# Patient Record
Sex: FEMALE | Race: BLACK OR AFRICAN AMERICAN | Employment: OTHER | URBAN - METROPOLITAN AREA
[De-identification: names, ages, dates, MRNs, and addresses within clinical notes are randomized per-mention and may not be internally consistent; named-entity substitution may affect disease eponyms.]

---

## 2017-03-27 ENCOUNTER — OFFICE VISIT (OUTPATIENT)
Dept: PRIMARY CARE CLINIC | Age: 67
End: 2017-03-27

## 2017-03-27 VITALS
HEART RATE: 55 BPM | DIASTOLIC BLOOD PRESSURE: 86 MMHG | SYSTOLIC BLOOD PRESSURE: 150 MMHG | BODY MASS INDEX: 27.38 KG/M2 | OXYGEN SATURATION: 99 % | WEIGHT: 140.2 LBS

## 2017-03-27 DIAGNOSIS — E04.1 THYROID NODULE: ICD-10-CM

## 2017-03-27 DIAGNOSIS — I10 ESSENTIAL HYPERTENSION: ICD-10-CM

## 2017-03-27 DIAGNOSIS — M81.0 OSTEOPOROSIS: ICD-10-CM

## 2017-03-27 DIAGNOSIS — I15.9 SECONDARY HYPERTENSION, UNSPECIFIED: ICD-10-CM

## 2017-03-27 DIAGNOSIS — Z11.59 NEED FOR HEPATITIS C SCREENING TEST: ICD-10-CM

## 2017-03-27 DIAGNOSIS — E55.9 VITAMIN D DEFICIENCY: ICD-10-CM

## 2017-03-27 DIAGNOSIS — J45.991 ASTHMA, COUGH VARIANT: Primary | ICD-10-CM

## 2017-03-27 DIAGNOSIS — Z23 NEED FOR TDAP VACCINATION: ICD-10-CM

## 2017-03-27 PROCEDURE — 90715 TDAP VACCINE 7 YRS/> IM: CPT | Performed by: INTERNAL MEDICINE

## 2017-03-27 PROCEDURE — 99214 OFFICE O/P EST MOD 30 MIN: CPT | Performed by: INTERNAL MEDICINE

## 2017-03-27 PROCEDURE — 90471 IMMUNIZATION ADMIN: CPT | Performed by: INTERNAL MEDICINE

## 2017-03-27 RX ORDER — ALENDRONATE SODIUM 70 MG/1
70 TABLET ORAL
Qty: 4 TABLET | Refills: 11 | Status: SHIPPED | OUTPATIENT
Start: 2017-03-27 | End: 2019-04-08 | Stop reason: SDUPTHER

## 2017-03-27 RX ORDER — MONTELUKAST SODIUM 10 MG/1
10 TABLET ORAL DAILY
Qty: 30 TABLET | Refills: 3 | Status: SHIPPED | OUTPATIENT
Start: 2017-03-27 | End: 2020-06-22

## 2017-03-27 RX ORDER — MULTIVIT-MIN/IRON/FOLIC ACID/K 18-600-40
1 CAPSULE ORAL DAILY
Qty: 30 CAPSULE | Refills: 11 | Status: SHIPPED | OUTPATIENT
Start: 2017-03-27 | End: 2017-03-28 | Stop reason: SDUPTHER

## 2017-03-27 RX ORDER — FLUTICASONE PROPIONATE 110 UG/1
2 AEROSOL, METERED RESPIRATORY (INHALATION) 2 TIMES DAILY
Qty: 1 INHALER | Refills: 3 | Status: SHIPPED | OUTPATIENT
Start: 2017-03-27 | End: 2017-10-03 | Stop reason: CLARIF

## 2017-03-27 RX ORDER — ATENOLOL 100 MG/1
100 TABLET ORAL DAILY
Qty: 30 TABLET | Refills: 11 | Status: SHIPPED | OUTPATIENT
Start: 2017-03-27 | End: 2018-04-02 | Stop reason: SDUPTHER

## 2017-03-27 RX ORDER — INHALER, ASSIST DEVICES
1 SPACER (EA) MISCELLANEOUS 2 TIMES DAILY
Qty: 1 EACH | Refills: 2 | Status: SHIPPED | OUTPATIENT
Start: 2017-03-27 | End: 2021-09-08

## 2017-03-27 RX ORDER — CHLORTHALIDONE 25 MG/1
TABLET ORAL
Qty: 30 TABLET | Refills: 11 | Status: SHIPPED | OUTPATIENT
Start: 2017-03-27 | End: 2018-04-02 | Stop reason: SDUPTHER

## 2017-03-27 RX ORDER — LEVOTHYROXINE SODIUM 0.1 MG/1
TABLET ORAL
Qty: 30 TABLET | Refills: 11 | Status: SHIPPED | OUTPATIENT
Start: 2017-03-27 | End: 2018-04-02 | Stop reason: SDUPTHER

## 2017-03-27 RX ORDER — NIFEDIPINE 30 MG/1
30 TABLET, EXTENDED RELEASE ORAL DAILY
Qty: 30 TABLET | Refills: 11 | Status: SHIPPED | OUTPATIENT
Start: 2017-03-27 | End: 2017-07-25 | Stop reason: SDUPTHER

## 2017-03-27 ASSESSMENT — ENCOUNTER SYMPTOMS
WHEEZING: 1
ABDOMINAL PAIN: 0
VOMITING: 0
COUGH: 1
BLOOD IN STOOL: 0
ANAL BLEEDING: 0
EYE PAIN: 0
ALLERGIC/IMMUNOLOGIC NEGATIVE: 1
SHORTNESS OF BREATH: 0
NAUSEA: 0
BACK PAIN: 0
CONSTIPATION: 0
DIARRHEA: 0
RECTAL PAIN: 0

## 2017-03-27 ASSESSMENT — PATIENT HEALTH QUESTIONNAIRE - PHQ9
2. FEELING DOWN, DEPRESSED OR HOPELESS: 0
1. LITTLE INTEREST OR PLEASURE IN DOING THINGS: 0
SUM OF ALL RESPONSES TO PHQ QUESTIONS 1-9: 0
SUM OF ALL RESPONSES TO PHQ9 QUESTIONS 1 & 2: 0

## 2017-03-28 DIAGNOSIS — E55.9 VITAMIN D DEFICIENCY: ICD-10-CM

## 2017-03-28 LAB
A/G RATIO: 1.7 (ref 1.1–2.2)
ALBUMIN SERPL-MCNC: 4.5 G/DL (ref 3.4–5)
ALP BLD-CCNC: 51 U/L (ref 40–129)
ALT SERPL-CCNC: 9 U/L (ref 10–40)
ANION GAP SERPL CALCULATED.3IONS-SCNC: 15 MMOL/L (ref 3–16)
AST SERPL-CCNC: 12 U/L (ref 15–37)
BASOPHILS ABSOLUTE: 0.1 K/UL (ref 0–0.2)
BASOPHILS RELATIVE PERCENT: 0.6 %
BILIRUB SERPL-MCNC: 0.4 MG/DL (ref 0–1)
BILIRUBIN URINE: NEGATIVE
BLOOD, URINE: NEGATIVE
BUN BLDV-MCNC: 15 MG/DL (ref 7–20)
CALCIUM SERPL-MCNC: 10.2 MG/DL (ref 8.3–10.6)
CHLORIDE BLD-SCNC: 101 MMOL/L (ref 99–110)
CHOLESTEROL, TOTAL: 156 MG/DL (ref 0–199)
CLARITY: CLEAR
CO2: 26 MMOL/L (ref 21–32)
COLOR: YELLOW
CREAT SERPL-MCNC: 0.8 MG/DL (ref 0.6–1.2)
EOSINOPHILS ABSOLUTE: 0.2 K/UL (ref 0–0.6)
EOSINOPHILS RELATIVE PERCENT: 2 %
GFR AFRICAN AMERICAN: >60
GFR NON-AFRICAN AMERICAN: >60
GLOBULIN: 2.7 G/DL
GLUCOSE BLD-MCNC: 90 MG/DL (ref 70–99)
GLUCOSE URINE: NEGATIVE MG/DL
HCT VFR BLD CALC: 39.7 % (ref 36–48)
HDLC SERPL-MCNC: 63 MG/DL (ref 40–60)
HEMOGLOBIN: 12.7 G/DL (ref 12–16)
HEPATITIS C ANTIBODY INTERPRETATION: NORMAL
KETONES, URINE: NEGATIVE MG/DL
LDL CHOLESTEROL CALCULATED: 65 MG/DL
LEUKOCYTE ESTERASE, URINE: NEGATIVE
LYMPHOCYTES ABSOLUTE: 1.2 K/UL (ref 1–5.1)
LYMPHOCYTES RELATIVE PERCENT: 12 %
MCH RBC QN AUTO: 28.4 PG (ref 26–34)
MCHC RBC AUTO-ENTMCNC: 31.9 G/DL (ref 31–36)
MCV RBC AUTO: 88.8 FL (ref 80–100)
MICROSCOPIC EXAMINATION: NORMAL
MONOCYTES ABSOLUTE: 0.7 K/UL (ref 0–1.3)
MONOCYTES RELATIVE PERCENT: 6.7 %
NEUTROPHILS ABSOLUTE: 7.7 K/UL (ref 1.7–7.7)
NEUTROPHILS RELATIVE PERCENT: 78.7 %
NITRITE, URINE: NEGATIVE
PDW BLD-RTO: 15.4 % (ref 12.4–15.4)
PH UA: 6.5
PLATELET # BLD: 475 K/UL (ref 135–450)
PMV BLD AUTO: 9.9 FL (ref 5–10.5)
POTASSIUM SERPL-SCNC: 4.5 MMOL/L (ref 3.5–5.1)
PROTEIN UA: NEGATIVE MG/DL
RBC # BLD: 4.46 M/UL (ref 4–5.2)
SODIUM BLD-SCNC: 142 MMOL/L (ref 136–145)
SPECIFIC GRAVITY UA: 1.02
TOTAL PROTEIN: 7.2 G/DL (ref 6.4–8.2)
TRIGL SERPL-MCNC: 139 MG/DL (ref 0–150)
TSH REFLEX FT4: 1.43 UIU/ML (ref 0.27–4.2)
URINE TYPE: NORMAL
UROBILINOGEN, URINE: 0.2 E.U./DL
VITAMIN D 25-HYDROXY: 29 NG/ML
VLDLC SERPL CALC-MCNC: 28 MG/DL
WBC # BLD: 9.8 K/UL (ref 4–11)

## 2017-03-28 RX ORDER — MULTIVIT-MIN/IRON/FOLIC ACID/K 18-600-40
2 CAPSULE ORAL DAILY
Qty: 60 CAPSULE | Refills: 11 | Status: SHIPPED | OUTPATIENT
Start: 2017-03-28

## 2017-03-31 ENCOUNTER — TELEPHONE (OUTPATIENT)
Dept: PRIMARY CARE CLINIC | Age: 67
End: 2017-03-31

## 2017-07-24 DIAGNOSIS — I10 ESSENTIAL HYPERTENSION: ICD-10-CM

## 2017-08-10 ENCOUNTER — HOSPITAL ENCOUNTER (OUTPATIENT)
Dept: MAMMOGRAPHY | Age: 67
Discharge: HOME | End: 2017-08-10
Admitting: INTERNAL MEDICINE

## 2017-08-10 DIAGNOSIS — E04.1 THYROID NODULE: ICD-10-CM

## 2017-08-10 DIAGNOSIS — Z12.31 ENCOUNTER FOR SCREENING MAMMOGRAM FOR BREAST CANCER: ICD-10-CM

## 2017-08-14 ENCOUNTER — TELEPHONE (OUTPATIENT)
Dept: PRIMARY CARE CLINIC | Age: 67
End: 2017-08-14

## 2017-08-15 ENCOUNTER — TELEPHONE (OUTPATIENT)
Dept: PRIMARY CARE CLINIC | Age: 67
End: 2017-08-15

## 2017-08-15 DIAGNOSIS — R92.8 ABNORMAL MAMMOGRAM: Primary | ICD-10-CM

## 2017-08-16 ENCOUNTER — TELEPHONE (OUTPATIENT)
Dept: PRIMARY CARE CLINIC | Age: 67
End: 2017-08-16

## 2017-08-17 ENCOUNTER — HOSPITAL ENCOUNTER (OUTPATIENT)
Dept: MAMMOGRAPHY | Age: 67
Discharge: HOME | End: 2017-08-17
Attending: INTERNAL MEDICINE | Admitting: INTERNAL MEDICINE

## 2017-08-17 DIAGNOSIS — R92.8 ABNORMAL MAMMOGRAM: ICD-10-CM

## 2017-08-17 DIAGNOSIS — R92.8 OTHER ABNORMAL AND INCONCLUSIVE FINDINGS ON DIAGNOSTIC IMAGING OF BREAST: ICD-10-CM

## 2017-10-03 ENCOUNTER — OFFICE VISIT (OUTPATIENT)
Dept: PRIMARY CARE CLINIC | Age: 67
End: 2017-10-03

## 2017-10-03 VITALS
HEART RATE: 69 BPM | DIASTOLIC BLOOD PRESSURE: 78 MMHG | SYSTOLIC BLOOD PRESSURE: 122 MMHG | HEIGHT: 60 IN | BODY MASS INDEX: 31.02 KG/M2 | TEMPERATURE: 98.4 F | OXYGEN SATURATION: 97 % | WEIGHT: 158 LBS | RESPIRATION RATE: 16 BRPM

## 2017-10-03 DIAGNOSIS — J06.9 VIRAL URI WITH COUGH: Primary | ICD-10-CM

## 2017-10-03 PROCEDURE — 99213 OFFICE O/P EST LOW 20 MIN: CPT | Performed by: FAMILY MEDICINE

## 2017-10-03 RX ORDER — GUAIFENESIN AND CODEINE PHOSPHATE 100; 10 MG/5ML; MG/5ML
10 SOLUTION ORAL NIGHTLY
Qty: 120 ML | Refills: 0 | Status: SHIPPED | OUTPATIENT
Start: 2017-10-03 | End: 2018-12-12

## 2017-10-03 NOTE — MR AVS SNAPSHOT
After Visit Summary             Marco Mathews   10/3/2017 3:40 PM   Office Visit    Description:  Female : 1950   Provider:  Mary Winston MD   Department:  [address] [facility]              Your Follow-Up and Future Appointments         Below is a list of your follow-up and future appointments. This may not be a complete list as you may have made appointments directly with providers that we are not aware of or your providers may have made some for you. Please call your providers to confirm appointments. It is important to keep your appointments. Please bring your current insurance card, photo ID, co-pay, and all medication bottles to your appointment. If self-pay, payment is expected at the time of service. Your To-Do List     Follow-Up    Return if symptoms worsen or fail to improve. Information from Your Visit        Department     Name Address Phone Fax    [address] [phone]      You Were Seen for:         Comments    Viral URI with cough   [[id number]]         Vital Signs     Blood Pressure Pulse Temperature Respirations Height Weight    122/78 69 98.4 °F (36.9 °C) (Oral) 16 4' 11.5\" (1.511 m) 158 lb (71.7 kg)    Oxygen Saturation Body Mass Index Smoking Status             97% 31.38 kg/m2 Never Smoker         Additional Information about your Body Mass Index (BMI)           Your BMI as listed above is considered obese (30 or more). BMI is an estimate of body fat, calculated from your height and weight. The higher your BMI, the greater your risk of heart disease, high blood pressure, type 2 diabetes, stroke, gallstones, arthritis, sleep apnea, and certain cancers. BMI is not perfect. It may overestimate body fat in athletes and people who are more muscular.   Even a small weight loss (between 5 and 10 percent of your current weight) by decreasing your calorie intake and license by [facility] ([facility]). If you have questions about a medical condition or this instruction, always ask your healthcare professional. Anh Guzman any warranty or liability for your use of this information. Today's Medication Changes          These changes are accurate as of: 10/3/17  4:15 PM.  If you have any questions, ask your nurse or doctor. START taking these medications           guaiFENesin-codeine 100-10 MG/5ML syrup   Commonly known as:  CHERATUSSIN AC   Instructions: Take 10 mLs by mouth nightly   Quantity:  120 mL   Refills:  0   Started by:  Jonathan Pettit MD         STOP taking these medications           fluticasone 110 MCG/ACT inhaler   Commonly known as:  FLOVENT HFA   Stopped by:  Jonathan Pettit MD            Where to Get Your Medications      You can get these medications from any pharmacy     Bring a paper prescription for each of these medications     guaiFENesin-codeine 100-10 MG/5ML syrup               Your Current Medications Are              guaiFENesin-codeine (CHERATUSSIN AC) 100-10 MG/5ML syrup Take 10 mLs by mouth nightly    NIFEDICAL XL 30 MG extended release tablet TAKE ONE TABLET BY MOUTH ONCE DAILY    beclomethasone (QVAR) 80 MCG/ACT inhaler Inhale 1 puff into the lungs 2 times daily Discontinue flovent    Cholecalciferol (VITAMIN D) 2000 UNITS CAPS capsule Take 2 capsules by mouth daily Remind patient, can purchase over the counter and also purchase Caltrate D 600mg/ 400 iu and take bid.     atenolol (TENORMIN) 100 MG tablet Take 1 tablet by mouth daily    levothyroxine (SYNTHROID) 100 MCG tablet TAKE ONE TABLET BY MOUTH DAILY    alendronate (FOSAMAX) 70 MG tablet Take 1 tablet by mouth every 7 days    chlorthalidone (HYGROTON) 25 MG tablet TAKE ONE TABLET BY MOUTH EVERY DAY    Spacer/Aero-Holding Chambers (AEROCHAMBER MV) MISC 1 each by Does not apply route 2 times daily montelukast (SINGULAIR) 10 MG tablet Take 1 tablet by mouth daily      Allergies              Ace Inhibitors Swelling    Lip swelling    Amlodipine Besy-benazepril Hcl Other (See Comments)    Lip swelling    Benicar [Olmesartan]     cough    Clonidine Derivatives Other (See Comments)    Lethargy. Additional Information        Basic Information     Date Of Birth Sex Race Ethnicity Preferred Language    1950 Female Black Non-/Non  English      Problem List as of 10/3/2017  Date Reviewed: 10/3/2017                Multiple thyroid nodules    Essential hypertension    Right thyroid nodule    Prediabetes    Diverticulitis    Gas pain    Osteopenia    Vitamin D deficiency    Hypokalemia    Abnormal mammogram    Foot pain, left    Muscle cramps    Thyroid nodule    Osteoporosis    Thrombocytosis (HCC)    Pap smear    Visit for screening mammogram    S/P colonoscopy      Your Goals as of 10/3/2017 at 4:15 PM              3/27/17    12/20/12       Exercise    Exercise 7x per week (30 min per time)   On track  On track      Immunizations as of 10/3/2017     Name Date    Influenza Vaccine, unspecified formulation 10/27/2016    Influenza, High Dose 11/11/2015    Pneumococcal 13-valent Conjugate (Xsoqcpp18) 12/21/2015    Pneumococcal Polysaccharide (Uyjfuzcrp49) 8/31/2014    Tdap (Boostrix, Adacel) 3/27/2017      Preventive Care        Date Due    Zoster Vaccine 10/22/2008    Yearly Flu Vaccine (1) 9/1/2017    Mammograms are recommended every 2 years for low/average risk patients aged 48 - 69, and every year for high risk patients per updated national guidelines. However these guidelines can be individualized by your provider.  8/17/2019    Colonoscopy 3/26/2020    Cholesterol Screening 3/27/2022    Tetanus Combination Vaccine (2 - Td) 3/27/2027            MyChart Signup           Node Management allows you to send messages to your doctor, view your test results, renew your prescriptions, schedule appointments, view visit notes, and more. How Do I Sign Up? 1. In your Internet browser, go to [email or web address]  2. Click on the Sign Up Now link in the Sign In box. You will see the New Member Sign Up page. 3. Enter your Ecosiat Access Code exactly as it appears below. You will not need to use this code after youve completed the sign-up process. If you do not sign up before the expiration date, you must request a new code. [facility] Access Code: [email or web address]  Expires: 12/2/2017  4:15 PM    4. Enter your Social Security Number ([id number]) and Date of Birth (mm/dd/yyyy) as indicated and click Submit. You will be taken to the next sign-up page. 5. Create a Ecosiat ID. This will be your [facility] login ID and cannot be changed, so think of one that is secure and easy to remember. 6. Create a [facility] password. You can change your password at any time. 7. Enter your Password Reset Question and Answer. This can be used at a later time if you forget your password. 8. Enter your e-mail address. You will receive e-mail notification when new information is available in [address]. 9. Click Sign Up. You can now view your medical record. Additional Information  If you have questions, please contact the physician practice where you receive care. Remember, [facility] is NOT to be used for urgent needs. For medical emergencies, dial 911. For questions regarding your [facility] account call [phone]. If you have a clinical question, please call your doctor's office.

## 2017-10-03 NOTE — PROGRESS NOTES
Subjective:      Patient ID: Jose M Tilley is a 74 y.o. female. HPI Pt of Dr Iris Craig here for acute problem. Has had a 'cold' for 2 weeks. Congestion, cough, down in chest.  Afebrile. Sore throat has resolved. Review of Systems   All other systems reviewed and are negative. Objective:   Physical Exam   Constitutional: She is oriented to person, place, and time. She appears well-developed and well-nourished. HENT:   Head: Normocephalic and atraumatic. Neck: Normal range of motion. Neck supple. No thyromegaly present. Cardiovascular: Normal rate, regular rhythm, normal heart sounds and intact distal pulses. No murmur heard. Pulmonary/Chest: Effort normal and breath sounds normal. No respiratory distress. Abdominal: Soft. Musculoskeletal: Normal range of motion. She exhibits no edema or tenderness. Lymphadenopathy:     She has no cervical adenopathy. Neurological: She is alert and oriented to person, place, and time. Skin: Skin is warm and dry. Psychiatric: She has a normal mood and affect. Her behavior is normal.   Vitals reviewed. Assessment / Plan:      Catalina Banegas was seen today for cough. Diagnoses and all orders for this visit:    Viral URI with cough  -     guaiFENesin-codeine (CHERATUSSIN AC) 100-10 MG/5ML syrup;  Take 10 mLs by mouth nightly  -  Rest, fluids, mucinex

## 2017-10-20 ENCOUNTER — TELEPHONE (OUTPATIENT)
Dept: PRIMARY CARE CLINIC | Age: 67
End: 2017-10-20

## 2017-10-20 NOTE — TELEPHONE ENCOUNTER
Patient is requesting something for a yeast infection. She states it started yesterday. She has itching, no discharge.   Please advise

## 2017-10-24 DIAGNOSIS — N76.0 ACUTE VAGINITIS: Primary | ICD-10-CM

## 2017-10-24 RX ORDER — FLUCONAZOLE 150 MG/1
150 TABLET ORAL ONCE
Qty: 1 TABLET | Refills: 0 | Status: SHIPPED | OUTPATIENT
Start: 2017-10-24 | End: 2017-10-24

## 2017-11-09 ENCOUNTER — OFFICE VISIT (OUTPATIENT)
Dept: PRIMARY CARE CLINIC | Age: 67
End: 2017-11-09

## 2017-11-09 VITALS
OXYGEN SATURATION: 93 % | TEMPERATURE: 98.7 F | SYSTOLIC BLOOD PRESSURE: 148 MMHG | DIASTOLIC BLOOD PRESSURE: 88 MMHG | WEIGHT: 158 LBS | HEART RATE: 61 BPM | RESPIRATION RATE: 18 BRPM | BODY MASS INDEX: 31.38 KG/M2

## 2017-11-09 DIAGNOSIS — S80.01XA CONTUSION OF RIGHT KNEE, INITIAL ENCOUNTER: Primary | ICD-10-CM

## 2017-11-09 DIAGNOSIS — E55.9 VITAMIN D DEFICIENCY: ICD-10-CM

## 2017-11-09 DIAGNOSIS — Z23 NEEDS FLU SHOT: ICD-10-CM

## 2017-11-09 DIAGNOSIS — I10 ESSENTIAL HYPERTENSION: ICD-10-CM

## 2017-11-09 DIAGNOSIS — Z78.0 MENOPAUSE: ICD-10-CM

## 2017-11-09 DIAGNOSIS — E04.1 THYROID NODULE: ICD-10-CM

## 2017-11-09 DIAGNOSIS — R73.03 PREDIABETES: ICD-10-CM

## 2017-11-09 PROCEDURE — 99213 OFFICE O/P EST LOW 20 MIN: CPT | Performed by: INTERNAL MEDICINE

## 2017-11-09 RX ORDER — CELECOXIB 200 MG/1
200 CAPSULE ORAL DAILY
Qty: 7 CAPSULE | Refills: 0 | Status: SHIPPED | OUTPATIENT
Start: 2017-11-09 | End: 2018-12-12

## 2017-11-09 ASSESSMENT — ENCOUNTER SYMPTOMS
ABDOMINAL PAIN: 0
RECTAL PAIN: 0
COUGH: 0
VISUAL CHANGE: 0
NAUSEA: 0
WHEEZING: 0
BACK PAIN: 0
CONSTIPATION: 0
ANAL BLEEDING: 0
BOWEL INCONTINENCE: 0
EYE PAIN: 0
DIARRHEA: 0
SHORTNESS OF BREATH: 0
RESPIRATORY NEGATIVE: 1
BLOOD IN STOOL: 0
VOMITING: 0
ALLERGIC/IMMUNOLOGIC NEGATIVE: 1

## 2017-11-09 NOTE — PROGRESS NOTES
plan.      I have instructed Josseline to complete a self tracking handout on Blood Pressures  and Weights and instructed them to bring it with them to her next appointment. Discussed use, benefit, and side effects of prescribed medications. Barriers to medication compliance addressed. All patient questions answered. Pt voiced understanding. Patient is taking over the counter meds and discussed as to how they interact with prescription medications. Return in one month.

## 2017-11-10 ENCOUNTER — HOSPITAL ENCOUNTER (OUTPATIENT)
Dept: OTHER | Age: 67
Discharge: HOME | End: 2017-11-10
Attending: INTERNAL MEDICINE | Admitting: INTERNAL MEDICINE

## 2017-11-10 DIAGNOSIS — S80.01XA CONTUSION OF RIGHT KNEE, INITIAL ENCOUNTER: ICD-10-CM

## 2017-11-14 ENCOUNTER — OFFICE VISIT (OUTPATIENT)
Dept: ORTHOPEDIC SURGERY | Age: 67
End: 2017-11-14

## 2017-11-14 VITALS
SYSTOLIC BLOOD PRESSURE: 127 MMHG | HEART RATE: 60 BPM | DIASTOLIC BLOOD PRESSURE: 77 MMHG | HEIGHT: 59 IN | BODY MASS INDEX: 31.85 KG/M2 | WEIGHT: 158 LBS

## 2017-11-14 DIAGNOSIS — M25.561 RIGHT KNEE PAIN, UNSPECIFIED CHRONICITY: Primary | ICD-10-CM

## 2017-11-14 PROCEDURE — 73560 X-RAY EXAM OF KNEE 1 OR 2: CPT | Performed by: ORTHOPAEDIC SURGERY

## 2017-11-14 PROCEDURE — 99203 OFFICE O/P NEW LOW 30 MIN: CPT | Performed by: ORTHOPAEDIC SURGERY

## 2017-11-14 NOTE — PROGRESS NOTES
Narrative    None       Current Outpatient Prescriptions   Medication Sig Dispense Refill    celecoxib (CELEBREX) 200 MG capsule Take 1 capsule by mouth daily 7 capsule 0    guaiFENesin-codeine (CHERATUSSIN AC) 100-10 MG/5ML syrup Take 10 mLs by mouth nightly 120 mL 0    NIFEDICAL XL 30 MG extended release tablet TAKE ONE TABLET BY MOUTH ONCE DAILY 30 tablet 0    beclomethasone (QVAR) 80 MCG/ACT inhaler Inhale 1 puff into the lungs 2 times daily Discontinue flovent 1 Inhaler 3    Cholecalciferol (VITAMIN D) 2000 UNITS CAPS capsule Take 2 capsules by mouth daily Remind patient, can purchase over the counter and also purchase Caltrate D 600mg/ 400 iu and take bid. 60 capsule 11    atenolol (TENORMIN) 100 MG tablet Take 1 tablet by mouth daily 30 tablet 11    levothyroxine (SYNTHROID) 100 MCG tablet TAKE ONE TABLET BY MOUTH DAILY 30 tablet 11    alendronate (FOSAMAX) 70 MG tablet Take 1 tablet by mouth every 7 days 4 tablet 11    chlorthalidone (HYGROTON) 25 MG tablet TAKE ONE TABLET BY MOUTH EVERY DAY 30 tablet 11    Spacer/Aero-Holding Chambers (AEROCHAMBER MV) MISC 1 each by Does not apply route 2 times daily 1 each 2    montelukast (SINGULAIR) 10 MG tablet Take 1 tablet by mouth daily 30 tablet 3     No current facility-administered medications for this visit. Allergies   Allergen Reactions    Ace Inhibitors Swelling     Lip swelling    Amlodipine Besy-Benazepril Hcl Other (See Comments)     Lip swelling    Benicar [Olmesartan]      cough    Clonidine Derivatives Other (See Comments)     Lethargy. Review of Systems:  Pertinent items are noted in HPI. Review of systems from Patient History Form dated 11/14/17 and available in the patient's chart under the Media tab.       Physical Examination:     Vital signs:   /77   Pulse 60   Ht 4' 11\" (1.499 m)   Wt 158 lb (71.7 kg)   BMI 31.91 kg/m²     General:  alert, appears stated age, cooperative and no distress   Gait:  Normal. The

## 2018-04-02 DIAGNOSIS — I10 ESSENTIAL HYPERTENSION: ICD-10-CM

## 2018-04-02 RX ORDER — NIFEDIPINE 30 MG/1
30 TABLET, EXTENDED RELEASE ORAL DAILY
Qty: 90 TABLET | Refills: 3 | Status: SHIPPED | OUTPATIENT
Start: 2018-04-02 | End: 2018-05-09

## 2018-05-09 ENCOUNTER — OFFICE VISIT (OUTPATIENT)
Dept: PRIMARY CARE CLINIC | Age: 68
End: 2018-05-09

## 2018-05-09 VITALS
HEIGHT: 60 IN | BODY MASS INDEX: 27.29 KG/M2 | TEMPERATURE: 97 F | SYSTOLIC BLOOD PRESSURE: 140 MMHG | DIASTOLIC BLOOD PRESSURE: 78 MMHG | HEART RATE: 53 BPM | OXYGEN SATURATION: 98 % | WEIGHT: 139 LBS | RESPIRATION RATE: 16 BRPM

## 2018-05-09 DIAGNOSIS — R20.0 NUMBNESS AND TINGLING IN LEFT HAND: Primary | ICD-10-CM

## 2018-05-09 DIAGNOSIS — R20.2 NUMBNESS AND TINGLING IN LEFT HAND: Primary | ICD-10-CM

## 2018-05-09 DIAGNOSIS — E04.1 THYROID NODULE: ICD-10-CM

## 2018-05-09 DIAGNOSIS — E55.9 VITAMIN D DEFICIENCY: ICD-10-CM

## 2018-05-09 DIAGNOSIS — D75.839 THROMBOCYTOSIS: ICD-10-CM

## 2018-05-09 DIAGNOSIS — Z12.31 ENCOUNTER FOR SCREENING MAMMOGRAM FOR BREAST CANCER: ICD-10-CM

## 2018-05-09 DIAGNOSIS — R73.03 PREDIABETES: ICD-10-CM

## 2018-05-09 DIAGNOSIS — I10 ESSENTIAL HYPERTENSION: ICD-10-CM

## 2018-05-09 PROCEDURE — 99213 OFFICE O/P EST LOW 20 MIN: CPT | Performed by: INTERNAL MEDICINE

## 2018-05-09 RX ORDER — NIFEDIPINE 60 MG/1
60 TABLET, EXTENDED RELEASE ORAL DAILY
Qty: 90 TABLET | Refills: 3 | Status: SHIPPED | OUTPATIENT
Start: 2018-05-09 | End: 2019-04-08 | Stop reason: SDUPTHER

## 2018-05-09 ASSESSMENT — ENCOUNTER SYMPTOMS
ANAL BLEEDING: 0
ALLERGIC/IMMUNOLOGIC NEGATIVE: 1
WHEEZING: 0
CONSTIPATION: 0
ABDOMINAL PAIN: 0
SHORTNESS OF BREATH: 0
RECTAL PAIN: 0
COUGH: 0
BACK PAIN: 0
NAUSEA: 0
DIARRHEA: 0
EYE PAIN: 0
BLOOD IN STOOL: 0
VOMITING: 0

## 2018-05-09 ASSESSMENT — PATIENT HEALTH QUESTIONNAIRE - PHQ9
2. FEELING DOWN, DEPRESSED OR HOPELESS: 0
SUM OF ALL RESPONSES TO PHQ9 QUESTIONS 1 & 2: 0
1. LITTLE INTEREST OR PLEASURE IN DOING THINGS: 0
SUM OF ALL RESPONSES TO PHQ QUESTIONS 1-9: 0

## 2018-05-11 ENCOUNTER — HOSPITAL ENCOUNTER (OUTPATIENT)
Dept: OTHER | Age: 68
Discharge: HOME | End: 2018-05-11
Attending: INTERNAL MEDICINE | Admitting: INTERNAL MEDICINE

## 2018-05-11 DIAGNOSIS — R73.03 PREDIABETES: ICD-10-CM

## 2018-05-11 DIAGNOSIS — I10 ESSENTIAL HYPERTENSION: ICD-10-CM

## 2018-05-11 DIAGNOSIS — R20.0 NUMBNESS AND TINGLING IN LEFT HAND: ICD-10-CM

## 2018-05-11 DIAGNOSIS — E04.1 THYROID NODULE: ICD-10-CM

## 2018-05-11 DIAGNOSIS — R20.2 NUMBNESS AND TINGLING IN LEFT HAND: ICD-10-CM

## 2018-05-11 LAB
A/G RATIO: 1.5 (ref 1.1–2.2)
ALBUMIN SERPL-MCNC: 4.4 G/DL (ref 3.4–5)
ALP BLD-CCNC: 46 U/L (ref 40–129)
ALT SERPL-CCNC: 8 U/L (ref 10–40)
ANION GAP SERPL CALCULATED.3IONS-SCNC: 14 MMOL/L (ref 3–16)
AST SERPL-CCNC: 13 U/L (ref 15–37)
BASOPHILS ABSOLUTE: 0.1 K/UL (ref 0–0.2)
BASOPHILS RELATIVE PERCENT: 0.6 %
BILIRUB SERPL-MCNC: 0.3 MG/DL (ref 0–1)
BUN BLDV-MCNC: 24 MG/DL (ref 7–20)
CALCIUM SERPL-MCNC: 10.1 MG/DL (ref 8.3–10.6)
CHLORIDE BLD-SCNC: 100 MMOL/L (ref 99–110)
CO2: 27 MMOL/L (ref 21–32)
CREAT SERPL-MCNC: 0.8 MG/DL (ref 0.6–1.2)
EOSINOPHILS ABSOLUTE: 0.3 K/UL (ref 0–0.6)
EOSINOPHILS RELATIVE PERCENT: 4 %
ESTIMATED AVERAGE GLUCOSE: 111.2 MG/DL
GFR AFRICAN AMERICAN: >60
GFR NON-AFRICAN AMERICAN: >60
GLOBULIN: 3 G/DL
GLUCOSE BLD-MCNC: 95 MG/DL (ref 70–99)
HBA1C MFR BLD: 5.5 %
HCT VFR BLD CALC: 37.3 % (ref 36–48)
HEMOGLOBIN: 12.7 G/DL (ref 12–16)
LYMPHOCYTES ABSOLUTE: 1.7 K/UL (ref 1–5.1)
LYMPHOCYTES RELATIVE PERCENT: 22.2 %
MCH RBC QN AUTO: 29.7 PG (ref 26–34)
MCHC RBC AUTO-ENTMCNC: 34.1 G/DL (ref 31–36)
MCV RBC AUTO: 87.1 FL (ref 80–100)
MONOCYTES ABSOLUTE: 0.7 K/UL (ref 0–1.3)
MONOCYTES RELATIVE PERCENT: 9.2 %
NEUTROPHILS ABSOLUTE: 5 K/UL (ref 1.7–7.7)
NEUTROPHILS RELATIVE PERCENT: 64 %
PDW BLD-RTO: 15.5 % (ref 12.4–15.4)
PLATELET # BLD: 444 K/UL (ref 135–450)
PMV BLD AUTO: 10 FL (ref 5–10.5)
POTASSIUM SERPL-SCNC: 3.8 MMOL/L (ref 3.5–5.1)
RBC # BLD: 4.29 M/UL (ref 4–5.2)
SODIUM BLD-SCNC: 141 MMOL/L (ref 136–145)
TOTAL PROTEIN: 7.4 G/DL (ref 6.4–8.2)
TSH REFLEX FT4: 1.75 UIU/ML (ref 0.27–4.2)
VITAMIN B-12: 632 PG/ML (ref 211–911)
VITAMIN D 25-HYDROXY: 30 NG/ML
WBC # BLD: 7.8 K/UL (ref 4–11)

## 2018-05-15 ENCOUNTER — TELEPHONE (OUTPATIENT)
Dept: PRIMARY CARE CLINIC | Age: 68
End: 2018-05-15

## 2018-06-08 RX ORDER — LEVOTHYROXINE SODIUM 0.1 MG/1
TABLET ORAL
Qty: 30 TABLET | Refills: 0 | Status: SHIPPED | OUTPATIENT
Start: 2018-06-08 | End: 2018-06-11 | Stop reason: SDUPTHER

## 2018-06-11 ENCOUNTER — OFFICE VISIT (OUTPATIENT)
Dept: PRIMARY CARE CLINIC | Age: 68
End: 2018-06-11

## 2018-06-11 VITALS
DIASTOLIC BLOOD PRESSURE: 82 MMHG | RESPIRATION RATE: 18 BRPM | HEART RATE: 55 BPM | WEIGHT: 136 LBS | SYSTOLIC BLOOD PRESSURE: 129 MMHG | BODY MASS INDEX: 26.56 KG/M2 | TEMPERATURE: 98 F | OXYGEN SATURATION: 96 %

## 2018-06-11 DIAGNOSIS — L23.7 POISON IVY: Primary | ICD-10-CM

## 2018-06-11 DIAGNOSIS — E03.9 ACQUIRED HYPOTHYROIDISM: ICD-10-CM

## 2018-06-11 DIAGNOSIS — I10 ESSENTIAL HYPERTENSION: ICD-10-CM

## 2018-06-11 DIAGNOSIS — R73.03 PREDIABETES: ICD-10-CM

## 2018-06-11 PROCEDURE — 99213 OFFICE O/P EST LOW 20 MIN: CPT | Performed by: INTERNAL MEDICINE

## 2018-06-11 RX ORDER — FLUOCINONIDE 0.5 MG/G
OINTMENT TOPICAL
Qty: 15 G | Refills: 5 | Status: SHIPPED | OUTPATIENT
Start: 2018-06-11 | End: 2018-06-18

## 2018-06-11 RX ORDER — LEVOTHYROXINE SODIUM 0.1 MG/1
TABLET ORAL
Qty: 90 TABLET | Refills: 3 | Status: SHIPPED | OUTPATIENT
Start: 2018-06-11 | End: 2019-04-08 | Stop reason: SDUPTHER

## 2018-06-11 ASSESSMENT — ENCOUNTER SYMPTOMS
WHEEZING: 0
ALLERGIC/IMMUNOLOGIC NEGATIVE: 1
ABDOMINAL PAIN: 0
DIARRHEA: 0
RECTAL PAIN: 0
COUGH: 0
NAUSEA: 0
SORE THROAT: 0
NAIL CHANGES: 0
ANAL BLEEDING: 0
RHINORRHEA: 0
BLOOD IN STOOL: 0
SHORTNESS OF BREATH: 0
BACK PAIN: 0
VOMITING: 0
EYE PAIN: 0
CONSTIPATION: 0

## 2018-06-11 ASSESSMENT — PATIENT HEALTH QUESTIONNAIRE - PHQ9
1. LITTLE INTEREST OR PLEASURE IN DOING THINGS: 0
SUM OF ALL RESPONSES TO PHQ QUESTIONS 1-9: 0
SUM OF ALL RESPONSES TO PHQ9 QUESTIONS 1 & 2: 0
2. FEELING DOWN, DEPRESSED OR HOPELESS: 0

## 2018-12-06 ENCOUNTER — OFFICE VISIT (OUTPATIENT)
Dept: ORTHOPEDIC SURGERY | Age: 68
End: 2018-12-06
Payer: MEDICARE

## 2018-12-06 VITALS
SYSTOLIC BLOOD PRESSURE: 133 MMHG | DIASTOLIC BLOOD PRESSURE: 81 MMHG | WEIGHT: 131 LBS | HEART RATE: 53 BPM | HEIGHT: 60 IN | BODY MASS INDEX: 25.72 KG/M2

## 2018-12-06 DIAGNOSIS — M25.562 LEFT KNEE PAIN, UNSPECIFIED CHRONICITY: ICD-10-CM

## 2018-12-06 DIAGNOSIS — M17.12 PRIMARY OSTEOARTHRITIS OF LEFT KNEE: Primary | ICD-10-CM

## 2018-12-06 PROCEDURE — 20610 DRAIN/INJ JOINT/BURSA W/O US: CPT | Performed by: ORTHOPAEDIC SURGERY

## 2018-12-06 PROCEDURE — 99214 OFFICE O/P EST MOD 30 MIN: CPT | Performed by: ORTHOPAEDIC SURGERY

## 2018-12-06 RX ORDER — IBUPROFEN 200 MG
200 TABLET ORAL EVERY 6 HOURS PRN
COMMUNITY
End: 2018-12-12

## 2018-12-06 RX ORDER — TRIAMCINOLONE ACETONIDE 40 MG/ML
40 INJECTION, SUSPENSION INTRA-ARTICULAR; INTRAMUSCULAR ONCE
Status: COMPLETED | OUTPATIENT
Start: 2018-12-06 | End: 2018-12-06

## 2018-12-06 RX ADMIN — TRIAMCINOLONE ACETONIDE 40 MG: 40 INJECTION, SUSPENSION INTRA-ARTICULAR; INTRAMUSCULAR at 12:29

## 2018-12-12 ENCOUNTER — OFFICE VISIT (OUTPATIENT)
Dept: PRIMARY CARE CLINIC | Age: 68
End: 2018-12-12
Payer: MEDICARE

## 2018-12-12 VITALS
OXYGEN SATURATION: 99 % | WEIGHT: 131 LBS | RESPIRATION RATE: 18 BRPM | TEMPERATURE: 97.2 F | BODY MASS INDEX: 25.72 KG/M2 | HEIGHT: 60 IN | DIASTOLIC BLOOD PRESSURE: 83 MMHG | SYSTOLIC BLOOD PRESSURE: 137 MMHG | HEART RATE: 54 BPM

## 2018-12-12 DIAGNOSIS — I10 ESSENTIAL HYPERTENSION: ICD-10-CM

## 2018-12-12 DIAGNOSIS — E03.9 ACQUIRED HYPOTHYROIDISM: ICD-10-CM

## 2018-12-12 DIAGNOSIS — E55.9 VITAMIN D DEFICIENCY: ICD-10-CM

## 2018-12-12 DIAGNOSIS — R42 VERTIGO: ICD-10-CM

## 2018-12-12 DIAGNOSIS — Z12.11 COLON CANCER SCREENING: ICD-10-CM

## 2018-12-12 DIAGNOSIS — Z00.00 ROUTINE GENERAL MEDICAL EXAMINATION AT A HEALTH CARE FACILITY: Primary | ICD-10-CM

## 2018-12-12 DIAGNOSIS — Z78.0 MENOPAUSE: Primary | ICD-10-CM

## 2018-12-12 DIAGNOSIS — Z12.31 ENCOUNTER FOR SCREENING MAMMOGRAM FOR BREAST CANCER: ICD-10-CM

## 2018-12-12 PROCEDURE — G0438 PPPS, INITIAL VISIT: HCPCS | Performed by: INTERNAL MEDICINE

## 2018-12-12 ASSESSMENT — ENCOUNTER SYMPTOMS
COUGH: 0
SORE THROAT: 0
ABDOMINAL PAIN: 0
BLOOD IN STOOL: 0
WHEEZING: 0
CONSTIPATION: 0
NAUSEA: 0
BACK PAIN: 0
EYE PAIN: 0
RHINORRHEA: 0
ANAL BLEEDING: 0
SHORTNESS OF BREATH: 0
RECTAL PAIN: 0
DIARRHEA: 0
ALLERGIC/IMMUNOLOGIC NEGATIVE: 1
VOMITING: 0

## 2018-12-12 ASSESSMENT — LIFESTYLE VARIABLES: HOW OFTEN DO YOU HAVE A DRINK CONTAINING ALCOHOL: 0

## 2018-12-12 ASSESSMENT — PATIENT HEALTH QUESTIONNAIRE - PHQ9
SUM OF ALL RESPONSES TO PHQ QUESTIONS 1-9: 0
SUM OF ALL RESPONSES TO PHQ QUESTIONS 1-9: 0

## 2018-12-12 ASSESSMENT — ANXIETY QUESTIONNAIRES: GAD7 TOTAL SCORE: 0

## 2018-12-12 NOTE — PROGRESS NOTES
outside providers/suppliers regularly involved in providing care):   Patient Care Team:  Clemente Hayden MD as PCP - General (Internal Medicine)  Clemente Hayden MD as PCP - S Attributed Provider  Versa KINSEY Bird as Consulting Physician (Podiatry)  Alvaro Coy MD as Consulting Physician (Gynecology)  Mazin Grande MD as Consulting Physician (Otolaryngology)    Wt Readings from Last 3 Encounters:   12/12/18 131 lb (59.4 kg)   12/06/18 131 lb (59.4 kg)   06/11/18 136 lb (61.7 kg)     Vitals:    12/12/18 1404   BP: 137/83   Site: Left Upper Arm   Position: Sitting   Cuff Size: Large Adult   Pulse: 54   Resp: 18   Temp: 97.2 °F (36.2 °C)   TempSrc: Oral   SpO2: 99%   Weight: 131 lb (59.4 kg)   Height: 5' (1.524 m)     Body mass index is 25.58 kg/m². Flu shot [facility] at [facility] in November. Patient's complete Health Risk Assessment and screening values have been reviewed and are found in Flowsheets. The following problems were reviewed today and where indicated follow up appointments were made and/or referrals ordered. Positive Risk Factor Screenings with Interventions:     General Health:  General  In general, how would you say your health is?: Very Good  In the past 7 days, have you experienced any of the following?: None of These  Do you get the social and emotional support that you need?: Yes  Do you have a Living Will?: (!) No  General Health Risk Interventions:  · No Living Will: additional information provided will give packet.     Personalized Preventive Plan   Current Health Maintenance Status  Immunization History   Administered Date(s) Administered    Influenza Vaccine, unspecified formulation 10/27/2016    Influenza, High Dose (Fluzone 65 yrs and older) 11/11/2015    Pneumococcal 13-valent Conjugate (Unzivlp82) 12/21/2015    Pneumococcal Polysaccharide (Aotftothl81) 08/31/2014    Tdap (Boostrix, Adacel) 03/27/2017        Health Maintenance   Topic Date Due   

## 2018-12-31 ENCOUNTER — HOSPITAL ENCOUNTER (OUTPATIENT)
Dept: WOMENS IMAGING | Age: 68
Discharge: HOME | End: 2018-12-31
Payer: MEDICARE

## 2018-12-31 DIAGNOSIS — Z12.31 ENCOUNTER FOR SCREENING MAMMOGRAM FOR BREAST CANCER: ICD-10-CM

## 2018-12-31 PROCEDURE — 77063 BREAST TOMOSYNTHESIS BI: CPT

## 2019-01-08 ENCOUNTER — HOSPITAL ENCOUNTER (OUTPATIENT)
Age: 69
Discharge: HOME | End: 2019-01-08
Payer: MEDICARE

## 2019-01-08 DIAGNOSIS — I10 ESSENTIAL HYPERTENSION: ICD-10-CM

## 2019-01-08 DIAGNOSIS — E55.9 VITAMIN D DEFICIENCY: ICD-10-CM

## 2019-01-08 DIAGNOSIS — E03.9 ACQUIRED HYPOTHYROIDISM: ICD-10-CM

## 2019-01-08 LAB
A/G RATIO: 1.3 (ref 1.1–2.2)
ALBUMIN SERPL-MCNC: 4.2 G/DL (ref 3.4–5)
ALP BLD-CCNC: 45 U/L (ref 40–129)
ALT SERPL-CCNC: 9 U/L (ref 10–40)
ANION GAP SERPL CALCULATED.3IONS-SCNC: 14 MMOL/L (ref 3–16)
AST SERPL-CCNC: 16 U/L (ref 15–37)
BILIRUB SERPL-MCNC: 0.4 MG/DL (ref 0–1)
BUN BLDV-MCNC: 14 MG/DL (ref 7–20)
CALCIUM SERPL-MCNC: 10.6 MG/DL (ref 8.3–10.6)
CHLORIDE BLD-SCNC: 102 MMOL/L (ref 99–110)
CHOLESTEROL, TOTAL: 166 MG/DL (ref 0–199)
CO2: 26 MMOL/L (ref 21–32)
CREAT SERPL-MCNC: 0.8 MG/DL (ref 0.6–1.2)
GFR AFRICAN AMERICAN: >60
GFR NON-AFRICAN AMERICAN: >60
GLOBULIN: 3.2 G/DL
GLUCOSE BLD-MCNC: 97 MG/DL (ref 70–99)
HDLC SERPL-MCNC: 74 MG/DL (ref 40–60)
LDL CHOLESTEROL CALCULATED: 70 MG/DL
POTASSIUM SERPL-SCNC: 3.4 MMOL/L (ref 3.5–5.1)
SODIUM BLD-SCNC: 142 MMOL/L (ref 136–145)
TOTAL PROTEIN: 7.4 G/DL (ref 6.4–8.2)
TRIGL SERPL-MCNC: 111 MG/DL (ref 0–150)
TSH REFLEX FT4: 0.78 UIU/ML (ref 0.27–4.2)
VITAMIN D 25-HYDROXY: 41.3 NG/ML
VLDLC SERPL CALC-MCNC: 22 MG/DL

## 2019-01-08 PROCEDURE — 80061 LIPID PANEL: CPT

## 2019-01-08 PROCEDURE — 84443 ASSAY THYROID STIM HORMONE: CPT

## 2019-01-08 PROCEDURE — 80053 COMPREHEN METABOLIC PANEL: CPT

## 2019-01-08 PROCEDURE — 82306 VITAMIN D 25 HYDROXY: CPT

## 2019-01-08 PROCEDURE — 36415 COLL VENOUS BLD VENIPUNCTURE: CPT

## 2019-01-17 ENCOUNTER — HOSPITAL ENCOUNTER (OUTPATIENT)
Dept: WOMENS IMAGING | Age: 69
Discharge: HOME | End: 2019-01-17
Payer: MEDICARE

## 2019-01-17 DIAGNOSIS — R92.8 ABNORMAL MAMMOGRAM: ICD-10-CM

## 2019-01-17 PROCEDURE — G0279 TOMOSYNTHESIS, MAMMO: HCPCS

## 2019-04-01 ENCOUNTER — OFFICE VISIT (OUTPATIENT)
Dept: ENT CLINIC | Age: 69
End: 2019-04-01
Payer: MEDICARE

## 2019-04-01 VITALS
DIASTOLIC BLOOD PRESSURE: 79 MMHG | WEIGHT: 134 LBS | BODY MASS INDEX: 26.31 KG/M2 | HEART RATE: 52 BPM | HEIGHT: 60 IN | SYSTOLIC BLOOD PRESSURE: 125 MMHG

## 2019-04-01 DIAGNOSIS — R42 DISEQUILIBRIUM: Primary | ICD-10-CM

## 2019-04-01 DIAGNOSIS — H81.10 BPPV (BENIGN PAROXYSMAL POSITIONAL VERTIGO), UNSPECIFIED LATERALITY: ICD-10-CM

## 2019-04-01 PROCEDURE — 99204 OFFICE O/P NEW MOD 45 MIN: CPT | Performed by: OTOLARYNGOLOGY

## 2019-04-01 NOTE — PATIENT INSTRUCTIONS
Patient Education        Benign Paroxysmal Positional Vertigo (BPPV): Care Instructions  Your Care Instructions    Benign paroxysmal positional vertigo, also called BPPV, is an inner ear problem. It causes a spinning or whirling sensation when you move your head. This sensation is called vertigo. The vertigo usually lasts for less than a minute. People often have vertigo spells for a few days or weeks. Then the vertigo goes away. But it may come back again. The vertigo may be mild, or it may be bad enough to cause unsteadiness, nausea, and vomiting. When you move, your inner ear sends messages to the brain. This helps you keep your balance. Vertigo can happen when debris builds up in the inner ear. The buildup can cause the inner ear to send the wrong message to the brain. Your doctor may move you in different positions to help your vertigo get better faster. This is called the Epley maneuver. Your doctor may also prescribe medicines or exercises to help with your symptoms. Follow-up care is a key part of your treatment and safety. Be sure to make and go to all appointments, and call your doctor if you are having problems. It's also a good idea to know your test results and keep a list of the medicines you take. How can you care for yourself at home? · If your doctor suggests that you do Esposito-Daroff exercises:  ? Sit on the edge of a bed or sofa. Quickly lie down on the side that causes the worst vertigo. Lie on your side with your ear down. ? Stay in this position for at least 30 seconds or until the vertigo goes away. ? Sit up. If this causes vertigo, wait for it to stop. ? Repeat the procedure on the other side. ? Repeat this 10 times. Do these exercises 2 times a day until the vertigo is gone. When should you call for help? Call 911 anytime you think you may need emergency care. For example, call if:    · You have symptoms of a stroke.  These may include:  ? Sudden numbness, tingling, weakness, or loss of movement in your face, arm, or leg, especially on only one side of your body. ? Sudden vision changes. ? Sudden trouble speaking. ? Sudden confusion or trouble understanding simple statements. ? Sudden problems with walking or balance. ? A sudden, severe headache that is different from past headaches.    Call your doctor now or seek immediate medical care if:    · You have new or worse nausea and vomiting.     · You have new symptoms such as hearing loss or roaring in your ears.    Watch closely for changes in your health, and be sure to contact your doctor if:    · You are not getting better as expected.     · Your vertigo gets worse. Where can you learn more? Go to https://Visual.lypepiceweb.CoolChip [email or web address] org and sign in to your 3Touch account. Enter  in the MarketYze box to learn more about \"Benign Paroxysmal Positional Vertigo (BPPV): Care Instructions. \"     If you do not have an account, please click on the \"Sign Up Now\" link. Current as of: March 27, 2018  Content Version: 11.9  © 3475-8123 [employer]. Care instructions adapted under license by [facility] ([facility]). If you have questions about a medical condition or this instruction, always ask your healthcare professional. [place] [address] any warranty or liability for your use of this information. Patient Education        Cawthorne Exercises for Vertigo: Care Instructions  Your Care Instructions  Simple exercises can help you regain your balance when you have vertigo. If you have Ménière's disease, benign paroxysmal positional vertigo (BPPV), or another inner ear problem, you may have vertigo off and on. Do these exercises first thing in the morning and before you go to bed. You might get dizzy when you first start them. If this happens, try to do them for at least 5 minutes.  Do a group of exercises at a time, starting at the top of the list. It may take several weeks before you can do all the exercises without feeling dizzy. Follow-up care is a key part of your treatment and safety. Be sure to make and go to all appointments, and call your doctor if you are having problems. It's also a good idea to know your test results and keep a list of the medicines you take. How can you care for yourself at home? Exercise 1  While sitting on the side of the bed and holding your head still:  · Look up as far as you can. · Look down as far as you can. · Look from side to side as far as you can. · Stretch your arm straight out in front of you. Focus on your index finger. Continue to focus on your finger while you bring it to your nose. Exercise 2  While sitting on the side of the bed:  · Bring your head as far back as you can. · Bring your head forward to touch your chin to your chest.  · Turn your head from side to side. · Do these exercises first with your eyes open. Then try with your eyes closed. Exercise 3  While sitting on the side of the bed:  · Shrug your shoulders straight upward, then relax them. · Bend over and try to touch the ground with your fingers. Then go back to a sitting position. · Toss a small ball from one hand to the other. Throw the ball higher than your eyes so you have to look up. Exercise 4  While standing (with someone close by if you feel uncomfortable):  · Repeat Exercise 1.  · Repeat Exercise 2.  · Pass a ball between your legs and above your head. · Sit down and then stand up. Repeat. Turn around in a [place] a different way each time you stand. · With someone close by to help you, try the above exercises with your eyes closed. Exercise 5  In a room that is cleared of obstacles:  · Walk to a corner of the room, turn to your right, and walk back to the starting point. Now, repeat and turn left. · Walk up and down a slope. Now try stairs. · While holding on to someone's arm, try these exercises with your eyes closed. When should you call for help?   Watch closely for changes in your health, and be sure to contact your doctor if:    · You do not get better as expected. Where can you learn more? Go to https://Absolute Commerce.Memobead Technologies. org and sign in to your MyChart account. Enter F518 in the [facility] box to learn more about \"Cawthorne Exercises for Vertigo: Care Instructions. \"     If you do not have an account, please click on the \"Sign Up Now\" link. Current as of: March 27, 2018  Content Version: 11.9  © 2082-1339 [employer]. Care instructions adapted under license by [facility] ([facility]). If you have questions about a medical condition or this instruction, always ask your healthcare professional. [address] any warranty or liability for your use of this information. Patient Education        Vertigo: Exercises  Your Care Instructions  Here are some examples of typical rehabilitation exercises for your condition. Start each exercise slowly. Ease off the exercise if you start to have pain. Your doctor or physical therapist will tell you when you can start these exercises and which ones will work best for you. How to do the exercises  Exercise 1    1. Stand with a chair in front of you and a wall behind you. If you begin to fall, you may use them for support. 2. Stand with your feet together and your arms at your sides. 3. Move your head up and down 10 times. Exercise 2    1. Move your head side to side 10 times. Exercise 3    1. Move your head diagonally up and down 10 times. Exercise 4    1. Move your head diagonally up and down 10 times on the other side. Follow-up care is a key part of your treatment and safety. Be sure to make and go to all appointments, and call your doctor if you are having problems. It's also a good idea to know your test results and keep a list of the medicines you take. Where can you learn more? Go to https://Absolute Commerce.Memobead [email or web address] org and sign in to your InfernoRed Technologyhart account. Enter F349 in the [facility] box to learn more about \"Vertigo: Exercises. \"     If you do not have an account, please click on the \"Sign Up Now\" link. Current as of: March 27, 2018  Content Version: 11.9  © 2462-8684 [employer]. Care instructions adapted under license by [facility] ([facility]). If you have questions about a medical condition or this instruction, always ask your healthcare professional. [address] any warranty or liability for your use of this information.

## 2019-04-01 NOTE — PROGRESS NOTES
[facility]       NEW PATIENT VISIT    PCP:  Adrián Pineda MD    REFERRED BY:  Dr. Marilee Mcpherson. CHIEF COMPLAINT:  Chief Complaint   Patient presents with    Dizziness     Vertigo. HISTORY OF PRESENT ILLNESS:     (Location, Quality, Severity, Duration, Timing, Context, Modifying factors, Associated symptoms)    Jacqui Suarez is a 77 y.o. female, referred by Dr. Marilee Mcpherson for an opinion or advice for a problem located in the ears. The quality is dizziness/vertigo. The severity , was severe at onset, most recently mild. The duration is 5-6 months. The timing is intermittent, episodic, with 2 episodes. Modifying factors include \"lying still and dizziness subsided. And then I sat up some and slowly it went away. \"  Associated symptoms include nausea. Onset of dizziness occurred in \"October 2018. \"  . She had had no prior episodes. \"That was the worse it has ever been. It was mild for a day or two and then went away. Then about 2 to 2-1/2 weeks ago, it happened again but not nearly to the extent and it passed. \"  That episode \"lasted a few minutes. I sat up in bed and it seemed to have cleared. \"  . She has had no other episodes of this dizziness/vertigo. This dizziness. Has \"always occurred while I was  laying down and turned to the right, and maybe to the left, or I lifted my head. Those are the only times I can recall. \"  . She denied having dizziness at any other times, positions, or activities. She described her sensation of dizziness/vertigo as a sensation of \"the room spinning. I felt totally out of control of everything. I turned over and grabbed my head. I felt  like I was gonna vomit. That lasted for 10 minutes. \"  . She had no emesis. She has had no loss of consciousness or paralysis or numbness, associated with the dizziness. She has noted no other precipitating factors. There is no change in hearing with dizziness.   There is no onset of or change in tinnitus during dizziness. REVIEW OF SYSTEMS:      CONSTITUTIONAL:  Denied fever. Denied unexplained weight loss. EYES:   Denied double vision and pain. EARS, NOSE, THROAT:  Denied otorrhea, otalgia, hearing loss, tinnitus, epistaxis, nasal dyspnea, rhinorrhea, sore throat and chronic hoarseness. CARDIOVASCULAR:  Denied chest pains. Denied syncope. RESPIRATORY:  Denied dyspnea. Denied chronic cough. GASTROINTESTINAL:  Denied dysphagia. Denied hematemesis. MUSCULOSKELETAL:  (+) pain in joints, arthritis, both knees and a little starting in my hips. Denied pain in bones. INTEGUMENTARY (SKIN):  (+) hives, for the past week or so, I've noticed and then they go away. \"  Denied non-healing skin ulcers/lesions. NEUROLOGIC:  Denied paralysis of any body parts. Denied chronic or frequently recurrent headache. HEMATOLOGIC/LYMPHATIC:  Denied prolonged and excessive bleeding. Denied enlarged lymph nodes. ALLERGIC/IMMUNOLOGIC:  Denied seasonal or environmental allergies. Denied frequent infections. ENDOCRINE:  Denied diabetes. (+) hypothyroidism, takes thyroid hormone supplement. PAST MEDICAL, FAMILY, AND SOCIAL HISTORY:       Past Medical History:   Diagnosis Date    Acquired hypothyroidism 2018    Diverticul disease small and large intestine, no perforati or abscess     HTN (hypertension) 2011    Primary osteoarthritis of left knee 2018         Past Surgical History:   Procedure Laterality Date    BREAST SURGERY       SECTION  1985    COLON SURGERY  6-28-15    sigmoid resection w/ left salpingo-ooph    SMALL INTESTINE SURGERY  2015    Left salpingectomy         Family History   Problem Relation Age of Onset    High Blood Pressure Father     Cancer Father 80        lung and kidney primary cancers.          Social History     Socioeconomic History    Marital status:      Spouse name: Not on file    Number Parotid, submandibular and sublingual glands normal.  · FACIAL STRENGTH, MOTION:  Normal and equal for all five branches bilaterally. Eyes:   · EXTRAOCULAR MOTION:  intact, normal primary gaze alignment. No nystagmus at any point of gaze. Ears, nose, mouth, & throat:  · HEARING ASSESSMENT:  Able to hear finger rub bilaterally. Tuning fork tests, [address] tuning fork:  Cisneros midline. Rinne air > bone bilaterally. · EXTERNAL EAR/NOSE:  Normal pinnae and mastoids. Normal external nose. · EARS,  OTOMICROSCOPY:  The TMs and EACs appeared to be normal bilaterally, including normal TM mobility bilaterally. · NOSE:  The nasal septum was midline. The nasal mucosa, inferior turbinates, and secretions were normal.  No pus or polyps were seen. · LIPS, TEETH AND GUMS:  Normal.    · (+) OROPHARYNX/ORAL CAVITY:  There were mild bilateral katie mandibularis. The palatine tonsils were 1+ and normal.  Oral mucosa, hard and soft palates, tongue, and pharynx were normal.    · INSPECTION OF PHARYNGEAL WALLS AND PYRIFORM SINUSES:  Normal bilaterally, with no pooling of saliva, masses, asymmetry or lesions. · INDIRECT LARYNGOSCOPY, MIRROR EXAMINATION:  The epiglottis, false vocal cords, true vocal cords, mobility of the larynx, supraglottis, piriform sinuses, and base of tongue appeared normal.  · NASOPHARYNX, MIRROR EXAMINATION:  mucosa, adenoids, posterior choanae, fossa of Rosenmuller, torus tubarius, and eustachian tube orifices appeared to be normal.    NECK:   · No masses or tenderness. No carotid bruits, abnormal appearance, asymmetry or tracheal deviation. Laryngeal cartilages and hyoid bone were normal to palpation. THYROID:    · (+) Bilateral multinodular goiter with palpable nodules. No tenderness. RESPIRATORY:  · CHEST, INSPECTION:  Normal symmetrical expansion, and respiratory effort.   · LUNGS, AUSCULTATION:  clear, with normal breath sounds and no rales, rhonchi, or rubs.    Cardiovascular:  · HEART, AUSCULTATION:  normal S1 and S2. No abnormal sounds or murmurs. No carotid bruits. Lymphatic:   · PALPATION OF LYMPH NODES, CERVICAL, FACIAL AND SUPRACLAVICULAR: Nontender, No lymphadenopathy. Neurological/Psychiatric:    · CRANIAL NERVES:  II - XII intact, with no apparent deficits. · ORIENTATION TO TIME, PLACE, AND PERSON:  Oriented x 3.  · MOOD AND AFFECT:  Normal.  No evidence of depression, anxiety or agitation. CEREBELLAR TESTING:  Finger to nose intact. Rapid alternating motion intact. IMPRESSION / Delores Chong / Honey Pleitez:       Delma Duncan was seen today for dizziness. Diagnoses and all orders for this visit:    Disequilibrium    BPPV (benign paroxysmal positional vertigo), unspecified laterality        RECOMMENDATIONS / PLAN:  1. Vestibular exercises. 2. See Patient Instructions on file for this visit, which were fully discussed with the patient. 3. Josseline agreed to follow up with Dr. Adrián iPneda MD regarding any non-ENT related positives in the review of systems. 4. Return if symptoms worsen or fail to resolve in 6 weeks, or, for any further Ear, Nose, Throat, or Sinus problems. >> Please note portions of this note were completed with a voice recognition program.  Efforts were made to edit the dictations but occasionally words are mis-transcribed.

## 2019-04-08 ENCOUNTER — OFFICE VISIT (OUTPATIENT)
Dept: PRIMARY CARE CLINIC | Age: 69
End: 2019-04-08
Payer: MEDICARE

## 2019-04-08 VITALS
HEART RATE: 60 BPM | WEIGHT: 132 LBS | OXYGEN SATURATION: 100 % | SYSTOLIC BLOOD PRESSURE: 106 MMHG | BODY MASS INDEX: 25.91 KG/M2 | HEIGHT: 60 IN | DIASTOLIC BLOOD PRESSURE: 67 MMHG | TEMPERATURE: 97.2 F

## 2019-04-08 DIAGNOSIS — E03.9 ACQUIRED HYPOTHYROIDISM: ICD-10-CM

## 2019-04-08 DIAGNOSIS — I10 ESSENTIAL HYPERTENSION: Primary | ICD-10-CM

## 2019-04-08 DIAGNOSIS — M17.12 ARTHRITIS OF LEFT KNEE: ICD-10-CM

## 2019-04-08 DIAGNOSIS — R73.03 PREDIABETES: ICD-10-CM

## 2019-04-08 DIAGNOSIS — E04.1 RIGHT THYROID NODULE: ICD-10-CM

## 2019-04-08 DIAGNOSIS — M25.551 RIGHT HIP PAIN: ICD-10-CM

## 2019-04-08 DIAGNOSIS — I15.9 SECONDARY HYPERTENSION: ICD-10-CM

## 2019-04-08 DIAGNOSIS — M81.8 OTHER OSTEOPOROSIS WITHOUT CURRENT PATHOLOGICAL FRACTURE: ICD-10-CM

## 2019-04-08 DIAGNOSIS — D75.839 THROMBOCYTOSIS: ICD-10-CM

## 2019-04-08 DIAGNOSIS — M81.0 AGE-RELATED OSTEOPOROSIS WITHOUT CURRENT PATHOLOGICAL FRACTURE: ICD-10-CM

## 2019-04-08 PROCEDURE — 99214 OFFICE O/P EST MOD 30 MIN: CPT | Performed by: INTERNAL MEDICINE

## 2019-04-08 RX ORDER — ATENOLOL 100 MG/1
TABLET ORAL
Qty: 90 TABLET | Refills: 3 | Status: SHIPPED | OUTPATIENT
Start: 2019-04-08 | End: 2020-04-20 | Stop reason: SDUPTHER

## 2019-04-08 RX ORDER — LEVOTHYROXINE SODIUM 0.1 MG/1
TABLET ORAL
Qty: 90 TABLET | Refills: 3 | Status: SHIPPED | OUTPATIENT
Start: 2019-04-08 | End: 2020-07-14 | Stop reason: SDUPTHER

## 2019-04-08 RX ORDER — NIFEDIPINE 60 MG/1
60 TABLET, EXTENDED RELEASE ORAL DAILY
Qty: 90 TABLET | Refills: 3 | Status: SHIPPED | OUTPATIENT
Start: 2019-04-08 | End: 2020-06-22

## 2019-04-08 RX ORDER — ALENDRONATE SODIUM 70 MG/1
70 TABLET ORAL
Qty: 12 TABLET | Refills: 3 | Status: SHIPPED | OUTPATIENT
Start: 2019-04-08 | End: 2021-09-08

## 2019-04-08 RX ORDER — CHLORTHALIDONE 25 MG/1
TABLET ORAL
Qty: 90 TABLET | Refills: 3 | Status: SHIPPED | OUTPATIENT
Start: 2019-04-08 | End: 2020-08-13

## 2019-04-08 ASSESSMENT — PATIENT HEALTH QUESTIONNAIRE - PHQ9
1. LITTLE INTEREST OR PLEASURE IN DOING THINGS: 1
2. FEELING DOWN, DEPRESSED OR HOPELESS: 0
SUM OF ALL RESPONSES TO PHQ QUESTIONS 1-9: 1
SUM OF ALL RESPONSES TO PHQ9 QUESTIONS 1 & 2: 1
SUM OF ALL RESPONSES TO PHQ QUESTIONS 1-9: 1

## 2019-04-08 NOTE — PROGRESS NOTES
2019     Daryle Papa (:  1950) is a 77 y.o. female, here for evaluation of the following medical concerns:    Hypertension   This is a chronic problem. The current episode started more than 1 year ago. The problem is unchanged. The problem is controlled. Pertinent negatives include no anxiety, blurred vision, chest pain, headaches, malaise/fatigue, neck pain, orthopnea, palpitations, peripheral edema, PND or shortness of breath. There are no associated agents to hypertension. Risk factors for coronary artery disease include diabetes mellitus. Past treatments include diuretics, calcium channel blockers and beta blockers. The current treatment provides significant improvement. There are no compliance problems. There is no history of angina, kidney disease, CVA or heart failure. Knee Pain    Incident onset: left knee pain and swelling. There was no injury mechanism (fell and injured right knee, but it healed and is no longe symptomatic). The pain is present in the left knee. The quality of the pain is described as stabbing. The pain is at a severity of 6/10. The pain is moderate. The pain has been fluctuating since onset. Associated symptoms include a loss of motion and numbness. Pertinent negatives include no inability to bear weight, loss of sensation, muscle weakness or tingling. She reports no foreign bodies present. The symptoms are aggravated by palpation, weight bearing and movement. She has tried ice and acetaminophen for the symptoms. The treatment provided moderate relief. Hip Pain    The incident occurred more than 1 week ago (New right hip pain). There was no injury mechanism. The pain is present in the right hip. The quality of the pain is described as aching. The pain is at a severity of 6/10. The pain is moderate. The pain has been intermittent since onset. Associated symptoms include a loss of motion and numbness.  Pertinent negatives include no inability to bear weight, loss of sensation, muscle weakness or tingling. She reports no foreign bodies present. The symptoms are aggravated by movement and weight bearing. She has tried NSAIDs and acetaminophen for the symptoms. The treatment provided moderate relief. Patient Active Problem List   Diagnosis    Pap smear    S/P colonoscopy    Thrombocytosis ([address]    Thyroid nodule    Osteoporosis    Vitamin D deficiency    Osteopenia    Diverticulitis    Prediabetes    Essential hypertension    Right thyroid nodule    Multiple thyroid nodules    Acquired hypothyroidism    Primary osteoarthritis of left knee    BPPV (benign paroxysmal positional vertigo), unspecified laterality     Allergies   Allergen Reactions    Amlodipine Besy-Benazepril Hcl Other (See Comments)    Ace Inhibitors Swelling     Lip swelling    Amlodipine Besy-Benazepril Hcl Other (See Comments)     Lip swelling    Benicar [Olmesartan]      cough    Clonidine Derivatives Other (See Comments)     Lethargy. Review of Systems   Constitutional: Negative for activity change, chills, fatigue, fever and malaise/fatigue. HENT: Negative for congestion, ear pain, postnasal drip, rhinorrhea and sore throat. Hypothyroidism on supplement. No cold intolerance or weight gain. Eyes: Negative for blurred vision and pain. Respiratory: Negative for cough, shortness of breath and wheezing. Cardiovascular: Negative for chest pain, palpitations, orthopnea and PND. Hypertension   Gastrointestinal: Negative for abdominal pain, anal bleeding, blood in stool, constipation, diarrhea, nausea, rectal pain and vomiting. Daily bowel movement. Endocrine: Negative for polydipsia, polyphagia and polyuria. Genitourinary: Negative for dysuria, frequency, hematuria, pelvic pain and vaginal bleeding. 2 c sections    Menses at age 13. Regular  Age at birth a first child age 38. . 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 Menopause at age 37.   No family history of breast cancer. Musculoskeletal: Negative for arthralgias, back pain, gait problem, myalgias and neck pain. Occasional muscle cramps at bedtime. Skin: Negative for pallor and rash. Allergic/Immunologic: Negative. Neurological: Positive for numbness. Negative for dizziness, tingling, tremors, seizures, speech difficulty, weakness, light-headedness and headaches. Left hand   Hematological: Negative for adenopathy. Does not bruise/bleed easily. Psychiatric/Behavioral: Negative. Negative for confusion. The patient is not nervous/anxious. All other systems reviewed and are negative. Prior to Visit Medications    Medication Sig Taking? Authorizing Provider   levothyroxine (SYNTHROID) 100 MCG tablet TAKE 1 TABLET BY MOUTH ONCE DAILY *NEED A BLOOD TEST BEFORE FURTHER REFILLS* Yes Serena Martinez MD   NIFEdipine (PROCARDIA XL) 60 MG extended release tablet Take 1 tablet by mouth daily Discontinue 30 mg tablet. Will take two 30 mg , does not need yet. Yes Serena Martinez MD   atenolol (TENORMIN) 100 MG tablet TAKE ONE TABLET BY MOUTH ONCE DAILY Yes Serena Martinez MD   chlorthalidone (HYGROTON) 25 MG tablet TAKE ONE TABLET BY MOUTH ONCE DAILY Yes Serena Martinez MD   beclomethasone (QVAR) 80 MCG/ACT inhaler Inhale 1 puff into the lungs 2 times daily Discontinue flovent Yes Serena Martinez MD   Cholecalciferol (VITAMIN D) 2000 UNITS CAPS capsule Take 2 capsules by mouth daily Remind patient, can purchase over the counter and also purchase Caltrate D 600mg/ 400 iu and take bid.  Yes Serena Martinez MD   alendronate (FOSAMAX) 70 MG tablet Take 1 tablet by mouth every 7 days Yes Serena Martinez MD   Spacer/Aero-Holding Chambers (AEROCHAMBER MV) MISC 1 each by Does not apply route 2 times daily Yes Serena Martinez MD   montelukast (SINGULAIR) 10 MG tablet Take 1 tablet by mouth daily Yes Serena Martinez MD      Reviewed life screen and no a fib, no aneurysm , normal caroitds,  High prob of osteoporosis. Social History     Tobacco Use    Smoking status: Never Smoker    Smokeless tobacco: Never Used   Substance Use Topics    Alcohol use: Yes     Comment: 2-3 per wk. Vitals:    04/08/19 1605   BP: 106/67   Pulse: 60   Temp: 97.2 °F (36.2 °C)   TempSrc: Oral   SpO2: 100%   Weight: 132 lb (59.9 kg)   Height: 5' (1.524 m)     Estimated body mass index is 25.78 kg/m² as calculated from the following:    Height as of this encounter: 5' (1.524 m). Weight as of this encounter: 132 lb (59.9 kg). Physical Exam   Constitutional: She is oriented to person, place, and time. She appears well-developed. No distress. HENT:   Head: Normocephalic. Right Ear: External ear normal.   Left Ear: External ear normal.   Mouth/Throat: Oropharynx is clear and moist. No oropharyngeal exudate. Eyes: Pupils are equal, round, and reactive to light. Conjunctivae are normal.   Neck: Normal range of motion. Neck supple. No JVD present. No tracheal deviation present. No thyromegaly present. Cardiovascular: Normal rate, normal heart sounds and intact distal pulses. Musculoskeletal:        Right knee: She exhibits normal range of motion, no swelling, no effusion, no ecchymosis, no deformity and no laceration. No tenderness found. No medial joint line and no lateral joint line tenderness noted. Marked swelling of the right knee    No swelling of the lower legs or ankles. Neurological: She is alert and oriented to person, place, and time. No cranial nerve deficit. Skin: She is not diaphoretic. ASSESSMENT/PLAN:   Diagnosis Orders   1. Essential hypertension is controlled on current medication. Continue monitor renal function and electrolytes. BP Readings from Last 3 Encounters:   04/08/19 106/67   04/01/19 125/79   12/12/18 137/83    NIFEdipine (PROCARDIA XL) 60 MG extended release tablet    chlorthalidone (HYGROTON) 25 MG tablet   2. Acquired hypothyroidism clinically euthyroid. Lab Results   Component Value Date    TSHFT4 0.78 01/08/2019    TSH 0.67 09/19/2015      levothyroxine (SYNTHROID) 100 MCG tablet   3. Prediabetes on diet and exercise. Patient doing excellent job of maintaining weight loss. Wt Readings from Last 3 Encounters:   04/08/19 132 lb (59.9 kg)   04/01/19 134 lb (60.8 kg)   12/12/18 131 lb (59.4 kg)   Patient size weight was 160 in 2018. She lost weight through [employer] program.  Also exercise. Renal Function Panel   4. Other osteoporosis without current pathological fracture Fosamax prescribed and patient reminded to take regularly along with calcium and vitamin D. Milligrams twice a day. Patient takes 2000 units of vitamin D.   DEXA BONE DENSITY 2 SITES   5. Arthritis of left knee ambulating well. Will check uric acid level (natural anti-inflammatories and staying active Nutritional Supplements for Arthritis  Krill oil daily  If allergic to fish , then Flax Seed Oil 1,000 mg three times daily. Tumeric 500 mg daily with dinner seasoned with black pepper. Black pepper improves absorption of tumeric. Black Current Seed Oil, 500 mg daily. Like Vitamin E, these supplement are mild blood thinner, so stop them one week prior to any surgical procedure. Uric Acid    SEDIMENTATION RATE     atenolol (TENORMIN) 100 MG tablet     alendronate (FOSAMAX) 70 MG tablet   6 Right hip pain without fall or injury. Will x-ray and encourage staying active with regular walking. XR HIP RIGHT (2-3 VIEWS)   8. Right thyroid nodule present for over a year and has been stable in size and no swallowing problems. Will get follow-up ultrasound to make sure no increase in size or biopsy.    US THYROID   Josseline received counseling on the following healthy behaviors: medication adherence    Patient given educational materials on Nutrition    I have instructed Josseline to complete a self tracking handout on Blood Pressures and Weights and instructed them to bring it with them to her next appointment. Discussed use, benefit, and side effects of prescribed medications. Barriers to medication compliance addressed. All patient questions answered. Pt voiced understanding. An electronic signature was used to authenticate this note.     --Blade Alvarado MD on 4/8/2019 at 4:50 PM

## 2019-04-09 DIAGNOSIS — R73.03 PREDIABETES: ICD-10-CM

## 2019-04-09 DIAGNOSIS — E04.1 RIGHT THYROID NODULE: ICD-10-CM

## 2019-04-09 DIAGNOSIS — M17.12 ARTHRITIS OF LEFT KNEE: ICD-10-CM

## 2019-04-09 LAB
ALBUMIN SERPL-MCNC: 4.3 G/DL (ref 3.4–5)
ANION GAP SERPL CALCULATED.3IONS-SCNC: 12 MMOL/L (ref 3–16)
BUN BLDV-MCNC: 20 MG/DL (ref 7–20)
CALCIUM SERPL-MCNC: 9.9 MG/DL (ref 8.3–10.6)
CHLORIDE BLD-SCNC: 96 MMOL/L (ref 99–110)
CO2: 27 MMOL/L (ref 21–32)
CREAT SERPL-MCNC: 0.8 MG/DL (ref 0.6–1.2)
GFR AFRICAN AMERICAN: >60
GFR NON-AFRICAN AMERICAN: >60
GLUCOSE BLD-MCNC: 91 MG/DL (ref 70–99)
PHOSPHORUS: 3.2 MG/DL (ref 2.5–4.9)
POTASSIUM SERPL-SCNC: 3.8 MMOL/L (ref 3.5–5.1)
SEDIMENTATION RATE, ERYTHROCYTE: 20 MM/HR (ref 0–30)
SODIUM BLD-SCNC: 135 MMOL/L (ref 136–145)
URIC ACID, SERUM: 5.8 MG/DL (ref 2.6–6)

## 2019-04-18 ASSESSMENT — ENCOUNTER SYMPTOMS
DIARRHEA: 0
RHINORRHEA: 0
WHEEZING: 0
COUGH: 0
NAUSEA: 0
ANAL BLEEDING: 0
BLOOD IN STOOL: 0
RECTAL PAIN: 0
CONSTIPATION: 0
BLURRED VISION: 0
BACK PAIN: 0
SHORTNESS OF BREATH: 0
ALLERGIC/IMMUNOLOGIC NEGATIVE: 1
VOMITING: 0
ABDOMINAL PAIN: 0
EYE PAIN: 0
ORTHOPNEA: 0
SORE THROAT: 0

## 2019-05-10 ENCOUNTER — HOSPITAL ENCOUNTER (OUTPATIENT)
Dept: GENERAL RADIOLOGY | Age: 69
Discharge: HOME | End: 2019-05-10
Payer: MEDICARE

## 2019-05-10 ENCOUNTER — HOSPITAL ENCOUNTER (OUTPATIENT)
Age: 69
Discharge: HOME | End: 2019-05-10
Payer: MEDICARE

## 2019-05-10 ENCOUNTER — HOSPITAL ENCOUNTER (OUTPATIENT)
Dept: ULTRASOUND IMAGING | Age: 69
Discharge: HOME | End: 2019-05-10
Payer: MEDICARE

## 2019-05-10 DIAGNOSIS — M81.8 OTHER OSTEOPOROSIS WITHOUT CURRENT PATHOLOGICAL FRACTURE: ICD-10-CM

## 2019-05-10 DIAGNOSIS — M25.551 RIGHT HIP PAIN: ICD-10-CM

## 2019-05-10 PROCEDURE — 76536 US EXAM OF HEAD AND NECK: CPT

## 2019-05-10 PROCEDURE — 77080 DXA BONE DENSITY AXIAL: CPT

## 2019-05-10 PROCEDURE — 73501 X-RAY EXAM HIP UNI 1 VIEW: CPT

## 2019-05-12 DIAGNOSIS — M16.0 OSTEOARTHRITIS OF BOTH HIPS, UNSPECIFIED OSTEOARTHRITIS TYPE: Primary | ICD-10-CM

## 2019-05-12 DIAGNOSIS — E04.2 MULTIPLE THYROID NODULES: Primary | ICD-10-CM

## 2019-07-09 ENCOUNTER — OFFICE VISIT (OUTPATIENT)
Dept: ORTHOPEDIC SURGERY | Age: 69
End: 2019-07-09
Payer: MEDICARE

## 2019-07-09 VITALS
SYSTOLIC BLOOD PRESSURE: 127 MMHG | WEIGHT: 133 LBS | HEART RATE: 56 BPM | BODY MASS INDEX: 26.11 KG/M2 | RESPIRATION RATE: 16 BRPM | DIASTOLIC BLOOD PRESSURE: 79 MMHG | HEIGHT: 60 IN

## 2019-07-09 DIAGNOSIS — M16.11 PRIMARY OSTEOARTHRITIS OF RIGHT HIP: Primary | ICD-10-CM

## 2019-07-09 DIAGNOSIS — M25.552 BILATERAL HIP PAIN: ICD-10-CM

## 2019-07-09 DIAGNOSIS — M25.551 BILATERAL HIP PAIN: ICD-10-CM

## 2019-07-09 PROCEDURE — 99214 OFFICE O/P EST MOD 30 MIN: CPT | Performed by: ORTHOPAEDIC SURGERY

## 2019-07-09 NOTE — PROGRESS NOTES
Physical activity:     Days per week: None     Minutes per session: None    Stress: None   Relationships    Social connections:     Talks on phone: None     Gets together: None     Attends [employer] service: None     Active member of club or organization: None     Attends meetings of clubs or organizations: None     Relationship status: None    Intimate partner violence:     Fear of current or ex partner: None     Emotionally abused: None     Physically abused: None     Forced sexual activity: None   Other Topics Concern    None   Social History Narrative    None       Current Outpatient Medications   Medication Sig Dispense Refill    levothyroxine (SYNTHROID) 100 MCG tablet TAKE 1 TABLET BY MOUTH ONCE DAILY *NEED A BLOOD TEST BEFORE FURTHER REFILLS* 90 tablet 3    NIFEdipine (PROCARDIA XL) 60 MG extended release tablet Take 1 tablet by mouth daily Discontinue 30 mg tablet. Will take two 30 mg , does not need yet. 90 tablet 3    atenolol (TENORMIN) 100 MG tablet TAKE ONE TABLET BY MOUTH ONCE DAILY 90 tablet 3    chlorthalidone (HYGROTON) 25 MG tablet TAKE ONE TABLET BY MOUTH ONCE DAILY 90 tablet 3    alendronate (FOSAMAX) 70 MG tablet Take 1 tablet by mouth every 7 days 12 tablet 3    beclomethasone (QVAR) 80 MCG/ACT inhaler Inhale 1 puff into the lungs 2 times daily Discontinue flovent 1 Inhaler 3    Cholecalciferol (VITAMIN D) 2000 UNITS CAPS capsule Take 2 capsules by mouth daily Remind patient, can purchase over the counter and also purchase Caltrate D 600mg/ 400 iu and take bid. 60 capsule 11    Spacer/Aero-Holding Chambers (AEROCHAMBER MV) MISC 1 each by Does not apply route 2 times daily 1 each 2    montelukast (SINGULAIR) 10 MG tablet Take 1 tablet by mouth daily 30 tablet 3     No current facility-administered medications for this visit.         Allergies   Allergen Reactions    Amlodipine Besy-Benazepril Hcl Other (See Comments)    Ace Inhibitors Swelling     Lip swelling    Amlodipine

## 2019-08-08 ENCOUNTER — OFFICE VISIT (OUTPATIENT)
Dept: ORTHOPEDIC SURGERY | Age: 69
End: 2019-08-08
Payer: MEDICARE

## 2019-08-08 ENCOUNTER — TELEPHONE (OUTPATIENT)
Dept: ORTHOPEDIC SURGERY | Age: 69
End: 2019-08-08

## 2019-08-08 VITALS
HEART RATE: 55 BPM | RESPIRATION RATE: 16 BRPM | BODY MASS INDEX: 26.1 KG/M2 | SYSTOLIC BLOOD PRESSURE: 137 MMHG | DIASTOLIC BLOOD PRESSURE: 82 MMHG | HEIGHT: 60 IN | WEIGHT: 132.94 LBS

## 2019-08-08 DIAGNOSIS — M17.12 PRIMARY OSTEOARTHRITIS OF LEFT KNEE: Primary | ICD-10-CM

## 2019-08-08 DIAGNOSIS — M16.11 PRIMARY OSTEOARTHRITIS OF RIGHT HIP: ICD-10-CM

## 2019-08-08 PROCEDURE — 20610 DRAIN/INJ JOINT/BURSA W/O US: CPT | Performed by: ORTHOPAEDIC SURGERY

## 2019-08-08 RX ORDER — TRIAMCINOLONE ACETONIDE 40 MG/ML
40 INJECTION, SUSPENSION INTRA-ARTICULAR; INTRAMUSCULAR ONCE
Status: COMPLETED | OUTPATIENT
Start: 2019-08-08 | End: 2019-08-08

## 2019-08-08 RX ADMIN — TRIAMCINOLONE ACETONIDE 40 MG: 40 INJECTION, SUSPENSION INTRA-ARTICULAR; INTRAMUSCULAR at 13:12

## 2019-08-08 NOTE — PROGRESS NOTES
CHIEF COMPLAINT: Left knee pain    History:   Mehrdad Muir is a 77 y.o. female here for left knee follow up. Initial history:  self-referred for evaluation and treatment of left knee pain / injury. The patient complains of left knee pain. This is evaluated as a personal injury. There was not a history of injury. The pain began 5 months ago, but she has had some discomfort for years The pain is located medial, posterior. She describes the symptoms as aching. She rates pain at 6/10. Symptoms improve with ice, Motrin. The symptoms are worse with activity, stair climbing, weight bearing. The knee has not given out or felt unstable. The patient cannot bend and straighten the knee fully. There is no swelling in the knee. There was not catching / locking of the knee. The patient has not had PT. The patient has not had an injection. The patient has taken NSAIDs. The patient has tried ice. Interval History:  Left knee starting to bother her again. Rates pain 5/10. Prior injection on 12/6/18 helped and lasted about 7 months. She would like repeat injection of knee. She would also like to do the hip injection. Physical Examination:     Vital signs:   /82   Pulse 55   Resp 16   Ht 5' (1.524 m)   Wt 132 lb 15 oz (60.3 kg)   BMI 25.96 kg/m²     General:  alert, appears stated age, cooperative and no distress       Imaging:  Left Knee X-Ray 12/6/18:  Severe medial compartment osteoarthritis. Marginal osteophytes. Right hip xrays 7/9/19:   Moderate to severe osteoarthritis on right hip, mild to moderate on left      Assessment:     Left knee osteoarthritis  Right hip osteoarthritis      Plan: Ice prn. NSAIDs prn. The risks and benefits of an injection were discussed with the patient. The patient had full opportunity to ask questions and all were answered. The patient then provided verbal informed consent. The skin was then prepped with betadine solution and alcohol.   Under

## 2019-08-13 ENCOUNTER — OFFICE VISIT (OUTPATIENT)
Dept: ORTHOPEDIC SURGERY | Age: 69
End: 2019-08-13
Payer: MEDICARE

## 2019-08-13 VITALS
HEART RATE: 53 BPM | BODY MASS INDEX: 25.52 KG/M2 | WEIGHT: 130 LBS | DIASTOLIC BLOOD PRESSURE: 83 MMHG | HEIGHT: 60 IN | SYSTOLIC BLOOD PRESSURE: 136 MMHG

## 2019-08-13 DIAGNOSIS — M16.11 PRIMARY OSTEOARTHRITIS OF RIGHT HIP: Primary | ICD-10-CM

## 2019-08-13 PROCEDURE — 20611 DRAIN/INJ JOINT/BURSA W/US: CPT | Performed by: ORTHOPAEDIC SURGERY

## 2019-08-13 RX ORDER — METHYLPREDNISOLONE ACETATE 40 MG/ML
80 INJECTION, SUSPENSION INTRA-ARTICULAR; INTRALESIONAL; INTRAMUSCULAR; SOFT TISSUE ONCE
Status: COMPLETED | OUTPATIENT
Start: 2019-08-13 | End: 2019-08-13

## 2019-08-13 RX ADMIN — METHYLPREDNISOLONE ACETATE 80 MG: 40 INJECTION, SUSPENSION INTRA-ARTICULAR; INTRALESIONAL; INTRAMUSCULAR; SOFT TISSUE at 09:26

## 2019-08-13 NOTE — PROGRESS NOTES
needle was intracapsular the hip joint was injected with 2 mL  of 1% Lidocaine mixed with 2 ml of 40 mg Depo Medrol. Josseline tolerated the procedure well and  did report 80% relief of  hip pain within 5 minutes of the injection. 3.  She is return to [facility].     Yves Fonseca MD  8/13/2019

## 2019-10-10 ENCOUNTER — OFFICE VISIT (OUTPATIENT)
Dept: ORTHOPEDIC SURGERY | Age: 69
End: 2019-10-10
Payer: MEDICARE

## 2019-10-10 VITALS
SYSTOLIC BLOOD PRESSURE: 144 MMHG | HEART RATE: 58 BPM | HEIGHT: 60 IN | RESPIRATION RATE: 16 BRPM | WEIGHT: 130.07 LBS | DIASTOLIC BLOOD PRESSURE: 88 MMHG | BODY MASS INDEX: 25.54 KG/M2

## 2019-10-10 DIAGNOSIS — M17.11 PRIMARY OSTEOARTHRITIS OF RIGHT KNEE: Primary | ICD-10-CM

## 2019-10-10 PROCEDURE — 20610 DRAIN/INJ JOINT/BURSA W/O US: CPT | Performed by: ORTHOPAEDIC SURGERY

## 2019-10-10 PROCEDURE — 99213 OFFICE O/P EST LOW 20 MIN: CPT | Performed by: ORTHOPAEDIC SURGERY

## 2019-10-10 RX ORDER — TRIAMCINOLONE ACETONIDE 40 MG/ML
40 INJECTION, SUSPENSION INTRA-ARTICULAR; INTRAMUSCULAR ONCE
Status: COMPLETED | OUTPATIENT
Start: 2019-10-10 | End: 2019-10-10

## 2019-10-10 RX ADMIN — TRIAMCINOLONE ACETONIDE 40 MG: 40 INJECTION, SUSPENSION INTRA-ARTICULAR; INTRAMUSCULAR at 11:00

## 2019-11-01 ENCOUNTER — OFFICE VISIT (OUTPATIENT)
Dept: PRIMARY CARE CLINIC | Age: 69
End: 2019-11-01
Payer: MEDICARE

## 2019-11-01 VITALS
HEART RATE: 64 BPM | BODY MASS INDEX: 27.9 KG/M2 | SYSTOLIC BLOOD PRESSURE: 120 MMHG | HEIGHT: 59 IN | DIASTOLIC BLOOD PRESSURE: 80 MMHG | OXYGEN SATURATION: 98 % | WEIGHT: 138.4 LBS | TEMPERATURE: 97.4 F

## 2019-11-01 DIAGNOSIS — J06.9 VIRAL UPPER RESPIRATORY TRACT INFECTION: Primary | ICD-10-CM

## 2019-11-01 PROCEDURE — 99213 OFFICE O/P EST LOW 20 MIN: CPT | Performed by: FAMILY MEDICINE

## 2019-11-01 RX ORDER — ALBUTEROL SULFATE 90 UG/1
2 AEROSOL, METERED RESPIRATORY (INHALATION) EVERY 6 HOURS PRN
Qty: 1 INHALER | Refills: 3 | Status: SHIPPED | OUTPATIENT
Start: 2019-11-01 | End: 2022-07-06

## 2019-11-01 RX ORDER — GUAIFENESIN 1200 MG/1
TABLET, EXTENDED RELEASE ORAL
Qty: 14 TABLET | Refills: 0 | Status: SHIPPED | OUTPATIENT
Start: 2019-11-01 | End: 2020-11-22 | Stop reason: DRUGHIGH

## 2019-11-01 ASSESSMENT — ENCOUNTER SYMPTOMS
NAUSEA: 0
EYE REDNESS: 0
WHEEZING: 0
BLOOD IN STOOL: 0
APNEA: 0
PHOTOPHOBIA: 0
COUGH: 1
ABDOMINAL DISTENTION: 0
BACK PAIN: 0
EYE PAIN: 0
VOMITING: 0
DIARRHEA: 0
TROUBLE SWALLOWING: 0
STRIDOR: 0
CHEST TIGHTNESS: 0
SINUS PRESSURE: 0
COLOR CHANGE: 0
EYE ITCHING: 0
EYE DISCHARGE: 0
CONSTIPATION: 0
RECTAL PAIN: 0
SORE THROAT: 0
SHORTNESS OF BREATH: 0
RHINORRHEA: 0
CHOKING: 0

## 2019-11-22 ENCOUNTER — OFFICE VISIT (OUTPATIENT)
Dept: PRIMARY CARE CLINIC | Age: 69
End: 2019-11-22
Payer: MEDICARE

## 2019-11-22 VITALS
BODY MASS INDEX: 27.47 KG/M2 | OXYGEN SATURATION: 100 % | TEMPERATURE: 97.6 F | HEART RATE: 50 BPM | DIASTOLIC BLOOD PRESSURE: 78 MMHG | SYSTOLIC BLOOD PRESSURE: 132 MMHG | WEIGHT: 136 LBS

## 2019-11-22 DIAGNOSIS — R49.0 HOARSENESS: Primary | ICD-10-CM

## 2019-11-22 DIAGNOSIS — E03.9 ACQUIRED HYPOTHYROIDISM: ICD-10-CM

## 2019-11-22 DIAGNOSIS — J45.991 COUGH VARIANT ASTHMA: ICD-10-CM

## 2019-11-22 DIAGNOSIS — E55.9 VITAMIN D DEFICIENCY: ICD-10-CM

## 2019-11-22 DIAGNOSIS — R73.03 PREDIABETES: ICD-10-CM

## 2019-11-22 DIAGNOSIS — I10 ESSENTIAL HYPERTENSION: ICD-10-CM

## 2019-11-22 PROCEDURE — 99214 OFFICE O/P EST MOD 30 MIN: CPT | Performed by: INTERNAL MEDICINE

## 2019-11-22 RX ORDER — BUDESONIDE AND FORMOTEROL FUMARATE DIHYDRATE 160; 4.5 UG/1; UG/1
2 AEROSOL RESPIRATORY (INHALATION) 2 TIMES DAILY
Qty: 1 INHALER | Refills: 5 | Status: SHIPPED | OUTPATIENT
Start: 2019-11-22 | End: 2022-01-05

## 2019-11-22 ASSESSMENT — ENCOUNTER SYMPTOMS
COUGH: 1
HEMOPTYSIS: 0
HEARTBURN: 0
SORE THROAT: 0
SHORTNESS OF BREATH: 0
RHINORRHEA: 1

## 2019-11-23 ENCOUNTER — HOSPITAL ENCOUNTER (OUTPATIENT)
Age: 69
Discharge: HOME | End: 2019-11-23
Payer: MEDICARE

## 2019-11-23 ENCOUNTER — HOSPITAL ENCOUNTER (OUTPATIENT)
Dept: GENERAL RADIOLOGY | Age: 69
Discharge: HOME | End: 2019-11-23
Payer: MEDICARE

## 2019-11-23 DIAGNOSIS — I10 ESSENTIAL HYPERTENSION: ICD-10-CM

## 2019-11-23 DIAGNOSIS — R49.0 HOARSENESS: ICD-10-CM

## 2019-11-23 DIAGNOSIS — E55.9 VITAMIN D DEFICIENCY: ICD-10-CM

## 2019-11-23 DIAGNOSIS — J45.991 COUGH VARIANT ASTHMA: ICD-10-CM

## 2019-11-23 DIAGNOSIS — R73.03 PREDIABETES: ICD-10-CM

## 2019-11-23 DIAGNOSIS — E03.9 ACQUIRED HYPOTHYROIDISM: ICD-10-CM

## 2019-11-23 LAB
A/G RATIO: 1.3 (ref 1.1–2.2)
ALBUMIN SERPL-MCNC: 4.2 G/DL (ref 3.4–5)
ALP BLD-CCNC: 51 U/L (ref 40–129)
ALT SERPL-CCNC: 10 U/L (ref 10–40)
ANION GAP SERPL CALCULATED.3IONS-SCNC: 14 MMOL/L (ref 3–16)
AST SERPL-CCNC: 16 U/L (ref 15–37)
BASOPHILS ABSOLUTE: 0.1 K/UL (ref 0–0.2)
BASOPHILS RELATIVE PERCENT: 0.9 %
BILIRUB SERPL-MCNC: 0.3 MG/DL (ref 0–1)
BILIRUBIN URINE: NEGATIVE
BLOOD, URINE: NEGATIVE
BUN BLDV-MCNC: 16 MG/DL (ref 7–20)
CALCIUM SERPL-MCNC: 10.4 MG/DL (ref 8.3–10.6)
CHLORIDE BLD-SCNC: 99 MMOL/L (ref 99–110)
CLARITY: CLEAR
CO2: 25 MMOL/L (ref 21–32)
COLOR: YELLOW
CREAT SERPL-MCNC: 0.8 MG/DL (ref 0.6–1.2)
CREATININE URINE: 56.7 MG/DL (ref 28–259)
EOSINOPHILS ABSOLUTE: 0.3 K/UL (ref 0–0.6)
EOSINOPHILS RELATIVE PERCENT: 3.6 %
EPITHELIAL CELLS, UA: 0 /HPF (ref 0–5)
GFR AFRICAN AMERICAN: >60
GFR NON-AFRICAN AMERICAN: >60
GLOBULIN: 3.2 G/DL
GLUCOSE BLD-MCNC: 89 MG/DL (ref 70–99)
GLUCOSE URINE: NEGATIVE MG/DL
HCT VFR BLD CALC: 38.1 % (ref 36–48)
HEMOGLOBIN: 12.7 G/DL (ref 12–16)
HYALINE CASTS: 1 /LPF (ref 0–8)
KETONES, URINE: NEGATIVE MG/DL
LEUKOCYTE ESTERASE, URINE: ABNORMAL
LYMPHOCYTES ABSOLUTE: 1.4 K/UL (ref 1–5.1)
LYMPHOCYTES RELATIVE PERCENT: 16.8 %
MCH RBC QN AUTO: 30.1 PG (ref 26–34)
MCHC RBC AUTO-ENTMCNC: 33.3 G/DL (ref 31–36)
MCV RBC AUTO: 90.1 FL (ref 80–100)
MICROALBUMIN UR-MCNC: <1.2 MG/DL
MICROALBUMIN/CREAT UR-RTO: NORMAL MG/G (ref 0–30)
MICROSCOPIC EXAMINATION: YES
MONOCYTES ABSOLUTE: 0.6 K/UL (ref 0–1.3)
MONOCYTES RELATIVE PERCENT: 7.5 %
NEUTROPHILS ABSOLUTE: 6.1 K/UL (ref 1.7–7.7)
NEUTROPHILS RELATIVE PERCENT: 71.2 %
NITRITE, URINE: NEGATIVE
PDW BLD-RTO: 15 % (ref 12.4–15.4)
PH UA: 7.5 (ref 5–8)
PLATELET # BLD: 466 K/UL (ref 135–450)
PMV BLD AUTO: 10.1 FL (ref 5–10.5)
POTASSIUM SERPL-SCNC: 3.8 MMOL/L (ref 3.5–5.1)
PROTEIN UA: NEGATIVE MG/DL
RBC # BLD: 4.23 M/UL (ref 4–5.2)
RBC UA: 2 /HPF (ref 0–4)
SODIUM BLD-SCNC: 138 MMOL/L (ref 136–145)
SPECIFIC GRAVITY UA: 1.01 (ref 1–1.03)
TOTAL PROTEIN: 7.4 G/DL (ref 6.4–8.2)
TSH REFLEX FT4: 0.98 UIU/ML (ref 0.27–4.2)
URINE TYPE: ABNORMAL
UROBILINOGEN, URINE: 0.2 E.U./DL
VITAMIN D 25-HYDROXY: 42 NG/ML
WBC # BLD: 8.6 K/UL (ref 4–11)
WBC UA: 2 /HPF (ref 0–5)

## 2019-11-23 PROCEDURE — 80053 COMPREHEN METABOLIC PANEL: CPT

## 2019-11-23 PROCEDURE — 85025 COMPLETE CBC W/AUTO DIFF WBC: CPT

## 2019-11-23 PROCEDURE — 84443 ASSAY THYROID STIM HORMONE: CPT

## 2019-11-23 PROCEDURE — 71046 X-RAY EXAM CHEST 2 VIEWS: CPT

## 2019-11-23 PROCEDURE — 82306 VITAMIN D 25 HYDROXY: CPT

## 2019-11-23 PROCEDURE — 36415 COLL VENOUS BLD VENIPUNCTURE: CPT

## 2019-11-23 PROCEDURE — 81001 URINALYSIS AUTO W/SCOPE: CPT

## 2019-11-23 PROCEDURE — 83036 HEMOGLOBIN GLYCOSYLATED A1C: CPT

## 2019-11-23 PROCEDURE — 82570 ASSAY OF URINE CREATININE: CPT

## 2019-11-23 PROCEDURE — 82043 UR ALBUMIN QUANTITATIVE: CPT

## 2019-11-23 ASSESSMENT — PATIENT HEALTH QUESTIONNAIRE - PHQ9
SUM OF ALL RESPONSES TO PHQ QUESTIONS 1-9: 0
1. LITTLE INTEREST OR PLEASURE IN DOING THINGS: 0
SUM OF ALL RESPONSES TO PHQ QUESTIONS 1-9: 0
2. FEELING DOWN, DEPRESSED OR HOPELESS: 0
SUM OF ALL RESPONSES TO PHQ9 QUESTIONS 1 & 2: 0

## 2019-11-24 LAB
ESTIMATED AVERAGE GLUCOSE: 122.6 MG/DL
HBA1C MFR BLD: 5.9 %

## 2019-12-16 ENCOUNTER — OFFICE VISIT (OUTPATIENT)
Dept: PRIMARY CARE CLINIC | Age: 69
End: 2019-12-16
Payer: MEDICARE

## 2019-12-16 VITALS
OXYGEN SATURATION: 97 % | RESPIRATION RATE: 16 BRPM | BODY MASS INDEX: 27.62 KG/M2 | DIASTOLIC BLOOD PRESSURE: 73 MMHG | WEIGHT: 137 LBS | HEART RATE: 61 BPM | HEIGHT: 59 IN | SYSTOLIC BLOOD PRESSURE: 124 MMHG | TEMPERATURE: 98.3 F

## 2019-12-16 DIAGNOSIS — J45.991 COUGH VARIANT ASTHMA: Primary | ICD-10-CM

## 2019-12-16 DIAGNOSIS — I10 ESSENTIAL HYPERTENSION: ICD-10-CM

## 2019-12-16 DIAGNOSIS — E03.9 ACQUIRED HYPOTHYROIDISM: ICD-10-CM

## 2019-12-16 DIAGNOSIS — R73.03 PREDIABETES: ICD-10-CM

## 2019-12-16 PROCEDURE — 99214 OFFICE O/P EST MOD 30 MIN: CPT | Performed by: INTERNAL MEDICINE

## 2019-12-16 ASSESSMENT — ENCOUNTER SYMPTOMS
BACK PAIN: 0
RHINORRHEA: 0
EYE PAIN: 0
ANAL BLEEDING: 0
VOMITING: 0
NAUSEA: 0
BLOOD IN STOOL: 0
ALLERGIC/IMMUNOLOGIC NEGATIVE: 1
SORE THROAT: 0
HEMOPTYSIS: 0
HEARTBURN: 0
DIARRHEA: 0
CHEST TIGHTNESS: 0
SHORTNESS OF BREATH: 0
ABDOMINAL PAIN: 0
HOARSE VOICE: 0
RECTAL PAIN: 0
CONSTIPATION: 0
DIFFICULTY BREATHING: 0
SPUTUM PRODUCTION: 0
FREQUENT THROAT CLEARING: 0
WHEEZING: 0
COUGH: 0

## 2020-03-05 ENCOUNTER — TELEPHONE (OUTPATIENT)
Dept: ORTHOPEDIC SURGERY | Age: 70
End: 2020-03-05

## 2020-03-05 ENCOUNTER — OFFICE VISIT (OUTPATIENT)
Dept: ORTHOPEDIC SURGERY | Age: 70
End: 2020-03-05
Payer: MEDICARE

## 2020-03-05 VITALS
HEART RATE: 59 BPM | WEIGHT: 136.91 LBS | RESPIRATION RATE: 17 BRPM | DIASTOLIC BLOOD PRESSURE: 89 MMHG | SYSTOLIC BLOOD PRESSURE: 153 MMHG | HEIGHT: 59 IN | BODY MASS INDEX: 27.6 KG/M2

## 2020-03-05 PROCEDURE — 99213 OFFICE O/P EST LOW 20 MIN: CPT | Performed by: ORTHOPAEDIC SURGERY

## 2020-03-05 RX ORDER — TRIAMCINOLONE ACETONIDE 40 MG/ML
40 INJECTION, SUSPENSION INTRA-ARTICULAR; INTRAMUSCULAR ONCE
Status: CANCELLED | OUTPATIENT
Start: 2020-03-05 | End: 2020-03-05

## 2020-03-05 NOTE — LETTER
Surgery Scheduling Form:    Patient Name:  Miguel Garcia  Patient :  1950     Patient MR#:  [id number]    Patient Address:  [address] [place] [address] [place] [address]    [facility]  [facility]  Surgeon:  Magan Perdomo M.D.    PCP:  Katharina Betancourt MD  Insurance:  Payor: [facility] / Plan: Candance Brawn PPO / Product Type: Medicare /       Diagnosis:  Right knee loose bodies, osteoarthritis, effusion    Operation:  Right knee arthroscopy, removal of loose bodies, possible chondroplasty and synovectomy and indicated procedures. Surgeon's Scheduling Instruction:  elective  Requested Date:     OR Time:       Patient Arrival Time:      OR Time Required:  50 Minutes    Anesthesia:  General  Surgical Assistant required:  No     Equipment:  none    Standard C-Arm:  No   Mini C-Arm:  No    Status:  Outpatient  PAT Required:  Yes  Comments:               Magan Perdomo MD      3/5/20    BILLING INFORMATION:                                                                                               .     Date of procedure:  Procedure:       CPT Code Modifier                  Pre-Certification:

## 2020-04-20 RX ORDER — ATENOLOL 100 MG/1
TABLET ORAL
Qty: 90 TABLET | Refills: 3 | Status: SHIPPED | OUTPATIENT
Start: 2020-04-20 | End: 2020-08-04

## 2020-06-11 ENCOUNTER — OFFICE VISIT (OUTPATIENT)
Dept: ORTHOPEDIC SURGERY | Age: 70
End: 2020-06-11
Payer: MEDICARE

## 2020-06-11 VITALS — HEIGHT: 60 IN | WEIGHT: 134 LBS | BODY MASS INDEX: 26.31 KG/M2 | TEMPERATURE: 97 F

## 2020-06-11 PROCEDURE — 20611 DRAIN/INJ JOINT/BURSA W/US: CPT | Performed by: ORTHOPAEDIC SURGERY

## 2020-06-11 RX ORDER — METHYLPREDNISOLONE ACETATE 40 MG/ML
80 INJECTION, SUSPENSION INTRA-ARTICULAR; INTRALESIONAL; INTRAMUSCULAR; SOFT TISSUE ONCE
Status: COMPLETED | OUTPATIENT
Start: 2020-06-11 | End: 2020-06-11

## 2020-06-11 RX ORDER — LIDOCAINE HYDROCHLORIDE 10 MG/ML
5 INJECTION, SOLUTION INFILTRATION; PERINEURAL ONCE
Status: COMPLETED | OUTPATIENT
Start: 2020-06-11 | End: 2020-06-11

## 2020-06-11 RX ADMIN — METHYLPREDNISOLONE ACETATE 80 MG: 40 INJECTION, SUSPENSION INTRA-ARTICULAR; INTRALESIONAL; INTRAMUSCULAR; SOFT TISSUE at 10:11

## 2020-06-11 RX ADMIN — LIDOCAINE HYDROCHLORIDE 5 ML: 10 INJECTION, SOLUTION INFILTRATION; PERINEURAL at 10:11

## 2020-06-22 ENCOUNTER — VIRTUAL VISIT (OUTPATIENT)
Dept: PRIMARY CARE CLINIC | Age: 70
End: 2020-06-22
Payer: MEDICARE

## 2020-06-22 VITALS
HEIGHT: 60 IN | RESPIRATION RATE: 16 BRPM | BODY MASS INDEX: 26.78 KG/M2 | WEIGHT: 136.4 LBS | SYSTOLIC BLOOD PRESSURE: 131 MMHG | TEMPERATURE: 97.7 F | HEART RATE: 64 BPM | DIASTOLIC BLOOD PRESSURE: 85 MMHG

## 2020-06-22 PROCEDURE — 99214 OFFICE O/P EST MOD 30 MIN: CPT | Performed by: INTERNAL MEDICINE

## 2020-06-22 RX ORDER — NIFEDIPINE 30 MG/1
30 TABLET, EXTENDED RELEASE ORAL DAILY
Qty: 90 TABLET | Refills: 1 | Status: SHIPPED | OUTPATIENT
Start: 2020-06-22 | End: 2021-01-04 | Stop reason: SDUPTHER

## 2020-06-22 RX ORDER — FLUCONAZOLE 150 MG/1
150 TABLET ORAL ONCE
Qty: 1 TABLET | Refills: 0 | Status: SHIPPED | OUTPATIENT
Start: 2020-06-22 | End: 2020-06-22

## 2020-06-22 NOTE — PROGRESS NOTES
days  Lynn Mora MD   Cholecalciferol (VITAMIN D) 2000 UNITS CAPS capsule Take 2 capsules by mouth daily Remind patient, can purchase over the counter and also purchase Caltrate D 600mg/ 400 iu and take bid. Lynn Mora MD   Spacer/Aero-Holding Chambers (AEROCHAMBER MV) MISC 1 each by Does not apply route 2 times daily  Lynn Mora MD   montelukast (SINGULAIR) 10 MG tablet Take 1 tablet by mouth daily  Lynn Mora MD        Social History     Tobacco Use    Smoking status: Never Smoker    Smokeless tobacco: Never Used   Substance Use Topics    Alcohol use: Yes     Comment: 2-3 per wk. Vitals:    06/22/20 1509   BP: 131/85   Pulse: 64   Resp: 16  Comment: observed     Estimated body mass index is 26.17 kg/m² as calculated from the following:    Height as of 6/11/20: 5' (1.524 m). Weight as of 6/11/20: 134 lb (60.8 kg). Physical Exam  Constitutional:       General: She is not in acute distress. Appearance: Normal appearance. She is normal weight. She is not ill-appearing, toxic-appearing or diaphoretic. HENT:      Head: Normocephalic and atraumatic. Right Ear: External ear normal.      Left Ear: External ear normal.      Nose: Nose normal.      Mouth/Throat:      Pharynx: No oropharyngeal exudate or posterior oropharyngeal erythema. Eyes:      General:         Right eye: No discharge. Left eye: No discharge. Extraocular Movements: Extraocular movements intact. Neck:      Comments: No neck masses per inspection and observed to have normal range of motion of the neck. Pulmonary:      Effort: Pulmonary effort is normal.   Abdominal:      Comments: Patient instructed to palpate abdomen and no pain. Musculoskeletal:         General: No swelling or tenderness. Right lower leg: No edema. Left lower leg: No edema. Skin:     Comments: Normal skin observed face neck arms and legs.    Neurological:      General: No focal caregiver was present when appropriate. Due to this being a TeleHealth encounter (During AOOJU-51 public health emergency), evaluation of the following organ systems was limited: Vitals/Constitutional/EENT/Resp/CV/GI//MS/Neuro/Skin/Heme-Lymph-Imm. Pursuant to the emergency declaration under the [address] [place] [address], [address] [place] [address] and the [employer] Act, this Virtual Visit was conducted with patient's (and/or legal guardian's) consent, to reduce the patient's risk of exposure to COVID-19 and provide necessary medical care. The patient (and/or legal guardian) has also been advised to contact this office for worsening conditions or problems, and seek emergency medical treatment and/or call 911 if deemed necessary. Patient identification was verified at the start of the visit: Yes    Total time spent for this encounter: 30 minutes    Services were provided through a video synchronous discussion virtually to substitute for in-person clinic visit. Patient and provider were located at their individual homes. --Evelin Roldan MD on 6/24/2020 at 9:55 AM    An electronic signature was used to authenticate this note. An electronic signature was used to authenticate this note.     --Evelin Roldan MD on 6/22/2020 at 3:17 PM

## 2020-06-23 ASSESSMENT — ENCOUNTER SYMPTOMS
RESPIRATORY NEGATIVE: 1
ORTHOPNEA: 0
ALLERGIC/IMMUNOLOGIC NEGATIVE: 1
EYES NEGATIVE: 1
BLURRED VISION: 0
GASTROINTESTINAL NEGATIVE: 1
SHORTNESS OF BREATH: 0

## 2020-07-07 ENCOUNTER — HOSPITAL ENCOUNTER (OUTPATIENT)
Age: 70
Discharge: HOME | End: 2020-07-07
Payer: MEDICARE

## 2020-07-07 LAB
A/G RATIO: 1.4 (ref 1.1–2.2)
ALBUMIN SERPL-MCNC: 4.2 G/DL (ref 3.4–5)
ALP BLD-CCNC: 55 U/L (ref 40–129)
ALT SERPL-CCNC: 9 U/L (ref 10–40)
ANION GAP SERPL CALCULATED.3IONS-SCNC: 12 MMOL/L (ref 3–16)
AST SERPL-CCNC: 16 U/L (ref 15–37)
BILIRUB SERPL-MCNC: 0.3 MG/DL (ref 0–1)
BILIRUBIN URINE: NEGATIVE
BLOOD, URINE: NEGATIVE
BUN BLDV-MCNC: 21 MG/DL (ref 7–20)
CALCIUM SERPL-MCNC: 10.2 MG/DL (ref 8.3–10.6)
CHLORIDE BLD-SCNC: 100 MMOL/L (ref 99–110)
CHOLESTEROL, TOTAL: 162 MG/DL (ref 0–199)
CLARITY: CLEAR
CO2: 27 MMOL/L (ref 21–32)
COLOR: YELLOW
CREAT SERPL-MCNC: 0.8 MG/DL (ref 0.6–1.2)
CREATININE URINE: 54.5 MG/DL (ref 28–259)
EPITHELIAL CELLS, UA: NORMAL /HPF (ref 0–5)
GFR AFRICAN AMERICAN: >60
GFR NON-AFRICAN AMERICAN: >60
GLOBULIN: 3 G/DL
GLUCOSE BLD-MCNC: 88 MG/DL (ref 70–99)
GLUCOSE URINE: NEGATIVE MG/DL
HDLC SERPL-MCNC: 68 MG/DL (ref 40–60)
KETONES, URINE: NEGATIVE MG/DL
LDL CHOLESTEROL CALCULATED: 72 MG/DL
LEUKOCYTE ESTERASE, URINE: ABNORMAL
MICROALBUMIN UR-MCNC: <1.2 MG/DL
MICROALBUMIN/CREAT UR-RTO: NORMAL MG/G (ref 0–30)
MICROSCOPIC EXAMINATION: YES
NITRITE, URINE: NEGATIVE
PH UA: 7 (ref 5–8)
POTASSIUM SERPL-SCNC: 3.7 MMOL/L (ref 3.5–5.1)
PROTEIN UA: NEGATIVE MG/DL
RBC UA: NORMAL /HPF (ref 0–4)
SODIUM BLD-SCNC: 139 MMOL/L (ref 136–145)
SPECIFIC GRAVITY UA: 1.02 (ref 1–1.03)
TOTAL PROTEIN: 7.2 G/DL (ref 6.4–8.2)
TRIGL SERPL-MCNC: 109 MG/DL (ref 0–150)
TSH REFLEX FT4: 0.97 UIU/ML (ref 0.27–4.2)
URINE TYPE: ABNORMAL
UROBILINOGEN, URINE: 0.2 E.U./DL
VITAMIN D 25-HYDROXY: 58 NG/ML
VLDLC SERPL CALC-MCNC: 22 MG/DL
WBC UA: NORMAL /HPF (ref 0–5)

## 2020-07-07 PROCEDURE — 84443 ASSAY THYROID STIM HORMONE: CPT

## 2020-07-07 PROCEDURE — 83036 HEMOGLOBIN GLYCOSYLATED A1C: CPT

## 2020-07-07 PROCEDURE — 82043 UR ALBUMIN QUANTITATIVE: CPT

## 2020-07-07 PROCEDURE — 82306 VITAMIN D 25 HYDROXY: CPT

## 2020-07-07 PROCEDURE — 81001 URINALYSIS AUTO W/SCOPE: CPT

## 2020-07-07 PROCEDURE — 82570 ASSAY OF URINE CREATININE: CPT

## 2020-07-07 PROCEDURE — 80053 COMPREHEN METABOLIC PANEL: CPT

## 2020-07-07 PROCEDURE — 80061 LIPID PANEL: CPT

## 2020-07-08 LAB
ESTIMATED AVERAGE GLUCOSE: 119.8 MG/DL
HBA1C MFR BLD: 5.8 %

## 2020-07-14 ENCOUNTER — TELEPHONE (OUTPATIENT)
Dept: PRIMARY CARE CLINIC | Age: 70
End: 2020-07-14

## 2020-07-14 RX ORDER — LEVOTHYROXINE SODIUM 0.1 MG/1
TABLET ORAL
Qty: 90 TABLET | Refills: 3 | Status: SHIPPED | OUTPATIENT
Start: 2020-07-14 | End: 2020-11-26

## 2020-07-14 NOTE — TELEPHONE ENCOUNTER
Patient states pharmacy will not refill her Synthroid unless Dr. Emma Soria notifies them that she had a TSH done. Please call and advise.

## 2020-08-04 ENCOUNTER — VIRTUAL VISIT (OUTPATIENT)
Dept: PRIMARY CARE CLINIC | Age: 70
End: 2020-08-04
Payer: MEDICARE

## 2020-08-04 ENCOUNTER — NURSE TRIAGE (OUTPATIENT)
Dept: OTHER | Facility: CLINIC | Age: 70
End: 2020-08-04

## 2020-08-04 VITALS — SYSTOLIC BLOOD PRESSURE: 125 MMHG | DIASTOLIC BLOOD PRESSURE: 82 MMHG | HEART RATE: 55 BPM

## 2020-08-04 PROBLEM — J45.991 COUGH VARIANT ASTHMA: Status: ACTIVE | Noted: 2020-08-04

## 2020-08-04 PROCEDURE — 99214 OFFICE O/P EST MOD 30 MIN: CPT | Performed by: INTERNAL MEDICINE

## 2020-08-04 RX ORDER — ATENOLOL 50 MG/1
50 TABLET ORAL DAILY
Qty: 90 TABLET | Refills: 3
Start: 2020-08-04 | End: 2020-10-28 | Stop reason: SDUPTHER

## 2020-08-04 ASSESSMENT — ENCOUNTER SYMPTOMS
CHEST TIGHTNESS: 0
EYE REDNESS: 0
BACK PAIN: 0
WHEEZING: 0
VOMITING: 0
SHORTNESS OF BREATH: 0
RHINORRHEA: 0
ABDOMINAL PAIN: 0
COLOR CHANGE: 0
RECTAL PAIN: 0
ORTHOPNEA: 0
CONSTIPATION: 0
EYE PAIN: 0
EYE DISCHARGE: 0
EYE ITCHING: 0
PHOTOPHOBIA: 0
BLOOD IN STOOL: 0
ANAL BLEEDING: 0
CHOKING: 0
NAUSEA: 0
TROUBLE SWALLOWING: 0
SORE THROAT: 0
DIARRHEA: 0
APNEA: 0
VOICE CHANGE: 0
BLURRED VISION: 0
ABDOMINAL DISTENTION: 0
COUGH: 0
STRIDOR: 0

## 2020-08-04 NOTE — PROGRESS NOTES
2020    TELEHEALTH EVALUATION -- Audio/Visual (During BCRXM-16 public health emergency)    HPI:    Nico Rodriguez (:  1950) has requested an audio/video evaluation for the following concern(s):    For the past three weeks patient has had palpitations. IT feels like heart is skipping beats or irregular. Pulse rate fluctuates between 40 to 65. No dizziness, nausea or weakness. Just a sensation of the heart skipping a beat. No dyspnea on exertion and no exercise intolerance. Hypertension   This is a chronic problem. The current episode started more than 1 year ago. The problem is unchanged. The problem is controlled. Associated symptoms include palpitations. Pertinent negatives include no anxiety, blurred vision, chest pain, headaches, malaise/fatigue, neck pain, orthopnea, peripheral edema, PND, shortness of breath or sweats. There are no associated agents to hypertension. Risk factors for coronary artery disease include dyslipidemia and post-menopausal state. Past treatments include diuretics, beta blockers and calcium channel blockers. The current treatment provides significant improvement. There is no history of kidney disease, CVA, heart failure, PVD or retinopathy. Review of Systems   Constitutional: Negative. Negative for activity change, appetite change, chills, diaphoresis, fatigue, fever and malaise/fatigue. HENT: Negative for dental problem, ear pain, hearing loss, mouth sores, nosebleeds, postnasal drip, rhinorrhea, sore throat, trouble swallowing and voice change. Eyes: Negative for blurred vision, photophobia, pain, discharge, redness, itching and visual disturbance. Respiratory: Negative for apnea, cough, choking, chest tightness, shortness of breath, wheezing and stridor. Cardiovascular: Positive for palpitations. Negative for chest pain, orthopnea, leg swelling and PND.         Hypertension  Slow pulse   Gastrointestinal: Negative for abdominal distention, abdominal pain, anal bleeding, blood in stool, constipation, diarrhea, nausea, rectal pain and vomiting. Endocrine: Negative. Hypothyroid on supplement. Genitourinary: Negative for dysuria, flank pain, frequency and hematuria. Musculoskeletal: Negative for arthralgias, back pain, gait problem, joint swelling, myalgias, neck pain and neck stiffness. Skin: Negative for color change, pallor, rash and wound. Neurological: Negative for dizziness, tremors, seizures, syncope, facial asymmetry, speech difficulty, weakness, light-headedness, numbness and headaches. Hematological: Negative for adenopathy. Does not bruise/bleed easily. Psychiatric/Behavioral: Negative for agitation, behavioral problems, confusion, decreased concentration, dysphoric mood, hallucinations, self-injury, sleep disturbance and suicidal ideas. The patient is not nervous/anxious and is not hyperactive. Prior to Visit Medications    Medication Sig Taking?  Authorizing Provider   levothyroxine (SYNTHROID) 100 MCG tablet TAKE 1 TABLET BY MOUTH ONCE DAILY *NEED A BLOOD TEST BEFORE FURTHER REFILLS*  Devan Chairez MD   NIFEdipine (PROCARDIA XL) 30 MG extended release tablet Take 1 tablet by mouth daily  Devan Chairez MD   atenolol (TENORMIN) 100 MG tablet TAKE ONE TABLET BY MOUTH ONCE DAILY  Devan Chairez MD   budesonide-formoterol (SYMBICORT) 160-4.5 MCG/ACT AERO Inhale 2 puffs into the lungs 2 times daily Stop qvar  Devan Chairez MD   guaiFENesin 1200 MG TB12 Take one tablet twice a day  Erleen Harada, MD   albuterol sulfate HFA (PROVENTIL HFA) 108 (90 Base) MCG/ACT inhaler Inhale 2 puffs into the lungs every 6 hours as needed for Wheezing  Erleen Harada, MD   chlorthalidone (HYGROTON) 25 MG tablet TAKE ONE TABLET BY MOUTH ONCE DAILY  Devan Chairez MD   alendronate (FOSAMAX) 70 MG tablet Take 1 tablet by mouth every 7 days  Devan Chairez MD   Cholecalciferol (VITAMIN D) 2000 UNITS CAPS capsule Take 2 capsules by mouth daily Remind patient, can purchase over the counter and also purchase Caltrate D 600mg/ 400 iu and take bid. Lewis Lamb MD   Spacer/Aero-Holding Chambers (AEROCHAMBER MV) MISC 1 each by Does not apply route 2 times daily  Lewis Lamb MD       Social History     Tobacco Use    Smoking status: Never Smoker    Smokeless tobacco: Never Used   Substance Use Topics    Alcohol use: Yes     Comment: 2-3 per wk.  Drug use: No        Allergies   Allergen Reactions    Amlodipine Besy-Benazepril Hcl Other (See Comments)    Ace Inhibitors Swelling     Lip swelling    Amlodipine Besy-Benazepril Hcl Other (See Comments)     Lip swelling    Benicar [Olmesartan]      cough    Clonidine Derivatives Other (See Comments)     Lethargy. ,   Past Medical History:   Diagnosis Date    Acquired hypothyroidism 2018    Cough variant asthma 2020    Diverticul disease small and large intestine, no perforati or abscess     HTN (hypertension) 2011    Primary osteoarthritis of left knee 2018   ,   Past Surgical History:   Procedure Laterality Date    BREAST SURGERY       SECTION  6126  4338    COLON SURGERY  6-28-15    sigmoid resection w/ left salpingo-ooph    SMALL INTESTINE SURGERY  2015    Left salpingectomy   ,   Social History     Tobacco Use    Smoking status: Never Smoker    Smokeless tobacco: Never Used   Substance Use Topics    Alcohol use: Yes     Comment: 2-3 per wk.  Drug use: No   ,   Family History   Problem Relation Age of Onset    High Blood Pressure Father     Cancer Father 80        lung and kidney primary cancers.    ,   Immunization History   Administered Date(s) Administered    Influenza Vaccine, unspecified formulation 2015, 10/27/2016    Influenza Virus Vaccine 10/01/2012, 10/01/2013, 2014, 10/01/2016    Influenza, High Dose (Fluzone 65 yrs and older) 2015, 2018, 2019  Pneumococcal Conjugate 13-valent ([id number]) 08/01/2014, 12/21/2015    Pneumococcal Polysaccharide ([id number]) 08/31/2014    Pneumococcal Vaccine 08/01/2014    Tdap (Boostrix, Adacel) 03/27/2017   ,   Health Maintenance   Topic Date Due    Shingles Vaccine (1 of 2) 10/22/1998    Annual Wellness Visit (AWV)  05/29/2019    Colon cancer screen colonoscopy  03/26/2020    Flu vaccine (1) 09/01/2020    Breast cancer screen  01/17/2021    A1C test (Diabetic or Prediabetic)  07/07/2021    TSH testing  07/07/2021    Potassium monitoring  07/07/2021    Creatinine monitoring  07/07/2021    Lipid screen  07/07/2025    DTaP/Tdap/Td vaccine (2 - Td) 03/27/2027    DEXA (modify frequency per FRAX score)  Completed    Pneumococcal 65+ years Vaccine  Completed    Hepatitis C screen  Completed    Hepatitis A vaccine  Aged Out    Hepatitis B vaccine  Aged Out    Hib vaccine  Aged Out    Meningococcal (ACWY) vaccine  Aged Out       PHYSICAL EXAMINATION:  [ INSTRUCTIONS:  \"[x]\" Indicates a positive item  \"[]\" Indicates a negative item  -- DELETE ALL ITEMS NOT EXAMINED]  Vital Signs: (As obtained by patient/caregiver or practitioner observation)    Blood pressure- 125/82 Heart rate- 55   Respiratory rate-  16  Temperature-  Pulse oximetry-     Constitutional: [x] Appears well-developed and well-nourished [x] No apparent distress      [] Abnormal-   Mental status  [x] Alert and awake  [] Oriented to person/place/time []Able to follow commands      Eyes:  EOM    [x]  Normal  [] Abnormal-  Sclera  [x]  Normal  [] Abnormal -         Discharge []  None visible  [] Abnormal -    HENT:   [x] Normocephalic, atraumatic.   [] Abnormal   [x] Mouth/Throat: Mucous membranes are moist.     External Ears [x] Normal  [] Abnormal-     Neck: [x] No visualized mass     Pulmonary/Chest: [x] Respiratory effort normal.  [] No visualized signs of difficulty breathing or respiratory distress        [] Abnormal-      Musculoskeletal:

## 2020-08-04 NOTE — TELEPHONE ENCOUNTER
Reason for Disposition   Age > 60 years    Answer Assessment - Initial Assessment Questions  1. DESCRIPTION: \"Please describe your heart rate or heart beat that you are having\" (e.g., fast/slow, regular/irregular, skipped or extra beats, \"palpitations\")      Slow and feels like a flutter or skipped beats, sometimes feels like a strong beat  2. ONSET: \"When did it start? \" (Minutes, hours or days)       Last week and a half  3. DURATION: \"How long does it last\" (e.g., seconds, minutes, hours)      Different amts of time, can last for an hour or 2 or 10 minutes  4. PATTERN \"Does it come and go, or has it been constant since it started? \"  \"Does it get worse with exertion? \" No   \"Are you feeling it now? \" felt it a minute ago      Comes and goes    5. TAP: \"Using your hand, can you tap out what you are feeling on a chair or table in front of you, so that I can hear? \" (Note: not all patients can do this)         6. HEART RATE: \"Can you tell me your heart rate? \" \"How many beats in 15 seconds? \"  (Note: not all patients can do this)        BP was 133/82 HR 63  7. RECURRENT SYMPTOM: \"Have you ever had this before? \" If so, ask: \"When was the last time? \" and \"What happened that time? \"       Have never had this happen before  8. CAUSE: \"What do you think is causing the palpitations? \"      unknown  9. CARDIAC HISTORY: \"Do you have any history of heart disease? \" (e.g., heart attack, angina, bypass surgery, angioplasty, arrhythmia)      No   10. OTHER SYMPTOMS: \"Do you have any other symptoms? \" (e.g., dizziness, chest pain, sweating, difficulty breathing)       no  11. PREGNANCY: \"Is there any chance you are pregnant? \" \"When was your last menstrual period? \"      no    Protocols used: HEART RATE AND HEARTBEAT QUESTIONS-ADULT-[place]    No chest pain, feels like a flutter or skipping a beat. Heart rate 49 Does not have any other symptoms with this. Just checked her BP and HR and was 133/82 P-63.  Recommend per protocol to be seen by PCP within 2 days and she agrees. Able to do a VV    Received call from [facility]. Call soft transferred to [address] to schedule appointment. Please do not reply to the triage nurse through this encounter. Any subsequent communication should be directly with the patient.

## 2020-08-27 ENCOUNTER — HOSPITAL ENCOUNTER (OUTPATIENT)
Dept: WOMENS IMAGING | Age: 70
Discharge: HOME | End: 2020-08-27
Payer: MEDICARE

## 2020-08-27 PROCEDURE — 77063 BREAST TOMOSYNTHESIS BI: CPT

## 2020-10-27 ENCOUNTER — TELEPHONE (OUTPATIENT)
Dept: PRIMARY CARE CLINIC | Age: 70
End: 2020-10-27

## 2020-10-27 NOTE — TELEPHONE ENCOUNTER
Pt said Dr. Rubia Moscoso cut atenolol down to have to 50 mg instead of 100 mg    Pt needs refill of 50 mg. PHARM:  [facility] on [address]

## 2020-10-28 RX ORDER — ATENOLOL 50 MG/1
50 TABLET ORAL DAILY
Qty: 90 TABLET | Refills: 3 | Status: SHIPPED | OUTPATIENT
Start: 2020-10-28 | End: 2020-10-29 | Stop reason: SDUPTHER

## 2020-10-29 ENCOUNTER — TELEPHONE (OUTPATIENT)
Dept: PRIMARY CARE CLINIC | Age: 70
End: 2020-10-29

## 2020-10-29 RX ORDER — ATENOLOL 100 MG/1
50 TABLET ORAL DAILY
Qty: 45 TABLET | Refills: 3 | Status: SHIPPED | OUTPATIENT
Start: 2020-10-29 | End: 2022-10-18

## 2020-10-29 NOTE — TELEPHONE ENCOUNTER
Per pharmacy atenolol (TENORMIN) 50 MG tablet is not in stock. They are going to give her 100mg and patient will cut medication in half.

## 2020-11-05 ENCOUNTER — TELEPHONE (OUTPATIENT)
Dept: OTHER | Facility: CLINIC | Age: 70
End: 2020-11-05

## 2020-11-19 ENCOUNTER — OFFICE VISIT (OUTPATIENT)
Dept: PRIMARY CARE CLINIC | Age: 70
End: 2020-11-19
Payer: MEDICARE

## 2020-11-19 VITALS
HEART RATE: 59 BPM | SYSTOLIC BLOOD PRESSURE: 152 MMHG | TEMPERATURE: 97 F | HEIGHT: 60 IN | DIASTOLIC BLOOD PRESSURE: 84 MMHG | WEIGHT: 140 LBS | OXYGEN SATURATION: 99 % | BODY MASS INDEX: 27.48 KG/M2

## 2020-11-19 PROBLEM — E03.9 HYPOTHYROIDISM: Status: ACTIVE | Noted: 2020-11-19

## 2020-11-19 PROCEDURE — 99214 OFFICE O/P EST MOD 30 MIN: CPT | Performed by: INTERNAL MEDICINE

## 2020-11-19 RX ORDER — ESTRADIOL 0.1 MG/G
0.5 CREAM VAGINAL
COMMUNITY
End: 2020-11-19 | Stop reason: SDUPTHER

## 2020-11-19 RX ORDER — ESTRADIOL 0.1 MG/G
0.5 CREAM VAGINAL
Qty: 1 TUBE | Refills: 3 | Status: SHIPPED | OUTPATIENT
Start: 2020-11-19

## 2020-11-19 ASSESSMENT — PATIENT HEALTH QUESTIONNAIRE - PHQ9
SUM OF ALL RESPONSES TO PHQ QUESTIONS 1-9: 0
SUM OF ALL RESPONSES TO PHQ9 QUESTIONS 1 & 2: 0
2. FEELING DOWN, DEPRESSED OR HOPELESS: 0
1. LITTLE INTEREST OR PLEASURE IN DOING THINGS: 0
SUM OF ALL RESPONSES TO PHQ QUESTIONS 1-9: 0
SUM OF ALL RESPONSES TO PHQ QUESTIONS 1-9: 0

## 2020-11-19 ASSESSMENT — ENCOUNTER SYMPTOMS
DIARRHEA: 0
BLOOD IN STOOL: 0
TROUBLE SWALLOWING: 0
APNEA: 0
ANAL BLEEDING: 0
EYE PAIN: 0
RECTAL PAIN: 0
COUGH: 0
SORE THROAT: 0
CHEST TIGHTNESS: 0
EYE REDNESS: 0
PHOTOPHOBIA: 0
CONSTIPATION: 0
NAUSEA: 0
CHOKING: 0
SHORTNESS OF BREATH: 0
ABDOMINAL PAIN: 0
VOICE CHANGE: 0
VOMITING: 0
STRIDOR: 0
EYE ITCHING: 0
ABDOMINAL DISTENTION: 0
COLOR CHANGE: 0
BACK PAIN: 0
EYE DISCHARGE: 0
RHINORRHEA: 0
WHEEZING: 0

## 2020-11-19 NOTE — PROGRESS NOTES
apnea, cough, choking, chest tightness, shortness of breath, wheezing and stridor. Cardiovascular: Positive for palpitations. Negative for chest pain and leg swelling. Hypertension  Slow pulse   Gastrointestinal: Negative for abdominal distention, abdominal pain, anal bleeding, blood in stool, constipation, diarrhea, nausea, rectal pain and vomiting. Endocrine: Negative. Hypothyroid on supplement. Genitourinary: Negative for dysuria, flank pain, frequency and hematuria. Musculoskeletal: Negative for arthralgias, back pain, gait problem, joint swelling, myalgias, neck pain and neck stiffness. Skin: Negative for color change, pallor, rash and wound. Neurological: Negative for dizziness, tremors, seizures, syncope, facial asymmetry, speech difficulty, weakness, light-headedness, numbness and headaches. Hematological: Negative for adenopathy. Does not bruise/bleed easily. Psychiatric/Behavioral: Negative for agitation, behavioral problems, confusion, decreased concentration, dysphoric mood, hallucinations, self-injury, sleep disturbance and suicidal ideas. The patient is not nervous/anxious and is not hyperactive. Prior to Visit Medications    Medication Sig Taking? Authorizing Provider   atenolol (TENORMIN) 100 MG tablet Take 0.5 tablets by mouth daily Hold for pulse of 60 or below.  Yes China Ray MD   chlorthalidone (HYGROTON) 25 MG tablet Take 1 tablet by mouth once daily Yes China Ray MD   levothyroxine (SYNTHROID) 100 MCG tablet TAKE 1 TABLET BY MOUTH ONCE DAILY *NEED A BLOOD TEST BEFORE FURTHER REFILLS* Yes China Ray MD   NIFEdipine (PROCARDIA XL) 30 MG extended release tablet Take 1 tablet by mouth daily Yes China Ray MD   budesonide-formoterol (SYMBICORT) 160-4.5 MCG/ACT AERO Inhale 2 puffs into the lungs 2 times daily Stop qvar Yes China Ray MD   guaiFENesin 1200 MG TB12 Take one tablet twice a day Yes Sheela Jacobsen Normal heart sounds. Pulmonary:      Effort: Pulmonary effort is normal.      Breath sounds: Normal breath sounds. Abdominal:      General: Bowel sounds are normal. There is no distension. Palpations: There is no mass. Tenderness: There is no abdominal tenderness. There is no right CVA tenderness, left CVA tenderness, guarding or rebound. Hernia: No hernia is present. Musculoskeletal:         General: Deformity present. No swelling or tenderness. Right knee: She exhibits normal range of motion, no swelling, no effusion, no ecchymosis, no deformity and no laceration. No tenderness found. No medial joint line and no lateral joint line tenderness noted. Right lower leg: No edema. Left lower leg: No edema. Comments:     Deformity of knees. Skin:     General: Skin is warm and dry. Neurological:      General: No focal deficit present. Mental Status: She is alert and oriented to person, place, and time. Cranial Nerves: No cranial nerve deficit. Psychiatric:         Mood and Affect: Mood normal.         Behavior: Behavior normal.         Thought Content: Thought content normal.         Judgment: Judgment normal.         ASSESSMENT/PLAN:   Diagnosis Orders   1. Right thyroid nodule  US THYROID    enlarging right thyroid nodule   2. Essential hypertension home blood pressures have been controlled. Patient will continue monitoring at home there usually 015 to 530 systolic with diastolic below 80. Renal Function Panel   3. Acquired hypothyroidism clinically you thyroid monitor TSH  TSH WITH REFLEX TO FT4   4. Other osteoporosis without current pathological fracture Continue Fosamax    5. Prediabetes stable Hemoglobin A1C    Microalbumin / Creatinine Urine Ratio   6. Atrophic vaginitis  estradiol (ESTRACE) 0.1 MG/GM vaginal cream       An electronic signature was used to authenticate this note.     --Taiwo Mauro MD on 11/19/2020 at 8:58 AM

## 2020-11-22 RX ORDER — GUAIFENESIN 1200 MG/1
TABLET, EXTENDED RELEASE ORAL
Qty: 14 TABLET | Refills: 0 | Status: SHIPPED | OUTPATIENT
Start: 2020-11-22 | End: 2021-09-08

## 2020-11-22 ASSESSMENT — PATIENT HEALTH QUESTIONNAIRE - PHQ9
2. FEELING DOWN, DEPRESSED OR HOPELESS: 0
SUM OF ALL RESPONSES TO PHQ QUESTIONS 1-9: 0
1. LITTLE INTEREST OR PLEASURE IN DOING THINGS: 0
SUM OF ALL RESPONSES TO PHQ9 QUESTIONS 1 & 2: 0
SUM OF ALL RESPONSES TO PHQ QUESTIONS 1-9: 0
SUM OF ALL RESPONSES TO PHQ QUESTIONS 1-9: 0

## 2020-11-25 ENCOUNTER — HOSPITAL ENCOUNTER (OUTPATIENT)
Age: 70
Discharge: HOME | End: 2020-11-25
Payer: MEDICARE

## 2020-11-25 ENCOUNTER — HOSPITAL ENCOUNTER (OUTPATIENT)
Dept: ULTRASOUND IMAGING | Age: 70
Discharge: HOME | End: 2020-11-25
Payer: MEDICARE

## 2020-11-25 LAB
ALBUMIN SERPL-MCNC: 4.3 G/DL (ref 3.4–5)
ANION GAP SERPL CALCULATED.3IONS-SCNC: 10 MMOL/L (ref 3–16)
BUN BLDV-MCNC: 20 MG/DL (ref 7–20)
CALCIUM SERPL-MCNC: 10.5 MG/DL (ref 8.3–10.6)
CHLORIDE BLD-SCNC: 102 MMOL/L (ref 99–110)
CO2: 29 MMOL/L (ref 21–32)
CREAT SERPL-MCNC: 0.9 MG/DL (ref 0.6–1.2)
CREATININE URINE: 71.1 MG/DL (ref 28–259)
ESTIMATED AVERAGE GLUCOSE: 111.2 MG/DL
GFR AFRICAN AMERICAN: >60
GFR NON-AFRICAN AMERICAN: >60
GLUCOSE BLD-MCNC: 98 MG/DL (ref 70–99)
HBA1C MFR BLD: 5.5 %
MICROALBUMIN UR-MCNC: <1.2 MG/DL
MICROALBUMIN/CREAT UR-RTO: NORMAL MG/G (ref 0–30)
PHOSPHORUS: 2.4 MG/DL (ref 2.5–4.9)
POTASSIUM SERPL-SCNC: 3.6 MMOL/L (ref 3.5–5.1)
SODIUM BLD-SCNC: 141 MMOL/L (ref 136–145)
T4 FREE: 1.4 NG/DL (ref 0.9–1.8)
TSH REFLEX FT4: 0.17 UIU/ML (ref 0.27–4.2)

## 2020-11-25 PROCEDURE — 80069 RENAL FUNCTION PANEL: CPT

## 2020-11-25 PROCEDURE — 36415 COLL VENOUS BLD VENIPUNCTURE: CPT

## 2020-11-25 PROCEDURE — 84443 ASSAY THYROID STIM HORMONE: CPT

## 2020-11-25 PROCEDURE — 76536 US EXAM OF HEAD AND NECK: CPT

## 2020-11-25 PROCEDURE — 82570 ASSAY OF URINE CREATININE: CPT

## 2020-11-25 PROCEDURE — 83036 HEMOGLOBIN GLYCOSYLATED A1C: CPT

## 2020-11-25 PROCEDURE — 82043 UR ALBUMIN QUANTITATIVE: CPT

## 2020-11-25 PROCEDURE — 84439 ASSAY OF FREE THYROXINE: CPT

## 2020-11-26 RX ORDER — LEVOTHYROXINE SODIUM 88 UG/1
TABLET ORAL
Qty: 90 TABLET | Refills: 0 | Status: SHIPPED | OUTPATIENT
Start: 2020-11-26 | End: 2021-03-15 | Stop reason: SDUPTHER

## 2021-01-04 DIAGNOSIS — I10 ESSENTIAL HYPERTENSION: ICD-10-CM

## 2021-01-04 RX ORDER — NIFEDIPINE 30 MG/1
30 TABLET, EXTENDED RELEASE ORAL DAILY
Qty: 90 TABLET | Refills: 1 | Status: SHIPPED | OUTPATIENT
Start: 2021-01-04 | End: 2022-01-05

## 2021-03-12 DIAGNOSIS — E03.9 ACQUIRED HYPOTHYROIDISM: ICD-10-CM

## 2021-03-15 RX ORDER — LEVOTHYROXINE SODIUM 88 UG/1
88 TABLET ORAL DAILY
Qty: 90 TABLET | Refills: 0 | Status: SHIPPED | OUTPATIENT
Start: 2021-03-15 | End: 2021-07-29 | Stop reason: SDUPTHER

## 2021-03-30 NOTE — PROGRESS NOTES
for the past couple of years. #5 essential hypertension. She is historically mildly bradycardic, pulse 50-64 over the past couple of years. No history of syncope. Prescribed 50 mg atenolol daily but asked to hold if pulse less than 60. Creatinine 0.9 November 2020. #6  Complicated diverticulitis. Patient had delayed presentation of complicated sigmoid diverticulitis, resulting in multiple pericolonic abscesses requiring sigmoid colectomy with primary anastomosis in 2015. Last blood work November 2020. Review of Systems   Constitutional: Negative for activity change, appetite change, fatigue and unexpected weight change. HENT: Negative for dental problem, sinus pain, sore throat and trouble swallowing. Eyes: Negative for pain and visual disturbance. Respiratory: Negative for apnea, cough, chest tightness, shortness of breath and wheezing. Cardiovascular: Negative for chest pain and palpitations. Gastrointestinal: Negative for abdominal pain, blood in stool, constipation, diarrhea, nausea, rectal pain and vomiting. Endocrine: Negative for cold intolerance, heat intolerance, polydipsia, polyphagia and polyuria. Genitourinary: Negative for difficulty urinating, dysuria, flank pain, frequency, hematuria, pelvic pain, urgency, vaginal bleeding and vaginal discharge. Musculoskeletal: Negative for arthralgias, back pain, gait problem, joint swelling, myalgias, neck pain and neck stiffness. Skin: Negative for color change and rash. Neurological: Negative for dizziness, tremors, syncope, speech difficulty, weakness, light-headedness and headaches. Hematological: Negative for adenopathy. Does not bruise/bleed easily. Psychiatric/Behavioral: Negative for agitation, behavioral problems, decreased concentration, sleep disturbance and suicidal ideas. The patient is not nervous/anxious and is not hyperactive. Prior to Visit Medications    Medication Sig Taking?  Authorizing Past Surgical History:   Procedure Laterality Date    BREAST SURGERY       SECTION  9434  26    COLON SURGERY  6-28-15    sigmoid resection w/ left salpingo-ooph    SMALL INTESTINE SURGERY  2015    Left salpingectomy       Social History     Socioeconomic History    Marital status:      Spouse name: Not on file    Number of children: Not on file    Years of education: Not on file    Highest education level: Not on file   Occupational History    Occupation: Retired   Social Needs    Financial resource strain: Not on file    Food insecurity     Worry: Not on file     Inability: Not on file   [employer] needs     Medical: Not on file     Non-medical: Not on file   Tobacco Use    Smoking status: Never Smoker    Smokeless tobacco: Never Used   Substance and Sexual Activity    Alcohol use: Yes     Comment: 2-3 per wk.  Drug use: No    Sexual activity: Not Currently   Lifestyle    Physical activity     Days per week: Not on file     Minutes per session: Not on file    Stress: Not on file   Relationships    Social connections     Talks on phone: Not on file     Gets together: Not on file     Attends Baptism service: Not on file     Active member of club or organization: Not on file     Attends meetings of clubs or organizations: Not on file     Relationship status: Not on file    Intimate partner violence     Fear of current or ex partner: Not on file     Emotionally abused: Not on file     Physically abused: Not on file     Forced sexual activity: Not on file   Other Topics Concern    Not on file   Social History Narrative    Not on file        Family History   Problem Relation Age of Onset    High Blood Pressure Father     Cancer Father 80        lung and kidney primary cancers. There were no vitals filed for this visit. Estimated body mass index is 27.34 kg/m² as calculated from the following:    Height as of 20: 5' (1.524 m).     Weight as of 11/19/20: 140 lb (63.5 kg). PHYSICAL EXAM  GENERAL:  Pleasant  female who looks her stated age, awake alert and oriented x3, no acute distress. PSYCH:  No psychomotor retardation or agitation. Good eye contact. Unrestricted affect range. Mood congruent with affect. Linear thought.     [id number] [facility] Outpatient Visit on 11/25/2020   Component Date Value    Sodium 11/25/2020 141     Potassium 11/25/2020 3.6     Chloride 11/25/2020 102     CO2 11/25/2020 29     Anion Gap 11/25/2020 10     Glucose 11/25/2020 98     BUN 11/25/2020 20     CREATININE 11/25/2020 0.9     GFR Non- 11/25/2020 >60     GFR  11/25/2020 >60     Calcium 11/25/2020 10.5     Phosphorus 11/25/2020 2.4*    Albumin 11/25/2020 4.3     TSH Reflex FT4 11/25/2020 0.17*    Hemoglobin A1C 11/25/2020 5.5     eAG 11/25/2020 111.2     Microalbumin, Random Uri* 11/25/2020 <1.20     Creatinine, Ur 11/25/2020 71.1     Microalbumin Creatinine * 11/25/2020 see below     T4 Free 11/25/2020 1.4    Hospital Outpatient Visit on 07/07/2020   Component Date Value    Vit D, 25-Hydroxy 07/07/2020 58.0     Hemoglobin A1C 07/07/2020 5.8     eAG 07/07/2020 119.8     Cholesterol, Total 07/07/2020 162     Triglycerides 07/07/2020 109     HDL 07/07/2020 68*    LDL Calculated 07/07/2020 72     VLDL Cholesterol Calcula* 07/07/2020 22     Sodium 07/07/2020 139     Potassium 07/07/2020 3.7     Chloride 07/07/2020 100     CO2 07/07/2020 27     Anion Gap 07/07/2020 12     Glucose 07/07/2020 88     BUN 07/07/2020 21*    CREATININE 07/07/2020 0.8     GFR Non- 07/07/2020 >60     GFR  07/07/2020 >60     Calcium 07/07/2020 10.2     Total Protein 07/07/2020 7.2     Albumin 07/07/2020 4.2     Albumin/Globulin Ratio 07/07/2020 1.4     Total Bilirubin 07/07/2020 0.3     Alkaline Phosphatase 07/07/2020 55     ALT 07/07/2020 9*    AST 07/07/2020 16     Globulin 07/07/2020 3.0     TSH Reflex FT4 07/07/2020 0.97     Microalbumin, Random Uri* 07/07/2020 <1.20     Creatinine, Ur 07/07/2020 54.5     Microalbumin Creatinine * 07/07/2020 see below     Color, UA 07/07/2020 Yellow     Clarity, UA 07/07/2020 Clear     Glucose, Ur 07/07/2020 Negative     Bilirubin Urine 07/07/2020 Negative     Ketones, Urine 07/07/2020 Negative     Specific Gravity, UA 07/07/2020 1.020     Blood, Urine 07/07/2020 Negative     pH, UA 07/07/2020 7.0     Protein, UA 07/07/2020 Negative     Urobilinogen, Urine 07/07/2020 0.2     Nitrite, Urine 07/07/2020 Negative     Leukocyte Esterase, Urine 07/07/2020 SMALL*    Microscopic Examination 07/07/2020 YES     Urine Type 07/07/2020 Other     WBC, UA 07/07/2020 3-5     RBC, UA 07/07/2020 0-2     Epithelial Cells, UA 07/07/2020 0-1          ASSESSMENT/PLAN  1. Colon cancer [place]  [place] [id number] after complicated diverticulitis. Second degree relative with colon cancer.  - CBC Auto Differential; Future  - EKG 12 Lead; Future    2. Diverticulitis of large intestine with abscess without bleeding  Patient underwent sigmoid colectomy in 2015, despite multiple efforts and referrals I cannot find clear documentation that she has had a colonoscopy since. There is reference to colonoscopy in 2009, results unknown. Will pursue preop evaluation for colonoscopy later this year. 3. Acquired hypothyroidism  Synthroid dose decreased from 100 to 88 mcg late last year for depressed TSH, without subsequent testing documenting adequacy of repletion. We will obtain this now.  - TSH with Reflex; Future  - CBC Auto Differential; Future  - TSH without Reflex; Future    4. Vitamin D deficiency  Chart diagnosisIs of osteoporosis prescribed on vitamin D calcium and Fosamax . She has been on Fosamax for 2012 through 2017, [id number], total of 6 years years. Vitamin D 58 2020.   DEXA 2019-1.2 spine, -1.5 hip, but in 2012-2.6 spine -1.8 hip consistent with excellent response. No x-ray findings of vertebral compression fractures that I can locate. Discuss Fosamax drug holiday on return. repeat DEXA 2022. - Vitamin D 25 Hydroxy; Future    5. Mixed hyperlipidemia screening. Excellent lipid profile historically last checked summer 2020.  - Lipid Panel; Future  - Hemoglobin A1C; Future    6. Essential hypertension    She is historically mildly bradycardic, pulse 50-64 over the past couple of years. No history of syncope. Prescribed 50 mg atenolol daily but asked to hold if pulse less than 60. Creatinine 0.9 November 2020. Encouraged to monitor outpatient pressures. Goal less than 130/80. Asked to move beta-blocker to bedtime to reduce risk of symptomatic bradycardia but more importantly provide better primary prevention for cardiovascular disease.    - Basic Metabolic Panel; Future  - Hemoglobin A1C; Future    7. Pre-op evaluation  In anticipation of [facility] for colonoscopy. - CBC Auto Differential; Future  - Basic Metabolic Panel; Future  - EKG 12 Lead; Future    8. IFG (impaired fasting glucose)     - Hemoglobin A1C; Future    9. Multinodular thyroid disease. She has multinodular thyroid disease on replacement therapy, with right upper thyroid lobe 2.5 cm TI-RADS 3 and right lower thyroid 4.8 cm TI-RADS 2 and right lower thyroid 5.1 cm TI-RADS 2, and left lower 4.1 cm TI-RADS 4 and left upper thyroid 2.6 cm TI-RADS 3 lesion on ultrasound 5/2019 without associated lymphadenopathy. The 4.1 cm TI-RADS 4 nodule in the left lower thyroid gland has/has not been biopsied. The TI-RADS 3 nodules ideally would be ultrasounded at 1 3 and 5 years (2020 2022 2024). Follow-up November 2020 thyroid ultrasound showed radiographic stability of all her nodules according to the interpreting radiologist.  Interestingly right \"posterior\" 4.5 cm Ti RADS 4 lesion noted, not identified on prior ultrasound.   Left 3.9 cm TI-RADS 4 lesion seen on prior ultrasound  not enlarged. Patient has had thyroid ultrasounds in 2017 and 2015, without TI-RADS classification, right 4.1 cm left 4.2 cm in 2017.    -US thyroid 1 yr out from last [address], may be able to stop in 2023.    10. Healthcare maintenance. HCV -2017. Beyond age of Pap smear. VAX 23 2014, Tdap 2017. Addendum completed earlier this month. Discussed Shingrix vaccinations. Mammogram due August 2021, ordered. Due for colonoscopy. Discussed bone density above. HIV screen June 2021    Return in about 2 months (around 5/31/2021). It was a pleasure to visit with Ms. Carline Suarez today. Answered all questions as best I could. MD Joce Acosta, was evaluated through a synchronous (real-time) audio-video encounter. The patient (or guardian if applicable) is aware that this is a billable service. Verbal consent to proceed has been obtained within the past 12 months. The visit was conducted pursuant to the emergency declaration under the [address] [place] [address], [address] [place] [address] authority and the [place] [employer] [place] General Act. Patient identification was verified, and a caregiver was present when appropriate. The patient was located in a state where the provider was credentialed to provide care. Total time spent for this encounter: 41 minutes    --Penny Da Silva MD on 4/11/2021 at 5:56 PM    An electronic signature was used to authenticate this note.

## 2021-03-31 ENCOUNTER — VIRTUAL VISIT (OUTPATIENT)
Dept: PRIMARY CARE CLINIC | Age: 71
End: 2021-03-31
Payer: MEDICARE

## 2021-03-31 DIAGNOSIS — R73.01 IFG (IMPAIRED FASTING GLUCOSE): ICD-10-CM

## 2021-03-31 DIAGNOSIS — E03.9 ACQUIRED HYPOTHYROIDISM: ICD-10-CM

## 2021-03-31 DIAGNOSIS — E55.9 VITAMIN D DEFICIENCY: ICD-10-CM

## 2021-03-31 DIAGNOSIS — K57.20 DIVERTICULITIS OF LARGE INTESTINE WITH ABSCESS WITHOUT BLEEDING: ICD-10-CM

## 2021-03-31 DIAGNOSIS — E78.2 MIXED HYPERLIPIDEMIA: ICD-10-CM

## 2021-03-31 DIAGNOSIS — Z11.4 SCREENING FOR HUMAN IMMUNODEFICIENCY VIRUS WITHOUT PRESENCE OF RISK FACTORS: ICD-10-CM

## 2021-03-31 DIAGNOSIS — Z01.818 PRE-OP EVALUATION: ICD-10-CM

## 2021-03-31 DIAGNOSIS — I10 ESSENTIAL HYPERTENSION: ICD-10-CM

## 2021-03-31 DIAGNOSIS — Z12.11 COLON CANCER SCREENING: Primary | ICD-10-CM

## 2021-03-31 PROCEDURE — 99215 OFFICE O/P EST HI 40 MIN: CPT | Performed by: INTERNAL MEDICINE

## 2021-03-31 NOTE — PATIENT INSTRUCTIONS
Blood work this Monday, and again in June 2021. See me for pre op eval in June 2021  mmgm 8/2021  Thyroid US 11/2021    Remind patient to do blood work ordered recently.   Provid her contact information for Covid vaccination

## 2021-04-29 ENCOUNTER — TELEPHONE (OUTPATIENT)
Dept: PRIMARY CARE CLINIC | Age: 71
End: 2021-04-29

## 2021-04-29 DIAGNOSIS — I10 ESSENTIAL HYPERTENSION: ICD-10-CM

## 2021-04-30 DIAGNOSIS — I10 ESSENTIAL HYPERTENSION: ICD-10-CM

## 2021-04-30 RX ORDER — CHLORTHALIDONE 25 MG/1
TABLET ORAL
Qty: 90 TABLET | Refills: 1 | Status: CANCELLED | OUTPATIENT
Start: 2021-04-30

## 2021-04-30 RX ORDER — CHLORTHALIDONE 25 MG/1
TABLET ORAL
Qty: 90 TABLET | Refills: 1 | Status: SHIPPED | OUTPATIENT
Start: 2021-04-30 | End: 2022-01-05

## 2021-07-29 DIAGNOSIS — E03.9 ACQUIRED HYPOTHYROIDISM: ICD-10-CM

## 2021-07-29 RX ORDER — LEVOTHYROXINE SODIUM 88 UG/1
88 TABLET ORAL DAILY
Qty: 90 TABLET | Refills: 0 | Status: SHIPPED | OUTPATIENT
Start: 2021-07-29 | End: 2022-01-05

## 2021-07-29 NOTE — TELEPHONE ENCOUNTER
Med refill:   Pt is requesting a refill on the following:  ~Levothyroxine 88 mcg tabs  pls send to [facility]: [phone]   Thank you!     Pt rosemary: [phone]

## 2021-09-07 ENCOUNTER — OFFICE VISIT (OUTPATIENT)
Dept: ORTHOPEDIC SURGERY | Age: 71
End: 2021-09-07
Payer: MEDICARE

## 2021-09-07 VITALS — HEIGHT: 60 IN | WEIGHT: 142 LBS | BODY MASS INDEX: 27.88 KG/M2

## 2021-09-07 DIAGNOSIS — M25.551 RIGHT HIP PAIN: Primary | ICD-10-CM

## 2021-09-07 PROCEDURE — 99213 OFFICE O/P EST LOW 20 MIN: CPT | Performed by: ORTHOPAEDIC SURGERY

## 2021-09-07 NOTE — PROGRESS NOTES
CHIEF COMPLAINT: Right hip pain. History:   Mel Palacios is a 65 y.o. female who presents today for right hip follow-up. Initial history:  complaining of right hip pain. Patient was self-referred. She states that the pain began 9  months ago. Patient did not have a history of injury. Patient rates the pain as a 6/10. She states pain is located groin. Hip pain is described as aching. Pain is aggravated by stairs, walking. Patient states hip pain is relieved by heat, motrin. Patient does have a history of back pain. Patient has not had PT. The patient has taken NSAIDs. The patient has not had an intra-articular hip injection. 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 Interval History: She was last seen for right hip 2 years ago. She had ultrasound in injection by Dr. Marla Landry 2020. She notes good relief with the injections. She came back in to see me because her 77-year-old relative just had right hip arthroscopy and she thinks her symptoms sound similar to hers. She was wondering if she needs an MRI. Past Medical History:   Diagnosis Date    Acquired hypothyroidism 2018    Cough variant asthma 2020    Diverticul disease small and large intestine, no perforati or abscess     HTN (hypertension) 2011    Primary osteoarthritis of left knee 2018       Past Surgical History:   Procedure Laterality Date    BREAST SURGERY       SECTION    6306    COLON SURGERY  6-28-15    sigmoid resection w/ left salpingo-ooph    SMALL INTESTINE SURGERY  2015    Left salpingectomy       Family History   Problem Relation Age of Onset    High Blood Pressure Father     Cancer Father 80        lung and kidney primary cancers.        Social History     Socioeconomic History    Marital status:      Spouse name: Not on file    Number of children: Not on file    Years of education: Not on file    Highest education level: Not on file   Occupational History    Occupation: Retired   Tobacco Use    Smoking status: Never Smoker    Smokeless tobacco: Never Used   Vaping Use    Vaping Use: Never used   Substance and Sexual Activity    Alcohol use: Yes     Comment: 2-3 per wk.  Drug use: No    Sexual activity: Not Currently   Other Topics Concern    Not on file   Social History Narrative    Not on file     Social Determinants of Health     Financial Resource Strain:     Difficulty of Paying Living Expenses:    Food Insecurity:     Worried About Running Out of Food in the Last Year:     [address] in the Last Year:    Transportation Needs:     Lack of Transportation (Medical):  Lack of Transportation (Non-Medical):    Physical Activity:     Days of Exercise per Week:     Minutes of Exercise per Session:    Stress:     Feeling of Stress :    Social Connections:     Frequency of Communication with Friends and Family:     Frequency of Social Gatherings with Friends and Family:     Attends [employer]oration[employer] Services:     Active Member of Clubs or Organizations:     Attends Club or Organization Meetings:     Marital Status:    Intimate Partner Violence:     Fear of Current or Ex-Partner:     Emotionally Abused:     Physically Abused:     Sexually Abused:        Current Outpatient Medications   Medication Sig Dispense Refill    levothyroxine (SYNTHROID) 88 MCG tablet Take 1 tablet by mouth Daily 90 tablet 0    chlorthalidone (HYGROTON) 25 MG tablet Take 1 tablet by mouth once daily 90 tablet 1    NIFEdipine (PROCARDIA XL) 30 MG extended release tablet Take 1 tablet by mouth daily 90 tablet 1    guaiFENesin 1200 MG TB12 Take one tablet twice a day 14 tablet 0    estradiol (ESTRACE) 0.1 MG/GM vaginal cream Place 0.5 g vaginally Twice a Week 1 Tube 3    atenolol (TENORMIN) 100 MG tablet Take 0.5 tablets by mouth daily Hold for pulse of 60 or below.  45 tablet 3    budesonide-formoterol (SYMBICORT) 160-4.5 MCG/ACT AERO Inhale 2 puffs into the lungs 2 times daily Stop qvar 1 Inhaler 5    alendronate (FOSAMAX) 70 MG tablet Take 1 tablet by mouth every 7 days 12 tablet 3    Cholecalciferol (VITAMIN D) 2000 UNITS CAPS capsule Take 2 capsules by mouth daily Remind patient, can purchase over the counter and also purchase Caltrate D 600mg/ 400 iu and take bid. 60 capsule 11    Spacer/Aero-Holding Chambers (AEROCHAMBER MV) MISC 1 each by Does not apply route 2 times daily 1 each 2    albuterol sulfate HFA (PROVENTIL HFA) 108 (90 Base) MCG/ACT inhaler Inhale 2 puffs into the lungs every 6 hours as needed for Wheezing 1 Inhaler 3     No current facility-administered medications for this visit. Allergies   Allergen Reactions    Amlodipine Besy-Benazepril Hcl Other (See Comments)    Ace Inhibitors Swelling     Lip swelling    Amlodipine Besy-Benazepril Hcl Other (See Comments)     Lip swelling    Benicar [Olmesartan]      cough    Clonidine Derivatives Other (See Comments)     Lethargy. Review of Systems:  Pertinent items are noted in HPI. Review of systems from Patient History Form dated 12/6/18 and available in the patient's chart under the Media tab. No interval changes. Physical Examination:     Vital signs:   Ht 5' (1.524 m)   Wt 142 lb (64.4 kg)   BMI 27.73 kg/m²       General:  alert, appears stated age, cooperative and no distress   Feet:  normal   Gait:  Normal. The patient can bear weight on the injured extremity.      Right Hip  Trendelenburg test:  negative on Right   negative on Left      Passive ROM:  0 degrees extension    90 degrees flexion   45 degrees external rotation   15 degrees internal rotation   Bilateral hips   CHRISTY:  4   Impingement test:  Mildly positive   Left hip: negative   Labral stress test:  Mildly positive   Left hip: negative   Billy test:  negative   Greater trochanter tenderness:  negative   Pirifiormis / Gluteus medius tenderness:  negative   Iliopsoas strength:  5 / 5    Bilateral hips   Logroll: negative   Straight-leg raise:  negative   Leg length discrepancy:  Equal      Motor exam noted and recorded, quadriceps, hamstrings, foot dorsiflexors and plantar flexors are intact 5/5 equal and symmetric. Sensation is intact grossly to light touch in the tibial, peroneal, sural, and saphenous nerve distributions bilaterally. Both feet are well perfused and sensory is intact to feet equal and symmetric. There is not  any cellulitis, skin lesions, or lymphedema noted bilaterally. Imaging:  Right hip xrays:   AP pelvis xray and AP and lateral views reviewed. They demonstrate no evidence of acute fracture, subluxation, or dislocation. The x-rays findings were interval increase in right hip joint space narrowing now severe, moderate narrowing on left which is unchanged from 2019. Assessment:     Right hip osteoarthritis      Plan:     We discussed continued nonoperative management versus hip replacement. She does not think her hips bother her enough to warrant hip replacement at this time. Ice prn. NSAIDs prn. She can follow-up with Dr. Karo Zaldivar as needed for a repeat ultrasound-guided right hip injection. We discussed that if the symptoms worsen, or the injections do not help, we will have her see our hip arthroplasty surgeons. Joanne Sibley.  Yrn Cuellar MD  Orthopaedic Surgery and Sports Medicine   Electronically signed by Haylie Brady MD on 9/7/2021 at 10:24 AM

## 2021-09-08 ENCOUNTER — OFFICE VISIT (OUTPATIENT)
Dept: PRIMARY CARE CLINIC | Age: 71
End: 2021-09-08
Payer: MEDICARE

## 2021-09-08 VITALS
TEMPERATURE: 97.6 F | HEART RATE: 50 BPM | WEIGHT: 144 LBS | SYSTOLIC BLOOD PRESSURE: 124 MMHG | BODY MASS INDEX: 27.19 KG/M2 | DIASTOLIC BLOOD PRESSURE: 74 MMHG | OXYGEN SATURATION: 99 % | HEIGHT: 61 IN

## 2021-09-08 DIAGNOSIS — I10 ESSENTIAL HYPERTENSION: ICD-10-CM

## 2021-09-08 DIAGNOSIS — E03.9 ACQUIRED HYPOTHYROIDISM: Primary | ICD-10-CM

## 2021-09-08 DIAGNOSIS — Z86.010 HISTORY OF ADENOMATOUS POLYP OF COLON: ICD-10-CM

## 2021-09-08 DIAGNOSIS — Z23 NEEDS FLU SHOT: ICD-10-CM

## 2021-09-08 DIAGNOSIS — R73.01 IFG (IMPAIRED FASTING GLUCOSE): ICD-10-CM

## 2021-09-08 DIAGNOSIS — Z12.31 BREAST CANCER SCREENING BY MAMMOGRAM: ICD-10-CM

## 2021-09-08 DIAGNOSIS — E04.1 RIGHT THYROID NODULE: ICD-10-CM

## 2021-09-08 DIAGNOSIS — M85.80 OSTEOPENIA, UNSPECIFIED LOCATION: ICD-10-CM

## 2021-09-08 DIAGNOSIS — E78.2 MIXED HYPERLIPIDEMIA: ICD-10-CM

## 2021-09-08 DIAGNOSIS — Z12.11 COLON CANCER SCREENING: ICD-10-CM

## 2021-09-08 DIAGNOSIS — M81.0 AGE-RELATED OSTEOPOROSIS WITHOUT CURRENT PATHOLOGICAL FRACTURE: ICD-10-CM

## 2021-09-08 DIAGNOSIS — Z71.89 ACP (ADVANCE CARE PLANNING): ICD-10-CM

## 2021-09-08 DIAGNOSIS — E55.9 VITAMIN D DEFICIENCY: ICD-10-CM

## 2021-09-08 DIAGNOSIS — Z00.00 ROUTINE GENERAL MEDICAL EXAMINATION AT A HEALTH CARE FACILITY: ICD-10-CM

## 2021-09-08 DIAGNOSIS — Z11.4 SCREENING FOR HUMAN IMMUNODEFICIENCY VIRUS WITHOUT PRESENCE OF RISK FACTORS: ICD-10-CM

## 2021-09-08 PROCEDURE — 99213 OFFICE O/P EST LOW 20 MIN: CPT | Performed by: INTERNAL MEDICINE

## 2021-09-08 RX ORDER — ACYCLOVIR 400 MG/1
1 TABLET ORAL 2 TIMES DAILY PRN
COMMUNITY

## 2021-09-08 SDOH — ECONOMIC STABILITY: FOOD INSECURITY: WITHIN THE PAST 12 MONTHS, THE FOOD YOU BOUGHT JUST DIDN'T LAST AND YOU DIDN'T HAVE MONEY TO GET MORE.: NEVER TRUE

## 2021-09-08 SDOH — ECONOMIC STABILITY: FOOD INSECURITY: WITHIN THE PAST 12 MONTHS, YOU WORRIED THAT YOUR FOOD WOULD RUN OUT BEFORE YOU GOT MONEY TO BUY MORE.: NEVER TRUE

## 2021-09-08 ASSESSMENT — PATIENT HEALTH QUESTIONNAIRE - PHQ9
SUM OF ALL RESPONSES TO PHQ QUESTIONS 1-9: 0
SUM OF ALL RESPONSES TO PHQ QUESTIONS 1-9: 0
1. LITTLE INTEREST OR PLEASURE IN DOING THINGS: 0
2. FEELING DOWN, DEPRESSED OR HOPELESS: 0
SUM OF ALL RESPONSES TO PHQ QUESTIONS 1-9: 0
SUM OF ALL RESPONSES TO PHQ9 QUESTIONS 1 & 2: 0

## 2021-09-08 ASSESSMENT — LIFESTYLE VARIABLES: HOW OFTEN DO YOU HAVE A DRINK CONTAINING ALCOHOL: 0

## 2021-09-08 ASSESSMENT — SOCIAL DETERMINANTS OF HEALTH (SDOH): HOW HARD IS IT FOR YOU TO PAY FOR THE VERY BASICS LIKE FOOD, HOUSING, MEDICAL CARE, AND HEATING?: NOT HARD AT ALL

## 2021-09-08 NOTE — PATIENT INSTRUCTIONS
1. Fasting blood work within next 1 week  2. Contact Dr Carla Stanley to schedule colonoscopy to be performed within next 30 days  3. Take only your inhaler and the nifedipine       Advance Directives: Care Instructions  Overview  An advance directive is a legal way to state your wishes at the end of your life. It tells your family and your doctor what to do if you can't say what you want. There are two main types of advance directives. You can change them any time your wishes change. Living will. This form tells your family and your doctor your wishes about life support and other treatment. The form is also called a declaration. Medical power of . This form lets you name a person to make treatment decisions for you when you can't speak for yourself. This person is called a health care agent (health care proxy, health care surrogate). The form is also called a durable power of  for health care. If you do not have an advance directive, decisions about your medical care may be made by a family member, or by a doctor or a  who doesn't know you. It may help to think of an advance directive as a gift to the people who care for you. If you have one, they won't have to make tough decisions by themselves. Follow-up care is a key part of your treatment and safety. Be sure to make and go to all appointments, and call your doctor if you are having problems. It's also a good idea to know your test results and keep a list of the medicines you take. What should you include in an advance directive? Many states have a unique advance directive form. (It may ask you to address specific issues.) Or you might use a universal form that's approved by many states. If your form doesn't tell you what to address, it may be hard to know what to include in your advance directive. Use the questions below to help you get started.   · Who do you want to make decisions about your medical care if you are not able to?  · What life-support measures do you want if you have a serious illness that gets worse over time or can't be cured? · What are you most afraid of that might happen? (Maybe you're afraid of having pain, losing your independence, or being kept alive by machines.)  · Where would you prefer to die? (Your home? A hospital? A nursing home?)  · Do you want to donate your organs when you die? · Do you want certain Restoration practices performed before you die? When should you call for help? Be sure to contact your doctor if you have any questions. Where can you learn more? Go to [email or web address] and sign in to your InfoNow account. Enter R264 in the [employer] box to learn more about \"Advance Directives: Care Instructions. \"     If you do not have an account, please click on the \"Sign Up Now\" link. Current as of: March 17, 2021               Content Version: 12.9  © 9848-7806 Healthwise, [employer]. Care instructions adapted under license by [facility] ([facility]). If you have questions about a medical condition or this instruction, always ask your healthcare professional. [place] [address] any warranty or liability for your use of this information. Personalized Preventive Plan for Nila Hall - 9/8/2021  Medicare offers a range of preventive health benefits. Some of the tests and screenings are paid in full while other may be subject to a deductible, co-insurance, and/or copay. Some of these benefits include a comprehensive review of your medical history including lifestyle, illnesses that may run in your family, and various assessments and screenings as appropriate. After reviewing your medical record and screening and assessments performed today your provider may have ordered immunizations, labs, imaging, and/or referrals for you.   A list of these orders (if applicable) as well as your Preventive Care list are included within your After Visit Summary for your review. Other Preventive Recommendations:    · A preventive eye exam performed by an eye specialist is recommended every 1-2 years to screen for glaucoma; cataracts, macular degeneration, and other eye disorders. · A preventive dental visit is recommended every 6 months. · Try to get at least 150 minutes of exercise per week or 10,000 steps per day on a pedometer . · Order or download the FREE \"Exercise & Physical Activity: Your Everyday Guide\" from [employer]. Call 4-529.377.1308 or search [employer] online. · You need 9423-9847 mg of calcium and 9530-3397 IU of vitamin D per day. It is possible to meet your calcium requirement with diet alone, but a vitamin D supplement is usually necessary to meet this goal.  · When exposed to the sun, use a sunscreen that protects against both UVA and UVB radiation with an SPF of 30 or greater. Reapply every 2 to 3 hours or after sweating, drying off with a towel, or swimming. · Always wear a seat belt when traveling in a car. Always wear a helmet when riding a bicycle or motorcycle. Yes

## 2021-09-08 NOTE — PROGRESS NOTES
2021    Miriam Arias (:  1950) is a 65 y.o. female, here for evaluation of the following medical concerns:    No chief complaint on file. HPI  73-year-old female with osteoporosis, hypothyroidism and bilateral thyroid nodules, right hip OA requiring injections, complicated diverticulitis post sigmoid colectomy with primary anastomosis , impaired fasting glucose, treated hypertension hyperlipidemia without clinical history of cardiovascular disease, who is in today for annual awv and ANTOINE physical.     She is due for colonoscopy this year. She has been on Fosamax for 6 years, but not really taking it. Last DEXA . She was asked to move her beta-blocker to bedtime dosing. She is due for thyroid ultrasound 2021 follow-up thyroid nodules, and mammogram.    Arthritis is not too troubling her, primarily in the knees. Review of Systems   Constitutional: Negative for activity change, appetite change, fatigue and unexpected weight change. HENT: Negative for dental problem, sinus pain, sore throat and trouble swallowing. Eyes: Negative for pain and visual disturbance. Respiratory: Negative for apnea, cough, chest tightness, shortness of breath and wheezing. Cardiovascular: Negative for chest pain and palpitations. Gastrointestinal: Negative for abdominal pain, blood in stool, constipation, diarrhea, nausea, rectal pain and vomiting. Endocrine: Negative for cold intolerance, heat intolerance, polydipsia, polyphagia and polyuria. Genitourinary: Negative for difficulty urinating, dysuria, flank pain, frequency, hematuria, pelvic pain, urgency, vaginal bleeding and vaginal discharge. Musculoskeletal: Negative for arthralgias, back pain, gait problem, joint swelling, myalgias, neck pain and neck stiffness. Skin: Negative for color change and rash. Neurological: Negative for dizziness, tremors, syncope, speech difficulty, weakness, light-headedness and headaches. Hematological: Negative for adenopathy. Does not bruise/bleed easily. Psychiatric/Behavioral: Negative for agitation, behavioral problems, decreased concentration, sleep disturbance and suicidal ideas. The patient is not nervous/anxious and is not hyperactive. Prior to Visit Medications    Medication Sig Taking? Authorizing Provider   levothyroxine (SYNTHROID) 88 MCG tablet Take 1 tablet by mouth Daily  Valentina Mosqueda MD   chlorthalidone (HYGROTON) 25 MG tablet Take 1 tablet by mouth once daily  Valentina Mosqueda MD   NIFEdipine (PROCARDIA XL) 30 MG extended release tablet Take 1 tablet by mouth daily  Samuel Tyson MD   guaiFENesin 1200 MG TB12 Take one tablet twice a day  Samuel Tyson MD   estradiol (ESTRACE) 0.1 MG/GM vaginal cream Place 0.5 g vaginally Twice a Week  Samuel yTson MD   atenolol (TENORMIN) 100 MG tablet Take 0.5 tablets by mouth daily Hold for pulse of 60 or below. Samuel Tyson MD   budesonide-formoterol (SYMBICORT) 160-4.5 MCG/ACT AERO Inhale 2 puffs into the lungs 2 times daily Stop qvar  Samuel Tyson MD   albuterol sulfate HFA (PROVENTIL HFA) 108 (90 Base) MCG/ACT inhaler Inhale 2 puffs into the lungs every 6 hours as needed for Wheezing  Imtiaz Romero MD   alendronate (FOSAMAX) 70 MG tablet Take 1 tablet by mouth every 7 days  Samuel Tyson MD   Cholecalciferol (VITAMIN D) 2000 UNITS CAPS capsule Take 2 capsules by mouth daily Remind patient, can purchase over the counter and also purchase Caltrate D 600mg/ 400 iu and take bid.   Samuel Tyson MD   Spacer/Aero-Holding Chambers (AEROCHAMBER MV) MISC 1 each by Does not apply route 2 times daily  Samuel Tyson MD        Allergies   Allergen Reactions    Amlodipine Besy-Benazepril Hcl Other (See Comments)    Ace Inhibitors Swelling     Lip swelling    Amlodipine Besy-Benazepril Hcl Other (See Comments)     Lip swelling    Benicar [Olmesartan]      cough    Clonidine Derivatives Other (See Comments)     Lethargy. Past Medical History:   Diagnosis Date    Acquired hypothyroidism 2018    Cough variant asthma 2020    Diverticul disease small and large intestine, no perforati or abscess     HTN (hypertension) 2011    Primary osteoarthritis of left knee 2018       Past Surgical History:   Procedure Laterality Date    BREAST SURGERY       SECTION  1985    COLON SURGERY  6-28-15    sigmoid resection w/ left salpingo-ooph    SMALL INTESTINE SURGERY  2015    Left salpingectomy       Social History     Socioeconomic History    Marital status:      Spouse name: Not on file    Number of children: Not on file    Years of education: Not on file    Highest education level: Not on file   Occupational History    Occupation: Retired   Tobacco Use    Smoking status: Never Smoker    Smokeless tobacco: Never Used   Vaping Use    Vaping Use: Never used   Substance and Sexual Activity    Alcohol use: Yes     Comment: 2-3 per wk.  Drug use: No    Sexual activity: Not Currently   Other Topics Concern    Not on file   Social History Narrative    Not on file     Social Determinants of Health     Financial Resource Strain:     Difficulty of Paying Living Expenses:    Food Insecurity:     Worried About Running Out of Food in the Last Year:     [address] in the Last Year:    Transportation Needs:     Lack of Transportation (Medical):      Lack of Transportation (Non-Medical):    Physical Activity:     Days of Exercise per Week:     Minutes of Exercise per Session:    Stress:     Feeling of Stress :    Social Connections:     Frequency of Communication with Friends and Family:     Frequency of Social Gatherings with Friends and Family:     Attends Mu-ism Services:     Active Member of Clubs or Organizations:     Attends Club or Organization Meetings:     Marital Status:    Intimate Partner Violence:  Fear of Current or Ex-Partner:     Emotionally Abused:     Physically Abused:     Sexually Abused:         Family History   Problem Relation Age of Onset    High Blood Pressure Father     Cancer Father 80        lung and kidney primary cancers. There were no vitals filed for this visit. Estimated body mass index is 27.73 kg/m² as calculated from the following:    Height as of 9/7/21: 5' (1.524 m). Weight as of 9/7/21: 142 lb (64.4 kg). PHYSICAL EXAM  GENERAL:  Pleasant  female who looks her stated age, awake alert and oriented x3, no acute distress. HEENT:  Normocephalic atraumatic. Pupils equal round reactive light and accommodation, extra ocular muscles are intact. Oropharynx is clear moist without injection or exudate. Tongue and palate move normally. Turbinates appear normal.  Tympanic membranes appear normal.  NECK:  Supple nontender. No carotid bruits. Brisk carotid upstrokes, no JVD. No thyromegaly. LYMPH:  No supraclavicular cervical axillary or inguinal lymphadenopathy. LUNGS:  Clear to auscultation bilaterally. Very good not excellent air entry. No inspiratory crackles or expiratory wheezes. HEART:  Regular rate and rhythm without pathologic murmur rub gallop S3 or S4. ABDOMEN:  Soft, nontender. Normal bowel sounds. No guarding. No masses. UROGENITAL:  Deferred  EXTREMITIES:  Warm and well perfused without clubbing cyanosis or edema. 2+ pulses in all 4 extremities. Capillary refill less than 2 seconds. NEURO:  Cranial nerves 2-12 grossly intact. Normal muscle bulk and tone. No resting tremor, cogwheeling, normal rapid alternating movements in the hands and feet. No stocking paresthesia. Normal gait and station. MUSCULOSKELETAL:  Mild osteoarthritic changes, more pronounced in the knees. SKIN:  No worrisome lesions, skin a little dry. PSYCH:  No psychomotor retardation or agitation. Good eye contact. Unrestricted affect range. Mood congruent with affect. Linear thought. [id number] [facility] Visit on 11/25/2020   Component Date Value    Sodium 11/25/2020 141     Potassium 11/25/2020 3.6     Chloride 11/25/2020 102     CO2 11/25/2020 29     Anion Gap 11/25/2020 10     Glucose 11/25/2020 98     BUN 11/25/2020 20     CREATININE 11/25/2020 0.9     GFR Non- 11/25/2020 >60     GFR  11/25/2020 >60     Calcium 11/25/2020 10.5     Phosphorus 11/25/2020 2.4*    Albumin 11/25/2020 4.3     TSH Reflex FT4 11/25/2020 0.17*    Hemoglobin A1C 11/25/2020 5.5     eAG 11/25/2020 111.2     Microalbumin, Random Uri* 11/25/2020 <1.20     Creatinine, Ur 11/25/2020 71.1     Microalbumin Creatinine * 11/25/2020 see below     T4 Free 11/25/2020 1.4          ASSESSMENT/PLAN  1. Colon cancer screening  [place] [id number] after complicated diverticulitis. Second degree relative with colon cancer. Due for colonoscopy. 2. Diverticulitis of large intestine with abscess without bleeding  Patient underwent sigmoid colectomy in 2015, despite multiple efforts and referrals I cannot find clear documentation that she has had a colonoscopy since. There is reference to colonoscopy in 2009, results unknown. The patient denies recent fevers, shaking chills, drenching sweats. The patient denies new headache, ear or sinus pressure/pain/drainage, sore throat, dental thermal sensitivity, productive cough, abdominal pain, diarrhea, dysuria, new dyspnea, new chest pain, new pleuritic chest pain. The patient also denies new leg swelling, joint warmth/redness, and open skin sores. Patient has no known drug allergies. They are not an easy bleeder. They have not been on chronic cortisone. They have no history of diabetes. They have no personal or family history of venous thromboembolism. There is no family history of malignant hyperthermia. Patient has tolerated general anesthesia for sigmoid colectomy, breast surgery.  Patient has no known history of hepatitis. They have never received a blood transfusion. They have no known history of sleep apnea. Stop bang 2/8. We will update labs, and she will see Dr. Freda Hitchcock for colonoscopy. She will take only her nifedipine and inhaler on the morning of her scope. 3. Acquired hypothyroidism  Synthroid dose decreased from 100 to 88 mcg late last year for depressed TSH, without subsequent testing documenting adequacy of repletion. We will obtain this now.  - TSH with Reflex; Future  - CBC Auto Differential; Future  - TSH without Reflex; Future    4. Vitamin D deficiency  Chart diagnosisIs of osteoporosis prescribed on vitamin D calcium and Fosamax . She has prescribed  Fosamax for 2012 through 2017, [id number], total of 6 years in theory but actually hardly took it, perhaps 6 months in total.  Vitamin D 58 2020. DEXA 2019-1.2 spine, -1.5 hip, but in 2012-2.6 spine -1.8 hip consistent with excellent response. No x-ray findings of vertebral compression fractures that I can locate. Repeat DEXA 2021. - Vitamin D 25 Hydroxy; Future  -DEXA    5. Mixed hyperlipidemia screening. Excellent lipid profile historically last checked summer 2020.  - Lipid Panel; Future  -AST, ALT  - Hemoglobin A1C; Future    6. Essential hypertension    She is historically mildly bradycardic, pulse 50-64 over the past couple of years. No history of syncope. Prescribed 50 mg atenolol daily but asked to hold if pulse less than 60. Creatinine 0.9 November 2020. Encouraged to monitor outpatient pressures. Goal less than 130/80. Asked to move beta-blocker to bedtime to reduce risk of symptomatic bradycardia but more importantly provide better primary prevention for cardiovascular disease.    - Basic Metabolic Panel; Future  - Hemoglobin A1C; Future    7. Pre-op evaluation  See #2above    8. IFG (impaired fasting glucose)     - Hemoglobin A1C; Future    9. Multinodular thyroid disease.    She has multinodular thyroid disease on replacement therapy, with right upper thyroid lobe 2.5 cm TI-RADS 3 and right lower thyroid 4.8 cm TI-RADS 2 and right lower thyroid 5.1 cm TI-RADS 2, and left lower 4.1 cm TI-RADS 4 and left upper thyroid 2.6 cm TI-RADS 3 lesion on ultrasound 5/2019 without associated lymphadenopathy. The 4.1 cm TI-RADS 4 nodule in the left lower thyroid gland has/has not been biopsied. The TI-RADS 3 nodules ideally would be ultrasounded at 1 3 and 5 years (2020 2022 2024). Follow-up November 2020 thyroid ultrasound showed radiographic stability of all her nodules according to the interpreting radiologist.  Interestingly right \"posterior\" 4.5 cm Ti RADS 4 lesion noted, not identified on prior ultrasound. Left 3.9 cm TI-RADS 4 lesion seen on prior ultrasound  not enlarged. Patient has had thyroid ultrasounds in 2017 and 2015, without TI-RADS classification, right 4.1 cm left 4.2 cm in 2017.    -US thyroid 1 yr out from last [address] [facility], may be able to stop in 2023.    10. Healthcare maintenance. HCV -2017. Beyond age of Pap smear. PVAX 23 2014, Tdap 2017, Shingrix 2020, Covid Moderna 2021. Mammogram due August 2021, ordered. Due for colonoscopy. Discussed bone density above. HIV screen June 2021  POLST: Full code okay for prolonged intubation if aware and communicative would consider feeding tube if needed if aware. OK for IV fluids IV antibiotics. Will get flu shot around October 15  No follow-ups on file. It was a pleasure to visit with Ms. Serge Beaver today. Answered all questions as best I could.   Leslie Bautista MD     Time 25min apart from [facility]

## 2021-09-24 ENCOUNTER — HOSPITAL ENCOUNTER (OUTPATIENT)
Dept: GENERAL RADIOLOGY | Age: 71
Discharge: HOME | End: 2021-09-24
Payer: MEDICARE

## 2021-09-24 DIAGNOSIS — M85.80 OSTEOPENIA, UNSPECIFIED LOCATION: ICD-10-CM

## 2021-09-24 DIAGNOSIS — M81.0 AGE-RELATED OSTEOPOROSIS WITHOUT CURRENT PATHOLOGICAL FRACTURE: ICD-10-CM

## 2021-09-24 PROCEDURE — 77080 DXA BONE DENSITY AXIAL: CPT

## 2021-09-28 ENCOUNTER — OFFICE VISIT (OUTPATIENT)
Dept: ORTHOPEDIC SURGERY | Age: 71
End: 2021-09-28
Payer: MEDICARE

## 2021-09-28 VITALS
SYSTOLIC BLOOD PRESSURE: 132 MMHG | BODY MASS INDEX: 26.31 KG/M2 | WEIGHT: 134 LBS | HEIGHT: 60 IN | HEART RATE: 49 BPM | DIASTOLIC BLOOD PRESSURE: 80 MMHG

## 2021-09-28 DIAGNOSIS — M16.10 HIP ARTHRITIS: Primary | ICD-10-CM

## 2021-09-28 PROCEDURE — 20611 DRAIN/INJ JOINT/BURSA W/US: CPT | Performed by: ORTHOPAEDIC SURGERY

## 2021-09-28 RX ORDER — METHYLPREDNISOLONE ACETATE 40 MG/ML
80 INJECTION, SUSPENSION INTRA-ARTICULAR; INTRALESIONAL; INTRAMUSCULAR; SOFT TISSUE ONCE
Status: COMPLETED | OUTPATIENT
Start: 2021-09-28 | End: 2021-09-28

## 2021-09-28 RX ORDER — LIDOCAINE HYDROCHLORIDE 10 MG/ML
5 INJECTION, SOLUTION INFILTRATION; PERINEURAL ONCE
Status: COMPLETED | OUTPATIENT
Start: 2021-09-28 | End: 2021-09-28

## 2021-09-28 RX ADMIN — LIDOCAINE HYDROCHLORIDE 5 ML: 10 INJECTION, SOLUTION INFILTRATION; PERINEURAL at 10:55

## 2021-09-28 RX ADMIN — METHYLPREDNISOLONE ACETATE 80 MG: 40 INJECTION, SUSPENSION INTRA-ARTICULAR; INTRALESIONAL; INTRAMUSCULAR; SOFT TISSUE at 10:55

## 2021-09-28 NOTE — PROGRESS NOTES
recommended into  right   hip joint using ultrasound visualization. She understands the risks and benefits of the injection and describes no potential allergies. Time out was performed to verify correct person, correct procedure, and correct site. 2. Ultrasound visualization was first performed utilizing the [facility] Ultrasound unit using an 5/2 MHz Curved Probe. finding the femoral neck head junction. Next the femoral artery and vein were visualized with ultrasound medial to the femoral head. The location of the needle insertion was determined well lateral to the neurovascular structures and the site was anesthetized with 3 mL of 1% Lidocaine. The site was then prepped with ChloraPrep. A sterile cover was placed over the transducer head and the femoral head neck junction once again visualized. A 20-gauge spinal needle was then introduced under ultrasound control to the head neck junction. Once the needle was intracapsular the hip joint was injected with 2 mL  of 1% Lidocaine mixed with 2 ml of 40 mg Depo Medrol. Josseline tolerated the procedure well and  did report 80% relief of  hip pain within 5 minutes of the injection. 3.  She was advised that she is probably heading towards a hip replacement and she should discuss it with her family.       Aldo Hatchet, MD  9/28/2021

## 2021-09-28 NOTE — PATIENT INSTRUCTIONS
Impression:  right hip osteoarthritis with protrusio pattern which is worsened in the last 2 years. Plan:  1. Injection with cortisone was recommended into  right   hip joint using ultrasound visualization. She understands the risks and benefits of the injection and describes no potential allergies. Time out was performed to verify correct person, correct procedure, and correct site. 2. Ultrasound visualization was first performed utilizing the [facility] Ultrasound unit using an 5/2 MHz Curved Probe. finding the femoral neck head junction. Next the femoral artery and vein were visualized with ultrasound medial to the femoral head. The location of the needle insertion was determined well lateral to the neurovascular structures and the site was anesthetized with 3 mL of 1% Lidocaine. The site was then prepped with ChloraPrep. A sterile cover was placed over the transducer head and the femoral head neck junction once again visualized. A 20-gauge spinal needle was then introduced under ultrasound control to the head neck junction. Once the needle was intracapsular the hip joint was injected with 2 mL  of 1% Lidocaine mixed with 2 ml of 40 mg Depo Medrol. Josseline tolerated the procedure well and  did report 80% relief of  hip pain within 5 minutes of the injection. 3.  She was advised that she is probably heading towards a hip replacement and she should discuss it with her family.       Flakita Sanches MD  9/28/2021

## 2021-09-30 ENCOUNTER — HOSPITAL ENCOUNTER (OUTPATIENT)
Dept: ULTRASOUND IMAGING | Age: 71
Discharge: HOME | End: 2021-09-30
Payer: MEDICARE

## 2021-09-30 ENCOUNTER — HOSPITAL ENCOUNTER (OUTPATIENT)
Dept: WOMENS IMAGING | Age: 71
Discharge: HOME | End: 2021-09-30
Payer: MEDICARE

## 2021-09-30 DIAGNOSIS — Z12.31 BREAST CANCER SCREENING BY MAMMOGRAM: ICD-10-CM

## 2021-09-30 DIAGNOSIS — E04.1 RIGHT THYROID NODULE: ICD-10-CM

## 2021-09-30 PROCEDURE — 77063 BREAST TOMOSYNTHESIS BI: CPT

## 2021-09-30 PROCEDURE — 76536 US EXAM OF HEAD AND NECK: CPT

## 2021-10-28 ENCOUNTER — OFFICE VISIT (OUTPATIENT)
Dept: PRIMARY CARE CLINIC | Age: 71
End: 2021-10-28
Payer: MEDICARE

## 2021-10-28 VITALS
WEIGHT: 142 LBS | DIASTOLIC BLOOD PRESSURE: 70 MMHG | OXYGEN SATURATION: 98 % | HEART RATE: 50 BPM | BODY MASS INDEX: 27.73 KG/M2 | SYSTOLIC BLOOD PRESSURE: 122 MMHG

## 2021-10-28 DIAGNOSIS — M16.9 OSTEOARTHRITIS OF HIP, UNSPECIFIED LATERALITY, UNSPECIFIED OSTEOARTHRITIS TYPE: ICD-10-CM

## 2021-10-28 DIAGNOSIS — I10 ESSENTIAL HYPERTENSION: ICD-10-CM

## 2021-10-28 DIAGNOSIS — E78.2 MIXED HYPERLIPIDEMIA: Primary | ICD-10-CM

## 2021-10-28 PROCEDURE — 99213 OFFICE O/P EST LOW 20 MIN: CPT | Performed by: INTERNAL MEDICINE

## 2021-10-28 RX ORDER — ROSUVASTATIN CALCIUM 40 MG/1
40 TABLET, COATED ORAL DAILY
Qty: 90 TABLET | Refills: 1 | Status: SHIPPED | OUTPATIENT
Start: 2021-10-28 | End: 2022-10-18 | Stop reason: SINTOL

## 2021-10-28 NOTE — PROGRESS NOTES
10/28/2021    Louis Krause (:  1950) is a 66 y.o. female, here for evaluation of the following medical concerns:    Chief Complaint   Patient presents with    Hypertension    Hypothyroidism       HPI  78-year-old female with osteoporosis, hypothyroidism and bilateral thyroid nodules, right hip OA requiring injections, complicated diverticulitis post sigmoid colectomy with primary anastomosis , impaired fasting glucose, treated hypertension hyperlipidemia without clinical history of cardiovascular disease, who is in today for annual awv and ANTOINE physical.     She is due for colonoscopy this year. She has been on Fosamax for 6 years, but not really taking it. Last DEXA . She was asked to move her beta-blocker to bedtime dosing. She is due for thyroid ultrasound 2021 follow-up thyroid nodules, and mammogram.    Arthritis is not too troubling her, primarily in the knees. Review of Systems   Constitutional: Negative for activity change, appetite change, fatigue and unexpected weight change. HENT: Negative for dental problem, sinus pain, sore throat and trouble swallowing. Eyes: Negative for pain and visual disturbance. Respiratory: Negative for apnea, cough, chest tightness, shortness of breath and wheezing. Cardiovascular: Negative for chest pain and palpitations. Gastrointestinal: Negative for abdominal pain, blood in stool, constipation, diarrhea, nausea, rectal pain and vomiting. Endocrine: Negative for cold intolerance, heat intolerance, polydipsia, polyphagia and polyuria. Genitourinary: Negative for difficulty urinating, dysuria, flank pain, frequency, hematuria, pelvic pain, urgency, vaginal bleeding and vaginal discharge. Musculoskeletal: Negative for arthralgias, back pain, gait problem, joint swelling, myalgias, neck pain and neck stiffness. Skin: Negative for color change and rash.    Neurological: Negative for dizziness, tremors, syncope, speech difficulty, weakness, light-headedness and headaches. Hematological: Negative for adenopathy. Does not bruise/bleed easily. Psychiatric/Behavioral: Negative for agitation, behavioral problems, decreased concentration, sleep disturbance and suicidal ideas. The patient is not nervous/anxious and is not hyperactive. Prior to Visit Medications    Medication Sig Taking? Authorizing Provider   rosuvastatin (CRESTOR) 40 MG tablet Take 1 tablet by mouth daily Yes Ashley Faustin MD   acyclovir (ZOVIRAX) 400 MG tablet Take 1 tablet by mouth 2 times daily as needed Yes Historical Provider, MD   levothyroxine (SYNTHROID) 88 MCG tablet Take 1 tablet by mouth Daily Yes Ashley Faustin MD   chlorthalidone (HYGROTON) 25 MG tablet Take 1 tablet by mouth once daily Yes Ashley Faustin MD   NIFEdipine (PROCARDIA XL) 30 MG extended release tablet Take 1 tablet by mouth daily Yes Pio Venegas MD   estradiol (ESTRACE) 0.1 MG/GM vaginal cream Place 0.5 g vaginally Twice a Week Yes Pio Venegas MD   atenolol (TENORMIN) 100 MG tablet Take 0.5 tablets by mouth daily Hold for pulse of 60 or below. Yes Pio Venegas MD   budesonide-formoterol (SYMBICORT) 160-4.5 MCG/ACT AERO Inhale 2 puffs into the lungs 2 times daily Stop qvar Yes Pio Venegas MD   Cholecalciferol (VITAMIN D) 2000 UNITS CAPS capsule Take 2 capsules by mouth daily Remind patient, can purchase over the counter and also purchase Caltrate D 600mg/ 400 iu and take bid.  Yes Pio Venegas MD   albuterol sulfate HFA (PROVENTIL HFA) 108 (90 Base) MCG/ACT inhaler Inhale 2 puffs into the lungs every 6 hours as needed for Wheezing  Pete Henderson MD        Allergies   Allergen Reactions    Amlodipine Besy-Benazepril Hcl Other (See Comments)    Ace Inhibitors Swelling     Lip swelling    Amlodipine Besy-Benazepril Hcl Other (See Comments)     Lip swelling    Benicar [Olmesartan]      cough    Clonidine Derivatives Other (See Comments)     Lethargy. Past Medical History:   Diagnosis Date    Acquired hypothyroidism 2018    Cough variant asthma 2020    Diverticul disease small and large intestine, no perforati or abscess     HTN (hypertension) 2011    Primary osteoarthritis of left knee 2018       Past Surgical History:   Procedure Laterality Date    BREAST SURGERY       SECTION  1985    COLON SURGERY  6-28-15    sigmoid resection w/ left salpingo-ooph    SMALL INTESTINE SURGERY  2015    Left salpingectomy       Social History     Socioeconomic History    Marital status:      Spouse name: Not on file    Number of children: Not on file    Years of education: Not on file    Highest education level: Not on file   Occupational History    Occupation: Retired   Tobacco Use    Smoking status: Never Smoker    Smokeless tobacco: Never Used   Vaping Use    Vaping Use: Never used   Substance and Sexual Activity    Alcohol use: Yes     Comment: 2-3 per wk.  Drug use: No    Sexual activity: Not Currently   Other Topics Concern    Not on file   Social History Narrative    Not on file     Social Determinants of Health     Financial Resource Strain: Low Risk     Difficulty of Paying Living Expenses: Not hard at all   Food Insecurity: No Food Insecurity    Worried About Running Out of Food in the Last Year: Never true    [address] in the Last Year: Never true   Transportation Needs:     Lack of Transportation (Medical):      Lack of Transportation (Non-Medical):    Physical Activity:     Days of Exercise per Week:     Minutes of Exercise per Session:    Stress:     Feeling of Stress :    Social Connections:     Frequency of Communication with Friends and Family:     Frequency of Social Gatherings with Friends and Family:     Attends [employer] Services:     Active Member of Clubs or Organizations:     Attends Club or Organization Meetings:     Marital Status: agitation. Good eye contact. Unrestricted affect range. Mood congruent with affect. Linear thought.     LABS  Lab Review   Orders Only on 09/09/2021   Component Date Value    Cholesterol, Total 09/09/2021 168     Triglycerides 09/09/2021 101     HDL 09/09/2021 62*    LDL Calculated 09/09/2021 86     VLDL Cholesterol Calcula* 09/09/2021 20     ALT 09/09/2021 10     AST 09/09/2021 17     Hemoglobin A1C 09/09/2021 5.7     eAG 09/09/2021 116.9     WBC 09/09/2021 6.1     RBC 09/09/2021 4.24     Hemoglobin 09/09/2021 12.5     Hematocrit 09/09/2021 37.1     MCV 09/09/2021 87.6     MCH 09/09/2021 29.4     MCHC 09/09/2021 33.6     RDW 09/09/2021 15.3     Platelets 90/18/0665 443     MPV 09/09/2021 9.5     Neutrophils % 09/09/2021 67.1     Lymphocytes % 09/09/2021 19.9     Monocytes % 09/09/2021 7.7     Eosinophils % 09/09/2021 4.6     Basophils % 09/09/2021 0.7     Neutrophils Absolute 09/09/2021 4.1     Lymphocytes Absolute 09/09/2021 1.2     Monocytes Absolute 09/09/2021 0.5     Eosinophils Absolute 09/09/2021 0.3     Basophils Absolute 09/09/2021 0.0     Sodium 09/09/2021 142     Potassium 09/09/2021 3.8     Chloride 09/09/2021 100     CO2 09/09/2021 28     Anion Gap 09/09/2021 14     Glucose 09/09/2021 90     BUN 09/09/2021 23*    CREATININE 09/09/2021 1.0     GFR Non- 09/09/2021 54*    GFR  09/09/2021 >60     Calcium 09/09/2021 10.7*    Vit D, 25-Hydroxy 09/09/2021 48.7     TSH 09/09/2021 0.37     HIV Ag/Ab 09/09/2021 Non-Reactive     HIV-1 Antibody 09/09/2021 Non-Reactive     HIV ANTIGEN 09/09/2021 Non-Reactive     HIV-2 Ab 09/09/2021 [facility] Outpatient Visit on 11/25/2020   Component Date Value    Sodium 11/25/2020 141     Potassium 11/25/2020 3.6     Chloride 11/25/2020 102     CO2 11/25/2020 29     Anion Gap 11/25/2020 10     Glucose 11/25/2020 98     BUN 11/25/2020 20     CREATININE 11/25/2020 0.9     GFR Non- 11/25/2020 >60     GFR  11/25/2020 >60     Calcium 11/25/2020 10.5     Phosphorus 11/25/2020 2.4*    Albumin 11/25/2020 4.3     TSH Reflex FT4 11/25/2020 0.17*    Hemoglobin A1C 11/25/2020 5.5     eAG 11/25/2020 111.2     Microalbumin, Random Uri* 11/25/2020 <1.20     Creatinine, Ur 11/25/2020 71.1     Microalbumin Creatinine * 11/25/2020 see below     T4 Free 11/25/2020 1.4          ASSESSMENT/PLAN  1. Colon cancer screening  Cologuard -2422 after complicated diverticulitis. Second degree relative with colon cancer. Colonoscopy 2021 -.    2. Diverticulitis of large intestine with abscess without bleeding  Patient underwent sigmoid colectomy in 2015, despite multiple efforts and referrals I cannot find clear documentation that she has had a colonoscopy since. There is reference to colonoscopy in 2009, results unknown. Fortunately colonoscopy 10/25/2021 was negative for polyps. Sigmoid anastomosis was intact, prep was excellent, and the TI was cannulated. No further colonoscopy indicated given patient age in all likelihood. 3. Acquired hypothyroidism  Synthroid dose decreased from 100 to 88 mcg 2020 for depressed TSH, TSH consistent with adequate repletion 2021, TSH at the low end of the normal range. 4. Vitamin D deficiency  Chart diagnosisIs of osteoporosis prescribed on vitamin D calcium and Fosamax . She has prescribed  Fosamax for 2012 through 2017, [id number], total of 6 years in theory but actually hardly took it, perhaps 6 months in total.  Vitamin D49 2021. DEXA 2021 and +1.2 worst hip -1.8. 2019-1.2 spine, -1.5 hip, 2012 -2.6 spine -1.8 hip consistent with stable moderate osteopenia. Repeat 2026.    5. Mixed hyperlipidemia screening. 12.9% 10-year risk heart attack stroke LDL climbed from 72-86 over the past year. Recommended institution of lipid-lowering therapy. Patient a/w crestor 40mg rx sent recheck 3 months.     6. Essential hypertension    She is historically mildly bradycardic, pulse 50-64 over the past couple of years. No history of syncope. Prescribed 50 mg atenolol daily but asked to hold if pulse less than 60. Estimates she takes atenolol 90% percent of the time in addition to nifedipine and chlorthalidone. Encouraged to monitor outpatient pressures visit, she tells me 120s-130s/70s-low 80s. Goal less than 130/80. Moved beta-blocker to bedtime to reduce risk of symptomatic bradycardia but more importantly provide better primary prevention for cardiovascular disease. Reassuring BMP.      7.IFG (impaired fasting glucose)  A1c essentially normal at 5.7%, 2021    9. Multinodular thyroid disease. She has multinodular thyroid disease on replacement therapy, with right upper thyroid lobe 2.5 cm TI-RADS 3 and right lower thyroid 4.8 cm TI-RADS 2 and right lower thyroid 5.1 cm TI-RADS 2, and left lower 4.1 cm TI-RADS 4 and left upper thyroid 2.6 cm TI-RADS 3 lesion on ultrasound 5/2019 without associated lymphadenopathy. The 4.1 cm TI-RADS 4 nodule in the left lower thyroid gland has/has not been biopsied. The TI-RADS 3 nodules ideally would be ultrasounded at 1 3 and 5 years (2020 2022 2024). Follow-up November 2020 thyroid ultrasound showed radiographic stability of all her nodules according to the interpreting radiologist.  Interestingly right \"posterior\" 4.5 cm Ti RADS 4 lesion noted, not identified on prior ultrasound. Left 3.9 cm TI-RADS 4 lesion seen on prior ultrasound  not enlarged. Patient has had thyroid ultrasounds in 2017 and 2015, without TI-RADS classification, right 4.1 cm left 4.2 cm in 2017.    -US thyroid 10/2021, without interval change. No further imaging indicated. 10. Healthcare maintenance. HCV -2017. Beyond age of Pap smear. PVAX 23 2014, Tdap 2017, Shingrix 2020, Covid Moderna 2021 x 2, recommended for booster. .  Mammogram September 2021 without evidence of malignancy. [facility]   HIV screen negative  2021  POLST: Full code okay for prolonged intubation if aware and communicative would consider feeding tube if needed if aware. OK for IV fluids IV antibiotics. Flu shot 2021 already. 11. Bilateral knee and Right Hip OA. Injn R hip w good benefit, knees sevl yrs ago doing well so far. Return in about 6 months (around 4/28/2022). It was a pleasure to visit with Ms. Enrrique Burch today. Answered all questions as best I could.   Ilene Infante MD     Time 25min

## 2021-12-21 ENCOUNTER — TELEPHONE (OUTPATIENT)
Dept: PRIMARY CARE CLINIC | Age: 71
End: 2021-12-21

## 2021-12-21 NOTE — TELEPHONE ENCOUNTER
Left message on machine per HIPPA  Called patient needs an AWV appointment!  Please schedule an AWV with Daryle Spruce, CNP

## 2022-01-04 ENCOUNTER — TELEPHONE (OUTPATIENT)
Dept: PRIMARY CARE CLINIC | Age: 72
End: 2022-01-04

## 2022-07-06 ENCOUNTER — OFFICE VISIT (OUTPATIENT)
Dept: PRIMARY CARE CLINIC | Age: 72
End: 2022-07-06
Payer: MEDICARE

## 2022-07-06 VITALS
HEIGHT: 59 IN | OXYGEN SATURATION: 97 % | HEART RATE: 58 BPM | BODY MASS INDEX: 28.55 KG/M2 | TEMPERATURE: 97.5 F | WEIGHT: 141.6 LBS | SYSTOLIC BLOOD PRESSURE: 126 MMHG | DIASTOLIC BLOOD PRESSURE: 80 MMHG

## 2022-07-06 DIAGNOSIS — E83.52 HYPERCALCEMIA: ICD-10-CM

## 2022-07-06 DIAGNOSIS — M25.512 CHRONIC PAIN OF BOTH SHOULDERS: ICD-10-CM

## 2022-07-06 DIAGNOSIS — M25.511 CHRONIC PAIN OF BOTH SHOULDERS: ICD-10-CM

## 2022-07-06 DIAGNOSIS — Z00.00 MEDICARE ANNUAL WELLNESS VISIT, SUBSEQUENT: ICD-10-CM

## 2022-07-06 DIAGNOSIS — E78.2 MIXED HYPERLIPIDEMIA: Primary | ICD-10-CM

## 2022-07-06 DIAGNOSIS — G89.29 CHRONIC PAIN OF BOTH SHOULDERS: ICD-10-CM

## 2022-07-06 DIAGNOSIS — E78.2 MIXED HYPERLIPIDEMIA: ICD-10-CM

## 2022-07-06 LAB
ALT SERPL-CCNC: 11 U/L (ref 10–40)
AST SERPL-CCNC: 17 U/L (ref 15–37)
C-REACTIVE PROTEIN: <3 MG/L (ref 0–5.1)
CALCIUM SERPL-MCNC: 10.9 MG/DL (ref 8.3–10.6)
CHOLESTEROL, TOTAL: 116 MG/DL (ref 0–199)
CREAT SERPL-MCNC: 0.9 MG/DL (ref 0.6–1.2)
GFR AFRICAN AMERICAN: >60
GFR NON-AFRICAN AMERICAN: >60
HDLC SERPL-MCNC: 73 MG/DL (ref 40–60)
LDL CHOLESTEROL CALCULATED: 27 MG/DL
PARATHYROID HORMONE INTACT: 42.9 PG/ML (ref 14–72)
PHOSPHORUS: 3.3 MG/DL (ref 2.5–4.9)
SEDIMENTATION RATE, ERYTHROCYTE: 24 MM/HR (ref 0–30)
TRIGL SERPL-MCNC: 80 MG/DL (ref 0–150)
VLDLC SERPL CALC-MCNC: 16 MG/DL

## 2022-07-06 PROCEDURE — 1123F ACP DISCUSS/DSCN MKR DOCD: CPT | Performed by: INTERNAL MEDICINE

## 2022-07-06 PROCEDURE — G0439 PPPS, SUBSEQ VISIT: HCPCS | Performed by: INTERNAL MEDICINE

## 2022-07-06 ASSESSMENT — PATIENT HEALTH QUESTIONNAIRE - PHQ9
SUM OF ALL RESPONSES TO PHQ QUESTIONS 1-9: 0
2. FEELING DOWN, DEPRESSED OR HOPELESS: 0
1. LITTLE INTEREST OR PLEASURE IN DOING THINGS: 0
SUM OF ALL RESPONSES TO PHQ QUESTIONS 1-9: 0
SUM OF ALL RESPONSES TO PHQ9 QUESTIONS 1 & 2: 0
SUM OF ALL RESPONSES TO PHQ QUESTIONS 1-9: 0
SUM OF ALL RESPONSES TO PHQ QUESTIONS 1-9: 0

## 2022-07-06 ASSESSMENT — LIFESTYLE VARIABLES: HOW OFTEN DO YOU HAVE A DRINK CONTAINING ALCOHOL: NEVER

## 2022-07-06 NOTE — PATIENT INSTRUCTIONS
1. Thyroid blood test 9/2022  2. Cholesterol ESR calcium test today  3. POLST completed today  Personalized Preventive Plan for Rina Cuevas - 7/6/2022  Medicare offers a range of preventive health benefits. Some of the tests and screenings are paid in full while other may be subject to a deductible, co-insurance, and/or copay. Some of these benefits include a comprehensive review of your medical history including lifestyle, illnesses that may run in your family, and various assessments and screenings as appropriate. After reviewing your medical record and screening and assessments performed today your provider may have ordered immunizations, labs, imaging, and/or referrals for you. A list of these orders (if applicable) as well as your Preventive Care list are included within your After Visit Summary for your review. Other Preventive Recommendations:    · A preventive eye exam performed by an eye specialist is recommended every 1-2 years to screen for glaucoma; cataracts, macular degeneration, and other eye disorders. · A preventive dental visit is recommended every 6 months. · Try to get at least 150 minutes of exercise per week or 10,000 steps per day on a pedometer . · Order or download the FREE \"Exercise & Physical Activity: Your Everyday Guide\" from [employer]. Call 5-837.607.3195 or search [employer] online. · You need 4577-4867 mg of calcium and 7828-7264 IU of vitamin D per day. It is possible to meet your calcium requirement with diet alone, but a vitamin D supplement is usually necessary to meet this goal.  · When exposed to the sun, use a sunscreen that protects against both UVA and UVB radiation with an SPF of 30 or greater. Reapply every 2 to 3 hours or after sweating, drying off with a towel, or swimming. · Always wear a seat belt when traveling in a car. Always wear a helmet when riding a bicycle or motorcycle.

## 2022-07-06 NOTE — PROGRESS NOTES
2022    Paulina Bosch (:  1950) is a 66 y.o. female, here for evaluation of the following medical concerns:    No chief complaint on file. HPI  49-year-old female with osteoporosis, hypothyroidism and bilateral thyroid nodules, right hip OA requiring injections, complicated diverticulitis post sigmoid colectomy with primary anastomosis , impaired fasting glucose, treated hypertension hyperlipidemia without clinical history of cardiovascular disease, who is in today for annual wellness visit. Arthritis is not too troubling her, primarily in the knees. Review of Systems   Constitutional: Negative for activity change, appetite change, fatigue and unexpected weight change. HENT: Negative for dental problem, sinus pain, sore throat and trouble swallowing. Eyes: Negative for pain and visual disturbance. Respiratory: Negative for apnea, cough, chest tightness, shortness of breath and wheezing. Cardiovascular: Negative for chest pain and palpitations. Gastrointestinal: Negative for abdominal pain, blood in stool, constipation, diarrhea, nausea, rectal pain and vomiting. Endocrine: Negative for cold intolerance, heat intolerance, polydipsia, polyphagia and polyuria. Genitourinary: Negative for difficulty urinating, dysuria, flank pain, frequency, hematuria, pelvic pain, urgency, vaginal bleeding and vaginal discharge. Musculoskeletal: Negative for arthralgias, back pain, gait problem, joint swelling, myalgias, neck pain and neck stiffness. Skin: Negative for color change and rash. Neurological: Negative for dizziness, tremors, syncope, speech difficulty, weakness, light-headedness and headaches. Hematological: Negative for adenopathy. Does not bruise/bleed easily. Psychiatric/Behavioral: Negative for agitation, behavioral problems, decreased concentration, sleep disturbance and suicidal ideas. The patient is not nervous/anxious and is not hyperactive.         Prior to Visit Medications    Medication Sig Taking? Authorizing Provider   NIFEdipine (PROCARDIA XL) 30 MG extended release tablet Take 1 tablet by mouth once daily  Mariano Nloan MD   EUTHYROX 88 MCG tablet Take 1 tablet by mouth once daily  Mariano Nolan MD   atenolol (TENORMIN) 50 MG tablet TAKE 1 TABLET BY MOUTH ONCE DAILY HOLD  FOR  PULSE  OF  60  OR  BELOW  Mariano Nolan MD   chlorthalidone (HYGROTON) 25 MG tablet Take 1 tablet by mouth once daily  Mariano Nolan MD   budesonide-formoterol (SYMBICORT) 160-4.5 MCG/ACT AERO Inhale 2 puffs into the lungs 2 times daily  Mariano Nolan MD   rosuvastatin (CRESTOR) 40 MG tablet Take 1 tablet by mouth daily  Mariano Nolan MD   acyclovir (ZOVIRAX) 400 MG tablet Take 1 tablet by mouth 2 times daily as needed  Historical Provider, MD   estradiol (ESTRACE) 0.1 MG/GM vaginal cream Place 0.5 g vaginally Twice a Week  Tommy Mason MD   atenolol (TENORMIN) 100 MG tablet Take 0.5 tablets by mouth daily Hold for pulse of 60 or below. Tommy Mason MD   albuterol sulfate HFA (PROVENTIL HFA) 108 (90 Base) MCG/ACT inhaler Inhale 2 puffs into the lungs every 6 hours as needed for Wheezing  Pb Colorado MD   Cholecalciferol (VITAMIN D) 2000 UNITS CAPS capsule Take 2 capsules by mouth daily Remind patient, can purchase over the counter and also purchase Caltrate D 600mg/ 400 iu and take bid. Tommy Mason MD        Allergies   Allergen Reactions    Amlodipine Besy-Benazepril Hcl Other (See Comments)    Ace Inhibitors Swelling     Lip swelling    Amlodipine Besy-Benazepril Hcl Other (See Comments)     Lip swelling    Benicar [Olmesartan]      cough    Clonidine Derivatives Other (See Comments)     Lethargy.        Past Medical History:   Diagnosis Date    Cough variant asthma 8/4/2020    HTN (hypertension) 7/27/2011    Primary osteoarthritis of left knee 12/6/2018       Past Surgical History:   Procedure Laterality Date    BREAST SURGERY file    Number of Places Lived in the Last Year: Not on file    Unstable Housing in the Last Year: Not on file        Family History   Problem Relation Age of Onset    High Blood Pressure Father     Cancer Father 80        lung and kidney primary cancers. There were no vitals filed for this visit. Estimated body mass index is 27.73 kg/m² as calculated from the following:    Height as of 9/28/21: 5' (1.524 m). Weight as of 10/28/21: 142 lb (64.4 kg). PHYSICAL EXAM  GENERAL:  Pleasant  female who looks her stated age, awake alert and oriented x3, no acute distress. PSYCH:  No psychomotor retardation or agitation. Good eye contact. Unrestricted affect range. Mood congruent with affect. Linear thought.     LABS  Lab Review   Orders Only on 09/09/2021   Component Date Value    Cholesterol, Total 09/09/2021 168     Triglycerides 09/09/2021 101     HDL 09/09/2021 62*    LDL Calculated 09/09/2021 86     VLDL Cholesterol Calcula* 09/09/2021 20     ALT 09/09/2021 10     AST 09/09/2021 17     Hemoglobin A1C 09/09/2021 5.7     eAG 09/09/2021 116.9     WBC 09/09/2021 6.1     RBC 09/09/2021 4.24     Hemoglobin 09/09/2021 12.5     Hematocrit 09/09/2021 37.1     MCV 09/09/2021 87.6     MCH 09/09/2021 29.4     MCHC 09/09/2021 33.6     RDW 09/09/2021 15.3     Platelets 63/97/0177 443     MPV 09/09/2021 9.5     Neutrophils % 09/09/2021 67.1     Lymphocytes % 09/09/2021 19.9     Monocytes % 09/09/2021 7.7     Eosinophils % 09/09/2021 4.6     Basophils % 09/09/2021 0.7     Neutrophils Absolute 09/09/2021 4.1     Lymphocytes Absolute 09/09/2021 1.2     Monocytes Absolute 09/09/2021 0.5     Eosinophils Absolute 09/09/2021 0.3     Basophils Absolute 09/09/2021 0.0     Sodium 09/09/2021 142     Potassium 09/09/2021 3.8     Chloride 09/09/2021 100     CO2 09/09/2021 28     Anion Gap 09/09/2021 14     Glucose 09/09/2021 90     BUN 09/09/2021 23*    CREATININE 09/09/2021 1.0     GFR atenolol daily but asked to hold if pulse less than 60. Estimates she takes atenolol 90% percent of the time in addition to nifedipine and chlorthalidone. Encouraged to monitor outpatient pressures visit, she tells me 120s-130s/70s-low 80s. Goal less than 130/80. Moved beta-blocker to bedtime to reduce risk of symptomatic bradycardia but more importantly provide better primary prevention for cardiovascular disease. Reassuring BMP.      7.IFG (impaired fasting glucose)  A1c essentially normal at 5.7%, 2021    9. Multinodular thyroid disease. She has multinodular thyroid disease on replacement therapy, with right upper thyroid lobe 2.5 cm TI-RADS 3 and right lower thyroid 4.8 cm TI-RADS 2 and right lower thyroid 5.1 cm TI-RADS 2, and left lower 4.1 cm TI-RADS 4 and left upper thyroid 2.6 cm TI-RADS 3 lesion on ultrasound 5/2019 without associated lymphadenopathy. The 4.1 cm TI-RADS 4 nodule in the left lower thyroid gland has/has not been biopsied. The TI-RADS 3 nodules ideally would be ultrasounded at 1 3 and 5 years (2020 2022 2024). Follow-up November 2020 thyroid ultrasound showed radiographic stability of all her nodules according to the interpreting radiologist.  Interestingly right \"posterior\" 4.5 cm Ti RADS 4 lesion noted, not identified on prior ultrasound. Left 3.9 cm TI-RADS 4 lesion seen on prior ultrasound  not enlarged. Patient has had thyroid ultrasounds in 2017 and 2015, without TI-RADS classification, right 4.1 cm left 4.2 cm in 2017.    -US thyroid 10/2021, without interval change. No further imaging indicated. 10. Healthcare maintenance. HCV -2017. Beyond age of Pap smear. PVAX 23 2014, Tdap 2017, Shingrix 2020, Covid Moderna 2021 x 2, recommended for booster. .  Mammogram September 2021 without evidence of malignancy. [facility] HIV screen negative  2021  POLST: Full code okay for prolonged intubation if aware and communicative would consider feeding tube if needed if aware.   OK for IV fluids IV antibiotics. 11. Bilateral knee and Right Hip OA. Injn R hip w good benefit, knees sevl yrs ago doing well so far. 12.  Hypercalcemia. On thiazide diuretic, but will check PTH recheck calcium phosphorus renal function holding off on SPEP. No follow-ups on file. It was a pleasure to visit with Ms. Christine Brunson today. Answered all questions as best I could.   Gissel Kan MD     Time 25min

## 2022-07-06 NOTE — PROGRESS NOTES
Medicare Annual Wellness Visit    Loly Meléndez is here for Medicare AWV    Assessment & Plan   Mixed hyperlipidemia  -     Lipid Panel; Future  -     AST; Future  -     ALT; Future  Chronic pain of both shoulders  -     Sedimentation Rate; Future  -     C-Reactive Protein; Future  Hypercalcemia  -     PTH, Intact; Future  -     Calcium; Future  -     Phosphorus; Future  -     Creatinine; Future  Medicare annual wellness visit, subsequent      Recommendations for Preventive Services Due: see orders and patient instructions/AVS.  Recommended screening schedule for the next 5-10 years is provided to the patient in written form: see Patient Instructions/AVS.     Return for Medicare Annual Wellness Visit in 1 year. Subjective   [facility]    Patient's complete Health Risk Assessment and screening values have been reviewed and are found in Flowsheets. The following problems were reviewed today and where indicated follow up appointments were made and/or referrals ordered. Positive Risk Factor Screenings with Interventions:             General Health and ACP:  General  In general, how would you say your health is?: Good  In the past 7 days, have you experienced any of the following: New or Increased Pain, New or Increased Fatigue, Loneliness, Social Isolation, Stress or Anger?: (!) Yes (joint pain)  Select all that apply: (!) New or Increased Pain  Do you get the social and emotional support that you need?: Yes  Do you have a Living Will?: (!) No    Advance Directives     Power of  Living Will ACP-Advance Directive ACP-Power of     Not on File Not on File Not on File Not on File      General Health Risk Interventions:  · Pain issues: Check inflammatory markers. Sees orthopedics.       Safety:  Do you have working smoke detectors?: Yes  Do you have any tripping hazards - loose or unsecured carpets or rugs?: (!) Yes  Do you have any tripping hazards - clutter in doorways, halls, or stairs?: No  Do you have MCG/ACT inhaler Inhale 2 puffs into the lungs every 6 hours as needed for Wheezing Yes Nelson Spicer MD   Cholecalciferol (VITAMIN D) 2000 UNITS CAPS capsule Take 2 capsules by mouth daily Remind patient, can purchase over the counter and also purchase Caltrate D 600mg/ 400 iu and take bid.  Yes John Rodriguez MD       CareTeam (Including outside providers/suppliers regularly involved in providing care):   Patient Care Team:  Kana Celestin MD as PCP - General (Internal Medicine)  Kana Celestin MD as PCP - [facility] Empaneled Provider  Gabbi Pichardo DPM as Consulting Physician (Podiatry)  Chelsy Plasencia MD as Consulting Physician (Gynecology)  Mira Crenshaw MD as Consulting Physician (Otolaryngology)     Reviewed and updated this visit:  Tobacco  Allergies  Meds  Med Hx  Surg Hx  Soc Hx  Fam Hx

## 2022-07-07 ENCOUNTER — TELEPHONE (OUTPATIENT)
Dept: ORTHOPEDIC SURGERY | Age: 72
End: 2022-07-07

## 2022-07-07 NOTE — TELEPHONE ENCOUNTER
S/W NKECHI  Advised referral for hip injection would be Dr. Tai Feliciano or Dr. Suresh Myers.   Patient transferred to schedule at her convenience.

## 2022-07-07 NOTE — TELEPHONE ENCOUNTER
General Question     Subject: Patient is wondering who you recommend to do an injection in the hip.   Patient: Curtis Minaya  Contact Number: [phone]

## 2022-07-18 ENCOUNTER — OFFICE VISIT (OUTPATIENT)
Dept: ORTHOPEDIC SURGERY | Age: 72
End: 2022-07-18
Payer: MEDICARE

## 2022-07-18 VITALS — HEIGHT: 59 IN | WEIGHT: 141 LBS | BODY MASS INDEX: 28.43 KG/M2

## 2022-07-18 DIAGNOSIS — M16.11 PRIMARY OSTEOARTHRITIS OF RIGHT HIP: Primary | ICD-10-CM

## 2022-07-18 DIAGNOSIS — M25.559 HIP PAIN: ICD-10-CM

## 2022-07-18 PROCEDURE — 20611 DRAIN/INJ JOINT/BURSA W/US: CPT | Performed by: ORTHOPAEDIC SURGERY

## 2022-07-18 RX ORDER — TRIAMCINOLONE ACETONIDE 40 MG/ML
40 INJECTION, SUSPENSION INTRA-ARTICULAR; INTRAMUSCULAR ONCE
Status: COMPLETED | OUTPATIENT
Start: 2022-07-18 | End: 2022-07-18

## 2022-07-18 RX ORDER — LIDOCAINE HYDROCHLORIDE 10 MG/ML
20 INJECTION, SOLUTION INFILTRATION; PERINEURAL ONCE
Status: COMPLETED | OUTPATIENT
Start: 2022-07-18 | End: 2022-07-18

## 2022-07-18 RX ADMIN — LIDOCAINE HYDROCHLORIDE 20 ML: 10 INJECTION, SOLUTION INFILTRATION; PERINEURAL at 14:06

## 2022-07-18 RX ADMIN — TRIAMCINOLONE ACETONIDE 40 MG: 40 INJECTION, SUSPENSION INTRA-ARTICULAR; INTRAMUSCULAR at 14:06

## 2022-07-18 NOTE — PROGRESS NOTES
Date:  2022    Name:  Sil Mathews  Address:  [address] [place] [address],[address]    :  1950      Age:   66 y.o.    SSN:  [id number]      Medical Record Number:  [id number]    Reason for Visit:    Chief Complaint    Hip Pain (NP RIGHT HIP )      DOS:2022     HPI: Bernadene Landau is a 66 y.o. female here today for  evaluation of right anterior hip pain that has been on going for year(s). Pain assessment is documented below. She had ultrasound in injection by Dr. Hermelinda Lozano 2020. She notes good relief with the injections. She saw Dr. Cyndi Arana in the past as well in  who recommended continue nonoperative treatment versus hip replacement based on the severity of patient's symptoms. The patient denies any bowel/bladder symptoms, or any numbness, tingling, or weakness down the affected thigh or leg. The patient denies any prior hip injuries, surgeries, or any childhood history of hip disorders. Pain Assessment  Location of Pain: Hip  Location Modifiers: Right  Quality of Pain: Aching, Sharp (PINCHING)  Frequency of Pain: Intermittent  Aggravating Factors:  (EXTENDED SITTING. QUICKLY STEPPING TO LEFT OR RIGHT)  Limiting Behavior: Some  Relieving Factors: Ice, Heat, Nsaids, Rest  Result of Injury: No  Work-Related Injury: No  Are there other pain locations you wish to document?: No  ROS: Review of systems reviewed from Patient History Form completed today and available in the patient's chart under the Media tab.        Past Medical History:   Diagnosis Date    Cough variant asthma 2020    HTN (hypertension) 2011    Primary osteoarthritis of left knee 2018        Past Surgical History:   Procedure Laterality Date    [address] 21    COLON SURGERY  6-28-15    sigmoid resection w/ left salpingo-ooph    SMALL INTESTINE SURGERY  2015    Left salpingectomy       Family History   Problem Relation Age of Onset    High Blood daily Remind patient, can purchase over the counter and also purchase Caltrate D 600mg/ 400 iu and take bid. 60 capsule 11    albuterol sulfate HFA (PROVENTIL HFA) 108 (90 Base) MCG/ACT inhaler Inhale 2 puffs into the lungs every 6 hours as needed for Wheezing 1 Inhaler 3     No current facility-administered medications for this visit. Allergies   Allergen Reactions    Amlodipine Besy-Benazepril Hcl Other (See Comments)    Ace Inhibitors Swelling     Lip swelling    Amlodipine Besy-Benazepril Hcl Other (See Comments)     Lip swelling    Benicar [Olmesartan]      cough    Clonidine Derivatives Other (See Comments)     Lethargy. Vital signs:  Ht 4' 10.5\" (1.486 m)   Wt 141 lb (64 kg)   BMI 28.97 kg/m²        Constitutional: The physical examination finds the patient to be well-developed and well-nourished. The patient is alert and oriented x3 and was cooperative throughout the visit. Neuro: no focal deficits noted. Normal mood, judgement, decision making  Eyes: sclera clear, EOMI  Ears: Normal external ear  Mouth:  No perioral lesions  Pulm: Respirations unlabored and regular  Pulse: Extremities well perfused, warm, capillary refill < 2 seconds  Musculoskeletal:      Hip Examination: right    Skin/Inspection: No skin lesions, cellulitis, or extreme edema in the lower extremities. Standing/Walking: normal gait, negative Trendelenburg sign.       Supine/Side Lying Exam: Non tender around the major bony prominences  diminished range of motion flexion 110, internal rotation 10 external rotation 20  FADIR Positive  CHRISTY Positive  Resisted Abduction 5/5   Resisted Adduction 5/5   Resisted  Flexion 5/5   not tender at greater trochanter  Billy Test: normal    Prone: Present hip flexion contracture  Non tender to the proximal hamstring   Non tender to the lumbar spine or sacro-iliac joints    Distal Neurovascular exam is intact (foot sensation, pulses, and motor exam)      Beighton Score: Deferred/9 (? 4 indicates joint hypermobility)              1. Passively touch the forearm with the thumb, while flexing the wrist               2. More than 10? hyperextension of the elbows              3. Knees hyperextension greater than or equal to 10? (genu-recurvatum)              4. Passive extension of the fingers or a 90? or more extension of the fifth finger              5. Touching the floor with the palms of the hands when reaching down without bending the knees       Diagnostics:  Radiology:       Pertinent imaging reviewed, both images and report. newRadiographs were obtained and reviewed in the office; 3 views: AP Pelvis and right hip 45-deg Garza View, and right False Profile View    Tonnis Grade: End-Stage  LCEA:  within an acceptable range  Alpha Angle: 55deg   within an acceptable range  Cross over-sign is negative  Other:  Negative Coxa Profunda, Negative Protrusio  Impression: no acute findings regarding any fractures, loose bodies, or dislocation. There is severe osteoarthritis of the right hip      Assessment: Patient is a 66 y.o. female severe osteoarthritis of the right hip causing intermittent pain and stiffness anteriorly. Impression:  Visit Diagnoses         Codes    Hip pain    -  Primary M25.559            Office Procedures:  No orders of the defined types were placed in this encounter. Orders Placed This Encounter   Procedures    XR HIP 2-3 VW W PELVIS RIGHT     Standing Status:   Future     Number of Occurrences:   1     Standing Expiration Date:   7/18/2022     Scheduling Instructions:      ROOM 2       Plan:  Pertinent imaging was reviewed. The etiology, natural history, and treatment options for the disorder were discussed. The roles of activity modification, antiinflammatory medicine, injections, bracing, physical therapy, and surgical interventions were all described to the patient and questions were answered. We believe patient is a candidate for US guided steroid injection. Certainly ready to consider replacement in the future when it becomes more functionally debilitating and patient is mentally ready. The patient was given the option of performing today's injection using ultrasound guidance. We discussed the pros and cons of using the ultrasound for guidance. The patient chose to proceed, and today's injection was performed using Logic E ultrasound unit with a variable frequency (10 MHz) linear transducer for localization and needle placement. The image was saved for the medical record. Procedure: The indications and risks of steroid injection as well as treatment alternatives were discussed with the patient who consented to the procedure. Under sterile conditions and after informed consent was obtained the patient was given an injection into the right hip joint with ultrasound guidance. Using a 20 gauge needle, 2cc  of 40 mg/mL of Kenalog and 4 mL of 0.5% ropivacaine (Naropin) were placed in the hip after it was prepped with chlorhexidine and needle localization was confirmed on ultrasound. This resulted in good relief of symptoms, and flexion/rotation tests of the involved hip shortly after the injection were less provocative. There were no complications. The patient was advised to ice the hip  this evening and to avoid vigorous activities for the next 2 days. They were advised to call us if there was any erythema, enduration, swelling or increasing pain. All the patient's questions were answered while in the clinic. The patient is understanding of all instructions and agrees with the plan. Approximately 45 minutes was spent on patient education and coordinating care. Follow up in: No follow-ups on file. Hip Self assessment forms    Mayte Bynum MD  Sports Medicine Fellow  [address]    The encounter with Jose Ramon Ramos was supervised by Dr. Council Kehr who personally examined the patient and reviewed the plan.      This dictation was performed with a verbal recognition program (DRAGON) and it was checked for errors. It is possible that there are still dictated errors within this office note. If so, please bring any errors to my attention for an addendum. All efforts were made to ensure that this office note is accurate. Sincerely,    Gamal Beckham MD [address] [facility] [address] [place] [id number]  Email: [email or web address]  Office: [phone]    07/18/22  5:37 PM

## 2022-07-18 NOTE — PROGRESS NOTES
7/18/22  2:05 PM      NDC: 8356-3106-18   -   Lidocaine 1.0 %    LOT: [id number]    COMMENT: RIGHT HIP INTRA-ARTICULAR CORTISONE INJECTION      NDC: 2981-2628-98     -    Kenalog 40mg    LOT: [id number]    COMMENT: RIGHT HIP INTRA-ARTICULAR CORTISONE INJECTION

## 2022-07-18 NOTE — LETTER
[address] [address]  Phone: [phone]  Fax: [phone]    Jacob Galarza MD    July 18, 2022     Kary Galeazzi, [address] [place] [address]    Patient: Misa Winston   MR Number: [id number]   YOB: 1950   Date of Visit: 7/18/2022       Dear Kary Galeazzi: Thank you for referring Sage Zuluaga to me for evaluation/treatment. Below are the relevant portions of my assessment and plan of care. If you have questions, please do not hesitate to call me. I look forward to following Josseline along with you.     Sincerely,      Jcaob Galarza MD

## 2022-08-15 ENCOUNTER — OFFICE VISIT (OUTPATIENT)
Dept: ORTHOPEDIC SURGERY | Age: 72
End: 2022-08-15
Payer: MEDICARE

## 2022-08-15 VITALS — HEIGHT: 59 IN | BODY MASS INDEX: 28.43 KG/M2 | WEIGHT: 141 LBS

## 2022-08-15 DIAGNOSIS — M16.11 PRIMARY OSTEOARTHRITIS OF RIGHT HIP: Primary | ICD-10-CM

## 2022-08-15 PROCEDURE — 99213 OFFICE O/P EST LOW 20 MIN: CPT | Performed by: ORTHOPAEDIC SURGERY

## 2022-08-15 PROCEDURE — 1123F ACP DISCUSS/DSCN MKR DOCD: CPT | Performed by: ORTHOPAEDIC SURGERY

## 2022-08-15 NOTE — LETTER
[address]  [address]  Phone: [phone]  Fax: [phone]    Chari Arango MD    August 15, 2022     Andre Slaughter, [address] [facility] [address]    Patient: Rina Cuevas   MR Number: [id number]   YOB: 1950   Date of Visit: 8/15/2022       Dear Andre Slaughter: Thank you for referring Falguni Hamilton to me for evaluation/treatment. Below are the relevant portions of my assessment and plan of care. If you have questions, please do not hesitate to call me. I look forward to following Josseline along with you.     Sincerely,      Chari Arango MD

## 2022-08-15 NOTE — PROGRESS NOTES
Chief Complaint  Hip Pain (F/U RIGHT HIP)      History of Present Illness:  Louis Krause is a pleasant 66 y.o. female who is 1 month post intra-articular cortisone shot. She is noting significant improvement in range of motion and pain. She likes to garden. She denies major setbacks but did have a fall yesterday fell forward on the grass but this was essentially uneventful. She has had symptoms for about 6 to 7 months. She plans on starting a home exercise program and Silver sneakers. .      Medical History:  Patient's medications, allergies, past medical, surgical, social and family histories were reviewed and updated as appropriate. Pain Assessment  Location of Pain: Hip  Location Modifiers: Right  Severity of Pain: 2  Quality of Pain: Aching, Dull  Frequency of Pain: Intermittent  Aggravating Factors:  (SITTING)  Limiting Behavior: Some  Relieving Factors: Rest, Ice, Heat, Exercise, Nsaids  Result of Injury: No  Work-Related Injury: No  Are there other pain locations you wish to document?: No  ROS: Review of systems reviewed from Patient History Form completed today and available in the patient's chart under the Media tab. Pertinent items are noted in HPI  Review of systems reviewed from Patient History Form completed today and available in the patient's chart under the Media tab. Vital Signs:  Ht 4' 10.5\" (1.486 m)   Wt 141 lb (64 kg)   BMI 28.97 kg/m²         Neuro: Alert & oriented x 3,  normal,  no focal deficits noted. Normal affect. Eyes: sclera clear  Ears: Normal external ear  Mouth:  No perioral lesions  Pulm: Respirations unlabored and regular  Pulse: Extremities well perfused. 2+ peripheral pulses. Skin: Warm. No ulcerations. Constitutional: The physical examination finds the patient to be well-developed and well-nourished. The patient is alert and oriented x3 and was cooperative throughout the visit.     Hip Examination: right    Skin/Inspection: No skin lesions, cellulitis, or extreme edema in the lower extremities. Standing/Walking: abnormal:   gait, negative Trendelenburg sign. Supine/Side Lying Exam: Non tender around the major bony prominences  diminished range of motion but with less pain  FADIR Positive  CHRISTY Negative  Resisted Abduction  5/5   Resisted Adduction  5/5   Resisted  Flexion  5/5    not tender at greater trochanter    Distal Neurovascular exam is intact (foot sensation, pulses, and motor exam)       Diagnostics:        No new imaging was obtained today        Assessment: Patient is a 66 y.o. female with improving right hip range of motion and pain 1 month post intra-articular cortisone injection due to advanced hip osteoarthritis. Symptoms ongoing for 7 months. Impression:      Office Procedures:  No orders of the defined types were placed in this encounter. No orders of the defined types were placed in this encounter. Plan:  Walter Zaidi is doing terrific. At this point I recommend surveillance. We recommend that She commence with   home exercise program for   progression of motion, dynamic loading, and trunk/hip/core/thigh strengthening. She is welcome to call my office back for repeat evaluation of possible second round of injection. All the patient's questions were answered while in the clinic. The patient is understanding of all instructions and agrees with the plan. Approximately 25 minutes was spent on patient education and coordinating care. Follow up in: No follow-ups on file. Sincerely,    Sudhakar Mosqueda MD [facility] [address]  Email: [email or web address]  Office: [phone]    08/15/22  10:30 AM        The encounter with Elana Guillory was carried out by myself, Dr Rebeka Morales, who personally examined the patient and reviewed the plan.       This dictation

## 2022-10-12 ENCOUNTER — TELEPHONE (OUTPATIENT)
Dept: PRIMARY CARE CLINIC | Age: 72
End: 2022-10-12

## 2022-10-12 ENCOUNTER — NURSE ONLY (OUTPATIENT)
Dept: PRIMARY CARE CLINIC | Age: 72
End: 2022-10-12
Payer: MEDICARE

## 2022-10-12 DIAGNOSIS — Z23 NEEDS FLU SHOT: Primary | ICD-10-CM

## 2022-10-12 PROCEDURE — 90694 VACC AIIV4 NO PRSRV 0.5ML IM: CPT | Performed by: INTERNAL MEDICINE

## 2022-10-12 PROCEDURE — G0008 ADMIN INFLUENZA VIRUS VAC: HCPCS | Performed by: INTERNAL MEDICINE

## 2022-10-12 NOTE — TELEPHONE ENCOUNTER
Refill request:   Pt is in need of a refill on the following med:  Nifdidipine ER 30 mg tabs   She is requesting that you send the refill to;   [facility]: ([place]) [phone], (fax) [phone]    Last ov:  07/6/22  Next ov: 10/17/22 Harvinder Davey)     Pt phone: [phone]

## 2022-10-13 DIAGNOSIS — I10 ESSENTIAL HYPERTENSION: ICD-10-CM

## 2022-10-13 RX ORDER — NIFEDIPINE 30 MG/1
TABLET, EXTENDED RELEASE ORAL
Qty: 90 TABLET | Refills: 1 | Status: SHIPPED | OUTPATIENT
Start: 2022-10-13

## 2022-10-18 ENCOUNTER — OFFICE VISIT (OUTPATIENT)
Dept: PRIMARY CARE CLINIC | Age: 72
End: 2022-10-18
Payer: MEDICARE

## 2022-10-18 VITALS
DIASTOLIC BLOOD PRESSURE: 79 MMHG | HEART RATE: 50 BPM | SYSTOLIC BLOOD PRESSURE: 128 MMHG | TEMPERATURE: 97.3 F | BODY MASS INDEX: 28.56 KG/M2 | WEIGHT: 139 LBS | OXYGEN SATURATION: 98 %

## 2022-10-18 DIAGNOSIS — I10 ESSENTIAL HYPERTENSION: ICD-10-CM

## 2022-10-18 DIAGNOSIS — E55.9 VITAMIN D DEFICIENCY: ICD-10-CM

## 2022-10-18 DIAGNOSIS — E04.2 MULTIPLE THYROID NODULES: ICD-10-CM

## 2022-10-18 DIAGNOSIS — E78.2 MIXED HYPERLIPIDEMIA: ICD-10-CM

## 2022-10-18 DIAGNOSIS — R73.03 PREDIABETES: ICD-10-CM

## 2022-10-18 DIAGNOSIS — M16.11 PRIMARY OSTEOARTHRITIS OF RIGHT HIP: Primary | ICD-10-CM

## 2022-10-18 DIAGNOSIS — E83.52 HYPERCALCEMIA: ICD-10-CM

## 2022-10-18 DIAGNOSIS — E03.9 ACQUIRED HYPOTHYROIDISM: ICD-10-CM

## 2022-10-18 DIAGNOSIS — M16.11 PRIMARY OSTEOARTHRITIS OF RIGHT HIP: ICD-10-CM

## 2022-10-18 LAB
A/G RATIO: 2.1 (ref 1.1–2.2)
ALBUMIN SERPL-MCNC: 4.7 G/DL (ref 3.4–5)
ALP BLD-CCNC: 46 U/L (ref 40–129)
ALT SERPL-CCNC: 8 U/L (ref 10–40)
AMORPHOUS: ABNORMAL /HPF
ANION GAP SERPL CALCULATED.3IONS-SCNC: 11 MMOL/L (ref 3–16)
AST SERPL-CCNC: 13 U/L (ref 15–37)
BACTERIA: ABNORMAL /HPF
BASOPHILS ABSOLUTE: 0 K/UL (ref 0–0.2)
BASOPHILS RELATIVE PERCENT: 0.5 %
BILIRUB SERPL-MCNC: 0.4 MG/DL (ref 0–1)
BILIRUBIN URINE: NEGATIVE
BLOOD, URINE: NEGATIVE
BUN BLDV-MCNC: 21 MG/DL (ref 7–20)
CALCIUM SERPL-MCNC: 10.9 MG/DL (ref 8.3–10.6)
CHLORIDE BLD-SCNC: 99 MMOL/L (ref 99–110)
CHOLESTEROL, TOTAL: 165 MG/DL (ref 0–199)
CLARITY: CLEAR
CO2: 30 MMOL/L (ref 21–32)
COLOR: YELLOW
COMMENT UA: ABNORMAL
CREAT SERPL-MCNC: 1 MG/DL (ref 0.6–1.2)
EOSINOPHILS ABSOLUTE: 0.1 K/UL (ref 0–0.6)
EOSINOPHILS RELATIVE PERCENT: 1.5 %
EPITHELIAL CELLS, UA: 1 /HPF (ref 0–5)
GFR SERPL CREATININE-BSD FRML MDRD: 59 ML/MIN/{1.73_M2}
GLUCOSE BLD-MCNC: 97 MG/DL (ref 70–99)
GLUCOSE URINE: NEGATIVE MG/DL
HCT VFR BLD CALC: 37.3 % (ref 36–48)
HDLC SERPL-MCNC: 69 MG/DL (ref 40–60)
HEMOGLOBIN: 12.5 G/DL (ref 12–16)
HYALINE CASTS: 0 /LPF (ref 0–8)
KETONES, URINE: NEGATIVE MG/DL
LDL CHOLESTEROL CALCULATED: 80 MG/DL
LEUKOCYTE ESTERASE, URINE: ABNORMAL
LYMPHOCYTES ABSOLUTE: 1.1 K/UL (ref 1–5.1)
LYMPHOCYTES RELATIVE PERCENT: 15.5 %
MCH RBC QN AUTO: 29 PG (ref 26–34)
MCHC RBC AUTO-ENTMCNC: 33.6 G/DL (ref 31–36)
MCV RBC AUTO: 86.3 FL (ref 80–100)
MICROSCOPIC EXAMINATION: YES
MONOCYTES ABSOLUTE: 0.6 K/UL (ref 0–1.3)
MONOCYTES RELATIVE PERCENT: 8.6 %
MUCUS: ABNORMAL /LPF
NEUTROPHILS ABSOLUTE: 5.1 K/UL (ref 1.7–7.7)
NEUTROPHILS RELATIVE PERCENT: 73.9 %
NITRITE, URINE: NEGATIVE
PARATHYROID HORMONE INTACT: 48.3 PG/ML (ref 14–72)
PDW BLD-RTO: 15 % (ref 12.4–15.4)
PH UA: 6.5 (ref 5–8)
PLATELET # BLD: 442 K/UL (ref 135–450)
PMV BLD AUTO: 9.4 FL (ref 5–10.5)
POTASSIUM SERPL-SCNC: 4.2 MMOL/L (ref 3.5–5.1)
PROTEIN PROTEIN: 0.01 G/DL
PROTEIN PROTEIN: 10 MG/DL
PROTEIN UA: NEGATIVE MG/DL
RBC # BLD: 4.32 M/UL (ref 4–5.2)
RBC UA: 3 /HPF (ref 0–4)
SODIUM BLD-SCNC: 140 MMOL/L (ref 136–145)
SPECIFIC GRAVITY UA: 1.02 (ref 1–1.03)
T4 FREE: 1.9 NG/DL (ref 0.9–1.8)
TRIGL SERPL-MCNC: 79 MG/DL (ref 0–150)
TSH REFLEX FT4: 0.2 UIU/ML (ref 0.27–4.2)
URIC ACID, SERUM: 6.4 MG/DL (ref 2.6–6)
URINE TYPE: ABNORMAL
UROBILINOGEN, URINE: 0.2 E.U./DL
VITAMIN D 25-HYDROXY: 56.5 NG/ML
VLDLC SERPL CALC-MCNC: 16 MG/DL
WBC # BLD: 7 K/UL (ref 4–11)
WBC UA: ABNORMAL /HPF (ref 0–5)

## 2022-10-18 PROCEDURE — 1123F ACP DISCUSS/DSCN MKR DOCD: CPT | Performed by: INTERNAL MEDICINE

## 2022-10-18 PROCEDURE — 99214 OFFICE O/P EST MOD 30 MIN: CPT | Performed by: INTERNAL MEDICINE

## 2022-10-18 RX ORDER — ATENOLOL 25 MG/1
25 TABLET ORAL DAILY
Qty: 90 TABLET | Refills: 3
Start: 2022-10-18 | End: 2022-10-19

## 2022-10-18 SDOH — ECONOMIC STABILITY: FOOD INSECURITY: WITHIN THE PAST 12 MONTHS, YOU WORRIED THAT YOUR FOOD WOULD RUN OUT BEFORE YOU GOT MONEY TO BUY MORE.: NEVER TRUE

## 2022-10-18 SDOH — ECONOMIC STABILITY: FOOD INSECURITY: WITHIN THE PAST 12 MONTHS, THE FOOD YOU BOUGHT JUST DIDN'T LAST AND YOU DIDN'T HAVE MONEY TO GET MORE.: NEVER TRUE

## 2022-10-18 ASSESSMENT — ENCOUNTER SYMPTOMS
SHORTNESS OF BREATH: 0
PHOTOPHOBIA: 0
STRIDOR: 0
ANAL BLEEDING: 0
VOICE CHANGE: 0
ABDOMINAL PAIN: 0
EYE ITCHING: 0
WHEEZING: 0
COUGH: 0
COLOR CHANGE: 0
EYE REDNESS: 0
DIARRHEA: 0
SORE THROAT: 0
TROUBLE SWALLOWING: 0
RECTAL PAIN: 0
VOMITING: 0
CHOKING: 0
EYE DISCHARGE: 0
ABDOMINAL DISTENTION: 0
EYE PAIN: 0
CONSTIPATION: 0
APNEA: 0
RHINORRHEA: 0
BACK PAIN: 0
NAUSEA: 0
BLOOD IN STOOL: 0
CHEST TIGHTNESS: 0

## 2022-10-18 ASSESSMENT — SOCIAL DETERMINANTS OF HEALTH (SDOH): HOW HARD IS IT FOR YOU TO PAY FOR THE VERY BASICS LIKE FOOD, HOUSING, MEDICAL CARE, AND HEATING?: NOT HARD AT ALL

## 2022-10-18 NOTE — PROGRESS NOTES
Rivera Cain (:  1950) is a 66 y.o. female,Established patient, here for evaluation of the following chief complaint(s):  Follow-up         ASSESSMENT/PLAN:  1. Primary osteoarthritis of right hip, reviewed x-ray films with patient and severe bone-on-bone arthritis of the right hip noted. Patient has been doing an hip arthritis exercise And pain has decreased. C-reactive protein level and sed rate were normal in July of this year. Calcium level mildly elevated will repeat and also get a uric acid level. Patient referred to orthopedic doctor to see if she is a candidate for arthritis gel injection since steroid shots recently only gave her relief for 1 month. In the past steroid previously symptoms for years. -     Reuben Greer MD, Orthopedic Surgery (Hip; Knee; Shoulder), [place]  -     Uric Acid; Future  2. Multiple thyroid nodules: Review and results from 2021 with patient and follow-up recommended in 5 years. On exam today, stable. No report of swallowing problems or neck pain. Will check TSH level.  -     TSH with Reflex to FT4; Future  3. Acquired hypothyroidism, appears clinically euthyroid with 88 MCG of Synthroid daily. Monitor TSH. 4. Essential hypertension, when I checked blood pressure subsequently was 150/80. This was bilateral sitting. We will have patient bring in her blood pressure monitor and compare it with ours to make sure they are patching. No headaches or dizziness. Due to slow pulse rate atenolol was reduced from 50-25 mg daily and continue nifedipine XL 30 mg daily and chlorthalidone 25 mg daily. Pulse Readings from Last 3 Encounters:   10/18/22 50   22 58   10/28/21 50       BP Readings from Last 3 Encounters:   10/18/22 128/79   22 126/80   10/28/21 122/70     -     Comprehensive Metabolic Panel; Future  -     CBC with Auto Differential; Future  -     Urinalysis with Microscopic; Future  5.  Prediabetes, normal foot exam today and monitor A1c.  -     Hemoglobin A1C; Future  6. Mixed hyperlipidemia patient discontinued Crestor over a month a day due to nighttime arthralgias and myalgias that resolved within a week. We will look at fasting lipid profile to see if we need to switch to Livalo. -     Lipid Panel; Future  7. Hypercalcemia, will repeat calcium level with PTH and serum and urine immunofixation to further evaluate. Also check vitamin D level.  -     PTH, Intact; Future  -     IMMUNOFIXATION SERUM PROFILE; Future  -     IMMUNOFIXATION URINE RANDOM PROFILE; Future  8. Vitamin D deficiency monitor level to make sure above 30 in the [place] high risk for deficiency. -     Vitamin D 25 Hydroxy; Future      Return in about 3 months (around 1/18/2023) for 3 months for me and 1 week with MA to check her home bp monitor against our blood pressure monitor. .         Subjective   SUBJECTIVE/OBJECTIVE:  Mild hypercalcemia noted with normal vitamin D level. Some mild aching. Severe aching we will follow-up with discontinuation of statin. Normal PTH and vitamin D level, will repeat and will also need immunofixation for screening for myeloma. No history of kidney stones and currently not on calcium supplement but does take vitamin D regularly. DEXA scan last year stable and had not changed with mild osteopenia. Needs further evaluation. Has not noticed any lumps or bumps and no adenopathy on exam or abdominal masses. Hip Pain   Incident onset: Severe primary osteoarthritis of the right hip 3 years with right hip pain. In the past a steroid injection will last for a year. Now pain affecting ADLs. Incident location: Recently doing an apt for hip arthritis exercises and pain has decreased. In July sed rate and C-reactive protein levels were normal. There was no injury mechanism. The pain is present in the right hip. The quality of the pain is described as aching. The pain is at a severity of 6/10. The pain is moderate. The pain has been Intermittent since onset. Pertinent negatives include no inability to bear weight, loss of motion, loss of sensation, muscle weakness, numbness or tingling. She reports no foreign bodies present. The symptoms are aggravated by weight bearing. She has tried acetaminophen and rest (Hip arthritis exercises) for the symptoms. The treatment provided moderate relief. Hyperlipidemia  This is a chronic problem. The current episode started more than 1 year ago. Condition status: Not sure if controlled since rosuvastatin 40 mg nightly discontinued due to myalgias arthralgias that resolved. Factors aggravating her hyperlipidemia include beta blockers. Pertinent negatives include no chest pain, focal sensory loss, focal weakness, leg pain, myalgias or shortness of breath. Current antihyperlipidemic treatment includes exercise and diet change. Improvement on treatment: Labs to see if controlled without medication. Compliance problems include medication side effects. Risk factors for coronary artery disease include dyslipidemia, hypertension, diabetes mellitus and post-menopausal.     Review of Systems   Constitutional: Negative. Negative for activity change, appetite change, chills, diaphoresis, fatigue and fever. HENT:  Negative for dental problem, ear pain, hearing loss, mouth sores, nosebleeds, postnasal drip, rhinorrhea, sore throat, trouble swallowing and voice change. Eyes:  Negative for photophobia, pain, discharge, redness, itching and visual disturbance. Respiratory:  Negative for apnea, cough, choking, chest tightness, shortness of breath, wheezing and stridor. Cardiovascular:  Negative for chest pain, palpitations and leg swelling. Hypertension  Slow pulse   Gastrointestinal:  Negative for abdominal distention, abdominal pain, anal bleeding, blood in stool, constipation, diarrhea, nausea, rectal pain and vomiting. Endocrine: Negative.          Hypothyroid on supplement. Hyperlipidemia with myalgias with Crestor so discontinued   Genitourinary:  Negative for dysuria, flank pain, frequency and hematuria. Musculoskeletal:  Positive for arthralgias. Negative for back pain, gait problem, joint swelling, myalgias, neck pain and neck stiffness. Hip pain intermittently with weightbearing activities   Skin: Negative. Negative for color change, pallor, rash and wound. Neurological:  Negative for dizziness, tingling, tremors, focal weakness, seizures, syncope, facial asymmetry, speech difficulty, weakness, light-headedness, numbness and headaches. Hematological:  Negative for adenopathy. Does not bruise/bleed easily. Psychiatric/Behavioral:  Negative for agitation, behavioral problems, confusion, decreased concentration, dysphoric mood, hallucinations, self-injury, sleep disturbance and suicidal ideas. The patient is not nervous/anxious and is not hyperactive. Objective   Physical Exam  Constitutional:       General: She is not in acute distress. Appearance: She is well-developed. She is not diaphoretic. HENT:      Head: Normocephalic. Right Ear: External ear normal.      Left Ear: External ear normal.      Mouth/Throat:      Pharynx: No oropharyngeal exudate. Eyes:      Extraocular Movements: Extraocular movements intact. Conjunctiva/sclera: Conjunctivae normal.      Pupils: Pupils are equal, round, and reactive to light. Neck:      Thyroid: No thyromegaly. Vascular: No JVD. Trachea: No tracheal deviation. Comments: Chronic multinodular goiter  Cardiovascular:      Rate and Rhythm: Normal rate. Heart sounds: Normal heart sounds. Pulmonary:      Effort: Pulmonary effort is normal.      Breath sounds: Normal breath sounds. Abdominal:      General: Bowel sounds are normal. There is no distension. Palpations: There is no mass. Tenderness: There is no abdominal tenderness.  There is no right CVA tenderness, left CVA tenderness, guarding or rebound. Hernia: No hernia is present. Musculoskeletal:         General: Deformity present. No swelling or tenderness. Cervical back: Normal range of motion and neck supple. Right knee: No swelling, deformity, effusion, ecchymosis or lacerations. Normal range of motion. No tenderness. No medial joint line or lateral joint line tenderness. Right lower leg: No edema. Left lower leg: No edema. Comments:     Deformity of knees. Skin:     General: Skin is warm and dry. Neurological:      General: No focal deficit present. Mental Status: She is alert and oriented to person, place, and time. Cranial Nerves: No cranial nerve deficit. Psychiatric:         Mood and Affect: Mood normal.         Behavior: Behavior normal.         Thought Content: Thought content normal.         Judgment: Judgment normal.          Visual inspection:  Deformity/amputation: absent  Skin lesions/pre-ulcerative calluses: absent  Edema: right- negative, left- negative    Sensory exam:  Monofilament sensation: normal  (minimum of 5 random plantar locations tested, avoiding callused areas - > 1 area with absence of sensation is + for neuropathy)    Plus at least one of the following:  Pulses: normal,   Pinprick: Intact  Proprioception: Intact  Vibration (128 Hz): Intact       An electronic signature was used to authenticate this note.     --Dany Echevarria MD

## 2022-10-19 DIAGNOSIS — N30.90 CYSTITIS WITHOUT HEMATURIA: Primary | ICD-10-CM

## 2022-10-19 DIAGNOSIS — E78.2 MIXED HYPERLIPIDEMIA: ICD-10-CM

## 2022-10-19 DIAGNOSIS — E03.9 ACQUIRED HYPOTHYROIDISM: ICD-10-CM

## 2022-10-19 DIAGNOSIS — I10 ESSENTIAL HYPERTENSION: ICD-10-CM

## 2022-10-19 LAB
ALBUMIN SERPL-MCNC: 3.5 G/DL (ref 3.1–4.9)
ALPHA-1-GLOBULIN: 0.3 G/DL (ref 0.2–0.4)
ALPHA-2-GLOBULIN: 0.9 G/DL (ref 0.4–1.1)
BETA GLOBULIN: 1.2 G/DL (ref 0.9–1.6)
ESTIMATED AVERAGE GLUCOSE: 114 MG/DL
GAMMA GLOBULIN: 1 G/DL (ref 0.6–1.8)
HBA1C MFR BLD: 5.6 %
SPE/IFE INTERPRETATION: NORMAL
TOTAL PROTEIN: 6.9 G/DL (ref 6.4–8.2)
URINE ELECTROPHORESIS INTERP: NORMAL

## 2022-10-19 RX ORDER — FLUVASTATIN 20 MG/1
20 CAPSULE ORAL NIGHTLY
Qty: 90 CAPSULE | Refills: 3 | Status: SHIPPED | OUTPATIENT
Start: 2022-10-19

## 2022-10-19 RX ORDER — LEVOTHYROXINE SODIUM 0.07 MG/1
75 TABLET ORAL DAILY
Qty: 90 TABLET | Refills: 0 | Status: SHIPPED | OUTPATIENT
Start: 2022-10-19

## 2022-10-19 RX ORDER — NITROFURANTOIN 25; 75 MG/1; MG/1
100 CAPSULE ORAL 2 TIMES DAILY
Qty: 20 CAPSULE | Refills: 0 | Status: SHIPPED | OUTPATIENT
Start: 2022-10-19 | End: 2022-10-29

## 2022-10-19 RX ORDER — ATENOLOL 25 MG/1
25 TABLET ORAL DAILY
Qty: 90 TABLET | Refills: 3 | Status: SHIPPED | OUTPATIENT
Start: 2022-10-19

## 2022-10-19 NOTE — RESULT ENCOUNTER NOTE
Message left reducing Synthroid from 88-75 MCG daily and will start fluvastatin 20 MCG daily for cholesterol,. The cholesterol increase without having any medication. We want to keep it from going too high.

## 2022-10-20 DIAGNOSIS — E83.52 HYPERCALCEMIA: Primary | ICD-10-CM

## 2022-10-20 NOTE — RESULT ENCOUNTER NOTE
Voicemail message left that we need to reduce the thyroid supplement from 88-75 MCG daily. Elevated calcium with normal urine and serum immunofixation normal PTH normal vitamin D and no excess calcium. Patient instructed to call [phone] to schedule a nuclear bone scan to further evaluate.

## 2022-10-31 ENCOUNTER — OFFICE VISIT (OUTPATIENT)
Dept: ORTHOPEDIC SURGERY | Age: 72
End: 2022-10-31
Payer: MEDICARE

## 2022-10-31 VITALS — BODY MASS INDEX: 29.6 KG/M2 | WEIGHT: 141 LBS | HEIGHT: 58 IN

## 2022-10-31 DIAGNOSIS — M16.11 PRIMARY OSTEOARTHRITIS OF RIGHT HIP: Primary | ICD-10-CM

## 2022-10-31 PROCEDURE — 1123F ACP DISCUSS/DSCN MKR DOCD: CPT | Performed by: ORTHOPAEDIC SURGERY

## 2022-10-31 PROCEDURE — 99213 OFFICE O/P EST LOW 20 MIN: CPT | Performed by: ORTHOPAEDIC SURGERY

## 2022-10-31 NOTE — PROGRESS NOTES
[facility]  Office Visit    Chief Complaint: Right hip pain    HPI:  Gretta Gonsales is a 74 y. o. who is here for evaluation of right hip pain. She has seen Dr. Shiela Martinez and Dr. Marylu Torrez for this issue in the past.  She has undergone right hip injections in the past with each of these providers. Her most recent injection was on July 18, 2022 and helped for about 1 month. She describes anterior right hip pain in her groin for multiple years that has been relieved by injections. Pain is 2/10 at rest and up to 7/10 with activity. The pain is achy and worse with going from a seated to a standing position and walking. She walks without assistive device generally but does have a cane in her trunk in case she needs it. She takes NSAIDs to help with the pain. She denies diabetes, history of blood clots, blood thinners, tobacco use. She has help at home. Patient Active Problem List   Diagnosis    S/P colonoscopy    Vitamin D deficiency    Osteopenia    Prediabetes    Essential hypertension    Multiple thyroid nodules    Primary osteoarthritis of left knee    Primary osteoarthritis of right hip    Cough variant asthma    Hypothyroidism    Hypercalcemia    Mixed hyperlipidemia       ROS:  Constitutional: denies fever, chills, weight loss  MSK: denies pain in other joints, muscle aches  Neurological: denies numbness, tingling, weakness    Exam:  Height 4' 10\" (1.473 m), weight 141 lb (64 kg)    Appearance: sitting in exam room chair, appears to be in no acute distress, awake and alert  Resp: unlabored breathing on room air  Skin: warm, dry and intact with out erythema or significant increased temperature  Neuro: grossly intact both lower extremities. Intact sensation to light touch. Motor exam 4+ to 5/5 in all major motor groups. RLE: Examination demonstrates mild pain with logroll and Stinchfield. There is brisk capillary refill.   Strength is 5/5 in hamstrings, quads, hip flexors. Imaging:  Prior right hip radiographs were reviewed today and are significant for bone-on-bone arthritis in central region of the hip joint with sclerotic changes of the femoral head. She has mild left hip osteoarthritis as well. Assessment:  Right hip osteoarthritis    Plan:  We discussed the diagnosis and treatment options. We discussed continued treatment physical therapy exercises, NSAIDs, hip injections. We also discussed total hip arthroplasty. She is strongly considering right total hip arthroplasty in the near future. The operative procedure, alternatives, and risks were discussed in detail with the patient. The risks include but are not limited to: Infection, vessel injury, nerve injury, DVT, pulmonary embolism, implant loosening, need for revision surgery, leg length discrepancy, dislocation, lateral femoral cutaneous nerve palsy, intraoperative fracture. Despite these risks the patient would like to proceed. All questions have been answered and no guarantees were made. I discussed with the patient the diagnosis in detail and answered all questions. The patient verbalized understanding of the plan as it has been described above and is in agreement. She will call if she would like to schedule anterior right total hip arthroplasty. Total time spent on today's encounter was at least 24 minutes. This time included reviewing prior notes, radiographs, and lab results when available, reviewing history obtained by medical assistant, performing history and physical exam, reviewing tests/radiographs with the patient, counseling the patient, ordering medications or tests, documentation in the electronic health record, and coordination of care. This dictation was done with Dragon dictation and may contain mechanical errors related to translation.

## 2022-11-12 ENCOUNTER — HOSPITAL ENCOUNTER (OUTPATIENT)
Dept: WOMENS IMAGING | Age: 72
Discharge: HOME | End: 2022-11-12
Payer: MEDICARE

## 2022-11-12 VITALS — WEIGHT: 135 LBS | BODY MASS INDEX: 26.5 KG/M2 | HEIGHT: 60 IN

## 2022-11-12 DIAGNOSIS — Z12.31 ENCOUNTER FOR SCREENING MAMMOGRAM FOR BREAST CANCER: ICD-10-CM

## 2022-11-12 PROCEDURE — 77067 SCR MAMMO BI INCL CAD: CPT

## 2022-11-21 ENCOUNTER — HOSPITAL ENCOUNTER (OUTPATIENT)
Dept: NUCLEAR MEDICINE | Age: 72
Discharge: HOME | End: 2022-11-21
Payer: MEDICARE

## 2022-11-21 DIAGNOSIS — E83.52 HYPERCALCEMIA: ICD-10-CM

## 2022-11-21 PROCEDURE — 78306 BONE IMAGING WHOLE BODY: CPT | Performed by: INTERNAL MEDICINE

## 2022-11-21 PROCEDURE — 3430000000 HC RX DIAGNOSTIC RADIOPHARMACEUTICAL: Performed by: INTERNAL MEDICINE

## 2022-11-21 PROCEDURE — A9503 TC99M MEDRONATE: HCPCS | Performed by: INTERNAL MEDICINE

## 2022-11-21 RX ORDER — TC 99M MEDRONATE 20 MG/10ML
21.59 INJECTION, POWDER, LYOPHILIZED, FOR SOLUTION INTRAVENOUS
Status: COMPLETED | OUTPATIENT
Start: 2022-11-21 | End: 2022-11-21

## 2022-11-21 RX ADMIN — TC 99M MEDRONATE 21.59 MILLICURIE: 20 INJECTION, POWDER, LYOPHILIZED, FOR SOLUTION INTRAVENOUS at 08:57

## 2022-11-23 DIAGNOSIS — E83.52 HYPERCALCEMIA: Primary | ICD-10-CM

## 2022-11-23 NOTE — RESULT ENCOUNTER NOTE
The bone scan is consistent with degenerative arthritis.  We still do not have a reason for the elevated calcium levels.  Make an appointment with the endocrinologist at [facility] to find out why the calcium levels are elevated.     [facility]- Melissa Canas MD  [address] [place] [address] [address] [place] [address]  Phone: [phone]

## 2022-11-23 NOTE — RESULT ENCOUNTER NOTE
The bone scan is consistent with degenerative arthritis. We still do not have a reason for the elevated calcium levels. Make an appointment with the endocrinologist at [facility] to find out why the calcium levels are elevated.     [address] [facility]- DEANGELO Perera [address] [place] [address]  Phone: [phone]

## 2022-12-07 ENCOUNTER — APPOINTMENT (OUTPATIENT)
Dept: CT IMAGING | Age: 72
DRG: 175 | End: 2022-12-07
Payer: MEDICARE

## 2022-12-07 ENCOUNTER — APPOINTMENT (OUTPATIENT)
Dept: GENERAL RADIOLOGY | Age: 72
DRG: 175 | End: 2022-12-07
Payer: MEDICARE

## 2022-12-07 ENCOUNTER — TELEPHONE (OUTPATIENT)
Dept: PRIMARY CARE CLINIC | Age: 72
End: 2022-12-07

## 2022-12-07 ENCOUNTER — HOSPITAL ENCOUNTER (INPATIENT)
Age: 72
LOS: 7 days | Discharge: HOME | DRG: 175 | End: 2022-12-14
Attending: EMERGENCY MEDICINE | Admitting: INTERNAL MEDICINE
Payer: MEDICARE

## 2022-12-07 DIAGNOSIS — I26.99 ACUTE PULMONARY EMBOLISM, UNSPECIFIED PULMONARY EMBOLISM TYPE, UNSPECIFIED WHETHER ACUTE COR PULMONALE PRESENT (HCC): Primary | ICD-10-CM

## 2022-12-07 DIAGNOSIS — D64.9 ANEMIA, UNSPECIFIED TYPE: ICD-10-CM

## 2022-12-07 DIAGNOSIS — R93.89 ABNORMAL IMAGING OF THYROID: ICD-10-CM

## 2022-12-07 LAB
A/G RATIO: 1 (ref 1.1–2.2)
ALBUMIN SERPL-MCNC: 3.4 G/DL (ref 3.4–5)
ALP BLD-CCNC: 58 U/L (ref 40–129)
ALT SERPL-CCNC: 10 U/L (ref 10–40)
ANION GAP SERPL CALCULATED.3IONS-SCNC: 10 MMOL/L (ref 3–16)
AST SERPL-CCNC: 15 U/L (ref 15–37)
BASOPHILS ABSOLUTE: 0.1 K/UL (ref 0–0.2)
BASOPHILS RELATIVE PERCENT: 0.3 %
BILIRUB SERPL-MCNC: 0.5 MG/DL (ref 0–1)
BUN BLDV-MCNC: 20 MG/DL (ref 7–20)
CALCIUM SERPL-MCNC: 10.3 MG/DL (ref 8.3–10.6)
CHLORIDE BLD-SCNC: 96 MMOL/L (ref 99–110)
CO2: 30 MMOL/L (ref 21–32)
CREAT SERPL-MCNC: 0.9 MG/DL (ref 0.6–1.2)
EOSINOPHILS ABSOLUTE: 0 K/UL (ref 0–0.6)
EOSINOPHILS RELATIVE PERCENT: 0 %
GFR SERPL CREATININE-BSD FRML MDRD: >60 ML/MIN/{1.73_M2}
GLUCOSE BLD-MCNC: 109 MG/DL (ref 70–99)
HCT VFR BLD CALC: 32 % (ref 36–48)
HEMOGLOBIN: 10.7 G/DL (ref 12–16)
INR BLD: 1.28 (ref 0.87–1.14)
LYMPHOCYTES ABSOLUTE: 0.9 K/UL (ref 1–5.1)
LYMPHOCYTES RELATIVE PERCENT: 5.5 %
MAGNESIUM: 2 MG/DL (ref 1.8–2.4)
MCH RBC QN AUTO: 28.8 PG (ref 26–34)
MCHC RBC AUTO-ENTMCNC: 33.5 G/DL (ref 31–36)
MCV RBC AUTO: 86 FL (ref 80–100)
MONOCYTES ABSOLUTE: 1.4 K/UL (ref 0–1.3)
MONOCYTES RELATIVE PERCENT: 8.2 %
NEUTROPHILS ABSOLUTE: 14.8 K/UL (ref 1.7–7.7)
NEUTROPHILS RELATIVE PERCENT: 86 %
PDW BLD-RTO: 14.9 % (ref 12.4–15.4)
PLATELET # BLD: 440 K/UL (ref 135–450)
PMV BLD AUTO: 8.6 FL (ref 5–10.5)
POTASSIUM REFLEX MAGNESIUM: 3.2 MMOL/L (ref 3.5–5.1)
PRO-BNP: 667 PG/ML (ref 0–449)
PROTHROMBIN TIME: 16 SEC (ref 11.7–14.5)
RAPID INFLUENZA  B AGN: NEGATIVE
RAPID INFLUENZA A AGN: NEGATIVE
RBC # BLD: 3.72 M/UL (ref 4–5.2)
SARS-COV-2, NAAT: NOT DETECTED
SODIUM BLD-SCNC: 136 MMOL/L (ref 136–145)
TOTAL PROTEIN: 6.9 G/DL (ref 6.4–8.2)
TROPONIN: <0.01 NG/ML
WBC # BLD: 17.2 K/UL (ref 4–11)

## 2022-12-07 PROCEDURE — 85025 COMPLETE CBC W/AUTO DIFF WBC: CPT

## 2022-12-07 PROCEDURE — 85610 PROTHROMBIN TIME: CPT

## 2022-12-07 PROCEDURE — 83880 ASSAY OF NATRIURETIC PEPTIDE: CPT

## 2022-12-07 PROCEDURE — 84484 ASSAY OF TROPONIN QUANT: CPT

## 2022-12-07 PROCEDURE — 96374 THER/PROPH/DIAG INJ IV PUSH: CPT

## 2022-12-07 PROCEDURE — 80053 COMPREHEN METABOLIC PANEL: CPT

## 2022-12-07 PROCEDURE — 1200000000 HC SEMI PRIVATE

## 2022-12-07 PROCEDURE — 6370000000 HC RX 637 (ALT 250 FOR IP): Performed by: EMERGENCY MEDICINE

## 2022-12-07 PROCEDURE — 87635 SARS-COV-2 COVID-19 AMP PRB: CPT

## 2022-12-07 PROCEDURE — 6360000002 HC RX W HCPCS: Performed by: EMERGENCY MEDICINE

## 2022-12-07 PROCEDURE — 36415 COLL VENOUS BLD VENIPUNCTURE: CPT

## 2022-12-07 PROCEDURE — 71260 CT THORAX DX C+: CPT | Performed by: EMERGENCY MEDICINE

## 2022-12-07 PROCEDURE — 93005 ELECTROCARDIOGRAM TRACING: CPT | Performed by: EMERGENCY MEDICINE

## 2022-12-07 PROCEDURE — 99285 EMERGENCY DEPT VISIT HI MDM: CPT

## 2022-12-07 PROCEDURE — 71045 X-RAY EXAM CHEST 1 VIEW: CPT

## 2022-12-07 PROCEDURE — 6360000004 HC RX CONTRAST MEDICATION: Performed by: EMERGENCY MEDICINE

## 2022-12-07 PROCEDURE — 87804 INFLUENZA ASSAY W/OPTIC: CPT

## 2022-12-07 PROCEDURE — 83735 ASSAY OF MAGNESIUM: CPT

## 2022-12-07 PROCEDURE — 84443 ASSAY THYROID STIM HORMONE: CPT

## 2022-12-07 RX ORDER — SODIUM CHLORIDE 9 MG/ML
INJECTION, SOLUTION INTRAVENOUS PRN
Status: DISCONTINUED | OUTPATIENT
Start: 2022-12-07 | End: 2022-12-14 | Stop reason: HOSPADM

## 2022-12-07 RX ORDER — POLYETHYLENE GLYCOL 3350 17 G/17G
17 POWDER, FOR SOLUTION ORAL DAILY PRN
Status: DISCONTINUED | OUTPATIENT
Start: 2022-12-07 | End: 2022-12-14 | Stop reason: HOSPADM

## 2022-12-07 RX ORDER — HEPARIN SODIUM 1000 [USP'U]/ML
80 INJECTION, SOLUTION INTRAVENOUS; SUBCUTANEOUS ONCE
Status: DISCONTINUED | OUTPATIENT
Start: 2022-12-07 | End: 2022-12-07 | Stop reason: SDUPTHER

## 2022-12-07 RX ORDER — HEPARIN SODIUM 10000 [USP'U]/100ML
5-30 INJECTION, SOLUTION INTRAVENOUS CONTINUOUS
Status: DISCONTINUED | OUTPATIENT
Start: 2022-12-07 | End: 2022-12-07 | Stop reason: SDUPTHER

## 2022-12-07 RX ORDER — ACETAMINOPHEN 325 MG/1
650 TABLET ORAL EVERY 6 HOURS PRN
Status: DISCONTINUED | OUTPATIENT
Start: 2022-12-07 | End: 2022-12-14 | Stop reason: HOSPADM

## 2022-12-07 RX ORDER — HEPARIN SODIUM 10000 [USP'U]/100ML
5-30 INJECTION, SOLUTION INTRAVENOUS CONTINUOUS
Status: DISCONTINUED | OUTPATIENT
Start: 2022-12-07 | End: 2022-12-12

## 2022-12-07 RX ORDER — HYDROCODONE BITARTRATE AND ACETAMINOPHEN 5; 325 MG/1; MG/1
1 TABLET ORAL EVERY 6 HOURS PRN
Status: DISCONTINUED | OUTPATIENT
Start: 2022-12-07 | End: 2022-12-14 | Stop reason: HOSPADM

## 2022-12-07 RX ORDER — SODIUM CHLORIDE 0.9 % (FLUSH) 0.9 %
5-40 SYRINGE (ML) INJECTION PRN
Status: DISCONTINUED | OUTPATIENT
Start: 2022-12-07 | End: 2022-12-14 | Stop reason: HOSPADM

## 2022-12-07 RX ORDER — ACETAMINOPHEN 650 MG/1
650 SUPPOSITORY RECTAL EVERY 6 HOURS PRN
Status: DISCONTINUED | OUTPATIENT
Start: 2022-12-07 | End: 2022-12-14 | Stop reason: HOSPADM

## 2022-12-07 RX ORDER — SODIUM CHLORIDE 0.9 % (FLUSH) 0.9 %
5-40 SYRINGE (ML) INJECTION EVERY 12 HOURS SCHEDULED
Status: DISCONTINUED | OUTPATIENT
Start: 2022-12-08 | End: 2022-12-14 | Stop reason: HOSPADM

## 2022-12-07 RX ORDER — POTASSIUM CHLORIDE 20 MEQ/1
40 TABLET, EXTENDED RELEASE ORAL ONCE
Status: COMPLETED | OUTPATIENT
Start: 2022-12-08 | End: 2022-12-08

## 2022-12-07 RX ORDER — HEPARIN SODIUM 1000 [USP'U]/ML
80 INJECTION, SOLUTION INTRAVENOUS; SUBCUTANEOUS PRN
Status: DISCONTINUED | OUTPATIENT
Start: 2022-12-07 | End: 2022-12-07 | Stop reason: SDUPTHER

## 2022-12-07 RX ORDER — NIFEDIPINE 30 MG/1
30 TABLET, EXTENDED RELEASE ORAL DAILY
Status: DISCONTINUED | OUTPATIENT
Start: 2022-12-08 | End: 2022-12-14 | Stop reason: HOSPADM

## 2022-12-07 RX ORDER — ONDANSETRON 2 MG/ML
4 INJECTION INTRAMUSCULAR; INTRAVENOUS EVERY 6 HOURS PRN
Status: DISCONTINUED | OUTPATIENT
Start: 2022-12-07 | End: 2022-12-14 | Stop reason: HOSPADM

## 2022-12-07 RX ORDER — HEPARIN SODIUM 1000 [USP'U]/ML
40 INJECTION, SOLUTION INTRAVENOUS; SUBCUTANEOUS PRN
Status: DISCONTINUED | OUTPATIENT
Start: 2022-12-07 | End: 2022-12-09

## 2022-12-07 RX ORDER — HEPARIN SODIUM 1000 [USP'U]/ML
80 INJECTION, SOLUTION INTRAVENOUS; SUBCUTANEOUS PRN
Status: DISCONTINUED | OUTPATIENT
Start: 2022-12-07 | End: 2022-12-09

## 2022-12-07 RX ORDER — LEVOTHYROXINE SODIUM 0.07 MG/1
75 TABLET ORAL DAILY
Status: DISCONTINUED | OUTPATIENT
Start: 2022-12-08 | End: 2022-12-14 | Stop reason: HOSPADM

## 2022-12-07 RX ORDER — HEPARIN SODIUM 1000 [USP'U]/ML
80 INJECTION, SOLUTION INTRAVENOUS; SUBCUTANEOUS ONCE
Status: COMPLETED | OUTPATIENT
Start: 2022-12-07 | End: 2022-12-07

## 2022-12-07 RX ORDER — ASPIRIN 81 MG/1
324 TABLET, CHEWABLE ORAL ONCE
Status: COMPLETED | OUTPATIENT
Start: 2022-12-07 | End: 2022-12-07

## 2022-12-07 RX ORDER — ALBUTEROL SULFATE 90 UG/1
2 AEROSOL, METERED RESPIRATORY (INHALATION) EVERY 6 HOURS PRN
Status: DISCONTINUED | OUTPATIENT
Start: 2022-12-07 | End: 2022-12-14 | Stop reason: HOSPADM

## 2022-12-07 RX ORDER — HEPARIN SODIUM 1000 [USP'U]/ML
40 INJECTION, SOLUTION INTRAVENOUS; SUBCUTANEOUS PRN
Status: DISCONTINUED | OUTPATIENT
Start: 2022-12-07 | End: 2022-12-07 | Stop reason: SDUPTHER

## 2022-12-07 RX ORDER — ATENOLOL 25 MG/1
25 TABLET ORAL DAILY
Status: DISCONTINUED | OUTPATIENT
Start: 2022-12-08 | End: 2022-12-14 | Stop reason: HOSPADM

## 2022-12-07 RX ORDER — ONDANSETRON 4 MG/1
4 TABLET, ORALLY DISINTEGRATING ORAL EVERY 8 HOURS PRN
Status: DISCONTINUED | OUTPATIENT
Start: 2022-12-07 | End: 2022-12-14 | Stop reason: HOSPADM

## 2022-12-07 RX ADMIN — ASPIRIN 324 MG: 81 TABLET, CHEWABLE ORAL at 17:52

## 2022-12-07 RX ADMIN — IOPAMIDOL 75 ML: 755 INJECTION, SOLUTION INTRAVENOUS at 19:31

## 2022-12-07 RX ADMIN — HEPARIN SODIUM 4900 UNITS: 1000 INJECTION INTRAVENOUS; SUBCUTANEOUS at 21:22

## 2022-12-07 RX ADMIN — HEPARIN SODIUM 18 UNITS/KG/HR: 10000 INJECTION, SOLUTION INTRAVENOUS at 21:29

## 2022-12-07 ASSESSMENT — ENCOUNTER SYMPTOMS
GASTROINTESTINAL NEGATIVE: 1
SHORTNESS OF BREATH: 1
EYES NEGATIVE: 1
COUGH: 0

## 2022-12-07 ASSESSMENT — PAIN - FUNCTIONAL ASSESSMENT: PAIN_FUNCTIONAL_ASSESSMENT: 0-10

## 2022-12-07 ASSESSMENT — PAIN SCALES - GENERAL: PAINLEVEL_OUTOF10: 7

## 2022-12-07 NOTE — TELEPHONE ENCOUNTER
Called and spoke with pt making her aware that she need to be evaluated. Pt verbalized understanding and will go to the ER.

## 2022-12-07 NOTE — TELEPHONE ENCOUNTER
PT called in stating that since last night she's been experiencing congestion in her chest on the right side. Pt has no cough, no fever and its' been causing pain under her right rib cage. Pt would like to know if she needs a chest xray to have this looked at further.      Please call PT back: [phone]

## 2022-12-07 NOTE — ED PROVIDER NOTES
[facility] Emergency Department    Francois Pinto MD, am the primary clinician of record. CHIEF COMPLAINT  Chief Complaint   Patient presents with    Shortness of Breath     Pt states that she has been having shortness of breath with walking and every breath in she gets a sharp pain under right rib cage. Pt states no cough, fever, headaches or runny nose         HISTORY OF PRESENT ILLNESS  Maurice Riley is a 74 y.o. female  who presents to the ED complaining of SOB and R sided pleuritic pain onset last night and persistent today. She has not been coughing but feels some chest congestion nonetheless. Gets very winded with any exertion. No chest pain except with deep breath. No leg swelling. No DVT or PE history. No recent travel trauma or surgeries except a weekend trip to [address] [place] [address] at the beginning of November. No belly pain nausea vomiting or diarrhea. No other complaints, modifying factors or associated symptoms. I have reviewed the following from the nursing documentation. Past Medical History:   Diagnosis Date    Cough variant asthma 8/4/2020    HTN (hypertension) 7/27/2011    Mixed hyperlipidemia 10/18/2022    Primary osteoarthritis of left knee 12/6/2018     Past Surgical History:   Procedure Laterality Date    BREAST SURGERY      left breast in 1970's    [address]  06/28/2015    sigmoid resection w/ left salpingo-ooph    SMALL INTESTINE SURGERY  06/28/2015    Left salpingectomy     Family History   Problem Relation Age of Onset    High Blood Pressure Father     Cancer Father 80        lung and kidney primary cancers.      Social History     Socioeconomic History    Marital status:      Spouse name: Not on file    Number of children: Not on file    Years of education: Not on file    Highest education level: Not on file   Occupational History    Occupation: Retired   Tobacco Use    Smoking status: Never    Smokeless tobacco: Never   Vaping Use    Vaping Use: Never used   Substance and Sexual Activity    Alcohol use: Not Currently    Drug use: No    Sexual activity: Not Currently   Other Topics Concern    Not on file   Social History Narrative    Not on file     Social Determinants of Health     Financial Resource Strain: Low Risk     Difficulty of Paying Living Expenses: Not hard at all   Food Insecurity: No Food Insecurity    Worried About Running Out of Food in the Last Year: Never true    Ran Out of Food in the Last Year: Never true   Transportation Needs: Not on file   Physical Activity: Insufficiently Active    Days of Exercise per Week: 3 days    Minutes of Exercise per Session: 30 min   Stress: Not on file   Social Connections: Not on file   Intimate Partner Violence: Not on file   Housing Stability: Not on file     Current Facility-Administered Medications   Medication Dose Route Frequency Provider Last Rate Last Admin    heparin (porcine) injection 4,900 Units  80 Units/kg IntraVENous Once [place] MD Marcleino        heparin (porcine) injection 4,900 Units  80 Units/kg IntraVENous PRN [place] MD Marcelino        heparin (porcine) injection 2,450 Units  40 Units/kg IntraVENous PRN Dominik Benson MD        heparin 25,000 units in dextrose 5% 250 mL (premix) infusion  5-30 Units/kg/hr IntraVENous Continuous Atglenkat Benson MD         Current Outpatient Medications   Medication Sig Dispense Refill    atenolol (TENORMIN) 25 MG tablet Take 1 tablet by mouth daily 90 tablet 3    levothyroxine (EUTHYROX) 75 MCG tablet Take 1 tablet by mouth Daily Stop 88 mcg 90 tablet 0    fluvastatin (LESCOL) 20 MG capsule Take 1 capsule by mouth nightly 90 capsule 3    NIFEdipine (PROCARDIA XL) 30 MG extended release tablet Take 1 tablet by mouth once daily 90 tablet 1    chlorthalidone (HYGROTON) 25 MG tablet Take 1 tablet by mouth once daily 90 tablet 1    budesonide-formoterol (SYMBICORT) 160-4.5 MCG/ACT AERO Inhale 2 puffs into the lungs 2 times daily 1 each 5    acyclovir (ZOVIRAX) 400 MG tablet Take 1 tablet by mouth 2 times daily as needed      estradiol (ESTRACE) 0.1 MG/GM vaginal cream Place 0.5 g vaginally Twice a Week 1 Tube 3    albuterol sulfate HFA (PROVENTIL HFA) 108 (90 Base) MCG/ACT inhaler Inhale 2 puffs into the lungs every 6 hours as needed for Wheezing 1 Inhaler 3    Cholecalciferol (VITAMIN D) 2000 UNITS CAPS capsule Take 2 capsules by mouth daily Remind patient, can purchase over the counter and also purchase Caltrate D 600mg/ 400 iu and take bid. 60 capsule 11     Allergies   Allergen Reactions    Amlodipine Besy-Benazepril Hcl Other (See Comments)    Crestor [Rosuvastatin] Myalgia     Muscle pain at night that resolved within one week of discontinuation    Ace Inhibitors Swelling     Lip swelling    Amlodipine Besy-Benazepril Hcl Other (See Comments)     Lip swelling    Benicar [Olmesartan]      cough    Clonidine Derivatives Other (See Comments)     Lethargy. REVIEW OF SYSTEMS  10 systems reviewed, pertinent positives per HPI otherwise noted to be negative. PHYSICAL EXAM  /71   Pulse 69   Temp 99.3 °F (37.4 °C)   Resp 18   Wt 134 lb 14.7 oz (61.2 kg)   SpO2 94%   BMI 26.35 kg/m²    GENERAL APPEARANCE: Awake and alert. Cooperative. No distress. HENT: Normocephalic. Atraumatic. Mucous membranes are moist.  NECK: Supple. EYES: PERRL. EOM's grossly intact. HEART/CHEST: RRR. No murmurs. No chest wall tenderness. LUNGS: Respirations unlabored. CTAB. Good air exchange. Speaking comfortably in full sentences. ABDOMEN: No tenderness. Soft. Non-distended. No masses. No organomegaly. No guarding or rebound. Normal bowel sounds throughout. MUSCULOSKELETAL: No extremity edema. Compartments soft. No deformity. No tenderness in the extremities. All extremities neurovascularly intact. SKIN: Warm and dry. No acute rashes. NEUROLOGICAL: Alert and oriented. CN's 2-12 intact.  No gross facial drooping. Strength 5/5, sensation intact. 2 plus DTR's in knees bilaterally. Gait normal.  PSYCHIATRIC: Normal mood and affect. LABS  I have reviewed all labs for this visit.    Results for orders placed or performed during the hospital encounter of 12/07/22   Rapid influenza A/B antigens    Specimen: Nasopharyngeal   Result Value Ref Range    Rapid Influenza A Ag Negative Negative    Rapid Influenza B Ag Negative Negative   COVID-19, Rapid    Specimen: Nasopharyngeal Swab   Result Value Ref Range    SARS-CoV-2, NAAT Not Detected Not Detected   CBC with Auto Differential   Result Value Ref Range    WBC 17.2 (H) 4.0 - 11.0 K/uL    RBC 3.72 (L) 4.00 - 5.20 M/uL    Hemoglobin 10.7 (L) 12.0 - 16.0 g/dL    Hematocrit 32.0 (L) 36.0 - 48.0 %    MCV 86.0 80.0 - 100.0 fL    MCH 28.8 26.0 - 34.0 pg    MCHC 33.5 31.0 - 36.0 g/dL    RDW 14.9 12.4 - 15.4 %    Platelets 453 194 - 298 K/uL    MPV 8.6 5.0 - 10.5 fL    Neutrophils % 86.0 %    Lymphocytes % 5.5 %    Monocytes % 8.2 %    Eosinophils % 0.0 %    Basophils % 0.3 %    Neutrophils Absolute 14.8 (H) 1.7 - 7.7 K/uL    Lymphocytes Absolute 0.9 (L) 1.0 - 5.1 K/uL    Monocytes Absolute 1.4 (H) 0.0 - 1.3 K/uL    Eosinophils Absolute 0.0 0.0 - 0.6 K/uL    Basophils Absolute 0.1 0.0 - 0.2 K/uL   Comprehensive Metabolic Panel w/ Reflex to MG   Result Value Ref Range    Sodium 136 136 - 145 mmol/L    Potassium reflex Magnesium 3.2 (L) 3.5 - 5.1 mmol/L    Chloride 96 (L) 99 - 110 mmol/L    CO2 30 21 - 32 mmol/L    Anion Gap 10 3 - 16    Glucose 109 (H) 70 - 99 mg/dL    BUN 20 7 - 20 mg/dL    Creatinine 0.9 0.6 - 1.2 mg/dL    Est, Glom Filt Rate >60 >60    Calcium 10.3 8.3 - 10.6 mg/dL    Total Protein 6.9 6.4 - 8.2 g/dL    Albumin 3.4 3.4 - 5.0 g/dL    Albumin/Globulin Ratio 1.0 (L) 1.1 - 2.2    Total Bilirubin 0.5 0.0 - 1.0 mg/dL    Alkaline Phosphatase 58 40 - 129 U/L    ALT 10 10 - 40 U/L    AST 15 15 - 37 U/L   Troponin   Result Value Ref Range    Troponin <0.01 <0.01 ng/mL Brain Natriuretic Peptide   Result Value Ref Range    Pro- (H) 0 - 449 pg/mL   Magnesium   Result Value Ref Range    Magnesium 2.00 1.80 - 2.40 mg/dL   EKG 12 Lead   Result Value Ref Range    Ventricular Rate 77 BPM    Atrial Rate 77 BPM    P-R Interval 160 ms    QRS Duration 88 ms    Q-T Interval 368 ms    QTc Calculation (Bazett) 416 ms    P Axis 12 degrees    R Axis 65 degrees    T Axis 35 degrees    Diagnosis       Normal sinus rhythmPossible Left atrial enlargementNonspecific ST and T wave abnormalityAbnormal ECG       The 12 lead EKG was interpreted by me independent of a cardiologist as follows:  Rate: normal with a rate of 77  Rhythm: sinus  Axis: normal  Intervals: normal MO, narrow QRS, normal QTc  ST segments: no ST elevations or depressions  T waves: no abnormal inversions  Non-specific T wave changes: present  Prior EKG comparison: No prior is currently available for comparison      RADIOLOGY    XR CHEST PORTABLE    Result Date: 12/7/2022  EXAMINATION: ONE XRAY VIEW OF THE CHEST 12/7/2022 2:42 pm COMPARISON: 11/23/2019 HISTORY: ORDERING SYSTEM PROVIDED HISTORY: R sided pleurisy TECHNOLOGIST PROVIDED HISTORY: Reason for exam:->R sided pleurisy Reason for Exam: R sided pleurisy FINDINGS: There are low lung volumes is secondary bronchovascular crowding. Mild bibasilar airspace disease is noted. There is mild blunting of the left costophrenic angle. The cardial pericardial silhouette is unremarkable. Mild bibasilar airspace disease, likely atelectasis versus less likely pneumonia or edema. Possible small left pleural effusion versus airspace disease or scarring within the left costophrenic angle. CT CHEST PULMONARY EMBOLISM W CONTRAST    Result Date: 12/7/2022  EXAMINATION: CTA OF THE CHEST 12/7/2022 3:51 pm TECHNIQUE: CTA of the chest was performed after the administration of intravenous contrast.  Multiplanar reformatted images are provided for review.   MIP images are provided for review. Automated exposure control, iterative reconstruction, and/or weight based adjustment of the mA/kV was utilized to reduce the radiation dose to as low as reasonably achievable. COMPARISON: None. HISTORY: ORDERING SYSTEM PROVIDED HISTORY: R sided pleurisy TECHNOLOGIST PROVIDED HISTORY: Reason for exam:->R sided pleurisy Decision Support Exception - unselect if not a suspected or confirmed emergency medical condition->Emergency Medical Condition (MA) Reason for Exam: R sided pleurisy FINDINGS: Pulmonary Arteries: Pulmonary arteries are adequately opacified. Filling defects seen within the right upper, middle, and lower lobes. Additional filling defects seen within the lingula. Equivocal right heart strain, with the right heart minimally larger the left (4.3 cm versus 4.1 cm). With that said, no bowing of the interventricular septum is seen, and there is only minimal reflux into the hepatic veins, and the main pulmonary artery is normal in caliber. Mediastinum: The thyroid is heterogeneously enlarged, and has an infiltrative appearance within the lower neck. This appears increased in size when compared to an ultrasound from 09/30/2021. There is shift of the trachea to the left. Esophagus is unremarkable. No mediastinal or hilar lymphadenopathy is identified. Lungs/pleura: Patchy peripheral infiltrates are seen within the lower lobes bilaterally, greater on the right, with infiltrates seen to a lesser extent within the right middle lobe. No pneumothorax is seen. No pleural effusion. Upper Abdomen: Limited images of the upper abdomen are unremarkable. Soft Tissues/Bones: No acute or suspicious bony abnormality. Visualized extra thoracic soft tissues are unremarkable. Positive for pulmonary embolism within all the lobes except the left lower lobe. Equivocal right heart strain with the right ventricle a few mm larger than the left. However, other signs of right heart strain are not seen.  Peripheral airspace disease in the lower lungs, greater on the right, most likely pulmonary infarct, but pneumonia remains in the differential. Thyroid is very enlarged and heterogeneous, and has an infiltrative appearance, with loss of the fat planes in the lower neck. Thyroid appears increased in size when compared to the previous ultrasound. Findings are worrisome for thyroid malignancy. Further evaluation with neck CT and thyroid ultrasound is recommended. Findings were discussed with Srini Mueller at 8:30 pm on 12/7/2022. ED COURSE/MDM  Patient seen and evaluated. Old records reviewed. Labs and imaging reviewed and results discussed with patient. After initial evaluation, differential diagnostic considerations included: acute coronary syndrome, pulmonary embolism, COPD/asthma, pneumonia, sepsis, pericardial tamponade, pneumothorax, CHF, thoracic aortic dissection, anxiety    The patient's ED workup was notable for right-sided pleuritic discomfort. The patient is ultimately found to have bilateral pulmonary emboli, with right greater than left burden of clots. Unclear triggering etiology for this as her last travel was a little over a month ago and she has no clotting history. She does have a leukocytosis but nothing else suggest infection at this time. CT comments on possible pulmonary infarction no with equivocal findings of heart strain, but EKG is not ischemic and troponin is negative. Blood pressure and heart rate, as well as oxygen levels, are all stable. The patient will be given high-dose heparin protocol and admitted to the hospital for further treatment and evaluation. Unrelated but important nonetheless is the patient's thyroid appears notably abnormal on CT of the chest and so radiology recommended a work-up for thyroid malignancy is a possibility. I will defer this to the inpatient team.      Is this patient to be included in the SEP-1 Core Measure?   No   Exclusion criteria - the patient is NOT to be included for SEP-1 Core Measure due to: Infection is not suspected      During the patient's ED course, the patient was given:  Medications   heparin (porcine) injection 4,900 Units (has no administration in time range)   heparin (porcine) injection 4,900 Units (has no administration in time range)   heparin (porcine) injection 2,450 Units (has no administration in time range)   heparin 25,000 units in dextrose 5% 250 mL (premix) infusion (has no administration in time range)   aspirin chewable tablet 324 mg (324 mg Oral Given 12/7/22 1752)   iopamidol (ISOVUE-370) 76 % injection 75 mL (75 mLs IntraVENous Given 12/7/22 1931)        CLINICAL IMPRESSION  1. Acute pulmonary embolism, unspecified pulmonary embolism type, unspecified whether acute cor pulmonale present ([facility] [address]    2. Abnormal imaging of thyroid        Blood pressure 118/71, pulse 69, temperature 99.3 °F (37.4 °C), resp. rate 18, weight 134 lb 14.7 oz (61.2 kg), SpO2 94 %, not currently breastfeeding. Jarad Galvez was admitted in fair condition. The plan is to admit to the hospital at this time under the hospitalist service. Hospitalist accepted the patient and will take over the patient's care. The total critical care time I independently spent while evaluating and treating this patient was 40 minutes. This excludes time spent doing separately billable procedures. This includes time at the bedside, data interpretation, medication management, obtaining critical history from collateral sources if the patient is unable to provide it directly, and physician consultation. Specifics of interventions taken and potentially life-threatening diagnostic considerations are listed above in the medical decision making. If this was a shared visit with an SJ, the time in this attestation is non-concurrent critical care time out of the total shared critical care time provided by the JS and myself.     DISCLAIMER: This chart was created using [employer] Efforts were made by me to ensure accuracy, however some errors may be present due to limitations of this technology and occasionally words are not transcribed correctly.        Andrea Clark MD  12/07/22 2040

## 2022-12-08 ENCOUNTER — APPOINTMENT (OUTPATIENT)
Dept: CT IMAGING | Age: 72
DRG: 175 | End: 2022-12-08
Payer: MEDICARE

## 2022-12-08 PROBLEM — I26.99 ACUTE PULMONARY EMBOLISM (HCC): Status: ACTIVE | Noted: 2022-12-08

## 2022-12-08 LAB
ANTI-XA UNFRAC HEPARIN: 0.36 IU/ML (ref 0.3–0.7)
ANTI-XA UNFRAC HEPARIN: 0.52 IU/ML (ref 0.3–0.7)
EKG ATRIAL RATE: 77 BPM
EKG DIAGNOSIS: NORMAL
EKG P AXIS: 12 DEGREES
EKG P-R INTERVAL: 160 MS
EKG Q-T INTERVAL: 368 MS
EKG QRS DURATION: 88 MS
EKG QTC CALCULATION (BAZETT): 416 MS
EKG R AXIS: 65 DEGREES
EKG T AXIS: 35 DEGREES
EKG VENTRICULAR RATE: 77 BPM
FERRITIN: 340.5 NG/ML (ref 15–150)
FOLATE: 12.15 NG/ML (ref 4.78–24.2)
IRON SATURATION: 9 % (ref 15–50)
IRON: 17 UG/DL (ref 37–145)
LV EF: 58 %
LVEF MODALITY: NORMAL
TOTAL IRON BINDING CAPACITY: 197 UG/DL (ref 260–445)
TSH REFLEX FT4: 0.74 UIU/ML (ref 0.27–4.2)
VITAMIN B-12: 1408 PG/ML (ref 211–911)

## 2022-12-08 PROCEDURE — 93306 TTE W/DOPPLER COMPLETE: CPT

## 2022-12-08 PROCEDURE — 2700000000 HC OXYGEN THERAPY PER DAY

## 2022-12-08 PROCEDURE — 99221 1ST HOSP IP/OBS SF/LOW 40: CPT | Performed by: SURGERY

## 2022-12-08 PROCEDURE — APPSS60 APP SPLIT SHARED TIME 46-60 MINUTES: Performed by: NURSE PRACTITIONER

## 2022-12-08 PROCEDURE — 6370000000 HC RX 637 (ALT 250 FOR IP): Performed by: NURSE PRACTITIONER

## 2022-12-08 PROCEDURE — 83550 IRON BINDING TEST: CPT

## 2022-12-08 PROCEDURE — 85520 HEPARIN ASSAY: CPT

## 2022-12-08 PROCEDURE — 83540 ASSAY OF IRON: CPT

## 2022-12-08 PROCEDURE — 82746 ASSAY OF FOLIC ACID SERUM: CPT

## 2022-12-08 PROCEDURE — 82728 ASSAY OF FERRITIN: CPT

## 2022-12-08 PROCEDURE — 1200000000 HC SEMI PRIVATE

## 2022-12-08 PROCEDURE — 99222 1ST HOSP IP/OBS MODERATE 55: CPT | Performed by: OTOLARYNGOLOGY

## 2022-12-08 PROCEDURE — 6360000002 HC RX W HCPCS: Performed by: NURSE PRACTITIONER

## 2022-12-08 PROCEDURE — APPNB30 APP NON BILLABLE TIME 0-30 MINS: Performed by: NURSE PRACTITIONER

## 2022-12-08 PROCEDURE — 70491 CT SOFT TISSUE NECK W/DYE: CPT

## 2022-12-08 PROCEDURE — 94640 AIRWAY INHALATION TREATMENT: CPT

## 2022-12-08 PROCEDURE — 82607 VITAMIN B-12: CPT

## 2022-12-08 PROCEDURE — 94761 N-INVAS EAR/PLS OXIMETRY MLT: CPT

## 2022-12-08 PROCEDURE — 93010 ELECTROCARDIOGRAM REPORT: CPT | Performed by: INTERNAL MEDICINE

## 2022-12-08 PROCEDURE — 93970 EXTREMITY STUDY: CPT

## 2022-12-08 PROCEDURE — 2580000003 HC RX 258: Performed by: INTERNAL MEDICINE

## 2022-12-08 PROCEDURE — 36415 COLL VENOUS BLD VENIPUNCTURE: CPT

## 2022-12-08 PROCEDURE — 6360000004 HC RX CONTRAST MEDICATION: Performed by: OTOLARYNGOLOGY

## 2022-12-08 RX ADMIN — Medication 10 ML: at 21:34

## 2022-12-08 RX ADMIN — ATENOLOL 25 MG: 25 TABLET ORAL at 10:04

## 2022-12-08 RX ADMIN — IOPAMIDOL 75 ML: 755 INJECTION, SOLUTION INTRAVENOUS at 19:09

## 2022-12-08 RX ADMIN — Medication 10 ML: at 10:04

## 2022-12-08 RX ADMIN — POTASSIUM CHLORIDE 40 MEQ: 1500 TABLET, EXTENDED RELEASE ORAL at 00:50

## 2022-12-08 RX ADMIN — HYDROCODONE BITARTRATE AND ACETAMINOPHEN 1 TABLET: 5; 325 TABLET ORAL at 21:34

## 2022-12-08 RX ADMIN — NIFEDIPINE 30 MG: 30 TABLET, EXTENDED RELEASE ORAL at 10:04

## 2022-12-08 RX ADMIN — Medication 2 PUFF: at 09:43

## 2022-12-08 RX ADMIN — Medication 2 PUFF: at 21:38

## 2022-12-08 RX ADMIN — LEVOTHYROXINE SODIUM 75 MCG: 0.07 TABLET ORAL at 06:35

## 2022-12-08 RX ADMIN — Medication 2 PUFF: at 00:44

## 2022-12-08 RX ADMIN — HYDROCODONE BITARTRATE AND ACETAMINOPHEN 1 TABLET: 5; 325 TABLET ORAL at 10:07

## 2022-12-08 RX ADMIN — HEPARIN SODIUM 18 UNITS/KG/HR: 10000 INJECTION, SOLUTION INTRAVENOUS at 19:32

## 2022-12-08 ASSESSMENT — LIFESTYLE VARIABLES
HOW MANY STANDARD DRINKS CONTAINING ALCOHOL DO YOU HAVE ON A TYPICAL DAY: PATIENT DOES NOT DRINK
HOW OFTEN DO YOU HAVE A DRINK CONTAINING ALCOHOL: NEVER

## 2022-12-08 ASSESSMENT — PAIN DESCRIPTION - ORIENTATION
ORIENTATION: RIGHT
ORIENTATION: LEFT

## 2022-12-08 ASSESSMENT — PAIN DESCRIPTION - DESCRIPTORS
DESCRIPTORS: CRAMPING
DESCRIPTORS: CRAMPING

## 2022-12-08 ASSESSMENT — PAIN DESCRIPTION - LOCATION
LOCATION: FOOT
LOCATION: LEG

## 2022-12-08 ASSESSMENT — PAIN SCALES - GENERAL: PAINLEVEL_OUTOF10: 7

## 2022-12-08 NOTE — PROGRESS NOTES
ultrasound tech called and said they dont do thyroid ultrasounds as inpatient; Hospitalist notified.

## 2022-12-08 NOTE — CONSULTS
[facility]    Consult Note      Requesting Provider:  Can Chen MD    CHIEF COMPLAINT:  Chest pain and shortness of breath      HISTORY OF PRESENT ILLNESS:      Ms. Tess Parra  is a 74 y.o. female who came to the ER due to chest pain and shortness of breath. CTPA found pulmonary embolism within all the lobes except the left lower lobe with equivocal right heart strain. BLE doppler demonstrated acute totally occluding DVT in both legs. She reports taking a 9 hour car trip to [place] in early November. She also reports having a sedentary job. No recent surgeries, hospitalizations or injury. No prior history of VTE. No history of miscarriages. Thyroid enlargement was also noted on the CT of the chest which is worrisome for thyroid malignancy. She has a history of thyroid nodules and had a thyroid biopsy in 2013 which was benign. Thyroid nodules were stable on ultrasound done in September 2021. The patient was found to have hypercalcemia in September 2022. SPEP and UPEP and vitamin D were normal. Bone scan on 10/20/22 was without findings concerning for malignancy. Mammogram on 11/15/22 was benign. She was referred to endocrinology and is currently has an appointment scheduled in March 2023. She has no prior history of malignancy. No fevers, chills or night sweats. She reports a 10 lb weight loss since September with [employer]. No difficulty breathing or swallowing.        Past Medical History:    Past Medical History:   Diagnosis Date    Cough variant asthma 8/4/2020    HTN (hypertension) 7/27/2011    Mixed hyperlipidemia 10/18/2022    Primary osteoarthritis of left knee 12/6/2018       Past Surgical History:    Past Surgical History:   Procedure Laterality Date    BREAST SURGERY      left breast in 1970's    [address]  06/28/2015    sigmoid resection w/ left salpingo-ooph    SMALL INTESTINE SURGERY  06/28/2015    Left salpingectomy       Current Medications:    Current Facility-Administered Medications   Medication Dose Route Frequency Provider Last Rate Last Admin    heparin (porcine) injection 4,900 Units  80 Units/kg IntraVENous PRN CAREN Hobson - CNP        heparin (porcine) injection 2,450 Units  40 Units/kg IntraVENous PRN CAREN Hobson - CNP        heparin 25,000 units in dextrose 5% 250 mL (premix) infusion  5-30 Units/kg/hr IntraVENous Continuous FrancesCAREN Amaya - CNP 11 mL/hr at 12/07/22 2129 18 Units/kg/hr at 12/07/22 2129    sodium chloride flush 0.9 % injection 5-40 mL  5-40 mL IntraVENous 2 times per day Lyle Soria MD   10 mL at 12/08/22 1004    sodium chloride flush 0.9 % injection 5-40 mL  5-40 mL IntraVENous PRN Lyle Soria MD        0.9 % sodium chloride infusion   IntraVENous PRN Lyle Soria MD        ondansetron (ZOFRAN-ODT) disintegrating tablet 4 mg  4 mg Oral Q8H PRN Lyle Soria MD        Or    ondansetron (ZOFRAN) injection 4 mg  4 mg IntraVENous Q6H PRN Lyle Soria MD        polyethylene glycol (GLYCOLAX) packet 17 g  17 g Oral Daily PRN Lyle Soria MD        acetaminophen (TYLENOL) tablet 650 mg  650 mg Oral Q6H PRN Lyle Soria MD        Or    acetaminophen (TYLENOL) suppository 650 mg  650 mg Rectal Q6H PRN Lyle Soria MD        HYDROcodone-acetaminophen (NORCO) 5-325 MG per tablet 1 tablet  1 tablet Oral Q6H PRN CAREN Hobson CNP   1 tablet at 12/08/22 1007    perflutren lipid microspheres (DEFINITY) injection 1.5 mL  1.5 mL IntraVENous ONCE PRN CAREN Hobson CNP        albuterol sulfate HFA (PROVENTIL;VENTOLIN;PROAIR) 108 (90 Base) MCG/ACT inhaler 2 puff  2 puff Inhalation Q6H PRN CAREN Hobson - CNP        atenolol (TENORMIN) tablet 25 mg  25 mg Oral Daily FrancesCAREN Amaya - CNP   25 mg at 12/08/22 1004    mometasone-formoterol (DULERA) 200-5 MCG/ACT inhaler 2 puff  2 puff Inhalation BID Frances Minick, APRN - CNP   2 puff at 12/08/22 0943    levothyroxine (SYNTHROID) tablet 75 mcg  75 mcg Oral Daily Frances Minick, APRN - CNP   75 mcg at 12/08/22 0635    NIFEdipine (PROCARDIA XL) extended release tablet 30 mg  30 mg Oral Daily Frances Minick, APRN - CNP   30 mg at 12/08/22 1004     Allergies: Allergies   Allergen Reactions    Ace Inhibitors Swelling and Angioedema     Lip swelling    Amlodipine Besy-Benazepril Hcl Other (See Comments) and Angioedema     Lip swelling    Amlodipine Besy-Benazepril Hcl Angioedema    Crestor [Rosuvastatin] Myalgia     Muscle pain at night that resolved within one week of discontinuation    Benicar [Olmesartan]      cough    Clonidine Derivatives Other (See Comments)     Lethargy.        Social History:   Social History     Socioeconomic History    Marital status:      Spouse name: Not on file    Number of children: Not on file    Years of education: Not on file    Highest education level: Not on file   Occupational History    Occupation: Retired   Tobacco Use    Smoking status: Never    Smokeless tobacco: Never   Vaping Use    Vaping Use: Never used   Substance and Sexual Activity    Alcohol use: Not Currently    Drug use: No    Sexual activity: Not Currently   Other Topics Concern    Not on file   Social History Narrative    Not on file     Social Determinants of Health     Financial Resource Strain: Low Risk     Difficulty of Paying Living Expenses: Not hard at all   Food Insecurity: No Food Insecurity    Worried About Running Out of Food in the Last Year: Never true    Ran Out of Food in the Last Year: Never true   Transportation Needs: Not on file   Physical Activity: Insufficiently Active    Days of Exercise per Week: 3 days    Minutes of Exercise per Session: 30 min   Stress: Not on file   Social Connections: Not on file   Intimate Partner Violence: Not on file   Housing Stability: Not on file          Family History:     Family History   Problem Relation Age of Onset    High Blood Pressure Father     Cancer Father 80        lung and kidney primary cancers. REVIEW OF SYSTEMS:      Constitutional:  No weight loss, No fever, No chills, No night sweats. Eyes:  No impairment or change in vision  ENT / Mouth:  No pain, abnormal ulceration, bleeding, nasal drip or change in voice or hearing  Cardiovascular:  No chest pain, palpitations, new edema, or calf discomfort  Respiratory:  No hemoptysis, change to breathing. +Chest pain and KHAN   Gastrointestinal:  No pain, cramping, jaundice, change to eating and bowel habits  Urinary:  No pain, bleeding or change in continence  Musculoskeletal:  No redness, pain, edema or weakness  Skin:  No pruritus, rash, change to nodules or lesions  Neurologic:  No discomfort, change in mental status, speech, sensory or motor activity  Psychiatric:  No change in concentration or change to affect or mood  Endocrine:  No hot flashes, increased thirst, or change to urine production  Hematologic: No petechiae, ecchymosis or bleeding  Lymphatic:  No lymphadenopathy or lymphedema  Allergy / Immunologic:  No eczema, hives, frequent or recurrent infections    PHYSICAL EXAM:      Vitals:  /74   Pulse 77   Temp 99.1 °F (37.3 °C) (Oral)   Resp 16   Ht 5' (1.524 m)   Wt 135 lb 9.6 oz (61.5 kg)   SpO2 93%   BMI 26.48 kg/m²     General appearance:  Appears comfortable  Eyes: Sclera clear. Pupils equal.  ENT: Moist oral mucosa. Trachea midline, no adenopathy. Cardiovascular: Regular rhythm, normal S1, S2. No murmur. No edema in lower extremities  Respiratory: Not using accessory muscles. Good inspiratory effort. Clear to auscultation bilaterally, no wheeze or crackles. GI: Abdomen soft, no tenderness, not distended  Musculoskeletal: No cyanosis in digits, neck supple  Neurology: CN 2-12 grossly intact. No speech or motor deficits  Psych: Normal affect.  Alert and oriented in time, place and person  Skin: Warm, dry, normal turgor      DATA:    PT/INR:    Recent Labs     12/07/22  1741 12/07/22 2039   PROT 6.9  --    INR  --  1.28*     PTT:  No results for input(s): APTT in the last 72 hours. CMP:    Lab Results   Component Value Date/Time     12/07/2022 05:41 PM    K 3.2 12/07/2022 05:41 PM    CL 96 12/07/2022 05:41 PM    CO2 30 12/07/2022 05:41 PM    BUN 20 12/07/2022 05:41 PM    PROT 6.9 12/07/2022 05:41 PM    PROT 7.2 04/09/2012 09:56 AM     :    Lab Results   Component Value Date/Time    MG 2.00 12/07/2022 05:41 PM     Phosphorus:  No components found for: PO4  Calcium:  No results found for: CA  CBC:    Lab Results   Component Value Date/Time    WBC 17.2 12/07/2022 05:41 PM    RBC 3.72 12/07/2022 05:41 PM    HGB 10.7 12/07/2022 05:41 PM    HCT 32.0 12/07/2022 05:41 PM    MCV 86.0 12/07/2022 05:41 PM    RDW 14.9 12/07/2022 05:41 PM     12/07/2022 05:41 PM     DIFF:  Lab Results   Component Value Date/Time    MCV 86.0 12/07/2022 05:41 PM    RDW 14.9 12/07/2022 05:41 PM          IMAGING:    Narrative   EXAMINATION:   ONE XRAY VIEW OF THE CHEST       12/7/2022 2:42 pm       COMPARISON:   11/23/2019       HISTORY:   ORDERING SYSTEM PROVIDED HISTORY: R sided pleurisy   TECHNOLOGIST PROVIDED HISTORY:   Reason for exam:->R sided pleurisy   Reason for Exam: R sided pleurisy       FINDINGS:   There are low lung volumes is secondary bronchovascular crowding. Mild   bibasilar airspace disease is noted. There is mild blunting of the left   costophrenic angle. The cardial pericardial silhouette is unremarkable. Impression   Mild bibasilar airspace disease, likely atelectasis versus less likely   pneumonia or edema. Possible small left pleural effusion versus airspace disease or scarring   within the left costophrenic angle.          Narrative   EXAMINATION:   CTA OF THE CHEST 12/7/2022 3:51 pm       TECHNIQUE:   CTA of the chest was performed after the administration of intravenous   contrast.  Multiplanar reformatted images are provided for review. MIP   images are provided for review. Automated exposure control, iterative   reconstruction, and/or weight based adjustment of the mA/kV was utilized to   reduce the radiation dose to as low as reasonably achievable. COMPARISON:   None. HISTORY:   ORDERING SYSTEM PROVIDED HISTORY: R sided pleurisy   TECHNOLOGIST PROVIDED HISTORY:   Reason for exam:->R sided pleurisy   Decision Support Exception - unselect if not a suspected or confirmed   emergency medical condition->Emergency Medical Condition (MA)   Reason for Exam: R sided pleurisy       FINDINGS:   Pulmonary Arteries: Pulmonary arteries are adequately opacified. Filling defects seen within the right upper, middle, and lower lobes. Additional filling defects seen within the lingula. Equivocal right heart strain, with the right heart minimally larger the left   (4.3 cm versus 4.1 cm). With that said, no bowing of the interventricular   septum is seen, and there is only minimal reflux into the hepatic veins, and   the main pulmonary artery is normal in caliber. Mediastinum: The thyroid is heterogeneously enlarged, and has an infiltrative   appearance within the lower neck. This appears increased in size when   compared to an ultrasound from 09/30/2021. There is shift of the trachea to   the left. Esophagus is unremarkable. No mediastinal or hilar lymphadenopathy is   identified. Lungs/pleura: Patchy peripheral infiltrates are seen within the lower lobes   bilaterally, greater on the right, with infiltrates seen to a lesser extent   within the right middle lobe. No pneumothorax is seen. No pleural effusion. Upper Abdomen: Limited images of the upper abdomen are unremarkable. Soft Tissues/Bones: No acute or suspicious bony abnormality. Visualized   extra thoracic soft tissues are unremarkable.            Impression Positive for pulmonary embolism within all the lobes except the left lower   lobe. Equivocal right heart strain with the right ventricle a few mm larger than   the left. However, other signs of right heart strain are not seen. Peripheral airspace disease in the lower lungs, greater on the right, most   likely pulmonary infarct, but pneumonia remains in the differential.       Thyroid is very enlarged and heterogeneous, and has an infiltrative   appearance, with loss of the fat planes in the lower neck. Thyroid appears   increased in size when compared to the previous ultrasound. Findings are   worrisome for thyroid malignancy. Further evaluation with neck CT and   thyroid ultrasound is recommended. Findings were discussed with Joo Marks at 8:30 pm on 12/7/2022. Narrative   Vascular Lower Extremities DVT Study Procedure       -- PRELIMINARY SONOGRAPHER REPORT --        Demographics        Patient Name       [facility]        Date of Study      12/08/2022         Gender              Female        Patient Number     [id number]         Date of Birth       1950        Visit Number       [id number]          Age                 74 year(s)        Accession Number   [id number]         Room Number         3262        Corporate ID       [id number]           Sonographer         Brent Merida RVT, RT        Ordering Physician Javi Ramos,   Interpreting        Clarisse Aceves MD                       CNP                Physician       Procedure       Type of Study:        Veins:Lower Extremities DVT Study, VASC EXTREMITY VENOUS DUPLEX BILATERAL. Tech Comments   Right   Acute totally occluding deep vein thrombosis involving the right peroneal   veins. .   No other evidence of deep vein or superficial vein thrombosis involving the   right lower extremity ,   Left   Acute totally occluding deep vein thrombosis involving the left posterior   tibial veins and one of the pair of peroneal veins. No other evidence of deep vein or superficial vein thrombosis involving the   left lower extremity. Problem List  Patient Active Problem List   Diagnosis    S/P colonoscopy    Vitamin D deficiency    Osteopenia    Prediabetes    Essential hypertension    Multiple thyroid nodules    Primary osteoarthritis of left knee    Primary osteoarthritis of right hip    Cough variant asthma    Hypothyroidism    Hypercalcemia    Mixed hyperlipidemia    Bilateral pulmonary embolism [facility])       IMPRESSION/RECOMMENDATIONS:    74year old female with a history of HLD, HTN, hypothyroidism, hypercalcemia, osteoarthritis, thyroid nodules and vitamin D deficiency. 1. Bilateral PE with cor pulmonale and bilateral DVT:  -Currently on heparin drip.   -Echo is pending.  -Vascular surgery is following. No plans for thrombolysis unless echo shows significant cardiac dysfunction.  -Patient has high deductible plan and DOAC cost is $305/mo. Patient states she cannot afford. Does not qualify for copay assistance with Medicare. Will likely need coumadin. 2. Enlarged thyroid:  -CTPA showing enlarged heterogenous thyroid with shifting of trachea, more enlarged compared to thyroid US done 9/2021.  -Thyroid ultrasound is pending. -ENT has been consulted. 3. Anemia:  -Check iron studies, B12 and folate. -SPEP was normal in 10/2022.  -No indication to transfuse. This plan was discussed with the patient and he/she verbalized understanding. Thank you for allowing us to participate in the care of this patient. Radames Barrios CNP  [facility]  [phone]        Bilateral pulmonary emboli. Offerred DOAC for minimum of 6 months but patient cannot afford. Will need transitioning to coumadin until unless copay assistance can be found. ENT consult pending for thyroid nodules.  Will mostly need to be done later given need for anticoagulation and risk of bleeding with thyroid biopsy    Haydee Martin MD  [facility]

## 2022-12-08 NOTE — CONSULTS
[facility]  Consultation Note    Chief Complaint / Reason for Consultation  PE    History of Present Illness  Patient is a 74 y.o. female with past medical history of HTN and HLD who presented to the ED with complaints of SOB and chest pain. Patient reports last week she had congestion, fatigue and some SOB but over the past couple of days worsening SOB with activity and right sided chest/rib pain. Patient reports her SOB is worse with any activity. Patient denies any lower extremity swelling or calf pain. She denies any personal or family history of blood clots or clotting disorders. Patient does work remote at home but is usually very active. She reports travel to [place] by car the first week of November. Patient denies any recent surgeries or trauma. Denies tobacco use. No significant weight loss. On admission patient was noted to have bilateral PE with some RV strain noted on CT scan. She is hemodynamically stable on 2 lpm nasal cannula. Started on Heparin drip. Patient reports SOB improved today. We have been consulted for further evaluation and recommendations. Review of Systems   + SOB, + right sided chest/rib pain. Denies fevers, chills, nausea, vomiting, hematemesis, diarrhea, constipation, melena, hematochezia, wt changes, vision problems, blindness, hearing problems, facial droop, slurred speech, extremity weakness, extremity numbness, dysuria.     Past Medical History:   Diagnosis Date    Cough variant asthma 8/4/2020    HTN (hypertension) 7/27/2011    Mixed hyperlipidemia 10/18/2022    Primary osteoarthritis of left knee 12/6/2018       Past Surgical History:   Procedure Laterality Date    BREAST SURGERY      left breast in 1970's    [address]  06/28/2015    sigmoid resection w/ left salpingo-ooph    SMALL INTESTINE SURGERY  06/28/2015    Left salpingectomy       Allergies   Allergen Reactions    Amlodipine Besy-Benazepril Hcl Other (See Comments)    Crestor [Rosuvastatin] Myalgia     Muscle pain at night that resolved within one week of discontinuation    Ace Inhibitors Swelling     Lip swelling    Amlodipine Besy-Benazepril Hcl Other (See Comments)     Lip swelling    Benicar [Olmesartan]      cough    Clonidine Derivatives Other (See Comments)     Lethargy. Social History     Socioeconomic History    Marital status:      Spouse name: Not on file    Number of children: Not on file    Years of education: Not on file    Highest education level: Not on file   Occupational History    Occupation: Retired   Tobacco Use    Smoking status: Never    Smokeless tobacco: Never   Vaping Use    Vaping Use: Never used   Substance and Sexual Activity    Alcohol use: Not Currently    Drug use: No    Sexual activity: Not Currently   Other Topics Concern    Not on file   Social History Narrative    Not on file     Social Determinants of Health     Financial Resource Strain: Low Risk     Difficulty of Paying Living Expenses: Not hard at all   Food Insecurity: No Food Insecurity    Worried About Running Out of Food in the Last Year: Never true    [address] in the Last Year: Never true   Transportation Needs: Not on file   Physical Activity: Insufficiently Active    Days of Exercise per Week: 3 days    Minutes of Exercise per Session: 30 min   Stress: Not on file   Social Connections: Not on file   Intimate Partner Violence: Not on file   Housing Stability: Not on file       Family History   Problem Relation Age of Onset    High Blood Pressure Father     Cancer Father 80        lung and kidney primary cancers.      - No history of bleeding or clotting disorders    Vital Signs  Vitals:    12/08/22 0539 12/08/22 0745 12/08/22 0946 12/08/22 1000   BP: 119/76 122/74     Pulse: 73 72 77    Resp: 16 18 16    Temp: 98.1 °F (36.7 °C) 99.1 °F (37.3 °C)     TempSrc: Oral Oral     SpO2: 97% 96% 94% 95%   Weight:       Height: Physical Examination  General: no apparent distress, appears stated age  Psychiatric: affect appropriate  Head/Eyes/Ears/Nose/Throat:  Normocephalic, atraumatic, PERRLA, face symmetric  Neck:  no adenopathy, no carotid bruit, no JVD, supple, symmetrical, trachea midline, thyroid not enlarged, no tenderness/mass/nodules  Chest/Lungs: clear to auscultation bilaterally, no accessory muscle use  Cardiac:  regular rate and rhythm, S1, S2 normal, no murmur, click, rub or gallop  Abdomen: soft, nontender, active bowel sounds  Extremities: warm and well perfused, no signs of cyanosis or ischemia, no significant edema, bilateral upper and lower extremity motorsensory intact  Vascular exam:  - R radial: 2+  - L radial: 2+  - R femoral: 2+  - L femoral: 2+  - R DP: 2+  - L DP: 2+  - R PT: 2+  - L PT: 2+    Labs  Lab Results   Component Value Date/Time    WBC 17.2 12/07/2022 05:41 PM    HGB 10.7 12/07/2022 05:41 PM    HCT 32.0 12/07/2022 05:41 PM    MCV 86.0 12/07/2022 05:41 PM     12/07/2022 05:41 PM     Lab Results   Component Value Date/Time     12/07/2022 05:41 PM    K 3.2 12/07/2022 05:41 PM    CL 96 12/07/2022 05:41 PM    CO2 30 12/07/2022 05:41 PM    PHOS 3.3 07/06/2022 10:12 AM    BUN 20 12/07/2022 05:41 PM    CREATININE 0.9 12/07/2022 05:41 PM      No results found for: GLU    Scheduled Meds:    sodium chloride flush  5-40 mL IntraVENous 2 times per day    atenolol  25 mg Oral Daily    mometasone-formoterol  2 puff Inhalation BID    levothyroxine  75 mcg Oral Daily    NIFEdipine  30 mg Oral Daily     Continuous Infusions:    heparin (PORCINE) Infusion 18 Units/kg/hr (12/07/22 2129)    sodium chloride         Imaging:     CTPE chest 12/7/22:  Impression   Positive for pulmonary embolism within all the lobes except the left lower   lobe. Equivocal right heart strain with the right ventricle a few mm larger than   the left. However, other signs of right heart strain are not seen.        Peripheral airspace disease in the lower lungs, greater on the right, most   likely pulmonary infarct, but pneumonia remains in the differential.       Thyroid is very enlarged and heterogeneous, and has an infiltrative   appearance, with loss of the fat planes in the lower neck. Thyroid appears   increased in size when compared to the previous ultrasound. Findings are   worrisome for thyroid malignancy. Further evaluation with neck CT and   thyroid ultrasound is recommended. BLE venous duplex 12/8/22:  Tech Comments   Right   Acute totally occluding deep vein thrombosis involving the right peroneal   veins. .   No other evidence of deep vein or superficial vein thrombosis involving the   right lower extremity ,   Left   Acute totally occluding deep vein thrombosis involving the left posterior   tibial veins and one of the pair of peroneal veins. No other evidence of deep vein or superficial vein thrombosis involving the   left lower extremity. Assessment:   Bilateral PE with RV strain on CT scan - hemodynamically stable  SOB, hypoxia - on 2 lpm nasal cannula, improved since admission  Acute DVT bilateral lower extremities - duplex showing right peroneal DVT and left PT and peroneal DVT  Enlarged thyroid - seen on CT scan  HTN  HLD    Plan:  Continue Heparin drip. Will get ECHO to further evaluate. Recommend ambulating in room to evaluate patient's improvement in SOB as patient stated she already feels better since yesterday. If ECHO shows significant strain and patient still having significant SOB then could consider catheter directed thrombolysis (EKOS) or pulmonary thrombectomy. Will follow up after ECHO with further recommendations and plan. Will give prescription for 20-30 mmHg knee high compression stockings. Will discuss with Dr. Ester Madrid and further recommendations to follow. Thank you for the consultation. Patient educated on plan of care and disease process. All questions answered. Electronically signed by CAREN Camejo CNP on 12/8/2022 at 10:10 AM    Vascular Staff    I independently performed an evaluation on [address]. I have reviewed the above documentation completed by Demetris Dan CNP. Please see my additional contributions to the HPI, physical exam, assessment, and medical decision making. 73-year-old female with history of hypertension and hyperlipidemia noted approximately 1 week history of shortness of breath. It was drastically worse approximately 36 hours prior to presentation. She presented to the hospital for further evaluation was noted to have a bilateral lower extremity DVT as well as bilateral PE with evidence of right heart strain on CT scan. Patient does have a long trip in the early part of November to [place] via car. No previous history of deep vein thrombosis. She denies any pain or swelling in her legs. PE:  AF  Lower extremity soft nontender. Palpable distal pulses. CT and duplex reviewed    I: Bilateral PE with cor pulmonale  Bilateral lower extremity DVT  Hypertension  Hyperlipidemia  P: Patient clinically stable on current regimen. Would continue with heparin and transition to oral anticoagulation of choice. Given current clinical status would not offer pulmonary artery thrombolysis at this time. This was discussed with her and her  today. Echo is still pending. If echo were to show significant cardiac dysfunction would reconsider. All questions answered  Sincerely appreciate the consultation. Vita Hines M.D., FACS.   12/8/2022  2:34 PM

## 2022-12-08 NOTE — ED NOTES
This RN called to give report floor unable to take RN unknown for patient at this time floor will call back.      Jose Angel Helms RN  12/07/22 [id number]

## 2022-12-08 NOTE — CARE COORDINATION
Patient admitted with an anticipated short hospitalization length of stay. Chart reviewed and it appears that patient has minimal needs for discharge at this time. Discussed with patients nurse and requested that case management be notified if discharge needs are identified. Case management will continue to follow progress and update discharge plan as needed.        Electronically signed by NNAMDI Altman on 12/8/2022 at 8:44 AM

## 2022-12-08 NOTE — PROGRESS NOTES
Received patient to floor from ER via [facility] at 2326. Awake, alert, and oriented x4. Skin intact. Chest CT 12/7/22 revealed pulmonary embolisms in all lobes of bilateral lungs except LLL. On Heparin gtt. Placed on O2 @ 2L NC for comfort. Also placed telemetry monitor, SR. Orders are up as tolerated, but suggested BR due to desatting and KHAN. Purewick placed. CT also revealed heart strain and thyroid very enlarged, increased in size from previous study, possible malignancy. Otolaryngology consult. K+ 3.2, replaced with 40meq PO. Therapuetic on last anti-factor draw, next due at 0930. DC plans are to return to home with  at time of dc.

## 2022-12-08 NOTE — CONSULTS
Pharmacy to check patient copay for Xarelto/Eliquis    Copay for patient will be: $305/month for either medication based on her chosen insurance without prescription coverage. Pharmacy will continue to follow the decision on therapy and  the patient if appropriate. Thank you for allowing pharmacy to participate in the care of this patient.   Jenny OvallesD

## 2022-12-08 NOTE — RT PROTOCOL NOTE
RT Nebulizer Bronchodilator Protocol Note    There is a bronchodilator order in the chart from a provider indicating to follow the RT Bronchodilator Protocol and there is an Initiate RT Bronchodilator Protocol order as well (see protocol at bottom of note). CXR Findings:  XR CHEST PORTABLE    Result Date: 12/7/2022  Mild bibasilar airspace disease, likely atelectasis versus less likely pneumonia or edema. Possible small left pleural effusion versus airspace disease or scarring within the left costophrenic angle. The findings from the last RT Protocol Assessment were as follows:  Smoking: None or smoker <15 pack years  Respiratory Pattern: Dyspnea on exertion or RR 21-25 bpm  Breath Sounds: Clear breath sounds  Cough: Strong, spontaneous, non-productive  Indication for Bronchodilator Therapy: On home bronchodilators  Bronchodilator Assessment Score: 2    Aerosolized bronchodilator medication orders have been revised according to the RT Nebulizer Bronchodilator Protocol below. Respiratory Therapist to perform RT Therapy Protocol Assessment initially then follow the protocol. Repeat RT Therapy Protocol Assessment PRN for score 0-3 or on second treatment, BID, and PRN for scores above 3. No Indications - adjust the frequency to every 6 hours PRN wheezing or bronchospasm, if no treatments needed after 48 hours then discontinue using Per Protocol order mode. If indication present, adjust the RT bronchodilator orders based on the Bronchodilator Assessment Score as indicated below. If a patient is on this medication at home then do not decrease Frequency below that used at home. 0-3 - enter or revise RT bronchodilator order(s) to equivalent RT Bronchodilator order with Frequency of every 4 hours PRN for wheezing or increased work of breathing using Per Protocol order mode.        4-6 - enter or revise RT Bronchodilator order(s) to two equivalent RT bronchodilator orders with one order with BID Frequency and one order with Frequency of every 4 hours PRN wheezing or increased work of breathing using Per Protocol order mode. 7-10 - enter or revise RT Bronchodilator order(s) to two equivalent RT bronchodilator orders with one order with TID Frequency and one order with Frequency of every 4 hours PRN wheezing or increased work of breathing using Per Protocol order mode. 11-13 - enter or revise RT Bronchodilator order(s) to one equivalent RT bronchodilator order with QID Frequency and an Albuterol order with Frequency of every 4 hours PRN wheezing or increased work of breathing using Per Protocol order mode. Greater than 13 - enter or revise RT Bronchodilator order(s) to one equivalent RT bronchodilator order with every 4 hours Frequency and an Albuterol order with Frequency of every 2 hours PRN wheezing or increased work of breathing using Per Protocol order mode. RT to enter RT Home Evaluation for COPD & MDI Assessment order using Per Protocol order mode.     Electronically signed by Cher Che RCP on 12/8/2022 at 12:32 AM

## 2022-12-08 NOTE — CONSULTS
[facility]       Otolaryngology Consultation      Requesting Physician / Provider:  Inocencia Jett, APRN - CNP       Date of Consultation:  12/08/2022      History Obtained From:  patient, electronic medical record      [facility] / Reason for Consult:  \"enlarged thyorid on CT concerning for malignancy, change from prior ultrasound,  shifting of trachea noted. \"      HISTORY OF PRESENT ILLNESS:    Tamiko Garvin is a 74 y.o. female is a patient known to me from prior encounter. An otolaryngologic consultation was requested for evaluation of multinodular goiter with displacement of the airway. She has recently experienced shortness of breath with exertion and walking, culminating in hospitalization on 12/07/22. Patient denied current dyspnea or dysphagia. REVIEW OF SYSTEMS:    I reviewed the ROS in the admission history and physical and there has been no change in the information.        Past Medical History:       Diagnosis Date    Cough variant asthma 8/4/2020    HTN (hypertension) 7/27/2011    Mixed hyperlipidemia 10/18/2022    Primary osteoarthritis of left knee 12/6/2018       Past Surgical History:        Procedure Laterality Date    BREAST SURGERY      left breast in 1970's    [address]  06/28/2015    sigmoid resection w/ left salpingo-ooph    SMALL INTESTINE SURGERY  06/28/2015    Left salpingectomy       Current Medications:   Current Facility-Administered Medications: heparin (porcine) injection 4,900 Units, 80 Units/kg, IntraVENous, PRN  heparin (porcine) injection 2,450 Units, 40 Units/kg, IntraVENous, PRN  heparin 25,000 units in dextrose 5% 250 mL (premix) infusion, 5-30 Units/kg/hr, IntraVENous, Continuous  sodium chloride flush 0.9 % injection 5-40 mL, 5-40 mL, IntraVENous, 2 times per day  sodium chloride flush 0.9 % injection 5-40 mL, 5-40 mL, IntraVENous, PRN  0.9 % sodium chloride infusion, , IntraVENous, PRN  ondansetron (ZOFRAN-ODT) disintegrating tablet 4 mg, 4 mg, Oral, Q8H PRN **OR** ondansetron (ZOFRAN) injection 4 mg, 4 mg, IntraVENous, Q6H PRN  polyethylene glycol (GLYCOLAX) packet 17 g, 17 g, Oral, Daily PRN  acetaminophen (TYLENOL) tablet 650 mg, 650 mg, Oral, Q6H PRN **OR** acetaminophen (TYLENOL) suppository 650 mg, 650 mg, Rectal, Q6H PRN  HYDROcodone-acetaminophen (NORCO) 5-325 MG per tablet 1 tablet, 1 tablet, Oral, Q6H PRN  perflutren lipid microspheres (DEFINITY) injection 1.5 mL, 1.5 mL, IntraVENous, ONCE PRN  albuterol sulfate HFA (PROVENTIL;VENTOLIN;PROAIR) 108 (90 Base) MCG/ACT inhaler 2 puff, 2 puff, Inhalation, Q6H PRN  atenolol (TENORMIN) tablet 25 mg, 25 mg, Oral, Daily  mometasone-formoterol (DULERA) 200-5 MCG/ACT inhaler 2 puff, 2 puff, Inhalation, BID  levothyroxine (SYNTHROID) tablet 75 mcg, 75 mcg, Oral, Daily  NIFEdipine (PROCARDIA XL) extended release tablet 30 mg, 30 mg, Oral, Daily    Allergies: Allergies   Allergen Reactions    Ace Inhibitors Swelling and Angioedema     Lip swelling    Amlodipine Besy-Benazepril Hcl Other (See Comments) and Angioedema     Lip swelling    Amlodipine Besy-Benazepril Hcl Angioedema    Crestor [Rosuvastatin] Myalgia     Muscle pain at night that resolved within one week of discontinuation    Benicar [Olmesartan]      cough    Clonidine Derivatives Other (See Comments)     Lethargy. Social History:    TOBACCO:   reports that she has never smoked. She has never used smokeless tobacco.  ETOH:   reports that she does not currently use alcohol. Family History:       Problem Relation Age of Onset    High Blood Pressure Father     Cancer Father 80        lung and kidney primary cancers. PHYSICAL EXAM:      Constitutional:    Vitals:    Vitals:    12/08/22 1630   BP: 117/71   Pulse: 66   Resp: 18   Temp: 98.7 °F (37.1 °C)   SpO2: 95%      General appearance:  WDWN, NAD, awake and alert.   Eyes:  PERRL, EOMI   Ears, Nose, Mouth and Throat: TMs and EACs, external ears and external nose, nasal mucosa and secretions, inferior nasal turbinates, nasal septum, nasal vestibules, oral cavity and oropharynx appeared to be normal   (+) Neck:   Bilateral goiter, right > left. No masses or tenderness   Respiratory:   symmetric respiratory excursions, normal breath sounds in all field, no wheezing, no stridor. Cardiovascular:  RR&R, normal S2 and S2, with no murmur or gallop. Gastrointestinal: soft, flat, nontender, with normal bowel sounds, and no masses or organomegaly detected. Lymphatic:   Facial, neck and supraclavicular lymph nodes were normal to palpation. Musculoskeletal:   SMITH  Neurologic: CN 2-12 intact,  no paralysis. Psychiatric:    Normal thought content. No evidence of agitation or depression. LABORATORY:    Component Ref Range & Units 12/7/22 1741 10/18/22 1157 11/25/20 0717 7/7/20 0735 11/23/19 0734 1/8/19 0930 5/11/18 0744   TSH Reflex FT4 0.27 - 4.20 uIU/mL 0.74  0.20 Low   0.17 Low   0.97  0.98  0.78  1.75        Component Ref Range & Units 10/18/22 1157 11/25/20 0717 9/20/10 1629   T4 Free 0.9 - 1.8 ng/dL 1.9 High   1.4  1.65 R    Resulting Agency  [address] [facility]         REVIEW OF IMAGING:  Narrative   EXAMINATION:   CT OF THE NECK SOFT TISSUE WITH CONTRAST  12/8/2022       TECHNIQUE:   CT of the neck was performed with the administration of intravenous contrast.   Multiplanar reformatted images are provided for review. Automated exposure   control, iterative reconstruction, and/or weight based adjustment of the   mA/kV was utilized to reduce the radiation dose to as low as reasonably   achievable. COMPARISON:   None. HISTORY:   ORDERING SYSTEM PROVIDED HISTORY: evaluation of thyromegaly   TECHNOLOGIST PROVIDED HISTORY:   Reason for exam:->evaluation of thyromegaly   Reason for Exam: Evaluation of thyromegaly.        FINDINGS:   A right upper lobe pulmonary artery filling defect is present, consistent   with PE. There is enlargement of the thyroid gland with multiple hypoattenuating   masses, measuring up to 5.1 cm in diameter within the right thyroid lobe. A   retrosternal component is present and the airway is displaced to the left   hand side. The thyroid capsule appears to be grossly intact. No retropharyngeal free fluid is identified. The major salivary glands are symmetric in size and enhancement and   visualized portions of the tongue are normal.       The nasopharynx, oropharynx, hypopharynx, larynx, and upper esophagus are   grossly within normal limits. The major vessels of the neck enhance symmetrically. No pathologically enlarged lymph nodes are seen within neck, to the extent   visualized. No acute osseous abnormality identified. Impression   Multinodular goiter. Please refer to the thyroid ultrasound report from 09/30/2021 for further   details and follow-up recommendations. Right upper lobe PE. Please refer to the separately dictated CT chest report   from 12/07/2022 for further details and follow-up recommendations. Narrative   EXAMINATION:   CTA OF THE CHEST 12/7/2022 3:51 pm       TECHNIQUE:   CTA of the chest was performed after the administration of intravenous   contrast.  Multiplanar reformatted images are provided for review. MIP   images are provided for review. Automated exposure control, iterative   reconstruction, and/or weight based adjustment of the mA/kV was utilized to   reduce the radiation dose to as low as reasonably achievable. COMPARISON:   None.        HISTORY:   ORDERING SYSTEM PROVIDED HISTORY: R sided pleurisy   TECHNOLOGIST PROVIDED HISTORY:   Reason for exam:->R sided pleurisy   Decision Support Exception - unselect if not a suspected or confirmed   emergency medical condition->Emergency Medical Condition (MA)   Reason for Exam: R sided pleurisy FINDINGS:   Pulmonary Arteries: Pulmonary arteries are adequately opacified. Filling defects seen within the right upper, middle, and lower lobes. Additional filling defects seen within the lingula. Equivocal right heart strain, with the right heart minimally larger the left   (4.3 cm versus 4.1 cm). With that said, no bowing of the interventricular   septum is seen, and there is only minimal reflux into the hepatic veins, and   the main pulmonary artery is normal in caliber. Mediastinum: The thyroid is heterogeneously enlarged, and has an infiltrative   appearance within the lower neck. This appears increased in size when   compared to an ultrasound from 09/30/2021. There is shift of the trachea to   the left. Esophagus is unremarkable. No mediastinal or hilar lymphadenopathy is   identified. Lungs/pleura: Patchy peripheral infiltrates are seen within the lower lobes   bilaterally, greater on the right, with infiltrates seen to a lesser extent   within the right middle lobe. No pneumothorax is seen. No pleural effusion. Upper Abdomen: Limited images of the upper abdomen are unremarkable. Soft Tissues/Bones: No acute or suspicious bony abnormality. Visualized   extra thoracic soft tissues are unremarkable. Impression   Positive for pulmonary embolism within all the lobes except the left lower   lobe. Equivocal right heart strain with the right ventricle a few mm larger than   the left. However, other signs of right heart strain are not seen. Peripheral airspace disease in the lower lungs, greater on the right, most   likely pulmonary infarct, but pneumonia remains in the differential.       Thyroid is very enlarged and heterogeneous, and has an infiltrative   appearance, with loss of the fat planes in the lower neck. Thyroid appears   increased in size when compared to the previous ultrasound.   Findings are   worrisome for thyroid malignancy. Further evaluation with neck CT and   thyroid ultrasound is recommended. 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 RECOMMENDATIONS:    Thyroid ultrasound. Thyroidectomy. Would need to be off anticoagulants for surgery. Will see and follow up as outpatient after discharge.

## 2022-12-08 NOTE — FLOWSHEET NOTE
12/08/22 1041   Oxygen Therapy   SpO2 (!) 89 %  (with ambulation, increased to 2 L NC)   Pulse Oximetry Type Intermittent   Pulse Oximeter Device Mode Intermittent   Pulse Oximeter Device Location Finger   O2 Device Nasal cannula   O2 Flow Rate (L/min) 1 L/min   Pt ambulated to [facility] and sink with assist. O2 sats 89-90% on 1 L nc, no sob observed. O2 increased to 2 L Nc. Pt states breathing is much better today. Pt assisted to  and gone down to echo with transport, O2 and heparin gtt.

## 2022-12-08 NOTE — H&P
HOSPITALISTS HISTORY AND PHYSICAL    12/7/2022 11:15 PM    Patient Information:  Ken Lopez is a 74 y.o. female [id number]  PCP:  Bailey Avery MD (Tel: [phone] )    Chief complaint:    Chief Complaint   Patient presents with    Shortness of Breath     Pt states that she has been having shortness of breath with walking and every breath in she gets a sharp pain under right rib cage. Pt states no cough, fever, headaches or runny nose         History of Present Illness:  Ramona Kwan is a 74 y.o. female with hx hypothyroid, HTN, and OA. Presents the emergency room for chest tightness and shortness of breath. She states yesterday she started developing shortness of breath upon exertion as well as chest tightness symptoms did improve with rest but became progressively worse today so she came to the emergency room. She denies any history of pulmonary embolus or DVT. She did travel to [address] [place] [address] at the beginning of November approximately 6 weeks ago. She denies any recent hospitalization or surgeries. She denies any weight changes. History obtained from patient     REVIEW OF SYSTEMS:   Review of Systems   Constitutional: Negative. HENT: Negative. Eyes: Negative. Respiratory:  Positive for shortness of breath. Negative for cough. Cardiovascular:  Positive for chest pain. Negative for leg swelling. Gastrointestinal: Negative. Endocrine: Negative. Genitourinary: Negative. Musculoskeletal: Negative. Neurological: Negative. Hematological: Negative. Psychiatric/Behavioral: Negative. All other systems reviewed and are negative. Past Medical History:   has a past medical history of Cough variant asthma, HTN (hypertension), Mixed hyperlipidemia, and Primary osteoarthritis of left knee.      Past Surgical History:   has a past surgical history that includes Breast surgery; Small intestine surgery (2015);  section (1985); and Colon surgery (2015). Medications:  No current facility-administered medications on file prior to encounter. Current Outpatient Medications on File Prior to Encounter   Medication Sig Dispense Refill    atenolol (TENORMIN) 25 MG tablet Take 1 tablet by mouth daily 90 tablet 3    levothyroxine (EUTHYROX) 75 MCG tablet Take 1 tablet by mouth Daily Stop 88 mcg 90 tablet 0    fluvastatin (LESCOL) 20 MG capsule Take 1 capsule by mouth nightly 90 capsule 3    NIFEdipine (PROCARDIA XL) 30 MG extended release tablet Take 1 tablet by mouth once daily 90 tablet 1    chlorthalidone (HYGROTON) 25 MG tablet Take 1 tablet by mouth once daily 90 tablet 1    budesonide-formoterol (SYMBICORT) 160-4.5 MCG/ACT AERO Inhale 2 puffs into the lungs 2 times daily 1 each 5    acyclovir (ZOVIRAX) 400 MG tablet Take 1 tablet by mouth 2 times daily as needed      estradiol (ESTRACE) 0.1 MG/GM vaginal cream Place 0.5 g vaginally Twice a Week 1 Tube 3    albuterol sulfate HFA (PROVENTIL HFA) 108 (90 Base) MCG/ACT inhaler Inhale 2 puffs into the lungs every 6 hours as needed for Wheezing 1 Inhaler 3    Cholecalciferol (VITAMIN D) 2000 UNITS CAPS capsule Take 2 capsules by mouth daily Remind patient, can purchase over the counter and also purchase Caltrate D 600mg/ 400 iu and take bid. 60 capsule 11       Allergies: Allergies   Allergen Reactions    Amlodipine Besy-Benazepril Hcl Other (See Comments)    Crestor [Rosuvastatin] Myalgia     Muscle pain at night that resolved within one week of discontinuation    Ace Inhibitors Swelling     Lip swelling    Amlodipine Besy-Benazepril Hcl Other (See Comments)     Lip swelling    Benicar [Olmesartan]      cough    Clonidine Derivatives Other (See Comments)     Lethargy. Social History:  Patient Lives    reports that she has never smoked.  She has never used smokeless tobacco. She reports that she does not currently use alcohol. She reports that she does not use drugs. Family History:  family history includes Cancer (age of onset: 80) in her father; High Blood Pressure in her father. ,     Physical Exam:  /71   Pulse 71   Temp 99.3 °F (37.4 °C)   Resp 27   Wt 134 lb 14.7 oz (61.2 kg)   SpO2 92%   BMI 26.35 kg/m²   Physical Exam  Vitals and nursing note reviewed. Constitutional:       Appearance: Normal appearance. She is not ill-appearing. HENT:      Head: Normocephalic. Nose: Nose normal.      Mouth/Throat:      Mouth: Mucous membranes are moist.   Eyes:      Extraocular Movements: Extraocular movements intact. Pupils: Pupils are equal, round, and reactive to light. Cardiovascular:      Rate and Rhythm: Normal rate and regular rhythm. Heart sounds: Murmur heard. Pulmonary:      Effort: Pulmonary effort is normal. No respiratory distress. Breath sounds: Normal breath sounds. Abdominal:      General: Abdomen is flat. Bowel sounds are normal. There is no distension. Palpations: Abdomen is soft. Tenderness: There is no abdominal tenderness. Musculoskeletal:         General: Normal range of motion. Cervical back: Normal range of motion. Right lower leg: No edema. Left lower leg: No edema. Skin:     General: Skin is warm and dry. Capillary Refill: Capillary refill takes less than 2 seconds. Neurological:      General: No focal deficit present. Mental Status: She is alert and oriented to person, place, and time. Cranial Nerves: No cranial nerve deficit.    Psychiatric:         Mood and Affect: Mood normal.         Behavior: Behavior normal.       Labs:  CBC:   Lab Results   Component Value Date/Time    WBC 17.2 12/07/2022 05:41 PM    RBC 3.72 12/07/2022 05:41 PM    HGB 10.7 12/07/2022 05:41 PM    HCT 32.0 12/07/2022 05:41 PM    MCV 86.0 12/07/2022 05:41 PM    MCH 28.8 12/07/2022 05:41 PM    MCHC 33.5 12/07/2022 05:41 PM    RDW 14.9 12/07/2022 05:41 PM     12/07/2022 05:41 PM    MPV 8.6 12/07/2022 05:41 PM     BMP:    Lab Results   Component Value Date/Time     12/07/2022 05:41 PM    K 3.2 12/07/2022 05:41 PM    CL 96 12/07/2022 05:41 PM    CO2 30 12/07/2022 05:41 PM    BUN 20 12/07/2022 05:41 PM    CREATININE 0.9 12/07/2022 05:41 PM    CALCIUM 10.3 12/07/2022 05:41 PM    GFRAA >60 07/06/2022 10:12 AM    LABGLOM >60 12/07/2022 05:41 PM    GLUCOSE 109 12/07/2022 05:41 PM     CT CHEST PULMONARY EMBOLISM W CONTRAST   Final Result   Positive for pulmonary embolism within all the lobes except the left lower   lobe. Equivocal right heart strain with the right ventricle a few mm larger than   the left. However, other signs of right heart strain are not seen. Peripheral airspace disease in the lower lungs, greater on the right, most   likely pulmonary infarct, but pneumonia remains in the differential.      Thyroid is very enlarged and heterogeneous, and has an infiltrative   appearance, with loss of the fat planes in the lower neck. Thyroid appears   increased in size when compared to the previous ultrasound. Findings are   worrisome for thyroid malignancy. Further evaluation with neck CT and   thyroid ultrasound is recommended. Findings were discussed with Micheline Sommer at 8:30 pm on 12/7/2022. XR CHEST PORTABLE   Final Result   Mild bibasilar airspace disease, likely atelectasis versus less likely   pneumonia or edema. Possible small left pleural effusion versus airspace disease or scarring   within the left costophrenic angle. Chest Xray:   EKG:    I visualized CXR images and EKG strips    Problem List  Principal Problem:    Bilateral pulmonary embolism ([address]  Resolved Problems:    * No resolved hospital problems. *        Assessment/Plan:   Bilateral Pulmonary Embolus with Heart Strain  Admit inpatient with telemetry. Patient was started on heparin in the emergency room. Patient is on room air and blood pressure stable. She is in no acute respiratory distress. If respiratory status changes or becomes hypotensive then will consult Cardiovascular for consideration of EKOS  Venous ultrasound LE in am  2D echo   No prior hx PE/DVT    2. 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 Patient reports known hx of thyroid nodules and has been getting ultrasounds outpatient however CT noted more enlarged then previous ultrasound. TSH in am  Continue synthroid  She denies any weight changes  Consult ENT, given trachea shifting     3. Hypertension  BP stable continue home meds    4. Hypokalemia  Replace with oral K    DVT prophylaxis Heparin   Code status peripheral   Diet Cardiac   IV access peripheral   Natarajan Catheter none    Admit as inpatient. I anticipate hospitalization spanning more than two midnights for investigation and treatment of the above medically necessary diagnoses. Please note that some part of this chart was generated using Dragon dictation software. Although every effort was made to ensure the accuracy of this automated transcription, some errors in transcription may have occurred inadvertently. If you may need any clarification, please do not hesitate to contact me through [facility].        CAREN May CNP    12/7/2022 11:15 PM

## 2022-12-09 LAB
A/G RATIO: 0.9 (ref 1.1–2.2)
ALBUMIN SERPL-MCNC: 3 G/DL (ref 3.4–5)
ALP BLD-CCNC: 63 U/L (ref 40–129)
ALT SERPL-CCNC: 19 U/L (ref 10–40)
ANION GAP SERPL CALCULATED.3IONS-SCNC: 8 MMOL/L (ref 3–16)
ANTI-XA UNFRAC HEPARIN: 0.42 IU/ML (ref 0.3–0.7)
AST SERPL-CCNC: 21 U/L (ref 15–37)
BILIRUB SERPL-MCNC: <0.2 MG/DL (ref 0–1)
BUN BLDV-MCNC: 17 MG/DL (ref 7–20)
CALCIUM SERPL-MCNC: 9.9 MG/DL (ref 8.3–10.6)
CHLORIDE BLD-SCNC: 101 MMOL/L (ref 99–110)
CO2: 31 MMOL/L (ref 21–32)
CREAT SERPL-MCNC: 0.8 MG/DL (ref 0.6–1.2)
GFR SERPL CREATININE-BSD FRML MDRD: >60 ML/MIN/{1.73_M2}
GLUCOSE BLD-MCNC: 105 MG/DL (ref 70–99)
HCT VFR BLD CALC: 28.4 % (ref 36–48)
HEMOGLOBIN: 9.6 G/DL (ref 12–16)
MAGNESIUM: 2 MG/DL (ref 1.8–2.4)
MCH RBC QN AUTO: 28.9 PG (ref 26–34)
MCHC RBC AUTO-ENTMCNC: 33.7 G/DL (ref 31–36)
MCV RBC AUTO: 85.6 FL (ref 80–100)
PDW BLD-RTO: 15 % (ref 12.4–15.4)
PHOSPHORUS: 3.2 MG/DL (ref 2.5–4.9)
PLATELET # BLD: 525 K/UL (ref 135–450)
PMV BLD AUTO: 7.9 FL (ref 5–10.5)
POTASSIUM SERPL-SCNC: 3.6 MMOL/L (ref 3.5–5.1)
RBC # BLD: 3.32 M/UL (ref 4–5.2)
SODIUM BLD-SCNC: 140 MMOL/L (ref 136–145)
TOTAL PROTEIN: 6.2 G/DL (ref 6.4–8.2)
WBC # BLD: 13.5 K/UL (ref 4–11)

## 2022-12-09 PROCEDURE — 83735 ASSAY OF MAGNESIUM: CPT

## 2022-12-09 PROCEDURE — 85520 HEPARIN ASSAY: CPT

## 2022-12-09 PROCEDURE — 85027 COMPLETE CBC AUTOMATED: CPT

## 2022-12-09 PROCEDURE — 2700000000 HC OXYGEN THERAPY PER DAY

## 2022-12-09 PROCEDURE — 6360000002 HC RX W HCPCS: Performed by: INTERNAL MEDICINE

## 2022-12-09 PROCEDURE — 99232 SBSQ HOSP IP/OBS MODERATE 35: CPT | Performed by: SURGERY

## 2022-12-09 PROCEDURE — 6370000000 HC RX 637 (ALT 250 FOR IP): Performed by: NURSE PRACTITIONER

## 2022-12-09 PROCEDURE — 80053 COMPREHEN METABOLIC PANEL: CPT

## 2022-12-09 PROCEDURE — 94640 AIRWAY INHALATION TREATMENT: CPT

## 2022-12-09 PROCEDURE — 2580000003 HC RX 258: Performed by: INTERNAL MEDICINE

## 2022-12-09 PROCEDURE — 94761 N-INVAS EAR/PLS OXIMETRY MLT: CPT

## 2022-12-09 PROCEDURE — 84100 ASSAY OF PHOSPHORUS: CPT

## 2022-12-09 PROCEDURE — 97165 OT EVAL LOW COMPLEX 30 MIN: CPT

## 2022-12-09 PROCEDURE — APPSS30 APP SPLIT SHARED TIME 16-30 MINUTES: Performed by: NURSE PRACTITIONER

## 2022-12-09 PROCEDURE — 1200000000 HC SEMI PRIVATE

## 2022-12-09 PROCEDURE — 36415 COLL VENOUS BLD VENIPUNCTURE: CPT

## 2022-12-09 PROCEDURE — 97530 THERAPEUTIC ACTIVITIES: CPT

## 2022-12-09 PROCEDURE — APPNB30 APP NON BILLABLE TIME 0-30 MINS: Performed by: NURSE PRACTITIONER

## 2022-12-09 RX ORDER — ENOXAPARIN SODIUM 100 MG/ML
1 INJECTION SUBCUTANEOUS 2 TIMES DAILY
Qty: 36 ML | Refills: 3 | Status: SHIPPED | OUTPATIENT
Start: 2022-12-09 | End: 2022-12-12 | Stop reason: SDUPTHER

## 2022-12-09 RX ORDER — ENOXAPARIN SODIUM 100 MG/ML
1 INJECTION SUBCUTANEOUS 2 TIMES DAILY
Status: DISCONTINUED | OUTPATIENT
Start: 2022-12-09 | End: 2022-12-09

## 2022-12-09 RX ADMIN — ATENOLOL 25 MG: 25 TABLET ORAL at 09:50

## 2022-12-09 RX ADMIN — Medication 10 ML: at 20:06

## 2022-12-09 RX ADMIN — Medication 2 PUFF: at 08:04

## 2022-12-09 RX ADMIN — Medication 2 PUFF: at 20:54

## 2022-12-09 RX ADMIN — NIFEDIPINE 30 MG: 30 TABLET, EXTENDED RELEASE ORAL at 09:50

## 2022-12-09 RX ADMIN — Medication 10 ML: at 09:50

## 2022-12-09 RX ADMIN — LEVOTHYROXINE SODIUM 75 MCG: 0.07 TABLET ORAL at 06:07

## 2022-12-09 RX ADMIN — HEPARIN SODIUM 18 UNITS/KG/HR: 10000 INJECTION, SOLUTION INTRAVENOUS at 18:33

## 2022-12-09 RX ADMIN — IRON SUCROSE 200 MG: 20 INJECTION, SOLUTION INTRAVENOUS at 18:23

## 2022-12-09 NOTE — PROGRESS NOTES
Hospitalist Progress Note      PCP: Karla Albrecht MD    Date of Admission: 12/7/2022    Chief Complaint: SOB    Hospital Course: Thiago Shahid is a 74 y.o. female with hx hypothyroidism, thyroid nodules, HTN, and OA presented to the ED wi chest tightness and shortness of breath x 1 day. Work-up in the ER showed bilateral PEs with acute focal right heart strain. CT of the chest also demonstrated an enlarged with features concerning for thyroid malignancy. She mentioned 9 hour car trip about 6 weeks ago but denied recent surgeries or immobilization. No prior history of VTE. She denies any history of pulmonary embolus or DVT. Subjective: Patient seen and examined. Dyspnea and chest tightness improved. No lower extremity pain no swelling. Remains hemodynamically stable. On 2 L NC. Medications:  Reviewed    Infusion Medications    heparin (PORCINE) Infusion 18 Units/kg/hr (12/08/22 1932)    sodium chloride       Scheduled Medications    sodium chloride flush  5-40 mL IntraVENous 2 times per day    atenolol  25 mg Oral Daily    mometasone-formoterol  2 puff Inhalation BID    levothyroxine  75 mcg Oral Daily    NIFEdipine  30 mg Oral Daily     PRN Meds: heparin (porcine), heparin (porcine), sodium chloride flush, sodium chloride, ondansetron **OR** ondansetron, polyethylene glycol, acetaminophen **OR** acetaminophen, HYDROcodone 5 mg - acetaminophen, perflutren lipid microspheres, albuterol sulfate HFA    No intake or output data in the 24 hours ending 12/08/22 1943    Physical Exam Performed:    /71   Pulse 66   Temp 98.7 °F (37.1 °C) (Oral)   Resp 18   Ht 5' (1.524 m)   Wt 135 lb 9.6 oz (61.5 kg)   SpO2 95%   BMI 26.48 kg/m²     General appearance: No apparent distress, appears stated age and cooperative. HEENT: Pupils equal, round, and reactive to light. Conjunctivae/corneas clear. Neck: Supple, with full range of motion. No jugular venous distention.  Trachea midline. Respiratory:  Normal respiratory effort. Clear to auscultation, bilaterally without Rales/Wheezes/Rhonchi. Cardiovascular: Regular rate and rhythm with normal S1/S2 without murmurs, rubs or gallops. Abdomen: Soft, non-tender, non-distended with normal bowel sounds. Musculoskeletal: No clubbing, cyanosis or edema bilaterally. Full range of motion without deformity. Skin: Skin color, texture, turgor normal.  No rashes or lesions. Neurologic:  Neurovascularly intact without any focal sensory/motor deficits. Cranial nerves: II-XII intact, grossly non-focal.  Psychiatric: Alert and oriented, thought content appropriate, normal insight  Capillary Refill: Brisk, 3 seconds, normal   Peripheral Pulses: +2 palpable, equal bilaterally       Labs:   Recent Labs     12/07/22 1741   WBC 17.2*   HGB 10.7*   HCT 32.0*        Recent Labs     12/07/22 1741      K 3.2*   CL 96*   CO2 30   BUN 20   CREATININE 0.9   CALCIUM 10.3     Recent Labs     12/07/22 1741   AST 15   ALT 10   BILITOT 0.5   ALKPHOS 58     Recent Labs     12/07/22 2039   INR 1.28*     Recent Labs     12/07/22 1741   TROPONINI <0.01       Urinalysis:      Lab Results   Component Value Date/Time    NITRU Negative 10/18/2022 12:04 PM    WBCUA 6-9 10/18/2022 12:04 PM    BACTERIA None Seen 10/18/2022 12:04 PM    RBCUA 3 10/18/2022 12:04 PM    BLOODU Negative 10/18/2022 12:04 PM    SPECGRAV 1.016 10/18/2022 12:04 PM    GLUCOSEU Negative 10/18/2022 12:04 PM    GLUCOSEU NEGATIVE 08/10/2011 08:45 AM       Radiology:  VL Extremity Venous Bilateral         CT CHEST PULMONARY EMBOLISM W CONTRAST   Final Result   Positive for pulmonary embolism within all the lobes except the left lower   lobe. Equivocal right heart strain with the right ventricle a few mm larger than   the left. However, other signs of right heart strain are not seen.       Peripheral airspace disease in the lower lungs, greater on the right, most   likely pulmonary infarct, but pneumonia remains in the differential.      Thyroid is very enlarged and heterogeneous, and has an infiltrative   appearance, with loss of the fat planes in the lower neck. Thyroid appears   increased in size when compared to the previous ultrasound. Findings are   worrisome for thyroid malignancy. Further evaluation with neck CT and   thyroid ultrasound is recommended. Findings were discussed with Arlene Rojo at 8:30 pm on 12/7/2022. XR CHEST PORTABLE   Final Result   Mild bibasilar airspace disease, likely atelectasis versus less likely   pneumonia or edema. Possible small left pleural effusion versus airspace disease or scarring   within the left costophrenic angle. US THYROID    (Results Pending)   CT SOFT TISSUE NECK W CONTRAST    (Results Pending)       IP CONSULT TO HOSPITALIST  IP CONSULT TO OTOLARYNGOLOGY  IP CONSULT TO VASCULAR SURGERY  IP CONSULT TO ONCOLOGY  IP CONSULT TO PHARMACY    Assessment/Plan:    Active Hospital Problems    Diagnosis     Acute pulmonary embolism ([address] [I26.99]      Priority: Medium    Bilateral pulmonary embolism ([facility]) [I26.99]      Priority: Medium     Bilateral Pulmonary Embolus with Heart Strain on CT:  Acute DVT bilateral lower extremities:  Venous duplex showing R peroneal DVT and left PT and peroneal DVT. CT chest: PE within all the lobes except the LLL and echo both: Right heart strain. Started on heparin drip. High co-pays for DOAC. Will need Coumadin. Vascular consulted: Appreciate input. No indication for pulmonary artery thrombolysis at this time given. 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 CT noted more enlarged then previous ultrasound worrisome findings suggestive of malignancy. Thyroid ultrasound recommended. TSH wnl. Continue synthroid       Hypertension: Monitor BP on atenolol and Procardia.   BP stable continue home meds     Hypokalemia: Replaced. Monitor mag and K level. DVT Prophylaxis: Heparin drip  Diet: ADULT DIET;  Regular  Code Status: Full Code  PT/OT Eval Status: Independent    Dispo -once medically stable      Can Chen MD

## 2022-12-09 NOTE — PROGRESS NOTES
Hospitalist Progress Note      PCP: Tien Samuel MD    Date of Admission: 12/7/2022    Chief Complaint: SOB    Hospital Course: Gila Stapleton is a 74 y.o. female with hx hypothyroidism, thyroid nodules, HTN, and OA presented to the ED wi chest tightness and shortness of breath x 1 day. Work-up in the ER showed bilateral PEs with acute focal right heart strain. CT of the chest also demonstrated an enlarged with features concerning for thyroid malignancy. She mentioned 9 hour car trip about 6 weeks ago but denied recent surgeries or immobilization. No prior history of VTE. She denies any history of pulmonary embolus or DVT. Subjective: Patient seen and examined. Dyspnea and chest tightness improved. No lower extremity pain no swelling. Remains hemodynamically stable. On 2 L NC. Medications:  Reviewed    Infusion Medications    heparin (PORCINE) Infusion 18 Units/kg/hr (12/08/22 1932)    sodium chloride       Scheduled Medications    iron sucrose (VENOFER) iv piggyback 100 mL (Admin over 60 minutes)  200 mg IntraVENous Q24H    sodium chloride flush  5-40 mL IntraVENous 2 times per day    atenolol  25 mg Oral Daily    mometasone-formoterol  2 puff Inhalation BID    levothyroxine  75 mcg Oral Daily    NIFEdipine  30 mg Oral Daily     PRN Meds: sodium chloride flush, sodium chloride, ondansetron **OR** ondansetron, polyethylene glycol, acetaminophen **OR** acetaminophen, HYDROcodone 5 mg - acetaminophen, perflutren lipid microspheres, albuterol sulfate HFA    No intake or output data in the 24 hours ending 12/09/22 1621    Physical Exam Performed:    /69   Pulse 70   Temp 99.7 °F (37.6 °C) (Oral)   Resp 18   Ht 5' (1.524 m)   Wt 138 lb 6.4 oz (62.8 kg)   SpO2 93%   BMI 27.03 kg/m²     General appearance: No apparent distress, appears stated age and cooperative. HEENT: Pupils equal, round, and reactive to light. Conjunctivae clear.   Neck: Supple, with full range of motion. No jugular venous distention. Trachea midline. Respiratory:  Normal respiratory effort. Clear to auscultation, bilaterally without Rales/Wheezes/Rhonchi. Cardiovascular: Regular rate and rhythm with normal S1/S2 without murmurs, rubs or gallops. Abdomen: Soft, non-tender, non-distended with normal bowel sounds. Musculoskeletal: No clubbing, cyanosis or edema bilaterally. Full range of motion without deformity. Skin: Skin color, texture, turgor normal.  No rashes or lesions. Neurologic:  Neurovascularly intact without any focal sensory/motor deficits. Cranial nerves: II-XII intact, grossly non-focal.  Psychiatric: Alert and oriented, thought content appropriate, normal insight  Capillary Refill: Brisk, 3 seconds, normal   Peripheral Pulses: +2 palpable, equal bilaterally       Labs:   Recent Labs     12/07/22 1741 12/09/22  0539   WBC 17.2* 13.5*   HGB 10.7* 9.6*   HCT 32.0* 28.4*    525*     Recent Labs     12/07/22 1741 12/09/22  0538    140   K 3.2* 3.6   CL 96* 101   CO2 30 31   BUN 20 17   CREATININE 0.9 0.8   CALCIUM 10.3 9.9   PHOS  --  3.2     Recent Labs     12/07/22 1741 12/09/22  0538   AST 15 21   ALT 10 19   BILITOT 0.5 <0.2   ALKPHOS 58 63     Recent Labs     12/07/22 2039   INR 1.28*     Recent Labs     12/07/22 1741   TROPONINI <0.01       Urinalysis:      Lab Results   Component Value Date/Time    NITRU Negative 10/18/2022 12:04 PM    WBCUA 6-9 10/18/2022 12:04 PM    BACTERIA None Seen 10/18/2022 12:04 PM    RBCUA 3 10/18/2022 12:04 PM    BLOODU Negative 10/18/2022 12:04 PM    SPECGRAV 1.016 10/18/2022 12:04 PM    GLUCOSEU Negative 10/18/2022 12:04 PM    GLUCOSEU NEGATIVE 08/10/2011 08:45 AM       Radiology:  CT SOFT TISSUE NECK W CONTRAST   Final Result   Multinodular goiter. Please refer to the thyroid ultrasound report from 09/30/2021 for further   details and follow-up recommendations. Right upper lobe PE.   Please refer to the separately dictated CT chest report   from 12/07/2022 for further details and follow-up recommendations. VL Extremity Venous Bilateral         CT CHEST PULMONARY EMBOLISM W CONTRAST   Final Result   Positive for pulmonary embolism within all the lobes except the left lower   lobe. Equivocal right heart strain with the right ventricle a few mm larger than   the left. However, other signs of right heart strain are not seen. Peripheral airspace disease in the lower lungs, greater on the right, most   likely pulmonary infarct, but pneumonia remains in the differential.      Thyroid is very enlarged and heterogeneous, and has an infiltrative   appearance, with loss of the fat planes in the lower neck. Thyroid appears   increased in size when compared to the previous ultrasound. Findings are   worrisome for thyroid malignancy. Further evaluation with neck CT and   thyroid ultrasound is recommended. Findings were discussed with Beba Patient at 8:30 pm on 12/7/2022. XR CHEST PORTABLE   Final Result   Mild bibasilar airspace disease, likely atelectasis versus less likely   pneumonia or edema. Possible small left pleural effusion versus airspace disease or scarring   within the left costophrenic angle. US THYROID    (Results Pending)       IP CONSULT TO HOSPITALIST  IP CONSULT TO OTOLARYNGOLOGY  IP CONSULT TO VASCULAR SURGERY  IP CONSULT TO ONCOLOGY  IP CONSULT TO PHARMACY  IP CONSULT TO GI    Assessment/Plan:    Active Hospital Problems    Diagnosis     Acute pulmonary embolism ([address] [I26.99]      Priority: Medium    Bilateral pulmonary embolism ([address] [I26.99]      Priority: Medium     Bilateral Pulmonary Embolus with Heart Strain on CT:  Acute DVT bilateral lower extremities:  Venous duplex showing R peroneal DVT and left PT and peroneal DVT. CT chest: PE within all the lobes except the LLL and echo both: Right heart strain. Vascular consulted: Appreciate input.   No indication for pulmonary artery thrombolysis at this time. Continue Heparin drip. High co-pays for DOAC. Will need to transition to Coumadin eventually but patient will be discharged home on Lovenox 1 mg/kg twice daily instead of 1.5 mg/kg daily until out patient procedures have been completed. 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 CT noted more enlarged then previous ultrasound worrisome findings suggestive of malignancy. ENT consulted: Recommended outpatient thyroid ultrasound with close follow-up as patient will need transfer to  for further management. TSH wnl. Continue synthroid    Anemia:   Work up c/w with AoCD and ISAMAR. Started on Iron replacement. GI consulted. Hypertension: Monitor BP on atenolol and Procardia. Hypokalemia: Replaced. Monitor mag and K level. DVT Prophylaxis: Heparin drip  Diet: ADULT DIET;  Regular  Code Status: Full Code  PT/OT Eval Status: Independent    Dispo -once medically stable      Alcide Goldmann, MD

## 2022-12-09 NOTE — FLOWSHEET NOTE
12/09/22 0815   Oxygen Therapy   SpO2 91 %   Pulse Oximetry Type Intermittent   Pulse Oximeter Device Mode Intermittent   Pulse Oximeter Device Location Finger   O2 Device Nasal cannula   O2 Flow Rate (L/min) 1 L/min   O2 increased back up to 2 L NC, o2 sats increased to 92% on 2 L NC.

## 2022-12-09 NOTE — PROGRESS NOTES
Vascular Progress Note    12/9/2022 9:30 AM    Chief complaint / Reason for visit : PE/DVT    Subjective:  Patient resting in bed. She reports she is doing better and her SOB is improved. VSS, afebrile. Vital Signs: /63   Pulse 75   Temp 97.6 °F (36.4 °C) (Oral)   Resp 18   Ht 5' (1.524 m)   Wt 138 lb 6.4 oz (62.8 kg)   SpO2 93%   BMI 27.03 kg/m²  O2 Flow Rate (L/min): 2 L/min   I/O:  No intake or output data in the 24 hours ending 12/09/22 0930    Physical Exam:   General: no apparent distress, appears stated age  Chest/Lungs: no accessory muscle use  Extremities: no significant swelling of BLE    Labs:   Lab Results   Component Value Date/Time     12/09/2022 05:38 AM    K 3.6 12/09/2022 05:38 AM    K 3.2 12/07/2022 05:41 PM     12/09/2022 05:38 AM    CO2 31 12/09/2022 05:38 AM    BUN 17 12/09/2022 05:38 AM    CREATININE 0.8 12/09/2022 05:38 AM    GFRAA >60 07/06/2022 10:12 AM    LABGLOM >60 12/09/2022 05:38 AM    GLUCOSE 105 12/09/2022 05:38 AM    PHOS 3.2 12/09/2022 05:38 AM    MG 2.00 12/09/2022 05:38 AM    CALCIUM 9.9 12/09/2022 05:38 AM     Lab Results   Component Value Date/Time    WBC 13.5 12/09/2022 05:39 AM    RBC 3.32 12/09/2022 05:39 AM    HGB 9.6 12/09/2022 05:39 AM    HCT 28.4 12/09/2022 05:39 AM    MCV 85.6 12/09/2022 05:39 AM    RDW 15.0 12/09/2022 05:39 AM     12/09/2022 05:39 AM     Lab Results   Component Value Date    INR 1.28 (H) 12/07/2022    PROTIME 16.0 (H) 12/07/2022        Imaging:    ECHO 12/8/22:  Conclusions      Summary   Normal left ventricle size, wall thickness and systolic function with an   estimated ejection fraction of 55-60%. No regional wall motion abnormalities   are seen. Normal diastolic function. E/e' = 8.6. The aortic valve appears sclerotic but opens well. Trivial aortic regurgitation. Trivial mitral regurgitation. Mild to moderate tricuspid regurgitation.    IVC size is normal (<2.1 cm) but collapses < 50% with respiration consistent   with elevated RA pressure (8 mmHg). Estimated pulmonary artery systolic pressure is elevated at 46 mmHg assuming   a right atrial pressure of 8 mmHg. The right ventricle is normal in size and function. CTPE chest 12/7/22:  Impression   Positive for pulmonary embolism within all the lobes except the left lower   lobe. Equivocal right heart strain with the right ventricle a few mm larger than   the left. However, other signs of right heart strain are not seen. Peripheral airspace disease in the lower lungs, greater on the right, most   likely pulmonary infarct, but pneumonia remains in the differential.       Thyroid is very enlarged and heterogeneous, and has an infiltrative   appearance, with loss of the fat planes in the lower neck. Thyroid appears   increased in size when compared to the previous ultrasound. Findings are   worrisome for thyroid malignancy. Further evaluation with neck CT and   thyroid ultrasound is recommended. BLE venous duplex 12/8/22:  Tech Comments   Right   Acute totally occluding deep vein thrombosis involving the right peroneal   veins. .   No other evidence of deep vein or superficial vein thrombosis involving the   right lower extremity ,   Left   Acute totally occluding deep vein thrombosis involving the left posterior   tibial veins and one of the pair of peroneal veins. No other evidence of deep vein or superficial vein thrombosis involving the   left lower extremity.        Scheduled Meds:    sodium chloride flush  5-40 mL IntraVENous 2 times per day    atenolol  25 mg Oral Daily    mometasone-formoterol  2 puff Inhalation BID    levothyroxine  75 mcg Oral Daily    NIFEdipine  30 mg Oral Daily     Continuous Infusions:    heparin (PORCINE) Infusion 18 Units/kg/hr (12/08/22 1932)    sodium chloride           Assessment:   Bilateral PE with RV strain on CT scan - hemodynamically stable  SOB, hypoxia - on 2 lpm nasal cannula, improved   Acute DVT bilateral lower extremities - duplex showing right peroneal DVT and left PT and peroneal DVT  Enlarged thyroid - seen on CT scan  HTN  HLD    Plan:  ECHO reviewed showing some mild strain. Patient reports SOB improving. Would not plan for any vascular intervention at this time. Continue with anticoagulation for 6 months. Currently on Heparin drip and okay to transition to oral anticoagulant at medical team discretion. Will ask pharmacy to discuss dietary restrictions with coumadin. Will provide prescription for compression stockings. No further vascular plans. Okay to discharge from vascular standpoint once medically deemed appropriate. Patient is stable from vascular standpoint. We will sign off for now, but please do not hesitate to contact us with any questions. Thank you for the consultation. Patient educated on plan of care and disease process. All questions answered. Electronically signed by CAREN Yan CNP on 12/9/2022 at 9:30 AM     Vascular Staff    I independently performed an evaluation on [address]. I have reviewed the above documentation completed by Fatuma Pabon CNP. Please see my additional contributions to the HPI, physical exam, assessment, and medical decision making. No acute changes. Patient states that clinically she is feeling much better  Echo reviewed  Would recommend transition to oral anticoagulation of choice. Light weight knee-high support stockings and prescription was given today. Okay to discharge from vascular surgery perspective. All patient questions answered today. Sincerely appreciate the opportunity to assist with her care      Chacho Padron M.D., FACS.   12/9/2022  10:25 AM

## 2022-12-09 NOTE — PLAN OF CARE
Problem: Discharge Planning  Goal: Discharge to home or other facility with appropriate resources  Outcome: Progressing     Problem: Safety - Adult  Goal: Free from fall injury  Outcome: Progressing     Problem: ABCDS Injury Assessment  Goal: Absence of physical injury  Outcome: Progressing     Problem: Pain  Goal: Verbalizes/displays adequate comfort level or baseline comfort level  Outcome: Progressing     Problem: Cardiovascular - Adult  Goal: Maintains optimal cardiac output and hemodynamic stability  Outcome: Progressing

## 2022-12-09 NOTE — PROGRESS NOTES
[facility]   Phone: [phone]    Occupational Therapy    [x] Initial Evaluation            [] Daily Treatment Note         [] Discharge Summary      Patient: Ramona Kwan   : 1950   MRN: [id number]   Date of Service:  2022    Admitting Diagnosis:  Bilateral pulmonary embolism [facility])  Current Admission Summary:  Ramona Kwan is a 74 y.o. female with hx hypothyroid, HTN, and OA. Presents the emergency room for chest tightness and shortness of breath. She states yesterday she started developing shortness of breath upon exertion as well as chest tightness symptoms did improve with rest but became progressively worse today so she came to the emergency room. She denies any history of pulmonary embolus or DVT. She did travel to [address] [place] [address] at the beginning of November approximately 6 weeks ago. She denies any recent hospitalization or surgeries. She denies any weight changes. Past Medical History:  has a past medical history of Cough variant asthma, HTN (hypertension), Mixed hyperlipidemia, and Primary osteoarthritis of left knee. Past Surgical History:  has a past surgical history that includes Breast surgery; Small intestine surgery (2015);  section (1985); and Colon surgery (2015). Discharge Recommendations: Ramona Kwan scored a 22/24 on the AM-PAC ADL Inpatient form. Current research shows that an AM-PAC score of 18 or greater is typically associated with a discharge to the patient's home setting. Based on the patient's AM-PAC score, and their current ADL deficits, it is recommended that the patient have 2-3 sessions per week of Occupational Therapy at d/c to increase the patient's independence. At this time, this patient demonstrates the endurance and safety to discharge home with home based (home vs OP services) and a follow up treatment frequency of 2-3x/wk.    Please see assessment section for further patient specific details. If patient discharges prior to next session this note will serve as a discharge summary. Please see below for the latest assessment towards goals. HOME HEALTH CARE: LEVEL 1 STANDARD    - Initial home health evaluation to occur within 24-48 hours, in patient home   - Therapy to evaluate with goal of regaining prior level of functioning   - Therapy to evaluate if patient has [address] needs for personal care      DME Required For Discharge: shower chair with back--for energy conservation. Precautions/Restrictions: high fall risk  Weight Bearing Restrictions: no restrictions  [] Right Upper Extremity  [] Left Upper Extremity [] Right Lower Extremity  [] Left Lower Extremity     Required Braces/Orthotics: no braces required   [] Right  [] Left  Positional Restrictions:no positional restrictions    Pre-Admission Information   Lives With: spouse    Type of Home: house  Home Layout: two level, laundry in basement  Home Access:  2 step to enter with handrail. Handrails are located on R side. Bathroom Layout: walk in shower  Bathroom Equipment:  no prior equipment  Toilet Height: standard height  Home Equipment: single point cane--uses 1-2x per week due to hip issues and/or SOB. Transfer Assistance: Independent without use of device  Ambulation Assistance:Independent without use of device  ADL Assistance: independent with all ADL's  IADL Assistance: independent with homemaking tasks  Active :        [x] Yes  [] No  Hand Dominance: [] Left  [x] Right  Current Employment: part time employment. Occupation: works from [employer]: gardening; going to [place], being outside  [employer] Oil: x1 recent fall several months ago--no injury.      Examination   Vision:   Vision Gross Assessment: Impaired and Vision Corrective Device: wears contacts--macular degeneration in R eye  Hearing:   WFL  Perception:   WFL  Observation:   General Observation:  1L NC   Posture: good  Sensation:   denies numbness and tingling  Proprioception:    WFL    ROM:   (B) UE AROM WFL  Strength:   (B) UE strength grossly WFL    Therapist Clinical Decision Making (Complexity): low complexity  Clinical Presentation: stable      Subjective  General: Pt presenting supine in bed upon entry. She is pleasant and agreeable to OT evaluation. Daughter present on pt cell phone throughout session. Pt on 1L NC upon entry and saturating 93%. See Activity tolerance section for details on endurance/O2 needs throughout session. (RA at rest, 2L NC with activity). Pain: 0/10  Pain Interventions: not applicable        Activities of Daily Living  Basic Activities of Daily Living  Lower Extremity Dressing: supervision Comment: pt able to bertram/doff socks sitting EOB. General Comments: Pt politely declines additional ADLs this session. States she prefers to complete bathing and dressing in evening. Instrumental Activities of Daily Living  No IADL completed on this date. Functional Mobility  Bed Mobility  Supine to Sit: supervision  Sit to Supine: supervision  Scooting: supervision  Comments:  Transfers  Sit to stand transfer:stand by assistance  Stand to sit transfer: stand by assistance  Toilet transfer: stand by assistance, standard commode  Comments: Pt using cane for unilateral UE support. Good eccentric control noted. Functional Mobility:  Sitting Balance: supervision. Standing Balance: stand by assistance. Functional Mobility: .  stand by assistance, Pt ambulated 12ft x2 to/from bathroom with SPC. No LOB and no SOB noted. O2 increased to 2L NC due to desaturation from 91% on RA to 87% with activity.       Other Therapeutic Interventions    Functional Outcomes  AM-PAC Inpatient Daily Activity Raw Score: 22    Cognition  WFL  Orientation:    alert and oriented x 4  Command Following:   [facility]     Education  Barriers To Learning: none  Patient Education: patient educated on goals, OT role and benefits, plan of care, proper use of assistive device/equipment, energy conservation, family education, transfer training, discharge recommendations  Learning Assessment:  patient verbalizes understanding, would benefit from continued reinforcement    Note: Increased time required for pt education in disease process and to address all pt/daughter questions and concerns regarding POC and discharge planning. Pt/daughter with multiple questions. Assessment  Activity Tolerance: pt on 1L O2 upon entry saturatiing 92%. O2 removed to RA and pt remains 90-91% at rest sitting EOB. Pt desaturates to 87% in stance and with minimal activity (taking several steps)--O2 progressed to 2L NC where pt comfortably saturates 93-94%. Pt left on RA upon departure saturating 92%. Impairments Requiring Therapeutic Intervention: decreased functional mobility, decreased ADL status, decreased endurance, decreased balance, decreased IADL  Prognosis: excellent  Clinical Assessment: Pt presenting slightly below her functional baseline with the above deficits associated with bilateral PE. Prior to her admission she was extremely SOB and fatigued--limiting her ability to participate in ADLs and mobility. At this time, she is primarily limited by decreased endurance. Currently able to ambulate short distances with [facility] and complete basic ADLs with 2L O2. She is not on O2 at baseline. Continued OT indicated in order to facilitate safe and successful return to [facility]. Safety Interventions: patient left in chair, nurse notified, and Pt left without chair alarm; however she was educated on calling for assistance to use rest room. Pt not impulsive and cognitively intact. Pt verbalized understanding. Nurse notified and approved.     Plan  Frequency: 3-5 x/per week  Current Treatment Recommendations: strengthening, balance training, functional mobility training, gait training, stair training, endurance training, ADL/self-care training, IADL training, home exercise program, and safety education    Goals  Patient Goals: Return to home   Short Term Goals:  Time Frame: Discharge  Patient will complete upper body ADL at Independent   Patient will complete lower body ADL at Independent   Patient will complete toileting at Independent   Patient will complete grooming at Independent   Patient will complete functional transfers at [address] [place] [address]   Time In 0228      Time Out 0322      Minutes 54            Timed Code Treatment Minutes: 39 Minutes  Total Treatment Minutes:  54 minutes       Electronically Signed By: BRIA Hanson OTR/L  [id number]

## 2022-12-09 NOTE — PROGRESS NOTES
ONCOLOGY HEMATOLOGY CARE PROGRESS NOTE      SUBJECTIVE:  Still has mild KHAN. Occasional chest discomfort. Overall her symptoms have significantly improved. SpO2 93% on 1L oxygen. ROS:     Constitutional:  No weight loss, No fever, No chills, No night sweats. Eyes:  No impairment or change in vision  ENT / Mouth:  No pain, abnormal ulceration, bleeding, nasal drip or change in voice or hearing  Cardiovascular:  No chest pain, palpitations, new edema, or calf discomfort  Respiratory:  No pain, hemoptysis, change to breathing  Gastrointestinal:  No pain, cramping, jaundice, change to eating and bowel habits  Urinary:  No pain, bleeding or change in continence  Musculoskeletal:  No redness, pain, edema or weakness  Skin:  No pruritus, rash, change to nodules or lesions  Neurologic:  No discomfort, change in mental status, speech, sensory or motor activity  Psychiatric:  No change in concentration or change to affect or mood  Endocrine:  No hot flashes, increased thirst, or change to urine production  Hematologic: No petechiae, ecchymosis or bleeding  Lymphatic:  No lymphadenopathy or lymphedema  Allergy / Immunologic:  No eczema, hives, frequent or recurrent infections    OBJECTIVE      Physical      VITALS:  /63   Pulse 75   Temp 97.6 °F (36.4 °C) (Oral)   Resp 18   Ht 5' (1.524 m)   Wt 138 lb 6.4 oz (62.8 kg)   SpO2 93%   BMI 27.03 kg/m²   TEMPERATURE:  Current - Temp: 97.6 °F (36.4 °C); Max - Temp  Av.5 °F (36.9 °C)  Min: 97.6 °F (36.4 °C)  Max: 99.5 °F (37.5 °C)  PULSE OXIMETRY RANGE: SpO2  Av.7 %  Min: 91 %  Max: 97 %  24HR INTAKE/OUTPUT:  No intake or output data in the 24 hours ending 22 1151    General appearance:  Appears comfortable  Eyes: Sclera clear. Pupils equal.  ENT: Moist oral mucosa. Trachea midline, no adenopathy. Cardiovascular: Regular rhythm, normal S1, S2. No murmur.  No edema in lower extremities  Respiratory: Not using accessory muscles. Good inspiratory effort. Clear to auscultation bilaterally, no wheeze or crackles. GI: Abdomen soft, no tenderness, not distended  Musculoskeletal: No cyanosis in digits, neck supple  Neurology: CN 2-12 grossly intact. No speech or motor deficits  Psych: Normal affect. Alert and oriented in time, place and person  Skin: Warm, dry, normal turgor      Data  Recent Labs     12/09/22  0539 12/07/22  1741   WBC 13.5* 17.2*   NEUTROABS  --  14.8*   LYMPHOPCT  --  5.5   RBC 3.32* 3.72*   HGB 9.6* 10.7*   HCT 28.4* 32.0*   MCV 85.6 86.0   MCH 28.9 28.8   MCHC 33.7 33.5   RDW 15.0 14.9   * 440        Recent Labs     12/07/22  1741 12/09/22  0538    140   K 3.2* 3.6   CL 96* 101   CO2 30 31   PHOS  --  3.2   BUN 20 17   CREATININE 0.9 0.8     Recent Labs     12/07/22  1741 12/09/22  0538   AST 15 21   ALT 10 19   BILITOT 0.5 <0.2   ALKPHOS 58 63       Magnesium:    Lab Results   Component Value Date/Time    MG 2.00 12/09/2022 05:38 AM    MG 2.00 12/07/2022 05:41 PM       Imaging  CT SOFT TISSUE NECK W CONTRAST  Narrative: EXAMINATION:  CT OF THE NECK SOFT TISSUE WITH CONTRAST  12/8/2022    TECHNIQUE:  CT of the neck was performed with the administration of intravenous contrast.  Multiplanar reformatted images are provided for review. Automated exposure  control, iterative reconstruction, and/or weight based adjustment of the  mA/kV was utilized to reduce the radiation dose to as low as reasonably  achievable. COMPARISON:  None. HISTORY:  ORDERING SYSTEM PROVIDED HISTORY: evaluation of thyromegaly  TECHNOLOGIST PROVIDED HISTORY:  Reason for exam:->evaluation of thyromegaly  Reason for Exam: Evaluation of thyromegaly. FINDINGS:  A right upper lobe pulmonary artery filling defect is present, consistent  with PE. There is enlargement of the thyroid gland with multiple hypoattenuating  masses, measuring up to 5.1 cm in diameter within the right thyroid lobe.   A  retrosternal component is present and the airway is displaced to the left  hand side. The thyroid capsule appears to be grossly intact. No retropharyngeal free fluid is identified. The major salivary glands are symmetric in size and enhancement and  visualized portions of the tongue are normal.    The nasopharynx, oropharynx, hypopharynx, larynx, and upper esophagus are  grossly within normal limits. The major vessels of the neck enhance symmetrically. No pathologically enlarged lymph nodes are seen within neck, to the extent  visualized. No acute osseous abnormality identified. Impression: Multinodular goiter. Please refer to the thyroid ultrasound report from 09/30/2021 for further  details and follow-up recommendations. Right upper lobe PE. Please refer to the separately dictated CT chest report  from 12/07/2022 for further details and follow-up recommendations. VL Extremity Venous Bilateral  Vascular Lower Extremities DVT Study Procedure    -- PRELIMINARY SONOGRAPHER REPORT --      Demographics      Patient Name       [facility]      Date of Study      12/08/2022         Gender              Female      Patient Number     [id number]         Date of Birth       1950      Visit Number       [id number]          Age                 74 year(s)      Accession Number   [id number]         Room Number         6598      Corporate ID       [id number]           Sonographer         Gabby Robbins RVT, RT      Ordering Physician Indira Christopher,   Interpreting        Akbar Garvin MD                      CNP                Physician     Procedure    Type of Study:      Veins:Lower Extremities DVT Study, VASC EXTREMITY VENOUS DUPLEX BILATERAL. Tech Comments  Right  Acute totally occluding deep vein thrombosis involving the right peroneal  veins. .  No other evidence of deep vein or superficial vein thrombosis involving the  right lower extremity ,  Left  Acute totally occluding deep vein thrombosis involving the left posterior  tibial veins and one of the pair of peroneal veins. No other evidence of deep vein or superficial vein thrombosis involving the  left lower extremity. Problem List  Patient Active Problem List   Diagnosis    S/P colonoscopy    Vitamin D deficiency    Osteopenia    Prediabetes    Essential hypertension    Multiple thyroid nodules    Primary osteoarthritis of left knee    Primary osteoarthritis of right hip    Cough variant asthma    Hypothyroidism    Hypercalcemia    Mixed hyperlipidemia    Bilateral pulmonary embolism (HCC)    Acute pulmonary embolism (HCC)       ASSESSMENT AND PLAN:    74year old female with a history of HLD, HTN, hypothyroidism, hypercalcemia, osteoarthritis, thyroid nodules and vitamin D deficiency. 1. Bilateral PE with cor pulmonale and bilateral DVT:  -Currently on heparin drip. -ECHO showing some mild strain.  -Vascular surgery is following, no plans for direct thrombolysis. -Patient has high deductible plan and DOAC cost is $305/mo, she cannot afford this. Plan is for coumadin. 2. Enlarged thyroid:  -CTPA showing enlarged heterogenous thyroid with shifting of trachea, more enlarged compared to thyroid US done 9/2021.  -CT neck demonstrated multinodular goiter.   -Thyroid ultrasound will be done as an outpatient.  -Has been evaluated by ENT. 3. Anemia:  -Iron studies consistent with anemia of chronic disease and iron deficiency.  -No B12 or folate deficiency. -SPEP was normal in 10/2022.  -No indication to transfuse. Kevan Gan0 [facility], [facility].  [phone]     Patient was seen and examined. Agree with above. Do not see value of doing hypercoagulable work-up at this time. Patient has mild anemia. No external bleeding.   Iron studies are consistent with iron deficiency as well as anemia of chronic disease  Hemoglobin was around 12 in October 2022  Colonoscopy done by Dr. Maggie Real in October 2021 was essentially normal.  The patient never had an EGD.   Consider GI consult-talk to Dr. Freitas Many  Monitor hemoglobin    Janes Pedroza MD

## 2022-12-10 ENCOUNTER — APPOINTMENT (OUTPATIENT)
Dept: CT IMAGING | Age: 72
DRG: 175 | End: 2022-12-10
Payer: MEDICARE

## 2022-12-10 LAB
A/G RATIO: 0.9 (ref 1.1–2.2)
ALBUMIN SERPL-MCNC: 3.1 G/DL (ref 3.4–5)
ALP BLD-CCNC: 68 U/L (ref 40–129)
ALT SERPL-CCNC: 20 U/L (ref 10–40)
ANION GAP SERPL CALCULATED.3IONS-SCNC: 10 MMOL/L (ref 3–16)
ANTI-XA UNFRAC HEPARIN: 0.39 IU/ML (ref 0.3–0.7)
AST SERPL-CCNC: 21 U/L (ref 15–37)
BILIRUB SERPL-MCNC: 0.3 MG/DL (ref 0–1)
BUN BLDV-MCNC: 14 MG/DL (ref 7–20)
CALCIUM SERPL-MCNC: 10.2 MG/DL (ref 8.3–10.6)
CHLORIDE BLD-SCNC: 99 MMOL/L (ref 99–110)
CO2: 29 MMOL/L (ref 21–32)
CREAT SERPL-MCNC: 0.7 MG/DL (ref 0.6–1.2)
GFR SERPL CREATININE-BSD FRML MDRD: >60 ML/MIN/{1.73_M2}
GLUCOSE BLD-MCNC: 108 MG/DL (ref 70–99)
HCT VFR BLD CALC: 28.4 % (ref 36–48)
HEMOGLOBIN: 9.6 G/DL (ref 12–16)
MAGNESIUM: 2 MG/DL (ref 1.8–2.4)
MCH RBC QN AUTO: 28.8 PG (ref 26–34)
MCHC RBC AUTO-ENTMCNC: 34 G/DL (ref 31–36)
MCV RBC AUTO: 84.8 FL (ref 80–100)
PDW BLD-RTO: 15 % (ref 12.4–15.4)
PHOSPHORUS: 2.7 MG/DL (ref 2.5–4.9)
PLATELET # BLD: 573 K/UL (ref 135–450)
PMV BLD AUTO: 7.7 FL (ref 5–10.5)
POTASSIUM SERPL-SCNC: 3.8 MMOL/L (ref 3.5–5.1)
RBC # BLD: 3.35 M/UL (ref 4–5.2)
SODIUM BLD-SCNC: 138 MMOL/L (ref 136–145)
TOTAL PROTEIN: 6.6 G/DL (ref 6.4–8.2)
WBC # BLD: 11.6 K/UL (ref 4–11)

## 2022-12-10 PROCEDURE — 97110 THERAPEUTIC EXERCISES: CPT

## 2022-12-10 PROCEDURE — 97162 PT EVAL MOD COMPLEX 30 MIN: CPT

## 2022-12-10 PROCEDURE — 6370000000 HC RX 637 (ALT 250 FOR IP): Performed by: NURSE PRACTITIONER

## 2022-12-10 PROCEDURE — 2580000003 HC RX 258: Performed by: INTERNAL MEDICINE

## 2022-12-10 PROCEDURE — 83735 ASSAY OF MAGNESIUM: CPT

## 2022-12-10 PROCEDURE — 1200000000 HC SEMI PRIVATE

## 2022-12-10 PROCEDURE — 6360000002 HC RX W HCPCS: Performed by: INTERNAL MEDICINE

## 2022-12-10 PROCEDURE — 74177 CT ABD & PELVIS W/CONTRAST: CPT

## 2022-12-10 PROCEDURE — 80053 COMPREHEN METABOLIC PANEL: CPT

## 2022-12-10 PROCEDURE — 85520 HEPARIN ASSAY: CPT

## 2022-12-10 PROCEDURE — 2700000000 HC OXYGEN THERAPY PER DAY

## 2022-12-10 PROCEDURE — 94640 AIRWAY INHALATION TREATMENT: CPT

## 2022-12-10 PROCEDURE — 97116 GAIT TRAINING THERAPY: CPT

## 2022-12-10 PROCEDURE — 85027 COMPLETE CBC AUTOMATED: CPT

## 2022-12-10 PROCEDURE — 6360000004 HC RX CONTRAST MEDICATION

## 2022-12-10 PROCEDURE — 84100 ASSAY OF PHOSPHORUS: CPT

## 2022-12-10 PROCEDURE — 94761 N-INVAS EAR/PLS OXIMETRY MLT: CPT

## 2022-12-10 PROCEDURE — 6360000004 HC RX CONTRAST MEDICATION: Performed by: INTERNAL MEDICINE

## 2022-12-10 RX ADMIN — Medication 2 PUFF: at 21:38

## 2022-12-10 RX ADMIN — LEVOTHYROXINE SODIUM 75 MCG: 0.07 TABLET ORAL at 06:47

## 2022-12-10 RX ADMIN — DIATRIZOATE MEGLUMINE AND DIATRIZOATE SODIUM: 660; 100 LIQUID ORAL; RECTAL at 15:46

## 2022-12-10 RX ADMIN — ATENOLOL 25 MG: 25 TABLET ORAL at 09:16

## 2022-12-10 RX ADMIN — Medication 10 ML: at 09:17

## 2022-12-10 RX ADMIN — NIFEDIPINE 30 MG: 30 TABLET, EXTENDED RELEASE ORAL at 09:16

## 2022-12-10 RX ADMIN — Medication 10 ML: at 20:00

## 2022-12-10 RX ADMIN — IOPAMIDOL 75 ML: 755 INJECTION, SOLUTION INTRAVENOUS at 18:32

## 2022-12-10 RX ADMIN — IRON SUCROSE 200 MG: 20 INJECTION, SOLUTION INTRAVENOUS at 19:58

## 2022-12-10 RX ADMIN — Medication 2 PUFF: at 09:30

## 2022-12-10 RX ADMIN — HEPARIN SODIUM 17.97 UNITS/KG/HR: 10000 INJECTION, SOLUTION INTRAVENOUS at 22:16

## 2022-12-10 NOTE — CONSULTS
Gastrointestinal Consult Note    Patient: Heidi Woods CSN: [id number]     YOB: 1950  Age: 74 y.o. Sex: female    Unit: [address]  NURSING Room/Bed: [facility]-3328/3328-01 Location: [address] [place] [address]     Admitting Physician: Jasmine Briones    Date of  Admission: 12/7/2022   Admission type: Emergency  Primary Care Physician: Demetria Ray MD          Referring Physician: [unfilled]    Chief Complaint: Anemia  Consult Date: 12/10/2022     Subjective:     History of Present Illness: Heidi Woods is a 74 y.o. female who is seen at the request of Jasmine Briones for anemia    Very pleasant 55-year-old female comes into the hospital because she was short of breath    Patient was found to have bilateral pulmonary emboli    Was also found to have DVTs    Patient has been placed on anticoagulation and during her work-up she was found to have an iron deficiency anemia. Patient had an iron level of 17 and iron saturation of 6 did have an elevated ferritin level but this is most likely because is an acute phase reaction    In discussing things with the patient she had a colonoscopy done within the last year by Dr. Nova Moreno at [facility]    Does not complain of any reflux vomiting of blood  Also does not notice any blood in her stool    Past Medical History:   Diagnosis Date    Cough variant asthma 8/4/2020    HTN (hypertension) 7/27/2011    Mixed hyperlipidemia 10/18/2022    Primary osteoarthritis of left knee 12/6/2018     Past Surgical History:   Procedure Laterality Date    BREAST SURGERY      left breast in 1970's    [address]  06/28/2015    sigmoid resection w/ left salpingo-ooph    SMALL INTESTINE SURGERY  06/28/2015    Left salpingectomy      Family History   Problem Relation Age of Onset    High Blood Pressure Father     Cancer Father 80        lung and kidney primary cancers.      Social History     Tobacco Use    Smoking status: Never    Smokeless tobacco: Never   Substance Use Topics    Alcohol use: Not Currently      Allergies   Allergen Reactions    Ace Inhibitors Swelling and Angioedema     Lip swelling    Amlodipine Besy-Benazepril Hcl Other (See Comments) and Angioedema     Lip swelling    Amlodipine Besy-Benazepril Hcl Angioedema    Crestor [Rosuvastatin] Myalgia     Muscle pain at night that resolved within one week of discontinuation    Benicar [Olmesartan]      cough    Clonidine Derivatives Other (See Comments)     Lethargy. Prior to Admission medications    Medication Sig Start Date End Date Taking?  Authorizing Provider   enoxaparin (LOVENOX) 60 MG/0.6ML Inject 0.6 mLs into the skin 2 times daily 12/9/22 4/8/23 Yes Naila Silveira MD   atenolol (TENORMIN) 25 MG tablet Take 1 tablet by mouth daily 10/19/22   Tawanda Lr MD   levothyroxine (EUTHYROX) 75 MCG tablet Take 1 tablet by mouth Daily Stop 88 mcg 10/19/22   Tawanda Lr MD   fluvastatin (LESCOL) 20 MG capsule Take 1 capsule by mouth nightly 10/19/22   Tawanda Lr MD   NIFEdipine (PROCARDIA XL) 30 MG extended release tablet Take 1 tablet by mouth once daily 10/13/22   Tawanda Lr MD   chlorthalidone (HYGROTON) 25 MG tablet Take 1 tablet by mouth once daily 1/5/22   Jayden Che MD   budesonide-formoterol [facility]) 160-4.5 MCG/ACT AERO Inhale 2 puffs into the lungs 2 times daily 1/5/22   Jayden Che MD   acyclovir (ZOVIRAX) 400 MG tablet Take 1 tablet by mouth 2 times daily as needed    Historical Provider, MD   estradiol (ESTRACE) 0.1 MG/GM vaginal cream Place 0.5 g vaginally Twice a Week 11/19/20   Tawanda Lr MD   Cholecalciferol (VITAMIN D) 2000 UNITS CAPS capsule Take 2 capsules by mouth daily Remind patient, can purchase over the counter and also purchase Caltrate D 600mg/ 400 iu and take bid. 3/28/17   Tawanda Lr MD        Review of Systems:    Point review of systems is complete and is negative    Objective:     Physical Exam:  /77   Pulse 70   Temp 98.6 °F (37 °C) (Oral)   Resp 18   Ht 5' (1.524 m)   Wt 138 lb 12.8 oz (63 kg)   SpO2 99%   BMI 27.11 kg/m²      General: Well nourished, well developed, Black / , female  HEENT: Normocephalic, atraumatic, sclera anicteric, mucosal membranes moist  Cardiovascular: Regular rate and rhythm. Respiratory:  Clear to auscultation in the anterior lung fields bilaterally  GI:  Abdomen nondistended, soft, and nontender. Normal active bowel sounds. No enlargement of the liver or spleen. No masses palpable. Rectal:  Deferred  Musculoskeletal:  No pitting edema of the lower legs. Neurological:  Gross memory appears intact. Patient is alert and oriented. Scheduled Meds:   iron sucrose (VENOFER) iv piggyback 100 mL (Admin over 60 minutes)  200 mg IntraVENous Q24H    sodium chloride flush  5-40 mL IntraVENous 2 times per day    atenolol  25 mg Oral Daily    mometasone-formoterol  2 puff Inhalation BID    levothyroxine  75 mcg Oral Daily    NIFEdipine  30 mg Oral Daily     Continuous Infusions:   heparin (PORCINE) Infusion 18 Units/kg/hr (12/09/22 1833)    sodium chloride       PRN Meds:sodium chloride flush, sodium chloride, ondansetron **OR** ondansetron, polyethylene glycol, acetaminophen **OR** acetaminophen, HYDROcodone 5 mg - acetaminophen, perflutren lipid microspheres, albuterol sulfate HFA    Imaging:  CT SOFT TISSUE NECK W CONTRAST    Result Date: 12/8/2022  EXAMINATION: CT OF THE NECK SOFT TISSUE WITH CONTRAST  12/8/2022 TECHNIQUE: CT of the neck was performed with the administration of intravenous contrast. Multiplanar reformatted images are provided for review. Automated exposure control, iterative reconstruction, and/or weight based adjustment of the mA/kV was utilized to reduce the radiation dose to as low as reasonably achievable. COMPARISON: None.  HISTORY: ORDERING SYSTEM PROVIDED HISTORY: evaluation of thyromegaly TECHNOLOGIST PROVIDED HISTORY: Reason for exam:->evaluation of thyromegaly Reason for Exam: Evaluation of thyromegaly. FINDINGS: A right upper lobe pulmonary artery filling defect is present, consistent with PE. There is enlargement of the thyroid gland with multiple hypoattenuating masses, measuring up to 5.1 cm in diameter within the right thyroid lobe. A retrosternal component is present and the airway is displaced to the left hand side. The thyroid capsule appears to be grossly intact. No retropharyngeal free fluid is identified. The major salivary glands are symmetric in size and enhancement and visualized portions of the tongue are normal. The nasopharynx, oropharynx, hypopharynx, larynx, and upper esophagus are grossly within normal limits. The major vessels of the neck enhance symmetrically. No pathologically enlarged lymph nodes are seen within neck, to the extent visualized. No acute osseous abnormality identified. Multinodular goiter. Please refer to the thyroid ultrasound report from 09/30/2021 for further details and follow-up recommendations. Right upper lobe PE. Please refer to the separately dictated CT chest report from 12/07/2022 for further details and follow-up recommendations. XR CHEST PORTABLE    Result Date: 12/7/2022  EXAMINATION: ONE XRAY VIEW OF THE CHEST 12/7/2022 2:42 pm COMPARISON: 11/23/2019 HISTORY: ORDERING SYSTEM PROVIDED HISTORY: R sided pleurisy TECHNOLOGIST PROVIDED HISTORY: Reason for exam:->R sided pleurisy Reason for Exam: R sided pleurisy FINDINGS: There are low lung volumes is secondary bronchovascular crowding. Mild bibasilar airspace disease is noted. There is mild blunting of the left costophrenic angle. The cardial pericardial silhouette is unremarkable. Mild bibasilar airspace disease, likely atelectasis versus less likely pneumonia or edema. Possible small left pleural effusion versus airspace disease or scarring within the left costophrenic angle. CT CHEST PULMONARY EMBOLISM W CONTRAST    Result Date: 12/7/2022  EXAMINATION: CTA OF THE CHEST 12/7/2022 3:51 pm TECHNIQUE: CTA of the chest was performed after the administration of intravenous contrast.  Multiplanar reformatted images are provided for review. MIP images are provided for review. Automated exposure control, iterative reconstruction, and/or weight based adjustment of the mA/kV was utilized to reduce the radiation dose to as low as reasonably achievable. COMPARISON: None. HISTORY: ORDERING SYSTEM PROVIDED HISTORY: R sided pleurisy TECHNOLOGIST PROVIDED HISTORY: Reason for exam:->R sided pleurisy Decision Support Exception - unselect if not a suspected or confirmed emergency medical condition->Emergency Medical Condition (MA) Reason for Exam: R sided pleurisy FINDINGS: Pulmonary Arteries: Pulmonary arteries are adequately opacified. Filling defects seen within the right upper, middle, and lower lobes. Additional filling defects seen within the lingula. Equivocal right heart strain, with the right heart minimally larger the left (4.3 cm versus 4.1 cm). With that said, no bowing of the interventricular septum is seen, and there is only minimal reflux into the hepatic veins, and the main pulmonary artery is normal in caliber. Mediastinum: The thyroid is heterogeneously enlarged, and has an infiltrative appearance within the lower neck. This appears increased in size when compared to an ultrasound from 09/30/2021. There is shift of the trachea to the left. Esophagus is unremarkable. No mediastinal or hilar lymphadenopathy is identified. Lungs/pleura: Patchy peripheral infiltrates are seen within the lower lobes bilaterally, greater on the right, with infiltrates seen to a lesser extent within the right middle lobe. No pneumothorax is seen. No pleural effusion. Upper Abdomen: Limited images of the upper abdomen are unremarkable. Soft Tissues/Bones: No acute or suspicious bony abnormality. Visualized extra thoracic soft tissues are unremarkable. Positive for pulmonary embolism within all the lobes except the left lower lobe. Equivocal right heart strain with the right ventricle a few mm larger than the left. However, other signs of right heart strain are not seen. Peripheral airspace disease in the lower lungs, greater on the right, most likely pulmonary infarct, but pneumonia remains in the differential. Thyroid is very enlarged and heterogeneous, and has an infiltrative appearance, with loss of the fat planes in the lower neck. Thyroid appears increased in size when compared to the previous ultrasound. Findings are worrisome for thyroid malignancy. Further evaluation with neck CT and thyroid ultrasound is recommended. Findings were discussed with Db Ellis at 8:30 pm on 12/7/2022. NM BONE SCAN WHOLE BODY    Result Date: 11/21/2022  EXAMINATION: WHOLE BODY BONE SCAN  11/21/2022 TECHNIQUE: The patient was injected intravenously with  mCi of 99 mTc MDP and scintigraphy of the entire skeleton was performed approximately three hours later. COMPARISON: Pelvic radiograph dated 07/18/2022 HISTORY: ORDERING SYSTEM PROVIDED HISTORY: Hypercalcemia TECHNOLOGIST PROVIDED HISTORY: Reason for exam:->hypercalcemia, negative spep and upep, normal vit d and normal pth, rule out bone lesions. no external calcium intake. Reason for Exam: Hypercalcemia Additional signs and symptoms: negative spep and upep, normal vit d and normal pth, rule out bone lesions. no external calcium intake Relevant Medical/Surgical History: Arthritis FINDINGS: Intense activity is demonstrated at the right hip, likely reflecting those advanced degenerative changes seen on recent radiograph where there is joint space loss, sclerosis, and cystic change. A lesser degree of activity is seen at the left hip, where a lesser degree of degenerative changes seen on recent radiograph.  Diffuse activity is seen within the thoracic spine.  Mild focal activity is seen to the right of midline at L4 and L5. Activity at bilateral shoulders, elbows, wrists, knees, feet, likely degenerative. Injection site is seen at the left antecubital fossa. Physiologic activity is seen within the kidneys. Bladder has been masked. Intense activity at the right hip corresponds to those advanced degenerative changes seen on recent radiograph. A lesser degree of activity is seen at the left hip, likely also degenerative. Thoracic and lumbar spine activity is typically degenerative. If there is strong suspicion for an underlying lesion, MR could be performed for further assessment. Multifocal degenerative changes are otherwise favored as above. VL Extremity Venous Bilateral    Result Date: 12/8/2022  Vascular Lower Extremities DVT Study Procedure -- PRELIMINARY SONOGRAPHER REPORT --   Demographics   Patient Name       [facility]   Date of Study      12/08/2022         Gender              Female   Patient Number     [id number]         Date of Birth       1950   Visit Number       [id number]          Age                 74 year(s)   Accession Number   [id number]         Room Number         1631   Corporate ID       [id number]           Sonographer         Luis Guadarrama RVT, RT   Ordering Physician Raymundo Kumar,   Interpreting        Ester Galaviz MD                     CNP                Physician  Procedure Type of Study:   Veins:Lower Extremities DVT Study, VASC EXTREMITY VENOUS DUPLEX BILATERAL. Tech Comments Right Acute totally occluding deep vein thrombosis involving the right peroneal veins. . No other evidence of deep vein or superficial vein thrombosis involving the right lower extremity , Left Acute totally occluding deep vein thrombosis involving the left posterior tibial veins and one of the pair of peroneal veins.  No other evidence of deep vein or superficial vein thrombosis involving the left lower extremity. [facility] BUSTER DIGITAL SCREEN BILATERAL    Result Date: 11/15/2022  EXAMINATION: SCREENING DIGITAL BILATERAL MAMMOGRAM WITH TOMOSYNTHESIS, 11/12/2022 TECHNIQUE: Screening mammography was performed with tomosynthesis including MLO and CC views of the bilateral breasts. Computer aided detection was used for the interpretation of this exam. COMPARISON: 2021, 2020, 2019 HISTORY: Screening. FINDINGS: Breast density: There are scattered areas of fibroglandular density. Right breast: There is no dominant mass, suspicious microcalcification, or area of architectural distortion. Left breast: There is no dominant mass, suspicious microcalcification, or area of architectural distortion. Right breast: No mammographic evidence for malignancy. Left breast: No mammographic evidence for malignancy. BIRADS: BIRADS - CATEGORY 1 Negative, no evidence of malignancy. Normal interval follow-up is recommended in 12 months. OVERALL ASSESSMENT - NEGATIVE A letter of notification will be sent to the patient regarding the results. The [employer] recommends annual mammograms for women 40 years and older. Labs Reviewed:     Patient's laboratory data has been reviewed    Assessment/Plan:     Principal Problem:    Bilateral pulmonary embolism (HCC)  Active Problems:    Acute pulmonary embolism ([address]  Resolved Problems:    * No resolved hospital problems.  *  Iron deficiency anemia  --In the setting of multiple PEs and bilateral DVTs 1 has to wonder if she has some type of a GI malignancy  --States she has had a high-quality colonoscopy within the last year so therefore I will try to find her results from our office computer  --We will try to plan on an upper endoscopic evaluation  --She may need a repeat colonoscopy evaluation also    Jamie Alberto    Signed By: Cherry Reed MD     December 10, 2022      Please note that some or all of this record was generated using voice recognition software. If there are any questions about the content of this document, please contact the author as some errors in translation may have occurred.

## 2022-12-10 NOTE — PROGRESS NOTES
Hospitalist Progress Note      PCP: Burt Garcia MD    Date of Admission: 12/7/2022    Chief Complaint: SOB    Hospital Course: Kirsten Hollis is a 74 y.o. female with hx hypothyroidism, thyroid nodules, HTN, and OA presented to the ED wi chest tightness and shortness of breath x 1 day. Work-up in the ER showed bilateral PEs with acute focal right heart strain. CT of the chest also demonstrated an enlarged with features concerning for thyroid malignancy. She mentioned 9 hour car trip about 6 weeks ago but denied recent surgeries or immobilization. No prior history of VTE. She denies any history of pulmonary embolus or DVT. Subjective: Patient seen and examined. No dyspnea and chest tightness. No lower extremity pain or swelling. Remains hemodynamically stable. On 2 L NC. Medications:  Reviewed    Infusion Medications    heparin (PORCINE) Infusion 18 Units/kg/hr (12/09/22 1833)    sodium chloride       Scheduled Medications    iron sucrose (VENOFER) iv piggyback 100 mL (Admin over 60 minutes)  200 mg IntraVENous Q24H    sodium chloride flush  5-40 mL IntraVENous 2 times per day    atenolol  25 mg Oral Daily    mometasone-formoterol  2 puff Inhalation BID    levothyroxine  75 mcg Oral Daily    NIFEdipine  30 mg Oral Daily     PRN Meds: sodium chloride flush, sodium chloride, ondansetron **OR** ondansetron, polyethylene glycol, acetaminophen **OR** acetaminophen, HYDROcodone 5 mg - acetaminophen, perflutren lipid microspheres, albuterol sulfate HFA    No intake or output data in the 24 hours ending 12/10/22 1258    Physical Exam Performed:    /74   Pulse 70   Temp 98.2 °F (36.8 °C) (Oral)   Resp 18   Ht 5' (1.524 m)   Wt 138 lb 12.8 oz (63 kg)   SpO2 95%   BMI 27.11 kg/m²     General appearance: No apparent distress, appears stated age and cooperative. HEENT: Pupils equal, round, and reactive to light. Conjunctivae clear. Neck: Supple, with full range of motion. No jugular venous distention. Trachea midline. Respiratory:  Normal respiratory effort. Clear to auscultation, bilaterally without Rales/Wheezes/Rhonchi. Cardiovascular: Regular rate and rhythm with normal S1/S2 without murmurs, rubs or gallops. Abdomen: Soft, non-tender, non-distended with normal bowel sounds. Musculoskeletal: No clubbing, cyanosis or edema bilaterally. Full range of motion without deformity. Skin: Skin color, texture, turgor normal.  No rashes or lesions. Neurologic:  Neurovascularly intact without any focal sensory/motor deficits. Cranial nerves: II-XII intact, grossly non-focal.  Psychiatric: Alert and oriented, thought content appropriate, normal insight  Capillary Refill: Brisk, 3 seconds, normal   Peripheral Pulses: +2 palpable, equal bilaterally       Labs:   Recent Labs     12/07/22 1741 12/09/22 0539 12/10/22  0458   WBC 17.2* 13.5* 11.6*   HGB 10.7* 9.6* 9.6*   HCT 32.0* 28.4* 28.4*    525* 573*     Recent Labs     12/07/22 1741 12/09/22  0538 12/10/22  0458    140 138   K 3.2* 3.6 3.8   CL 96* 101 99   CO2 30 31 29   BUN 20 17 14   CREATININE 0.9 0.8 0.7   CALCIUM 10.3 9.9 10.2   PHOS  --  3.2 2.7     Recent Labs     12/07/22 1741 12/09/22  0538 12/10/22  0458   AST 15 21 21   ALT 10 19 20   BILITOT 0.5 <0.2 0.3   ALKPHOS 58 63 68     Recent Labs     12/07/22 2039   INR 1.28*     Recent Labs     12/07/22 1741   TROPONINI <0.01       Urinalysis:      Lab Results   Component Value Date/Time    NITRU Negative 10/18/2022 12:04 PM    WBCUA 6-9 10/18/2022 12:04 PM    BACTERIA None Seen 10/18/2022 12:04 PM    RBCUA 3 10/18/2022 12:04 PM    BLOODU Negative 10/18/2022 12:04 PM    SPECGRAV 1.016 10/18/2022 12:04 PM    GLUCOSEU Negative 10/18/2022 12:04 PM    GLUCOSEU NEGATIVE 08/10/2011 08:45 AM       Radiology:  CT SOFT TISSUE NECK W CONTRAST   Final Result   Multinodular goiter.       Please refer to the thyroid ultrasound report from 09/30/2021 for further   details and follow-up recommendations. Right upper lobe PE. Please refer to the separately dictated CT chest report   from 12/07/2022 for further details and follow-up recommendations. VL Extremity Venous Bilateral         CT CHEST PULMONARY EMBOLISM W CONTRAST   Final Result   Positive for pulmonary embolism within all the lobes except the left lower   lobe. Equivocal right heart strain with the right ventricle a few mm larger than   the left. However, other signs of right heart strain are not seen. Peripheral airspace disease in the lower lungs, greater on the right, most   likely pulmonary infarct, but pneumonia remains in the differential.      Thyroid is very enlarged and heterogeneous, and has an infiltrative   appearance, with loss of the fat planes in the lower neck. Thyroid appears   increased in size when compared to the previous ultrasound. Findings are   worrisome for thyroid malignancy. Further evaluation with neck CT and   thyroid ultrasound is recommended. Findings were discussed with Shun Ochoa at 8:30 pm on 12/7/2022. XR CHEST PORTABLE   Final Result   Mild bibasilar airspace disease, likely atelectasis versus less likely   pneumonia or edema. Possible small left pleural effusion versus airspace disease or scarring   within the left costophrenic angle. US THYROID    (Results Pending)       IP CONSULT TO HOSPITALIST  IP CONSULT TO OTOLARYNGOLOGY  IP CONSULT TO VASCULAR SURGERY  IP CONSULT TO ONCOLOGY  IP CONSULT TO PHARMACY  IP CONSULT TO GI    Assessment/Plan:    Active Hospital Problems    Diagnosis     Acute pulmonary embolism ([address] [I26.99]      Priority: Medium    Bilateral pulmonary embolism ([address] [I26.99]      Priority: Medium     Bilateral Pulmonary Embolus with Heart Strain on CT:  Acute DVT bilateral lower extremities:  Venous duplex showing R peroneal DVT and left PT and peroneal DVT.   CT chest: PE within all the lobes except the LLL and echo both: Right heart strain. Vascular consulted: Appreciate input. No indication for pulmonary artery thrombolysis at this time. Continue Heparin drip. High co-pays for DOAC. Will need to transition to Coumadin eventually but patient will be discharged home on Lovenox 1 mg/kg twice daily instead of 1.5 mg/kg daily until out patient procedures have been completed. 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 CT noted more enlarged then previous ultrasound worrisome findings suggestive of malignancy. ENT consulted: Recommended outpatient thyroid ultrasound with close follow-up as patient will need transfer to  for further management. TSH wnl. Continue synthroid      Anemia:   Work up c/w with AoCD and ISAMAR. Started on Iron replacement. GI consulted: EGD and colonoscopy being considered. Hypertension: Monitor BP on atenolol and Procardia. Hypokalemia: Replaced. Monitor mag and K level. DVT Prophylaxis: Heparin drip  Diet: ADULT DIET;  Regular  Code Status: Full Code  PT/OT Eval Status: Independent    Dispo -once medically stable      Megan Pollock MD

## 2022-12-10 NOTE — PROGRESS NOTES
Physical Therapy    [facility] [address]   Phone: [phone]    Physical Therapy    [x] Initial Evaluation            [] Daily Treatment Note         [] Discharge Summary      Patient: Ad Wilson   : 1950   MRN: [id number]   Date of Service:  12/10/2022  Admitting Diagnosis: Bilateral pulmonary embolism [facility])  Current Admission Summary: Ad Wilson is a 74 y.o. female with hx hypothyroid, HTN, and OA. Presents the emergency room for chest tightness and shortness of breath. She states yesterday she started developing shortness of breath upon exertion as well as chest tightness symptoms did improve with rest but became progressively worse today so she came to the emergency room. She denies any history of pulmonary embolus or DVT. She did travel to [address] [place] [address] at the beginning of November approximately 6 weeks ago. She denies any recent hospitalization or surgeries. She denies any weight changes. Past Medical History:  has a past medical history of Cough variant asthma, HTN (hypertension), Mixed hyperlipidemia, and Primary osteoarthritis of left knee. Past Surgical History:  has a past surgical history that includes Breast surgery; Small intestine surgery (2015);  section (1985); and Colon surgery (2015). Discharge Recommendations: Ad Wilson scored a 22/24 on the AM-PAC short mobility form. At this time, no further PT is recommended upon discharge due to safe with mobility, functioning at her baseline status. Recommend patient returns to prior setting with prior services. Please see assessment section for further patient specific details. If patient discharges prior to next session this note will serve as a discharge summary. Please see below for the latest assessment towards goals.      DME Required For Discharge: patient has all required DME for discharge  Precautions/Restrictions: high fall risk, up as tolerated  Weight Bearing Restrictions: no restrictions  [] Right Upper Extremity  [] Left Upper Extremity [] Right Lower Extremity  [] Left Lower Extremity     Required Braces/Orthotics: no braces required   [] Right  [] Left  Positional Restrictions:no positional restrictions    Pre-Admission Information   Lives With: spouse                  Type of Home: house  Home Layout: two level, laundry in basement  Home Access:  2 step to enter without handrail. Bathroom Layout: walk in shower  Bathroom Equipment:  no prior equipment  Toilet Height: standard height  Home Equipment: single point cane--uses 1-2x per week due to hip issues and/or SOB. Transfer Assistance: Independent without use of device  Ambulation Assistance:Independent without use of device  ADL Assistance: independent with all ADL's  IADL Assistance: independent with homemaking tasks -shares with   Active :        [x] Yes                 [] No  Hand Dominance: [] Left                 [x] Right  Current Employment: part time employment. Occupation: works from [employer]: gardening; going to [employer], being outside  [employer] Oil: x1 recent fall several months ago--no injury. Pt amb with no AD normally and occasionally use of cane due to hip OA issues. IPLOF. Examination   Vision:   Vision Gross Assessment: Impaired and Vision Corrective Device: wears glasses at all times  Hearing:   [facility]  Observation:   General Observation:  1LO2  Posture:   Good postural alignment. Sensation:   WFL  Proprioception:    WFL  Tone:   Normotonic  Coordination Testing:   WFL    ROM:   (B) LE AROM WFL  Strength:   (L) Hip: +4        (R) Hip: 4  (L) Knee: +4     (R) Knee: 4  (L) Ankle: +4     (R) Ankle: 4  Therapist Clinical Decision Making (Complexity): medium complexity  Clinical Presentation: stable      Subjective  General: pt denies pain at rest. Seated up in chair upon arrival and wanting to use bathroom.  Pt feeling much better than past several days, no SOB noted. Pain: 0/10  Pain Interventions: repositioned        Functional Mobility  Bed Mobility  Scooting: modified independent  Comments: Pt scooting forward and back in chair. Seated in chair upon arrival and remained in chair at end of treatment. Transfers  Sit to stand transfer: supervision  Stand to sit transfer: supervision  Toilet transfer: supervision  Comments: Transfers from chair and toilet. Ambulation  Surface:level surface  Assistive Device: no device  Assistance: stand by assistance  Distance: Pt amb 15 ft, then 180 ft  Gait Mechanics: slight increased lat sway with amb  Comments:  Pt amb to bathroom, urinated and performed her own hygiene. Pt stood at sink with supervision for hand washing. Pt then amb in hallway and back to chair to rest. Pt reporting feeling slightly SOB at end of amb. O2 sat remaining 94% or better on 2LO2 with activity. Stair Mobility  Stair mobility not completed on this date. Comments:  Wheelchair Mobility:  No w/c mobility completed on this date. Comments:  Balance  Static Sitting Balance: good: independent with functional balance in unsupported position  Dynamic Sitting Balance: good: independent with functional balance in unsupported position  Static Standing Balance: fair (+): maintains balance at SBA/supervision without use of UE support  Dynamic Standing Balance: fair (+): maintains balance at SBA/supervision without use of UE support  Comments: lat sway with gait    Other Therapeutic Interventions  Tolieting, standing ADLs at sink. Vitals monitored throughout activity. Pt on 2LO2 for ambulation and remained 94% or higher. Pt put on RA for there ex and dropping to 91%. Pt placed back on 1LO2 and nursing made aware. Pt performed seated there ex including HR/TR x 10 bilat, LAQs x 10 bilat, marching x 10 bilat.    Functional Outcomes  AM-PAC Inpatient Mobility Raw Score : 22              Cognition  WFL  Orientation:    alert and oriented x 4  Command Following:   [employer]    Education  Barriers To Learning: none  Patient Education: patient educated on PT role and benefits, plan of care, discharge recommendations  Learning Assessment:  patient verbalizes and demonstrates understanding    Assessment  Activity Tolerance: Limited by endurance  Impairments Requiring Therapeutic Intervention: decreased functional mobility, decreased endurance  Prognosis: good  Clinical Assessment: Pt presents with bilat PEs and DVT. Pt was independent with mobility and ADLs prior to this hospitalization. Pt with decreased endurance and needing O2 support currently, not functioning at her normal level. Pt would benefit from skilled PT services to address there issues. Safety Interventions: patient left in chair, call light within reach, gait belt, nurse notified, and family/caregiver present -    Plan  Frequency: 3-5 x/per week  Current Treatment Recommendations: strengthening, balance training, functional mobility training, transfer training, gait training, stair training, endurance training, and safety education    Goals  Patient Goals: to return home   Short Term Goals:  Time Frame:  To be met by DC  Patient will complete bed mobility at [facility]   Patient will complete transfers at [facility]   Patient will ambulate 300 ft with use of LRAD at [facility]  Patient will ascend/descend 12 stairs with (B) handrail at supervision    Therapy Session Time      Individual Group Co-treatment   Time In 1150       Time Out 1228       Minutes 38         Timed Code Treatment Minutes:  23 Minutes  Total Treatment Minutes:  38 minutes       Electronically Signed By: BLANCA [id number]

## 2022-12-10 NOTE — PROGRESS NOTES
ONCOLOGY HEMATOLOGY CARE PROGRESS NOTE      SUBJECTIVE:      Shortness of breath is improved. No chest pain  No external bleeding    ROS:         OBJECTIVE      Physical      VITALS:  /77   Pulse 70   Temp 98.6 °F (37 °C) (Oral)   Resp 18   Ht 5' (1.524 m)   Wt 138 lb 12.8 oz (63 kg)   SpO2 99%   BMI 27.11 kg/m²   TEMPERATURE:  Current - Temp: 98.6 °F (37 °C); Max - Temp  Av.2 °F (37.3 °C)  Min: 98.6 °F (37 °C)  Max: 99.7 °F (37.6 °C)  PULSE OXIMETRY RANGE: SpO2  Av.8 %  Min: 93 %  Max: 99 %  24HR INTAKE/OUTPUT:  No intake or output data in the 24 hours ending 12/10/22 0958    Conscious alert oriented  HEENT:++ pallor or scleral icterus. Oral mucosa: No mucositis. No thrush. Neck: Supple, no lymphadenopathy. No thyromegaly  Lungs: No rales, wheezes, respiratory efforts are normal.  CVS: S1-S2 normal.  No murmurs or gallops heard. Abdomen: Soft, bowel sounds are heard. No tenderness. Neuro: No focal deficits. No cranial nerve palsies. Alert and oriented. Skin: No rashes, petechiae, ecchymosis. Extremities: No edema, tenderness, erythema.       Data  Recent Labs     12/10/22  0458 12/09/22  0539 22   WBC 11.6* 13.5* 17.2*   NEUTROABS  --   --  14.8*   LYMPHOPCT  --   --  5.5   RBC 3.35* 3.32* 3.72*   HGB 9.6* 9.6* 10.7*   HCT 28.4* 28.4* 32.0*   MCV 84.8 85.6 86.0   MCH 28.8 28.9 28.8   MCHC 34.0 33.7 33.5   RDW 15.0 15.0 14.9   * 525* 440          Recent Labs     22  1741 22  0538 12/10/22  0458    140 138   K 3.2* 3.6 3.8   CL 96* 101 99   CO2 30 31 29   PHOS  --  3.2 2.7   BUN 20 17 14   CREATININE 0.9 0.8 0.7       Recent Labs     22  1741 22  0538 12/10/22  0458   AST 15 21 21   ALT 10 19 20   BILITOT 0.5 <0.2 0.3   ALKPHOS 58 63 68         Magnesium:    Lab Results   Component Value Date/Time    MG 2.00 12/10/2022 04:58 AM    MG 2.00 2022 05:38 AM    MG 2.00 2022 05:41 PM Imaging  CT SOFT TISSUE NECK W CONTRAST  Narrative: EXAMINATION:  CT OF THE NECK SOFT TISSUE WITH CONTRAST  12/8/2022    TECHNIQUE:  CT of the neck was performed with the administration of intravenous contrast.  Multiplanar reformatted images are provided for review. Automated exposure  control, iterative reconstruction, and/or weight based adjustment of the  mA/kV was utilized to reduce the radiation dose to as low as reasonably  achievable. COMPARISON:  None. HISTORY:  ORDERING SYSTEM PROVIDED HISTORY: evaluation of thyromegaly  TECHNOLOGIST PROVIDED HISTORY:  Reason for exam:->evaluation of thyromegaly  Reason for Exam: Evaluation of thyromegaly. FINDINGS:  A right upper lobe pulmonary artery filling defect is present, consistent  with PE. There is enlargement of the thyroid gland with multiple hypoattenuating  masses, measuring up to 5.1 cm in diameter within the right thyroid lobe. A  retrosternal component is present and the airway is displaced to the left  hand side. The thyroid capsule appears to be grossly intact. No retropharyngeal free fluid is identified. The major salivary glands are symmetric in size and enhancement and  visualized portions of the tongue are normal.    The nasopharynx, oropharynx, hypopharynx, larynx, and upper esophagus are  grossly within normal limits. The major vessels of the neck enhance symmetrically. No pathologically enlarged lymph nodes are seen within neck, to the extent  visualized. No acute osseous abnormality identified. Impression: Multinodular goiter. Please refer to the thyroid ultrasound report from 09/30/2021 for further  details and follow-up recommendations. Right upper lobe PE. Please refer to the separately dictated CT chest report  from 12/07/2022 for further details and follow-up recommendations.   VL Extremity Venous Bilateral  Vascular Lower Extremities DVT Study Procedure    -- PRELIMINARY SONOGRAPHER REPORT -- Demographics      Patient Name       [facility]      Date of Study      12/08/2022         Gender              Female      Patient Number     [id number]         Date of Birth       1950      Visit Number       [id number]          Age                 74 year(s)      Accession Number   [id number]         Room Number         5887      Corporate ID       [id number]           Sonographer         DEANA MathewsT, RT      Ordering Physician Inocencia Gonzalez,   Interpreting        Tawnya Dodson MD                      CNP                Physician     Procedure    Type of Study:      Veins:Lower Extremities DVT Study, VASC EXTREMITY VENOUS DUPLEX BILATERAL. Tech Comments  Right  Acute totally occluding deep vein thrombosis involving the right peroneal  veins. .  No other evidence of deep vein or superficial vein thrombosis involving the  right lower extremity ,  Left  Acute totally occluding deep vein thrombosis involving the left posterior  tibial veins and one of the pair of peroneal veins. No other evidence of deep vein or superficial vein thrombosis involving the  left lower extremity. Problem List  Patient Active Problem List   Diagnosis    S/P colonoscopy    Vitamin D deficiency    Osteopenia    Prediabetes    Essential hypertension    Multiple thyroid nodules    Primary osteoarthritis of left knee    Primary osteoarthritis of right hip    Cough variant asthma    Hypothyroidism    Hypercalcemia    Mixed hyperlipidemia    Bilateral pulmonary embolism (HCC)    Acute pulmonary embolism (HCC)       ASSESSMENT AND PLAN:    74year old female with a history of HLD, HTN, hypothyroidism, hypercalcemia, osteoarthritis, thyroid nodules and vitamin D deficiency. 1. Bilateral PE with cor pulmonale and bilateral DVT:  -Currently on heparin drip. -ECHO showing some mild strain.  -Vascular surgery is following, no plans for direct thrombolysis. -Patient has high deductible plan and DOAC cost is $305/mo, she cannot afford this.   -Would recommend Lovenox 1 mg/kg SQ twice daily as a bridge as outpatient  - Plan is for coumadin. 2. Enlarged thyroid:  -CTPA showing enlarged heterogenous thyroid with shifting of trachea, more enlarged compared to thyroid US done 9/2021.  -CT neck demonstrated multinodular goiter.   -Thyroid ultrasound will be done as an outpatient.  -Has been evaluated by ENT. 3. Anemia:    - Hemoglobin was normal in October 2022.  -Hemoglobin has dropped to 9.6.  -Iron studies are consistent with anemia of chronic disease and iron deficiency    - GI consult noted.   EGD soon.  -Will order CT abdomen and pelvis  -          Burnetta Goldmann, MD

## 2022-12-11 LAB
A/G RATIO: 1 (ref 1.1–2.2)
ALBUMIN SERPL-MCNC: 3.3 G/DL (ref 3.4–5)
ALP BLD-CCNC: 62 U/L (ref 40–129)
ALT SERPL-CCNC: 21 U/L (ref 10–40)
ANION GAP SERPL CALCULATED.3IONS-SCNC: 8 MMOL/L (ref 3–16)
ANTI-XA UNFRAC HEPARIN: 0.36 IU/ML (ref 0.3–0.7)
AST SERPL-CCNC: 19 U/L (ref 15–37)
BILIRUB SERPL-MCNC: 0.4 MG/DL (ref 0–1)
BUN BLDV-MCNC: 12 MG/DL (ref 7–20)
CALCIUM SERPL-MCNC: 10.4 MG/DL (ref 8.3–10.6)
CHLORIDE BLD-SCNC: 98 MMOL/L (ref 99–110)
CO2: 29 MMOL/L (ref 21–32)
CREAT SERPL-MCNC: 0.7 MG/DL (ref 0.6–1.2)
GFR SERPL CREATININE-BSD FRML MDRD: >60 ML/MIN/{1.73_M2}
GLUCOSE BLD-MCNC: 116 MG/DL (ref 70–99)
HCT VFR BLD CALC: 29 % (ref 36–48)
HEMOGLOBIN: 9.7 G/DL (ref 12–16)
MAGNESIUM: 1.9 MG/DL (ref 1.8–2.4)
MCH RBC QN AUTO: 28.3 PG (ref 26–34)
MCHC RBC AUTO-ENTMCNC: 33.4 G/DL (ref 31–36)
MCV RBC AUTO: 84.7 FL (ref 80–100)
PDW BLD-RTO: 15.3 % (ref 12.4–15.4)
PHOSPHORUS: 2.7 MG/DL (ref 2.5–4.9)
PLATELET # BLD: 641 K/UL (ref 135–450)
PMV BLD AUTO: 7.9 FL (ref 5–10.5)
POTASSIUM SERPL-SCNC: 3.6 MMOL/L (ref 3.5–5.1)
RBC # BLD: 3.43 M/UL (ref 4–5.2)
SODIUM BLD-SCNC: 135 MMOL/L (ref 136–145)
TOTAL PROTEIN: 6.7 G/DL (ref 6.4–8.2)
WBC # BLD: 11 K/UL (ref 4–11)

## 2022-12-11 PROCEDURE — 6370000000 HC RX 637 (ALT 250 FOR IP): Performed by: NURSE PRACTITIONER

## 2022-12-11 PROCEDURE — 83735 ASSAY OF MAGNESIUM: CPT

## 2022-12-11 PROCEDURE — 6370000000 HC RX 637 (ALT 250 FOR IP): Performed by: INTERNAL MEDICINE

## 2022-12-11 PROCEDURE — 2700000000 HC OXYGEN THERAPY PER DAY

## 2022-12-11 PROCEDURE — 94761 N-INVAS EAR/PLS OXIMETRY MLT: CPT

## 2022-12-11 PROCEDURE — 1200000000 HC SEMI PRIVATE

## 2022-12-11 PROCEDURE — 80053 COMPREHEN METABOLIC PANEL: CPT

## 2022-12-11 PROCEDURE — 6360000002 HC RX W HCPCS: Performed by: INTERNAL MEDICINE

## 2022-12-11 PROCEDURE — 2580000003 HC RX 258: Performed by: INTERNAL MEDICINE

## 2022-12-11 PROCEDURE — 36415 COLL VENOUS BLD VENIPUNCTURE: CPT

## 2022-12-11 PROCEDURE — 84100 ASSAY OF PHOSPHORUS: CPT

## 2022-12-11 PROCEDURE — 85027 COMPLETE CBC AUTOMATED: CPT

## 2022-12-11 PROCEDURE — 85520 HEPARIN ASSAY: CPT

## 2022-12-11 PROCEDURE — 94640 AIRWAY INHALATION TREATMENT: CPT

## 2022-12-11 RX ADMIN — IRON SUCROSE 200 MG: 20 INJECTION, SOLUTION INTRAVENOUS at 22:10

## 2022-12-11 RX ADMIN — ACETAMINOPHEN 650 MG: 325 TABLET ORAL at 22:16

## 2022-12-11 RX ADMIN — SODIUM CHLORIDE 20 ML: 9 INJECTION, SOLUTION INTRAVENOUS at 22:09

## 2022-12-11 RX ADMIN — HEPARIN SODIUM 17.97 UNITS/KG/HR: 10000 INJECTION, SOLUTION INTRAVENOUS at 23:31

## 2022-12-11 RX ADMIN — Medication 10 ML: at 08:29

## 2022-12-11 RX ADMIN — Medication 10 ML: at 22:11

## 2022-12-11 RX ADMIN — Medication 2 PUFF: at 20:54

## 2022-12-11 RX ADMIN — ACETAMINOPHEN 650 MG: 325 TABLET ORAL at 05:36

## 2022-12-11 RX ADMIN — LEVOTHYROXINE SODIUM 75 MCG: 0.07 TABLET ORAL at 05:24

## 2022-12-11 RX ADMIN — ATENOLOL 25 MG: 25 TABLET ORAL at 08:28

## 2022-12-11 RX ADMIN — Medication 2 PUFF: at 08:00

## 2022-12-11 RX ADMIN — NIFEDIPINE 30 MG: 30 TABLET, EXTENDED RELEASE ORAL at 08:28

## 2022-12-11 ASSESSMENT — PAIN SCALES - GENERAL
PAINLEVEL_OUTOF10: 0
PAINLEVEL_OUTOF10: 0
PAINLEVEL_OUTOF10: 2
PAINLEVEL_OUTOF10: 1
PAINLEVEL_OUTOF10: 0
PAINLEVEL_OUTOF10: 0

## 2022-12-11 ASSESSMENT — PAIN DESCRIPTION - ORIENTATION
ORIENTATION: RIGHT
ORIENTATION: RIGHT

## 2022-12-11 ASSESSMENT — PAIN SCALES - WONG BAKER: WONGBAKER_NUMERICALRESPONSE: 0

## 2022-12-11 ASSESSMENT — PAIN DESCRIPTION - ONSET: ONSET: ON-GOING

## 2022-12-11 ASSESSMENT — PAIN DESCRIPTION - LOCATION
LOCATION: ABDOMEN
LOCATION: ABDOMEN

## 2022-12-11 ASSESSMENT — PAIN DESCRIPTION - FREQUENCY: FREQUENCY: INTERMITTENT

## 2022-12-11 ASSESSMENT — PAIN DESCRIPTION - PAIN TYPE: TYPE: ACUTE PAIN

## 2022-12-11 ASSESSMENT — PAIN DESCRIPTION - DESCRIPTORS
DESCRIPTORS: ACHING
DESCRIPTORS: ACHING;DISCOMFORT

## 2022-12-11 ASSESSMENT — PAIN - FUNCTIONAL ASSESSMENT: PAIN_FUNCTIONAL_ASSESSMENT: ACTIVITIES ARE NOT PREVENTED

## 2022-12-11 NOTE — PROGRESS NOTES
Gastroenterology Progress Note      Ramona Kwan is a 74 y.o. female patient. Principal Problem:    Bilateral pulmonary embolism (HCC)  Active Problems:    Acute pulmonary embolism (HCC)  Resolved Problems:    * No resolved hospital problems. *      SUBJECTIVE: I went back and evaluated the patient's records found her colonoscopy from 2021 by Dr. Marek Person she had absolutely no colon polyps there was no question of any issues    Patient is otherwise doing well today    ROS:  No fever, chills  No chest pain, palpitations  No SOB, cough  Gastrointestinal ROS: no abdominal pain, nausea, vomiting     Physical    VITALS:  /67   Pulse 74   Temp 98 °F (36.7 °C) (Oral)   Resp 16   Ht 5' (1.524 m)   Wt 137 lb 3.2 oz (62.2 kg)   SpO2 94%   BMI 26.80 kg/m²   TEMPERATURE:  Current - Temp: 98 °F (36.7 °C); Max - Temp  Av.7 °F (37.1 °C)  Min: 98 °F (36.7 °C)  Max: 99.4 °F (37.4 °C)    NAD  RRR, Nl s1s2  Lungs CTA Bilaterally, normal effort  Abdomen soft, ND, NT, no HSM, Bowel sounds normal.    Data    Data Review:    Recent Labs     22  0539 12/10/22  0458 12/11/22  0613   WBC 13.5* 11.6* 11.0   HGB 9.6* 9.6* 9.7*   HCT 28.4* 28.4* 29.0*   MCV 85.6 84.8 84.7   * 573* 641*     Recent Labs     22  0538 12/10/22  0458 22  0614    138 135*   K 3.6 3.8 3.6    99 98*   CO2 31 29 29   PHOS 3.2 2.7 2.7   BUN 17 14 12   CREATININE 0.8 0.7 0.7     Recent Labs     22  0538 12/10/22  0458 22  0614   AST 21 21 19   ALT 19 20 21   BILITOT <0.2 0.3 0.4   ALKPHOS 63 68 62     No results for input(s): LIPASE, AMYLASE in the last 72 hours. No results for input(s): PROTIME, INR in the last 72 hours. No results for input(s): PTT in the last 72 hours.     Radiology Review:        ASSESSMENT   Anemia and multiple blood clot  --Since she just had a colonoscopy within the last 14-month that was perfectly normal  --We will plan on an upper endoscopy tomorrow  --Hopes that she can start her oral anticoagulation soon for these blood clots  Deo Garcia

## 2022-12-11 NOTE — PROGRESS NOTES
Pt called this RN in to room for pain at iv site, this RN assessed IV site and noticed swelling. IV removed, margins marked and pt given ice pack.

## 2022-12-11 NOTE — PROGRESS NOTES
Pt evening shift assessment complete. VSS and medication given as ordered. Pt assessed for comfort needs, assisted to the bathroom and back. Pt does not express any additional needs at this time, resting comfortably in bed.

## 2022-12-11 NOTE — PROGRESS NOTES
Hospitalist Progress Note      PCP: Rickey Hou MD    Date of Admission: 12/7/2022    Chief Complaint: SOB    Hospital Course: Areli Ashley is a 74 y.o. female with hx hypothyroidism, thyroid nodules, HTN, and OA presented to the ED wi chest tightness and shortness of breath x 1 day. Work-up in the ER showed bilateral PEs with acute focal right heart strain. CT of the chest also demonstrated an enlarged with features concerning for thyroid malignancy. She mentioned 9 hour car trip about 6 weeks ago but denied recent surgeries or immobilization. No prior history of VTE. She denies any history of pulmonary embolus or DVT. Subjective: Patient seen and examined. No dyspnea and chest tightness. No lower extremity pain or swelling. Remains hemodynamically stable. On 1-2 L NC. Medications:  Reviewed    Infusion Medications    heparin (PORCINE) Infusion 17.974 Units/kg/hr (12/10/22 2216)    sodium chloride       Scheduled Medications    iron sucrose (VENOFER) iv piggyback 100 mL (Admin over 60 minutes)  200 mg IntraVENous Q24H    sodium chloride flush  5-40 mL IntraVENous 2 times per day    atenolol  25 mg Oral Daily    mometasone-formoterol  2 puff Inhalation BID    levothyroxine  75 mcg Oral Daily    NIFEdipine  30 mg Oral Daily     PRN Meds: sodium chloride flush, sodium chloride, ondansetron **OR** ondansetron, polyethylene glycol, acetaminophen **OR** acetaminophen, HYDROcodone 5 mg - acetaminophen, perflutren lipid microspheres, albuterol sulfate HFA      Intake/Output Summary (Last 24 hours) at 12/11/2022 1218  Last data filed at 12/11/2022 0524  Gross per 24 hour   Intake 628 ml   Output --   Net 628 ml       Physical Exam Performed:    /72   Pulse 65   Temp 98.2 °F (36.8 °C) (Oral)   Resp 16   Ht 5' (1.524 m)   Wt 137 lb 3.2 oz (62.2 kg)   SpO2 97%   BMI 26.80 kg/m²     General appearance: No apparent distress, appears stated age and cooperative.   HEENT: Pupils equal, round, and reactive to light. Conjunctivae clear. Neck: Supple, with full range of motion. No jugular venous distention. Trachea midline. Respiratory:  Normal respiratory effort. Clear to auscultation, bilaterally without Rales/Wheezes/Rhonchi. Cardiovascular: Regular rate and rhythm with normal S1/S2 without murmurs, rubs or gallops. Abdomen: Soft, non-tender, non-distended with normal bowel sounds. Musculoskeletal: No clubbing, cyanosis or edema bilaterally. Full range of motion without deformity. Skin: Skin color, texture, turgor normal.  No rashes or lesions. Neurologic:  Neurovascularly intact without any focal sensory/motor deficits. Cranial nerves: II-XII intact, grossly non-focal.  Psychiatric: Alert and oriented, thought content appropriate, normal insight  Capillary Refill: Brisk, 3 seconds, normal   Peripheral Pulses: +2 palpable, equal bilaterally       Labs:   Recent Labs     12/09/22  0539 12/10/22  0458 12/11/22  0613   WBC 13.5* 11.6* 11.0   HGB 9.6* 9.6* 9.7*   HCT 28.4* 28.4* 29.0*   * 573* 641*     Recent Labs     12/09/22  0538 12/10/22  0458 12/11/22  0614    138 135*   K 3.6 3.8 3.6    99 98*   CO2 31 29 29   BUN 17 14 12   CREATININE 0.8 0.7 0.7   CALCIUM 9.9 10.2 10.4   PHOS 3.2 2.7 2.7     Recent Labs     12/09/22  0538 12/10/22  0458 12/11/22  0614   AST 21 21 19   ALT 19 20 21   BILITOT <0.2 0.3 0.4   ALKPHOS 63 68 62     No results for input(s): INR in the last 72 hours. No results for input(s): Cleatrice Obando in the last 72 hours.       Urinalysis:      Lab Results   Component Value Date/Time    NITRU Negative 10/18/2022 12:04 PM    [address] 10/18/2022 12:04 PM    BACTERIA None Seen 10/18/2022 12:04 PM    RBCUA 3 10/18/2022 12:04 PM    BLOODU Negative 10/18/2022 12:04 PM    SPECGRAV 1.016 10/18/2022 12:04 PM    GLUCOSEU Negative 10/18/2022 12:04 PM    GLUCOSEU NEGATIVE 08/10/2011 08:45 AM       Radiology:  CT ABDOMEN PELVIS W IV CONTRAST Additional Contrast? Oral   Preliminary Result   Indeterminate left renal lesions. Consider follow-up MRI with dedicated   renal protocol for further evaluation. A punctate hyperdense stone in the   superior pole of the left kidney without evidence of hydronephrosis seen. Pulmonary artery emboli are better seen on the recent chest CT. Atelectasis/opacity of the lung bases may reflect developing areas of lung   infarct or pneumonia. Prominent left periuterine vessel, which may reflect pelvic congestion   syndrome in the correct clinical setting. Suggestion of pancreatic divisum. CT SOFT TISSUE NECK W CONTRAST   Final Result   Multinodular goiter. Please refer to the thyroid ultrasound report from 09/30/2021 for further   details and follow-up recommendations. Right upper lobe PE. Please refer to the separately dictated CT chest report   from 12/07/2022 for further details and follow-up recommendations. VL Extremity Venous Bilateral         CT CHEST PULMONARY EMBOLISM W CONTRAST   Final Result   Positive for pulmonary embolism within all the lobes except the left lower   lobe. Equivocal right heart strain with the right ventricle a few mm larger than   the left. However, other signs of right heart strain are not seen. Peripheral airspace disease in the lower lungs, greater on the right, most   likely pulmonary infarct, but pneumonia remains in the differential.      Thyroid is very enlarged and heterogeneous, and has an infiltrative   appearance, with loss of the fat planes in the lower neck. Thyroid appears   increased in size when compared to the previous ultrasound. Findings are   worrisome for thyroid malignancy. Further evaluation with neck CT and   thyroid ultrasound is recommended. Findings were discussed with Annie Watson at 8:30 pm on 12/7/2022.          XR CHEST PORTABLE   Final Result   Mild bibasilar airspace disease, likely atelectasis versus less likely   pneumonia or edema. Possible small left pleural effusion versus airspace disease or scarring   within the left costophrenic angle. US THYROID    (Results Pending)       IP CONSULT TO HOSPITALIST  IP CONSULT TO OTOLARYNGOLOGY  IP CONSULT TO VASCULAR SURGERY  IP CONSULT TO ONCOLOGY  IP CONSULT TO PHARMACY  IP CONSULT TO GI    Assessment/Plan:    Active Hospital Problems    Diagnosis     Acute pulmonary embolism ([address] [I26.99]      Priority: Medium    Bilateral pulmonary embolism ([address] [I26.99]      Priority: Medium     Bilateral Pulmonary Embolus with Heart Strain on CT:  Acute DVT bilateral lower extremities:  Venous duplex showing R peroneal DVT and left PT and peroneal DVT. CT chest: PE within all the lobes except the LLL and echo both: Right heart strain. Vascular consulted: Appreciate input. No indication for pulmonary artery thrombolysis at this time. Continue Heparin drip. High co-pays for DOAC. Will need to transition to Coumadin eventually but patient will be discharged home on Lovenox 1 mg/kg twice daily instead of 1.5 mg/kg daily until out patient procedures have been completed. 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 CT noted more enlarged then previous ultrasound worrisome findings suggestive of malignancy. ENT consulted: Recommended outpatient thyroid ultrasound with close follow-up as patient will need transfer to  for further management. TSH wnl. Continue synthroid      Left renal lesion:  Noted incidentally on CT A/P: Indeterminate 1.6 cm and 1.7 cm lesions on the left kidney. 2.1 cm-dense lesion of the right kidney. MRI with renal protocol recommended. Anemia:   Work up c/w with AoCD and ISAMAR. Started on Iron replacement. GI consulted: Last colonoscopy last year unremarkable. EGD tomorrow. Hypertension: Monitor BP on atenolol and Procardia. Hypokalemia: Replaced. Monitor mag and K level. DVT Prophylaxis: Heparin drip  Diet: ADULT DIET;  Regular  Diet NPO  Code Status: Full Code  PT/OT Eval Status: Independent    Dispo -once medically stable      Silvestre Fernando MD

## 2022-12-11 NOTE — PROGRESS NOTES
ONCOLOGY HEMATOLOGY CARE PROGRESS NOTE      SUBJECTIVE:      Shortness of breath is improved. No chest pain  No external bleeding    ROS:         OBJECTIVE      Physical      VITALS:  /69   Pulse 66   Temp 98.8 °F (37.1 °C) (Oral)   Resp 16   Ht 5' (1.524 m)   Wt 137 lb 3.2 oz (62.2 kg)   SpO2 96%   BMI 26.80 kg/m²   TEMPERATURE:  Current - Temp: 98.8 °F (37.1 °C); Max - Temp  Av.8 °F (37.1 °C)  Min: 98.2 °F (36.8 °C)  Max: 99.4 °F (37.4 °C)  PULSE OXIMETRY RANGE: SpO2  Av.3 %  Min: 95 %  Max: 99 %  24HR INTAKE/OUTPUT:    Intake/Output Summary (Last 24 hours) at 2022 0757  Last data filed at 2022 0524  Gross per 24 hour   Intake 628 ml   Output --   Net 628 ml       Conscious alert oriented. Appears comfortable. No neck fullness. Respiratory efforts are normal.    Abdomen is not distended. No leg edema    No focal deficits.       Data  Recent Labs     22  0613 12/10/22  0458 22  0539 22  1741   WBC 11.0 11.6* 13.5* 17.2*   NEUTROABS  --   --   --  14.8*   LYMPHOPCT  --   --   --  5.5   RBC 3.43* 3.35* 3.32* 3.72*   HGB 9.7* 9.6* 9.6* 10.7*   HCT 29.0* 28.4* 28.4* 32.0*   MCV 84.7 84.8 85.6 86.0   MCH 28.3 28.8 28.9 28.8   MCHC 33.4 34.0 33.7 33.5   RDW 15.3 15.0 15.0 14.9   * 573* 525* 440          Recent Labs     22  0538 12/10/22  0458 22  0614    138 135*   K 3.6 3.8 3.6    99 98*   CO2 31 29 29   PHOS 3.2 2.7 2.7   BUN 17 14 12   CREATININE 0.8 0.7 0.7       Recent Labs     22  0538 12/10/22  0458 22  0614   AST 21 21 19   ALT 19 20 21   BILITOT <0.2 0.3 0.4   ALKPHOS 63 68 62         Magnesium:    Lab Results   Component Value Date/Time    MG 1.90 2022 06:14 AM    MG 2.00 12/10/2022 04:58 AM    MG 2.00 2022 05:38 AM       Imaging  CT ABDOMEN PELVIS W IV CONTRAST Additional Contrast? Oral  Narrative: EXAMINATION:  CT OF THE ABDOMEN AND PELVIS WITH CONTRAST 12/10/2022 6:30 pm    TECHNIQUE:  CT of the abdomen and pelvis was performed with the administration of  intravenous contrast. Multiplanar reformatted images are provided for review. Automated exposure control, iterative reconstruction, and/or weight based  adjustment of the mA/kV was utilized to reduce the radiation dose to as low  as reasonably achievable. COMPARISON:  06/27/2015. Chest CT dated 12/07/2022. HISTORY:  ORDERING SYSTEM PROVIDED HISTORY: ABDOMINAL PAIN  TECHNOLOGIST PROVIDED HISTORY:  Reason for exam:->ABDOMINAL PAIN  Additional Contrast?->Oral    FINDINGS:  Lower Chest: Pulmonary artery emboli are better seen on recent chest CT. Atelectasis/opacity of the lung bases are seen. Organs: The liver appears homogeneous without discrete hepatic lesion seen. The gallbladder is unremarkable. The spleen is not enlarged. There is  suggestion of pancreatic divisum. No significant peripancreatic inflammatory  changes are seen. There is punctate hyperdense stone of the superior pole of  the left kidney. No evidence of hydronephrosis seen. There are  indeterminate left renal lesions including a 1.6 cm lesion in the mid axis of  the left kidney with Hounsfield unit of 56, and 1.7 cm hypodense lesion of  the lower pole of the left kidney with Hounsfield unit of 51. 2.1 cm  hypodense lesion of the lower pole of the right kidney measuring less than 30  Hounsfield units and does not meet the criteria for imaging follow-up. GI/Bowel: Enteric contrast reaches sigmoid colon. No evidence of bowel  obstruction is seen. The appendix is nondilated. No evidence of bowel  obstruction is seen. Pelvis: Urinary bladder appears within normal limits. The uterus is  anteverted. There is mildly prominent left periuterine vessel, which may  reflect pelvic congestion syndrome in the correct clinical setting. Peritoneum/Retroperitoneum: No evidence of intra-abdominal free air is seen.   No evidence of ascites is seen. The abdominal aorta appears normal in  caliber. Bones/Soft Tissues: Moderate to severe degenerative disc disease affects  L5-S1. There is a fat containing ventral hernia. Impression: Indeterminate left renal lesions. Consider follow-up MRI with dedicated  renal protocol for further evaluation. A punctate hyperdense stone in the  superior pole of the left kidney without evidence of hydronephrosis seen. Pulmonary artery emboli are better seen on the recent chest CT. Atelectasis/opacity of the lung bases may reflect developing areas of lung  infarct or pneumonia. Prominent left periuterine vessel, which may reflect pelvic congestion  syndrome in the correct clinical setting. Suggestion of pancreatic divisum. Problem List  Patient Active Problem List   Diagnosis    S/P colonoscopy    Vitamin D deficiency    Osteopenia    Prediabetes    Essential hypertension    Multiple thyroid nodules    Primary osteoarthritis of left knee    Primary osteoarthritis of right hip    Cough variant asthma    Hypothyroidism    Hypercalcemia    Mixed hyperlipidemia    Bilateral pulmonary embolism (HCC)    Acute pulmonary embolism (HCC)       ASSESSMENT AND PLAN:    74year old female with a history of HLD, HTN, hypothyroidism, hypercalcemia, osteoarthritis, thyroid nodules and vitamin D deficiency. 1. Bilateral PE with cor pulmonale and bilateral DVT:  -Currently on heparin drip. -ECHO showing some mild strain.  -Vascular surgery is following, no plans for direct thrombolysis. -Patient has high deductible plan and DOAC cost is $305/mo, she cannot afford this.   -Would recommend Lovenox 1 mg/kg SQ twice daily as a bridge as outpatient  - If insurance does not authorize twice daily dose of Lovenox, okay to do Lovenox 1.5 mg/kg subcu daily till INR is therapeutic  - Plan is for coumadin.         2. Enlarged thyroid:  -CTPA showing enlarged heterogenous thyroid with shifting of trachea, more enlarged compared to thyroid US done 9/2021.  -CT neck demonstrated multinodular goiter.   -Thyroid ultrasound will be done as an outpatient.  -Has been evaluated by ENT. 3. Anemia:    - Hemoglobin was normal in October 2022.  -Hemoglobin had dropped to 9.6.  -Iron studies are consistent with anemia of chronic disease and iron deficiency    -CT abdomen and pelvis showed no obvious evidence of bleeding or malignancy. Left renal lesions are probably nonspecific. MRI has been ordered. - GI consult noted.   EGD tomorrow    -          Kirsten Winston MD

## 2022-12-11 NOTE — PLAN OF CARE
Problem: Discharge Planning  Goal: Discharge to home or other facility with appropriate resources  Outcome: Progressing  Flowsheets (Taken 12/10/2022 1950)  Discharge to home or other facility with appropriate resources: Identify barriers to discharge with patient and caregiver     Problem: Safety - Adult  Goal: Free from fall injury  12/10/2022 2129 by Liat Mercado RN  Outcome: Jessica Vigil (Taken 12/10/2022 1950)  Free From Fall Injury: Instruct family/caregiver on patient safety  12/10/2022 1836 by Td Varner RN  Outcome: Progressing     Problem: ABCDS Injury Assessment  Goal: Absence of physical injury  Outcome: Progressing  Flowsheets (Taken 12/10/2022 1950)  Absence of Physical Injury: Implement safety measures based on patient assessment

## 2022-12-12 ENCOUNTER — APPOINTMENT (OUTPATIENT)
Dept: MRI IMAGING | Age: 72
DRG: 175 | End: 2022-12-12
Payer: MEDICARE

## 2022-12-12 ENCOUNTER — ANESTHESIA (OUTPATIENT)
Dept: ENDOSCOPY | Age: 72
DRG: 175 | End: 2022-12-12
Payer: MEDICARE

## 2022-12-12 ENCOUNTER — ANESTHESIA EVENT (OUTPATIENT)
Dept: ENDOSCOPY | Age: 72
DRG: 175 | End: 2022-12-12
Payer: MEDICARE

## 2022-12-12 PROBLEM — N28.9 KIDNEY LESION, NATIVE, BILATERAL: Status: ACTIVE | Noted: 2022-12-12

## 2022-12-12 PROBLEM — D50.9 IDA (IRON DEFICIENCY ANEMIA): Status: ACTIVE | Noted: 2022-12-12

## 2022-12-12 LAB
A/G RATIO: 0.8 (ref 1.1–2.2)
ALBUMIN SERPL-MCNC: 2.8 G/DL (ref 3.4–5)
ALP BLD-CCNC: 66 U/L (ref 40–129)
ALT SERPL-CCNC: 25 U/L (ref 10–40)
ANION GAP SERPL CALCULATED.3IONS-SCNC: 9 MMOL/L (ref 3–16)
ANTI-XA UNFRAC HEPARIN: 0.26 IU/ML (ref 0.3–0.7)
ANTI-XA UNFRAC HEPARIN: <0.1 IU/ML (ref 0.3–0.7)
AST SERPL-CCNC: 36 U/L (ref 15–37)
BILIRUB SERPL-MCNC: 0.3 MG/DL (ref 0–1)
BUN BLDV-MCNC: 14 MG/DL (ref 7–20)
CALCIUM SERPL-MCNC: 9.9 MG/DL (ref 8.3–10.6)
CHLORIDE BLD-SCNC: 102 MMOL/L (ref 99–110)
CO2: 27 MMOL/L (ref 21–32)
CREAT SERPL-MCNC: 0.7 MG/DL (ref 0.6–1.2)
GFR SERPL CREATININE-BSD FRML MDRD: >60 ML/MIN/{1.73_M2}
GLUCOSE BLD-MCNC: 97 MG/DL (ref 70–99)
HCT VFR BLD CALC: 27.9 % (ref 36–48)
HEMOGLOBIN: 9.5 G/DL (ref 12–16)
INR BLD: 1.09 (ref 0.87–1.14)
MAGNESIUM: 1.9 MG/DL (ref 1.8–2.4)
MCH RBC QN AUTO: 29 PG (ref 26–34)
MCHC RBC AUTO-ENTMCNC: 34 G/DL (ref 31–36)
MCV RBC AUTO: 85.4 FL (ref 80–100)
PDW BLD-RTO: 15.1 % (ref 12.4–15.4)
PHOSPHORUS: 2.9 MG/DL (ref 2.5–4.9)
PLATELET # BLD: 637 K/UL (ref 135–450)
PMV BLD AUTO: 7.7 FL (ref 5–10.5)
POTASSIUM SERPL-SCNC: 4 MMOL/L (ref 3.5–5.1)
PROTHROMBIN TIME: 14 SEC (ref 11.7–14.5)
RBC # BLD: 3.26 M/UL (ref 4–5.2)
SODIUM BLD-SCNC: 138 MMOL/L (ref 136–145)
TOTAL PROTEIN: 6.4 G/DL (ref 6.4–8.2)
WBC # BLD: 10.7 K/UL (ref 4–11)

## 2022-12-12 PROCEDURE — 74183 MRI ABD W/O CNTR FLWD CNTR: CPT

## 2022-12-12 PROCEDURE — 2500000003 HC RX 250 WO HCPCS: Performed by: NURSE ANESTHETIST, CERTIFIED REGISTERED

## 2022-12-12 PROCEDURE — 85520 HEPARIN ASSAY: CPT

## 2022-12-12 PROCEDURE — 83735 ASSAY OF MAGNESIUM: CPT

## 2022-12-12 PROCEDURE — 6370000000 HC RX 637 (ALT 250 FOR IP): Performed by: INTERNAL MEDICINE

## 2022-12-12 PROCEDURE — 2709999900 HC NON-CHARGEABLE SUPPLY: Performed by: INTERNAL MEDICINE

## 2022-12-12 PROCEDURE — 85610 PROTHROMBIN TIME: CPT

## 2022-12-12 PROCEDURE — 7100000000 HC PACU RECOVERY - FIRST 15 MIN: Performed by: INTERNAL MEDICINE

## 2022-12-12 PROCEDURE — 6360000002 HC RX W HCPCS: Performed by: NURSE ANESTHETIST, CERTIFIED REGISTERED

## 2022-12-12 PROCEDURE — 1200000000 HC SEMI PRIVATE

## 2022-12-12 PROCEDURE — 94640 AIRWAY INHALATION TREATMENT: CPT

## 2022-12-12 PROCEDURE — 97530 THERAPEUTIC ACTIVITIES: CPT

## 2022-12-12 PROCEDURE — 36415 COLL VENOUS BLD VENIPUNCTURE: CPT

## 2022-12-12 PROCEDURE — 2580000003 HC RX 258: Performed by: NURSE ANESTHETIST, CERTIFIED REGISTERED

## 2022-12-12 PROCEDURE — 3700000000 HC ANESTHESIA ATTENDED CARE: Performed by: INTERNAL MEDICINE

## 2022-12-12 PROCEDURE — 6360000004 HC RX CONTRAST MEDICATION: Performed by: INTERNAL MEDICINE

## 2022-12-12 PROCEDURE — 80053 COMPREHEN METABOLIC PANEL: CPT

## 2022-12-12 PROCEDURE — 6360000002 HC RX W HCPCS: Performed by: INTERNAL MEDICINE

## 2022-12-12 PROCEDURE — 88305 TISSUE EXAM BY PATHOLOGIST: CPT

## 2022-12-12 PROCEDURE — 94761 N-INVAS EAR/PLS OXIMETRY MLT: CPT

## 2022-12-12 PROCEDURE — 0DB58ZX EXCISION OF ESOPHAGUS, VIA NATURAL OR ARTIFICIAL OPENING ENDOSCOPIC, DIAGNOSTIC: ICD-10-PCS | Performed by: INTERNAL MEDICINE

## 2022-12-12 PROCEDURE — A9577 INJ MULTIHANCE: HCPCS | Performed by: INTERNAL MEDICINE

## 2022-12-12 PROCEDURE — 85027 COMPLETE CBC AUTOMATED: CPT

## 2022-12-12 PROCEDURE — 3609012400 HC EGD TRANSORAL BIOPSY SINGLE/MULTIPLE: Performed by: INTERNAL MEDICINE

## 2022-12-12 PROCEDURE — 2580000003 HC RX 258: Performed by: INTERNAL MEDICINE

## 2022-12-12 PROCEDURE — 84100 ASSAY OF PHOSPHORUS: CPT

## 2022-12-12 PROCEDURE — 6370000000 HC RX 637 (ALT 250 FOR IP): Performed by: NURSE PRACTITIONER

## 2022-12-12 PROCEDURE — 0DB78ZX EXCISION OF STOMACH, PYLORUS, VIA NATURAL OR ARTIFICIAL OPENING ENDOSCOPIC, DIAGNOSTIC: ICD-10-PCS | Performed by: INTERNAL MEDICINE

## 2022-12-12 PROCEDURE — 7100000001 HC PACU RECOVERY - ADDTL 15 MIN: Performed by: INTERNAL MEDICINE

## 2022-12-12 PROCEDURE — 97116 GAIT TRAINING THERAPY: CPT

## 2022-12-12 RX ORDER — PEG-3350, SODIUM SULFATE, SODIUM CHLORIDE, POTASSIUM CHLORIDE, SODIUM ASCORBATE AND ASCORBIC ACID 7.5-2.691G
100 KIT ORAL ONCE
Status: COMPLETED | OUTPATIENT
Start: 2022-12-12 | End: 2022-12-12

## 2022-12-12 RX ORDER — LIDOCAINE HYDROCHLORIDE 20 MG/ML
INJECTION, SOLUTION EPIDURAL; INFILTRATION; INTRACAUDAL; PERINEURAL PRN
Status: DISCONTINUED | OUTPATIENT
Start: 2022-12-12 | End: 2022-12-12 | Stop reason: SDUPTHER

## 2022-12-12 RX ORDER — HEPARIN SODIUM 10000 [USP'U]/100ML
5-30 INJECTION, SOLUTION INTRAVENOUS CONTINUOUS
Status: DISCONTINUED | OUTPATIENT
Start: 2022-12-12 | End: 2022-12-14 | Stop reason: HOSPADM

## 2022-12-12 RX ORDER — HEPARIN SODIUM 1000 [USP'U]/ML
80 INJECTION, SOLUTION INTRAVENOUS; SUBCUTANEOUS PRN
Status: DISCONTINUED | OUTPATIENT
Start: 2022-12-12 | End: 2022-12-14 | Stop reason: HOSPADM

## 2022-12-12 RX ORDER — SODIUM CHLORIDE 9 MG/ML
INJECTION, SOLUTION INTRAVENOUS CONTINUOUS PRN
Status: DISCONTINUED | OUTPATIENT
Start: 2022-12-12 | End: 2022-12-12 | Stop reason: SDUPTHER

## 2022-12-12 RX ORDER — FLUCONAZOLE 100 MG/1
200 TABLET ORAL DAILY
Status: DISCONTINUED | OUTPATIENT
Start: 2022-12-12 | End: 2022-12-14 | Stop reason: HOSPADM

## 2022-12-12 RX ORDER — ENOXAPARIN SODIUM 100 MG/ML
1 INJECTION SUBCUTANEOUS 2 TIMES DAILY
Qty: 36 ML | Refills: 1 | Status: SHIPPED | OUTPATIENT
Start: 2022-12-12 | End: 2022-12-20 | Stop reason: CLARIF

## 2022-12-12 RX ORDER — HEPARIN SODIUM 1000 [USP'U]/ML
40 INJECTION, SOLUTION INTRAVENOUS; SUBCUTANEOUS PRN
Status: DISCONTINUED | OUTPATIENT
Start: 2022-12-12 | End: 2022-12-14 | Stop reason: HOSPADM

## 2022-12-12 RX ORDER — ENOXAPARIN SODIUM 100 MG/ML
1 INJECTION SUBCUTANEOUS 2 TIMES DAILY
Status: DISCONTINUED | OUTPATIENT
Start: 2022-12-12 | End: 2022-12-12

## 2022-12-12 RX ORDER — SODIUM CHLORIDE 9 MG/ML
INJECTION, SOLUTION INTRAVENOUS CONTINUOUS
Status: DISCONTINUED | OUTPATIENT
Start: 2022-12-12 | End: 2022-12-14 | Stop reason: HOSPADM

## 2022-12-12 RX ORDER — PROPOFOL 10 MG/ML
INJECTION, EMULSION INTRAVENOUS PRN
Status: DISCONTINUED | OUTPATIENT
Start: 2022-12-12 | End: 2022-12-12 | Stop reason: SDUPTHER

## 2022-12-12 RX ORDER — PEG-3350, SODIUM SULFATE, SODIUM CHLORIDE, POTASSIUM CHLORIDE, SODIUM ASCORBATE AND ASCORBIC ACID 7.5-2.691G
100 KIT ORAL ONCE
Status: COMPLETED | OUTPATIENT
Start: 2022-12-13 | End: 2022-12-13

## 2022-12-12 RX ADMIN — SODIUM CHLORIDE: 9 INJECTION, SOLUTION INTRAVENOUS at 09:49

## 2022-12-12 RX ADMIN — ATENOLOL 25 MG: 25 TABLET ORAL at 10:45

## 2022-12-12 RX ADMIN — GADOBENATE DIMEGLUMINE 12 ML: 529 INJECTION, SOLUTION INTRAVENOUS at 14:18

## 2022-12-12 RX ADMIN — Medication 2 PUFF: at 07:45

## 2022-12-12 RX ADMIN — PROPOFOL 100 MG: 10 INJECTION, EMULSION INTRAVENOUS at 09:52

## 2022-12-12 RX ADMIN — SODIUM CHLORIDE: 9 INJECTION, SOLUTION INTRAVENOUS at 23:44

## 2022-12-12 RX ADMIN — LIDOCAINE HYDROCHLORIDE 100 MG: 20 INJECTION, SOLUTION EPIDURAL; INFILTRATION; INTRACAUDAL; PERINEURAL at 09:52

## 2022-12-12 RX ADMIN — Medication 2 PUFF: at 20:17

## 2022-12-12 RX ADMIN — Medication 10 ML: at 07:57

## 2022-12-12 RX ADMIN — FLUCONAZOLE 200 MG: 100 TABLET ORAL at 14:31

## 2022-12-12 RX ADMIN — POLYETHYLENE GLYCOL 3350, SODIUM SULFATE, SODIUM CHLORIDE, POTASSIUM CHLORIDE, ASCORBIC ACID, SODIUM ASCORBATE 100 G: KIT at 20:22

## 2022-12-12 RX ADMIN — HEPARIN SODIUM 17.86 UNITS/KG/HR: 10000 INJECTION, SOLUTION INTRAVENOUS at 14:45

## 2022-12-12 RX ADMIN — HEPARIN SODIUM 4930 UNITS: 1000 INJECTION INTRAVENOUS; SUBCUTANEOUS at 20:31

## 2022-12-12 RX ADMIN — HEPARIN SODIUM 21.86 UNITS/KG/HR: 10000 INJECTION, SOLUTION INTRAVENOUS at 20:34

## 2022-12-12 RX ADMIN — NIFEDIPINE 30 MG: 30 TABLET, EXTENDED RELEASE ORAL at 10:45

## 2022-12-12 RX ADMIN — SODIUM CHLORIDE: 9 INJECTION, SOLUTION INTRAVENOUS at 14:18

## 2022-12-12 RX ADMIN — SODIUM CHLORIDE: 9 INJECTION, SOLUTION INTRAVENOUS at 14:37

## 2022-12-12 ASSESSMENT — PAIN SCALES - GENERAL
PAINLEVEL_OUTOF10: 0
PAINLEVEL_OUTOF10: 0

## 2022-12-12 ASSESSMENT — PAIN - FUNCTIONAL ASSESSMENT: PAIN_FUNCTIONAL_ASSESSMENT: 0-10

## 2022-12-12 NOTE — PROGRESS NOTES
Patient arrived from endo to pacu. Arouses to voice. On room air. NSR on the monitor. VSS.  Abdomen soft

## 2022-12-12 NOTE — ANESTHESIA PRE PROCEDURE
Department of Anesthesiology  Preprocedure Note       Name:  Hallie Laguna   Age:  74 y.o.  :  1950                                          MRN:  [id number]         Date:  2022      Surgeon: Chance Hernández):  Claudette Kalata, MD    Procedure: Procedure(s):  EGD DIAGNOSTIC ONLY    Medications prior to admission:   Prior to Admission medications    Medication Sig Start Date End Date Taking?  Authorizing Provider   enoxaparin (LOVENOX) 60 MG/0.6ML Inject 0.6 mLs into the skin 2 times daily 22 Yes Renetta Rodriguez MD   atenolol (TENORMIN) 25 MG tablet Take 1 tablet by mouth daily 10/19/22   Brookie Koyanagi, MD   levothyroxine (EUTHYROX) 75 MCG tablet Take 1 tablet by mouth Daily Stop 88 mcg 10/19/22   Brookie Koyanagi, MD   fluvastatin (LESCOL) 20 MG capsule Take 1 capsule by mouth nightly 10/19/22   Brookie Koyanagi, MD   NIFEdipine (PROCARDIA XL) 30 MG extended release tablet Take 1 tablet by mouth once daily 10/13/22   Brookie Koyanagi, MD   chlorthalidone (HYGROTON) 25 MG tablet Take 1 tablet by mouth once daily 22   Roslyn Miles MD   budesonide-formoterol [facility]) 160-4.5 MCG/ACT AERO Inhale 2 puffs into the lungs 2 times daily 22   Roslyn Miles MD   acyclovir (ZOVIRAX) 400 MG tablet Take 1 tablet by mouth 2 times daily as needed    Historical Provider, MD   estradiol (ESTRACE) 0.1 MG/GM vaginal cream Place 0.5 g vaginally Twice a Week 20   Brookie Koyanagi, MD   Cholecalciferol (VITAMIN D) 2000 UNITS CAPS capsule Take 2 capsules by mouth daily Remind patient, can purchase over the counter and also purchase Caltrate D 600mg/ 400 iu and take bid. 3/28/17   Brookie Koyanagi, MD       Current medications:    Current Facility-Administered Medications   Medication Dose Route Frequency Provider Last Rate Last Admin    [Held by provider] heparin 25,000 units in dextrose 5% 250 mL (premix) infusion  5-30 Units/kg/hr IntraVENous Continuous 101 Flavio Chu Himanshu Ennis MD   Paused at 12/12/22 0546    sodium chloride flush 0.9 % injection 5-40 mL  5-40 mL IntraVENous 2 times per day Juana Tineo MD   10 mL at 12/12/22 0757    sodium chloride flush 0.9 % injection 5-40 mL  5-40 mL IntraVENous PRN Juana Tineo MD        0.9 % sodium chloride infusion   IntraVENous PRN Juana Tineo MD   Stopped at 12/11/22 2329    ondansetron (ZOFRAN-ODT) disintegrating tablet 4 mg  4 mg Oral Q8H PRN Juana Tineo MD        Or    ondansetron (ZOFRAN) injection 4 mg  4 mg IntraVENous Q6H PRN Juana Tineo MD        polyethylene glycol (GLYCOLAX) packet 17 g  17 g Oral Daily PRN Juana Tineo MD        acetaminophen (TYLENOL) tablet 650 mg  650 mg Oral Q6H PRN Juana Tineo MD   650 mg at 12/11/22 2216    Or    acetaminophen (TYLENOL) suppository 650 mg  650 mg Rectal Q6H PRN Juana Tineo MD        HYDROcodone-acetaminophen (NORCO) 5-325 MG per tablet 1 tablet  1 tablet Oral Q6H PRN CAREN Hobson - CNP   1 tablet at 12/08/22 2134    perflutren lipid microspheres (DEFINITY) injection 1.5 mL  1.5 mL IntraVENous ONCE PRN Frances Michaud APRN - CNP        albuterol sulfate HFA (PROVENTIL;VENTOLIN;PROAIR) 108 (90 Base) MCG/ACT inhaler 2 puff  2 puff Inhalation Q6H PRN Frances Michaud APRN - CNP        atenolol (TENORMIN) tablet 25 mg  25 mg Oral Daily Frances Minieneida, APRN - CNP   25 mg at 12/11/22 0828    mometasone-formoterol (DULERA) 200-5 MCG/ACT inhaler 2 puff  2 puff Inhalation BID Frances Jaswant, APRN - CNP   2 puff at 12/12/22 0745    levothyroxine (SYNTHROID) tablet 75 mcg  75 mcg Oral Daily Frances Minieneida, APRN - CNP   75 mcg at 12/11/22 0524    NIFEdipine (PROCARDIA XL) extended release tablet 30 mg  30 mg Oral Daily Frances Minick, APRN - CNP   30 mg at 12/11/22 0501       Allergies:     Allergies   Allergen Reactions    Ace Inhibitors Swelling and Angioedema     Lip swelling    Amlodipine Besy-Benazepril Hcl Other (See Comments) and Angioedema     Lip swelling    Amlodipine Besy-Benazepril Hcl Angioedema    Crestor [Rosuvastatin] Myalgia     Muscle pain at night that resolved within one week of discontinuation    Benicar [Olmesartan]      cough    Clonidine Derivatives Other (See Comments)     Lethargy.        Problem List:    Patient Active Problem List   Diagnosis Code    S/P colonoscopy Z98.890    Vitamin D deficiency E55.9    Osteopenia M85.80    Prediabetes R73.03    Essential hypertension I10    Multiple thyroid nodules E04.2    Primary osteoarthritis of left knee M17.12    Primary osteoarthritis of right hip M16.11    Cough variant asthma J45.991    Hypothyroidism E03.9    Hypercalcemia E83.52    Mixed hyperlipidemia E78.2    Bilateral pulmonary embolism (HCC) I26.99    Acute pulmonary embolism (HCC) I26.99       Past Medical History:        Diagnosis Date    Cough variant asthma 8/4/2020    HTN (hypertension) 7/27/2011    Mixed hyperlipidemia 10/18/2022    Primary osteoarthritis of left knee 12/6/2018       Past Surgical History:        Procedure Laterality Date    BREAST SURGERY      left breast in 1970's   [address]    COLON SURGERY  06/28/2015    sigmoid resection w/ left salpingo-ooph    SMALL INTESTINE SURGERY  06/28/2015    Left salpingectomy       Social History:    Social History     Tobacco Use    Smoking status: Never    Smokeless tobacco: Never   Substance Use Topics    Alcohol use: Not Currently                                Counseling given: Not Answered      Vital Signs (Current):   Vitals:    12/12/22 0558 12/12/22 0745 12/12/22 0755 12/12/22 0844   BP:   133/81 (!) 147/78   Pulse:  73 72 76   Resp:  16 14 20   Temp:   98.8 °F (37.1 °C) 97.2 °F (36.2 °C)   TempSrc:   Oral Temporal   SpO2: 91% 94% 94% 95%   Weight: 135 lb 12.8 oz (61.6 kg)      Height:                                                  BP Readings from Last 3 Encounters:   12/12/22 (!) 147/78   10/25/22 130/72   10/18/22 128/79 NPO Status:                                                                                 BMI:   Wt Readings from Last 3 Encounters:   12/12/22 135 lb 12.8 oz (61.6 kg)   11/12/22 135 lb (61.2 kg)   10/31/22 141 lb (64 kg)     Body mass index is 26.52 kg/m². CBC:   Lab Results   Component Value Date/Time    WBC 10.7 12/12/2022 05:35 AM    RBC 3.26 12/12/2022 05:35 AM    HGB 9.5 12/12/2022 05:35 AM    HCT 27.9 12/12/2022 05:35 AM    MCV 85.4 12/12/2022 05:35 AM    RDW 15.1 12/12/2022 05:35 AM     12/12/2022 05:35 AM       CMP:   Lab Results   Component Value Date/Time     12/12/2022 05:35 AM    K 4.0 12/12/2022 05:35 AM    K 3.2 12/07/2022 05:41 PM     12/12/2022 05:35 AM    CO2 27 12/12/2022 05:35 AM    BUN 14 12/12/2022 05:35 AM    CREATININE 0.7 12/12/2022 05:35 AM    GFRAA >60 07/06/2022 10:12 AM    AGRATIO 0.8 12/12/2022 05:35 AM    LABGLOM >60 12/12/2022 05:35 AM    GLUCOSE 97 12/12/2022 05:35 AM    PROT 6.4 12/12/2022 05:35 AM    PROT 7.2 04/09/2012 09:56 AM    CALCIUM 9.9 12/12/2022 05:35 AM    BILITOT 0.3 12/12/2022 05:35 AM    ALKPHOS 66 12/12/2022 05:35 AM    AST 36 12/12/2022 05:35 AM    ALT 25 12/12/2022 05:35 AM       POC Tests: No results for input(s): POCGLU, POCNA, POCK, POCCL, POCBUN, POCHEMO, POCHCT in the last 72 hours.     Coags:   Lab Results   Component Value Date/Time    PROTIME 16.0 12/07/2022 08:39 PM    INR 1.28 12/07/2022 08:39 PM       HCG (If Applicable): No results found for: PREGTESTUR, PREGSERUM, HCG, HCGQUANT     ABGs: No results found for: PHART, PO2ART, CEO2OGI, YWQ4BAM, BEART, U9PLFYQC     Type & Screen (If Applicable):  No results found for: LABABO, LABRH    Drug/Infectious Status (If Applicable):  No results found for: HIV, HEPCAB    COVID-19 Screening (If Applicable):   Lab Results   Component Value Date/Time    COVID19 Not Detected 12/07/2022 05:45 PM           Anesthesia Evaluation  Patient summary reviewed and Nursing notes reviewed  Airway: Mallampati: II          Dental:          Pulmonary:   (+) asthma:                            Cardiovascular:    (+) hypertension:,                   Neuro/Psych:               GI/Hepatic/Renal:             Endo/Other:    (+) hypothyroidism::., .                 Abdominal:             Vascular:           Other Findings:           Anesthesia Plan      MAC     ASA 3                                   Kaylan Hi MD   12/12/2022

## 2022-12-12 NOTE — PROGRESS NOTES
ONCOLOGY HEMATOLOGY CARE PROGRESS NOTE      SUBJECTIVE:  She is feeling okay today, denies any bleeding. No chest pain or shortness of breath. ROS:     Constitutional:  No weight loss, No fever, No chills, No night sweats.     Eyes:  No impairment or change in vision  ENT / Mouth:  No pain, abnormal ulceration, bleeding, nasal drip or change in voice or hearing  Cardiovascular:  No chest pain, palpitations, new edema, or calf discomfort  Respiratory:  No pain, hemoptysis, change to breathing  Breast:  No pain, discharge, change in appearance or texture  Gastrointestinal:  No pain, cramping, jaundice, change to eating and bowel habits  Urinary:  No pain, bleeding or change in continence  Genitalia: No pain, bleeding or discharge  Musculoskeletal:  No redness, pain, edema or weakness  Skin:  No pruritus, rash, change to nodules or lesions  Neurologic:  No discomfort, change in mental status, speech, sensory or motor activity  Psychiatric:  No change in concentration or change to affect or mood  Endocrine:  No hot flashes, increased thirst, or change to urine production  Hematologic: No petechiae, ecchymosis or bleeding  Lymphatic:  No lymphadenopathy or lymphedema  Allergy / Immunologic:  No eczema, hives, frequent or recurrent infections    OBJECTIVE        Physical    VITALS:  Patient Vitals for the past 24 hrs:   BP Temp Temp src Pulse Resp SpO2 Weight   12/12/22 1038 138/77 98.4 °F (36.9 °C) Oral 74 20 95 % --   12/12/22 1020 129/72 97.8 °F (36.6 °C) Temporal 75 24 98 % --   12/12/22 1015 122/71 -- -- 78 23 99 % --   12/12/22 1010 111/77 -- -- 79 18 98 % --   12/12/22 1004 115/63 98.4 °F (36.9 °C) Temporal 83 19 99 % --   12/12/22 0844 (!) 147/78 97.2 °F (36.2 °C) Temporal 76 20 95 % --   12/12/22 0755 133/81 98.8 °F (37.1 °C) Oral 72 14 94 % --   12/12/22 0745 -- -- -- 73 16 94 % --   12/12/22 0558 -- -- -- -- -- 91 % 135 lb 12.8 oz (61.6 kg)   12/12/22 0529 (!) 144/82 98.8 °F (37.1 °C) Oral 63 18 95 % --   12/12/22 0305 -- -- -- -- -- 94 % --   12/11/22 2325 99/64 98.8 °F (37.1 °C) Oral 80 18 95 % --   12/11/22 2054 -- -- -- -- -- 94 % --   12/11/22 2018 127/75 98.8 °F (37.1 °C) Oral 78 18 93 % --   12/11/22 1804 -- -- -- 77 -- -- --   12/11/22 1648 128/80 98.8 °F (37.1 °C) Oral 76 18 95 % --   12/11/22 1438 -- -- -- 68 -- -- --   12/11/22 1207 -- -- -- 66 -- -- --   12/11/22 1138 123/72 98.2 °F (36.8 °C) Oral 65 16 97 % --       24HR INTAKE/OUTPUT:    Intake/Output Summary (Last 24 hours) at 12/12/2022 1132  Last data filed at 12/12/2022 1003  Gross per 24 hour   Intake 100 ml   Output --   Net 100 ml       CONSTITUTIONAL: awake, alert, cooperative, no apparent distress   EYES: pupils equal, round and reactive to light, sclera clear and conjunctiva normal  ENT: Normocephalic, without obvious abnormality, atraumatic  NECK: supple, symmetrical, no jugular venous distension and no carotid bruits   HEMATOLOGIC/LYMPHATIC: no cervical, supraclavicular or axillary lymphadenopathy   LUNGS: no increased work of breathing and clear to auscultation   CARDIOVASCULAR: regular rate and rhythm, normal S1 and S2, no murmur noted  ABDOMEN: normal bowel sounds x 4, soft, non-distended, non-tender, no masses palpated, no hepatosplenomgaly   MUSCULOSKELETAL: full range of motion noted, tone is normal  NEUROLOGIC: awake, alert, oriented to name, place and time. Motor skills grossly intact. SKIN: Normal skin color, texture, turgor and no jaundice.  appears intact   EXTREMITIES: no LE edema     DATA:  CBC:    Recent Labs     12/12/22  0535 12/11/22  0613 12/10/22  0458 12/09/22  0539 12/07/22  1741   WBC 10.7 11.0 11.6* 13.5* 17.2*   NEUTROABS  --   --   --   --  14.8*   LYMPHOPCT  --   --   --   --  5.5   RBC 3.26* 3.43* 3.35* 3.32* 3.72*   HGB 9.5* 9.7* 9.6* 9.6* 10.7*   HCT 27.9* 29.0* 28.4* 28.4* 32.0*   MCV 85.4 84.7 84.8 85.6 86.0   MCH 29.0 28.3 28.8 28.9 28.8   MCHC 34.0 33.4 34.0 33.7 33.5 RDW 15.1 15.3 15.0 15.0 14.9   * 641* 573* 525* 440       PT/INR:    Recent Labs     12/12/22  0535 12/11/22  0614 12/10/22  0458 12/09/22  0538 12/07/22 2039 12/07/22  1741   PROT 6.4 6.7 6.6 6.2*  --  6.9   INR  --   --   --   --  1.28*  --      PTT:  No results for input(s): APTT in the last 720 hours. CMP:    Recent Labs     12/12/22  0535 12/11/22  0614 12/10/22  0458 12/09/22  0538    135* 138 140   K 4.0 3.6 3.8 3.6    98* 99 101   CO2 27 29 29 31   GLUCOSE 97 116* 108* 105*   BUN 14 12 14 17   CREATININE 0.7 0.7 0.7 0.8   LABGLOM >60 >60 >60 >60   CALCIUM 9.9 10.4 10.2 9.9   PROT 6.4 6.7 6.6 6.2*   LABALBU 2.8* 3.3* 3.1* 3.0*   AGRATIO 0.8* 1.0* 0.9* 0.9*   BILITOT 0.3 0.4 0.3 <0.2   ALKPHOS 66 62 68 63   ALT 25 21 20 19   AST 36 19 21 21   MG 1.90 1.90 2.00 2.00       Lab Results   Component Value Date    CALCIUM 9.9 12/12/2022    PHOS 2.9 12/12/2022       LDH:No results for input(s): LDH in the last 720 hours. Radiology Review:  CT ABDOMEN PELVIS W IV CONTRAST Additional Contrast? Oral  Narrative: EXAMINATION:  CT OF THE ABDOMEN AND PELVIS WITH CONTRAST 12/10/2022 6:30 pm    TECHNIQUE:  CT of the abdomen and pelvis was performed with the administration of  intravenous contrast. Multiplanar reformatted images are provided for review. Automated exposure control, iterative reconstruction, and/or weight based  adjustment of the mA/kV was utilized to reduce the radiation dose to as low  as reasonably achievable. COMPARISON:  06/27/2015. Chest CT dated 12/07/2022. HISTORY:  ORDERING SYSTEM PROVIDED HISTORY: ABDOMINAL PAIN  TECHNOLOGIST PROVIDED HISTORY:  Reason for exam:->ABDOMINAL PAIN  Additional Contrast?->Oral    FINDINGS:  Lower Chest: Pulmonary artery emboli are better seen on recent chest CT. Atelectasis/opacity of the lung bases are seen. Organs: The liver appears homogeneous without discrete hepatic lesion seen. The gallbladder is unremarkable.   The spleen is not enlarged. There is  suggestion of pancreatic divisum. No significant peripancreatic inflammatory  changes are seen. There is punctate hyperdense stone of the superior pole of  the left kidney. No evidence of hydronephrosis seen. There are  indeterminate left renal lesions including a 1.6 cm lesion in the mid axis of  the left kidney with Hounsfield unit of 56, and 1.7 cm hypodense lesion of  the lower pole of the left kidney with Hounsfield unit of 51. 2.1 cm  hypodense lesion of the lower pole of the right kidney measuring less than 30  Hounsfield units and does not meet the criteria for imaging follow-up. GI/Bowel: Enteric contrast reaches sigmoid colon. No evidence of bowel  obstruction is seen. The appendix is nondilated. No evidence of bowel  obstruction is seen. Pelvis: Urinary bladder appears within normal limits. The uterus is  anteverted. There is mildly prominent left periuterine vessel, which may  reflect pelvic congestion syndrome in the correct clinical setting. Peritoneum/Retroperitoneum: No evidence of intra-abdominal free air is seen. No evidence of ascites is seen. The abdominal aorta appears normal in  caliber. Bones/Soft Tissues: Moderate to severe degenerative disc disease affects  L5-S1. There is a fat containing ventral hernia. Impression: Indeterminate left renal lesions. Consider follow-up MRI with dedicated  renal protocol for further evaluation. A punctate hyperdense stone in the  superior pole of the left kidney without evidence of hydronephrosis seen. Pulmonary artery emboli are better seen on the recent chest CT. Atelectasis/opacity of the lung bases may reflect developing areas of lung  infarct or pneumonia. Prominent left periuterine vessel, which may reflect pelvic congestion  syndrome in the correct clinical setting. Suggestion of pancreatic divisum.       Problem List  Patient Active Problem List   Diagnosis    S/P colonoscopy    Vitamin D deficiency    Osteopenia    Prediabetes    Essential hypertension    Multiple thyroid nodules    Primary osteoarthritis of left knee    Primary osteoarthritis of right hip    Cough variant asthma    Hypothyroidism    Hypercalcemia    Mixed hyperlipidemia    Bilateral pulmonary embolism (HCC)    Acute pulmonary embolism (HCC)       ASSESSMENT AND PLAN:  38-year-old female with a history of HLD, HTN, hypothyroidism, hypercalcemia, osteoarthritis, thyroid nodules and vitamin D deficiency. 1. Bilateral PE with cor pulmonale and bilateral DVT:  -Currently on heparin drip. -ECHO showing some mild strain.  -Vascular surgery is following, no plans for direct thrombolysis. -Patient has high deductible plan and DOAC cost is $305/mo, she cannot afford this.   -Would recommend Lovenox 1 mg/kg SQ twice daily as a bridge as outpatient  - If insurance does not authorize twice daily dose of Lovenox, okay to do Lovenox 1.5 mg/kg subcu daily till INR is therapeutic  - Plan is for coumadin. 2. Enlarged thyroid:  -CTPA showing enlarged heterogenous thyroid with shifting of trachea, more enlarged compared to thyroid US done 9/2021.  -CT neck demonstrated multinodular goiter.   -Thyroid ultrasound will be done as an outpatient.  -Has been evaluated by ENT. 3. Anemia:  - Hemoglobin was normal in October 2022.  - Hemoglobin had dropped to 9.6.  - Iron studies are consistent with anemia of chronic disease and iron deficiency  - CT abdomen and pelvis showed no obvious evidence of bleeding or malignancy. Left renal lesions are probably nonspecific. MRI has been ordered. - GI following  - s/p EGD today that did not reveal any source of bleeding. Plans for colonoscopy tomorrow. ONCOLOGIC DISPOSITION:      CAREN Andrews - CNP  Please contact through [employer]     Patient was seen and examined. Agree with above. EGD done today. No source of bleeding. Colonoscopy planned for tomorrow.   MRI of the kidneys pending.     Janes Pedroza MD

## 2022-12-12 NOTE — RT PROTOCOL NOTE
RT Inhaler-Nebulizer Bronchodilator Protocol Note    There is a bronchodilator order in the chart from a provider indicating to follow the RT Bronchodilator Protocol and there is an Initiate RT Inhaler-Nebulizer Bronchodilator Protocol order as well (see protocol at bottom of note). CXR Findings:  No results found. The findings from the last RT Protocol Assessment were as follows:   History Pulmonary Disease: None or smoker <15 pack years  Respiratory Pattern: Regular pattern and RR 12-20 bpm  Breath Sounds: Slightly diminished and/or crackles  Cough: Strong, spontaneous, non-productive  Indication for Bronchodilator Therapy: On home bronchodilators  Bronchodilator Assessment Score: 2    Aerosolized bronchodilator medication orders have been revised according to the RT Inhaler-Nebulizer Bronchodilator Protocol below. Respiratory Therapist to perform RT Therapy Protocol Assessment initially then follow the protocol. Repeat RT Therapy Protocol Assessment PRN for score 0-3 or on second treatment, BID, and PRN for scores above 3. No Indications - adjust the frequency to every 6 hours PRN wheezing or bronchospasm, if no treatments needed after 48 hours then discontinue using Per Protocol order mode. If indication present, adjust the RT bronchodilator orders based on the Bronchodilator Assessment Score as indicated below. Use Inhaler orders unless patient has one or more of the following: on home nebulizer, not able to hold breath for 10 seconds, is not alert and oriented, cannot activate and use MDI correctly, or respiratory rate 25 breaths per minute or more, then use the equivalent nebulizer order(s) with same Frequency and PRN reasons based on the score. If a patient is on this medication at home then do not decrease Frequency below that used at home.     0-3 - enter or revise RT bronchodilator order(s) to equivalent RT Bronchodilator order with Frequency of every 4 hours PRN for wheezing or increased work of breathing using Per Protocol order mode. 4-6 - enter or revise RT Bronchodilator order(s) to two equivalent RT bronchodilator orders with one order with BID Frequency and one order with Frequency of every 4 hours PRN wheezing or increased work of breathing using Per Protocol order mode. 7-10 - enter or revise RT Bronchodilator order(s) to two equivalent RT bronchodilator orders with one order with TID Frequency and one order with Frequency of every 4 hours PRN wheezing or increased work of breathing using Per Protocol order mode. 11-13 - enter or revise RT Bronchodilator order(s) to one equivalent RT bronchodilator order with QID Frequency and an Albuterol order with Frequency of every 4 hours PRN wheezing or increased work of breathing using Per Protocol order mode. Greater than 13 - enter or revise RT Bronchodilator order(s) to one equivalent RT bronchodilator order with every 4 hours Frequency and an Albuterol order with Frequency of every 2 hours PRN wheezing or increased work of breathing using Per Protocol order mode. RT to enter RT Home Evaluation for COPD & MDI Assessment order using Per Protocol order mode.     Electronically signed by Eliel Ramey RCP on 12/11/2022 at 9:47 PM

## 2022-12-12 NOTE — PROCEDURES
Endoscopy Note    Patient: Olivia Traylor  : 1950  CSN:     Procedure: Esophagogastroduodenoscopy with biopsy    Date:  2022     Surgeon:  Waqar Lopes MD     Referring Physician: Simeon Hull and Sanjiv Solis     Preoperative Diagnosis: Iron deficiency anemia    Postoperative Diagnosis: #1 mild Candida esophagitis #2 distal web ring #3 2 cm hiatal hernia #4 mild gastritis with some small ulcerations in the antrum #5 normal duodenum    Anesthesia: Monitored anesthesia care    EBL: <5 mL    Indications: This is a 74y.o. year old female who presents to  Description of Procedure:  Informed consent was obtained from the patient after explanation of indications, benefits and possible risks and complications of the procedure. The patient was then taken to the endoscopy suite, placed in the left lateral decubitus position and the above IV sedation was administrered. The Olympus videoendoscope was passed through the hypopharynx     Posterior pharynx was normal    Esophagus there was mild Candida throughout the upper and middle esophagus the distal esophagus had a web ring that we did biopsy to break up there was a nonobstructing    Hiatal hernia measures 2 cm    Stomach there was no evidence of active bleeding there was inflammation there were small ulcerations throughout the antrum we did biopsy for H. Pylori    Duodenum was normal in the bulb to sweep the distal duodenum made her way past the third portion close to the fourth portion of the duodenum    Retroflexion did show the hiatal hernia      Gastric or Duodenal ulcer present: There were ulcerations      The patient tolerated the procedure well and was taken to the post anesthesia care unit in good condition. Impression: Antral ulcerations  Hiatal hernia  Nonobstructing ring web  Candida      Recommendations:  This time there is not a cause for the iron deficiency anemia in the upper gastrointestinal system    Although the patient has had a colonoscopy in October 2021 given the fact that she has had bilateral pulmonary embolisms she has DVTs we need to reevaluate her colon to make sure she is not having a colon cancer or something that would be causing the situation    Thank you for this kind referral    Papa Smith MD, MD  [facility]  [phone]

## 2022-12-12 NOTE — DISCHARGE INSTR - COC
Continuity of Care Form    Patient Name: Simeon Aquino   :  1950  MRN:  [id number]    Admit date:  2022  Discharge date:  22    Code Status Order: Full Code   Advance Directives:   [facility] Documentation       Date/Time Healthcare Directive Type of Healthcare Directive Copy in [address] Agent's Name Healthcare Agent's Phone Number    22 0845 No, patient does not have an advance directive for healthcare treatment -- -- -- -- --            Admitting Physician:  Dara Hendricks MD  PCP: Chino Avery MD    Discharging Nurse: Selwyn Lay RN  [address] Unit/Room#: 0CN-8470/7342-29  Discharging Unit Phone Number: [phone]    Emergency Contact:   Extended Emergency Contact Information  Primary Emergency Contact: Fernando Koenig   [address] Phone: [phone]  Relation: Spouse  Secondary Emergency Contact: [address] [place] [address] Phone: [phone]  Mobile Phone: [phone]  Relation: Child    Past Surgical History:  Past Surgical History:   Procedure Laterality Date    BREAST SURGERY      left breast in 's    [address]  2015    sigmoid resection w/ left salpingo-ooph    SMALL INTESTINE SURGERY  2015    Left salpingectomy    UPPER GASTROINTESTINAL ENDOSCOPY N/A 2022    EGD BIOPSY performed by Denys Avila MD at [address] [facility] ENDOSCOPY       Immunization History:   Immunization History   Administered Date(s) Administered    COVID-19, MODERNA BLUE border, Primary or Immunocompromised, (age 12y+), IM, 100 mcg/0.5mL 2021, 2021    COVID-19, MODERNA Booster BLUE border, (age 18y+), IM, 50mcg/0.25mL 2021    Influenza Vaccine, unspecified formulation 2015, 10/27/2016    Influenza Virus Vaccine 10/01/2012, 10/01/2013, 2014, 10/01/2016    Influenza, FLUAD, (age 72 y+), Adjuvanted, 0.5mL 10/12/2022    Influenza, FLUZONE (age 72 y+), High Dose, 0.7mL 08/18/2020, 10/20/2021    Influenza, High Dose (Fluzone 65 yrs and older) 11/11/2015, 11/12/2018, 11/22/2019, 10/13/2021    Pneumococcal Conjugate 13-valent ([id number]) 08/01/2014, 12/21/2015    Pneumococcal Polysaccharide ([id number]) 08/31/2014    Pneumococcal Vaccine 08/01/2014    Tdap (Boostrix, Adacel) 03/27/2017    Zoster Recombinant (Shingrix) 08/18/2020, 11/24/2020       Active Problems:  Patient Active Problem List   Diagnosis Code    S/P colonoscopy Z98.890    Vitamin D deficiency E55.9    Osteopenia M85.80    Prediabetes R73.03    Essential hypertension I10    Multiple thyroid nodules E04.2    Primary osteoarthritis of left knee M17.12    Primary osteoarthritis of right hip M16.11    Cough variant asthma J45.991    Hypothyroid E03.9    Hypercalcemia E83.52    Mixed hyperlipidemia E78.2    Bilateral pulmonary embolism (HCC) I26.99    Acute pulmonary embolism (HCC) I26.99    ISAMAR (iron deficiency anemia) D50.9    Kidney lesion, native, bilateral N28.9       Isolation/Infection:   Isolation            No Isolation          Patient Infection Status       Infection Onset Added Last Indicated Last Indicated By Review Planned Expiration Resolved Resolved By    None active    Resolved    COVID-19 (Rule Out) 12/07/22 12/07/22 12/07/22 COVID-19, Rapid (Ordered)   12/07/22 Rule-Out Test Resulted            Nurse Assessment:  Last Vital Signs: /77   Pulse 74   Temp 98.4 °F (36.9 °C) (Oral)   Resp 20   Ht 5' (1.524 m)   Wt 135 lb 12.8 oz (61.6 kg)   SpO2 95%   BMI 26.52 kg/m²     Last documented pain score (0-10 scale): Pain Level: 0  Last Weight:   Wt Readings from Last 1 Encounters:   12/12/22 135 lb 12.8 oz (61.6 kg)     Mental Status:  oriented and alert    IV Access:  - None    Nursing Mobility/ADLs:  Walking   Independent  Transfer  Independent  Bathing  Independent  Dressing  Independent  Toileting  Independent  Feeding  Independent  Med Admin  Independent  Med Delivery   whole    Wound Care Documentation and Therapy:        Elimination:  Continence: Bowel: Yes  Bladder: Yes  Urinary Catheter: None   Colostomy/Ileostomy/Ileal Conduit: No       Date of Last BM: 12/14/22    Intake/Output Summary (Last 24 hours) at 12/12/2022 1151  Last data filed at 12/12/2022 1003  Gross per 24 hour   Intake 100 ml   Output --   Net 100 ml     I/O last 3 completed shifts: In: 628 [P.O.:480; I.V.:148]  Out: -     Safety Concerns:     None    Impairments/Disabilities:      None    Nutrition Therapy:  Current Nutrition Therapy:   - Oral Diet:  General    Routes of Feeding: Oral  Liquids: Thin Liquids  Daily Fluid Restriction: no  Last Modified Barium Swallow with Video (Video Swallowing Test): not done    Treatments at the Time of Hospital Discharge:   Respiratory Treatments: na  Oxygen Therapy:  is not on home oxygen therapy. Ventilator:    - No ventilator support    Rehab Therapies: na  Weight Bearing Status/Restrictions: No weight bearing restrictions  Other Medical Equipment (for information only, NOT a DME order):  na  Other Treatments: na    Patient's personal belongings (please select all that are sent with patient):  None    RN SIGNATURE:  Electronically signed by Waleska Ragsdale RN on 12/14/22 at 2:42 PM EST    CASE MANAGEMENT/SOCIAL WORK SECTION    Inpatient Status Date: ***    Readmission Risk Assessment Score:  Readmission Risk              Risk of Unplanned Readmission:  13           Discharging to Facility/ Agency   Name:   Address:  Phone:  Fax:    Dialysis Facility (if applicable)   Name:  Address:  Dialysis Schedule:  Phone:  Fax:    / signature: {Esignature:[id number]}    PHYSICIAN SECTION    Prognosis: Fair    Condition at Discharge: Stable    Rehab Potential (if transferring to Rehab):  Fair    Recommended Labs or Other Treatments After Discharge: None    Physician Certification: I certify the above information and transfer of Rivera Cain  is necessary for the continuing treatment of the diagnosis listed and that she requires Home Care for greater 30 days.      Update Admission H&P: No change in H&P    PHYSICIAN SIGNATURE:  Electronically signed by Grace Escobedo MD on 12/12/22 at 11:51 AM EST

## 2022-12-12 NOTE — PROGRESS NOTES
Physical Therapy    [facility] [address]   Phone: [phone]    Physical Therapy    [] Initial Evaluation            [x] Daily Treatment Note         [] Discharge Summary      Patient: Joe Hester   : 1950   MRN: [id number]   Date of Service:  2022  Admitting Diagnosis: Bilateral pulmonary embolism [facility])  Current Admission Summary: Joe Hester is a 74 y.o. female with hx hypothyroid, HTN, and OA. Presents the emergency room for chest tightness and shortness of breath. She states yesterday she started developing shortness of breath upon exertion as well as chest tightness symptoms did improve with rest but became progressively worse today so she came to the emergency room. She denies any history of pulmonary embolus or DVT. She did travel to [address] [place] [address] at the beginning of November approximately 6 weeks ago. She denies any recent hospitalization or surgeries. She denies any weight changes. Past Medical History:  has a past medical history of Cough variant asthma, HTN (hypertension), Mixed hyperlipidemia, and Primary osteoarthritis of left knee. Past Surgical History:  has a past surgical history that includes Breast surgery; Small intestine surgery (2015);  section ([address] [place] [address]); Colon surgery (2015); and Upper gastrointestinal endoscopy (N/A, 2022). Discharge Recommendations: Joe Hester scored a 22/24 on the AM-PAC short mobility form. At this time, no further PT is recommended upon discharge due to safe with mobility, functioning at her baseline status. Recommend patient returns to prior setting with prior services. Please see assessment section for further patient specific details. If patient discharges prior to next session this note will serve as a discharge summary. Please see below for the latest assessment towards goals.      DME Required For Discharge: patient has all required DME for discharge  Precautions/Restrictions: high fall risk, up as tolerated  Weight Bearing Restrictions: no restrictions  [] Right Upper Extremity  [] Left Upper Extremity [] Right Lower Extremity  [] Left Lower Extremity     Required Braces/Orthotics: no braces required   [] Right  [] Left  Positional Restrictions:no positional restrictions    Pre-Admission Information   Lives With: spouse                  Type of Home: house  Home Layout: two level, laundry in basement  Home Access:  2 step to enter without handrail. Bathroom Layout: walk in shower  Bathroom Equipment:  no prior equipment  Toilet Height: standard height  Home Equipment: single point cane--uses 1-2x per week due to hip issues and/or SOB. Transfer Assistance: Independent without use of device  Ambulation Assistance:Independent without use of device  ADL Assistance: independent with all ADL's  IADL Assistance: independent with homemaking tasks -shares with   Active :        [x] Yes                 [] No  Hand Dominance: [] Left                 [x] Right  Current Employment: part time employment. Occupation: works from [employer]: gardening; going to [employer], being outside  [employer]: x1 recent fall several months ago--no injury. Pt amb with no AD normally and occasionally use of cane due to hip OA issues. IPLOF. Examination   Vision:   Vision Gross Assessment: Impaired and Vision Corrective Device: wears glasses at all times  Hearing:   [facility]  Observation:   General Observation:  1LO2  Posture:   Good postural alignment.   Sensation:   WFL  Proprioception:    WFL  Tone:   Normotonic  Coordination Testing:   WFL    ROM:   (B) LE AROM WFL  Strength:   (L) Hip: +4        (R) Hip: 4  (L) Knee: +4     (R) Knee: 4  (L) Ankle: +4     (R) Ankle: 4  Therapist Clinical Decision Making (Complexity): medium complexity  Clinical Presentation: stable      Subjective  General: Pt had endo this AM and further MRI testing this afternoon. Pt needing to use bathroom and denies pain at rest.   Pain: 0/10  Pain Interventions: repositioned        Functional Mobility  Bed Mobility  Supine to Sit: Independent  Sit to Supine: Independent  Scooting: Independent  Comments: Pt sat EOB with good balance. Transfers  Sit to stand transfer: supervision  Stand to sit transfer: supervision  Toilet transfer: supervision  Comments: Transfers from EOB and toilet. Ambulation  Surface:level surface  Assistive Device: no device  Assistance: stand by assistance  Distance: Pt amb 15 ft x 2  Gait Mechanics: slight increased lat sway with amb, mild L LE limp due to chronic OA  Comments:  Pt amb to bathroom, urinated and had BM. Pt performed her own hygiene. Pt stood at sink with supervision for hand washing and then returning to bed. Stair Mobility  Stair mobility not completed on this date. Comments:  Wheelchair Mobility:  No w/c mobility completed on this date. Comments:  Balance  Static Sitting Balance: good: independent with functional balance in unsupported position  Dynamic Sitting Balance: good: independent with functional balance in unsupported position  Static Standing Balance: fair (+): maintains balance at SBA/supervision without use of UE support  Dynamic Standing Balance: fair (+): maintains balance at SBA/supervision without use of UE support  Comments: lat sway with gait    Other Therapeutic Interventions  Tolieting independent, standing ADLs at sink supervision for balance. Pt assisted with undressing for MRI testing and heart monitor removed.    Functional Outcomes  AM-PAC Inpatient Mobility Raw Score : 22              Cognition  WFL  Orientation:    alert and oriented x 4  Command Following:   [facility]    Education  Barriers To Learning: none  Patient Education: patient educated on PT role and benefits, plan of care, discharge recommendations  Learning Assessment:  patient verbalizes and demonstrates understanding    Assessment  Activity Tolerance: Limited by endurance  Impairments Requiring Therapeutic Intervention: decreased functional mobility, decreased endurance  Prognosis: good  Clinical Assessment: Pt independent with bed mob, Supervision transfer and SBA gait with no AD needed. Pt presents with bilat PEs and DVT. Pt was independent with mobility and ADLs prior to this hospitalization. Pt with decreased endurance and needing O2 support currently, not functioning at her normal level. Pt would benefit from skilled PT services to address there issues. Safety Interventions: patient left in chair, call light within reach, gait belt, nurse notified, and family/caregiver present -    Plan  Frequency: 3-5 x/per week  Current Treatment Recommendations: strengthening, balance training, functional mobility training, transfer training, gait training, stair training, endurance training, and safety education    Goals  Patient Goals: to return home   Short Term Goals:  Time Frame:  To be met by DC  Patient will complete bed mobility at Independent - Goal met 12/12  Patient will complete transfers at modified independent   Patient will ambulate 300 ft with use of LRAD at modified independent  Patient will ascend/descend 12 stairs with (B) handrail at supervision    Therapy Session Time      Individual Group Co-treatment   Time In 1202       Time Out 1225       Minutes 23         Total Treatment Minutes:  23 minutes       Electronically Signed By: Hiral Garces, [id number]

## 2022-12-12 NOTE — PROGRESS NOTES
Reviewed patient's medical and surgical history in electronic record and with patient at the bedside. All questions regarding procedure answered. Scope number and equipment verified using a two person system. Family in waiting room.     Electronically signed by Lou Weber RN on 12/12/2022 at 9:50 AM

## 2022-12-12 NOTE — CARE COORDINATION
Prior Authorization sent via [facility]    Medication: enoxaparin 60mg/0.6ml syringes.  qty is for 60 syringes for 30 days (1 bid)    Key Code: [id number]    Electronically signed by Alvaro Ruiz RN on 12/12/2022 at 10:52 AM

## 2022-12-12 NOTE — PROGRESS NOTES
Order received from MD to restart the Heparin drip the same dose of this morning which was 11 ml/hr. Per pharmacy, continue heparin continuously as previous dose, and no need for every q6 PTT monitoring. See MAR.

## 2022-12-12 NOTE — PROGRESS NOTES
Nutrition Education    Pt triggered for education consult on coumadin nutrition therapy. Off unit for MRI upon RD visit. Left handout on coumadin and vitamin K intake with RD name and phone number if pt has any questions regarding material. Will provide verbal education as time permits.     Electronically signed by Kala Rowan, MS, RD, LD on 12/12/2022 at 2:01 PM   Contact: [phone]

## 2022-12-12 NOTE — PROGRESS NOTES
Hospitalist Progress Note      PCP: Quan Mckee MD    Date of Admission: 12/7/2022    Chief Complaint: SOB    Hospital Course: Jameel Gottlieb is a 74 y.o. female with hx hypothyroidism, thyroid nodules, HTN, and OA presented to the ED wi chest tightness and shortness of breath x 1 day. Work-up in the ER showed bilateral PEs with acute focal right heart strain. CT of the chest also demonstrated an enlarged with features concerning for thyroid malignancy. She mentioned 9 hour car trip about 6 weeks ago but denied recent surgeries or immobilization. No prior history of VTE. She denies any history of pulmonary embolus or DVT. Subjective: Patient seen and examined. No dyspnea and chest tightness. No lower extremity pain or swelling. Remains hemodynamically stable. On 1-2 L NC. Scheduled for colonoscopy tomorrow. Medications:  Reviewed    Infusion Medications    sodium chloride Stopped (12/11/22 2329)     Scheduled Medications    sodium chloride flush  5-40 mL IntraVENous 2 times per day    atenolol  25 mg Oral Daily    mometasone-formoterol  2 puff Inhalation BID    levothyroxine  75 mcg Oral Daily    NIFEdipine  30 mg Oral Daily     PRN Meds: sodium chloride flush, sodium chloride, ondansetron **OR** ondansetron, polyethylene glycol, acetaminophen **OR** acetaminophen, HYDROcodone 5 mg - acetaminophen, perflutren lipid microspheres, albuterol sulfate HFA      Intake/Output Summary (Last 24 hours) at 12/12/2022 1200  Last data filed at 12/12/2022 1003  Gross per 24 hour   Intake 100 ml   Output --   Net 100 ml       Physical Exam Performed:    /77   Pulse 74   Temp 98.4 °F (36.9 °C) (Oral)   Resp 20   Ht 5' (1.524 m)   Wt 135 lb 12.8 oz (61.6 kg)   SpO2 95%   BMI 26.52 kg/m²     General appearance: No apparent distress, appears stated age and cooperative. HEENT: Pupils equal, round, and reactive to light. Conjunctivae clear.   Neck: Supple, with full range of motion. No jugular venous distention. Trachea midline. Respiratory:  Normal respiratory effort. Clear to auscultation, bilaterally without Rales/Wheezes/Rhonchi. Cardiovascular: Regular rate and rhythm with normal S1/S2 without murmurs, rubs or gallops. Abdomen: Soft, non-tender, non-distended with normal bowel sounds. Musculoskeletal: No clubbing, cyanosis or edema bilaterally. Full range of motion without deformity. Skin: Skin color, texture, turgor normal.  No rashes or lesions. Neurologic:  Neurovascularly intact without any focal sensory/motor deficits. Cranial nerves: II-XII intact, grossly non-focal.  Psychiatric: Alert and oriented, thought content appropriate, normal insight  Capillary Refill: Brisk, 3 seconds, normal   Peripheral Pulses: +2 palpable, equal bilaterally       Labs:   Recent Labs     12/10/22  0458 12/11/22  0613 12/12/22  0535   WBC 11.6* 11.0 10.7   HGB 9.6* 9.7* 9.5*   HCT 28.4* 29.0* 27.9*   * 641* 637*     Recent Labs     12/10/22  0458 12/11/22  0614 12/12/22  0535    135* 138   K 3.8 3.6 4.0   CL 99 98* 102   CO2 29 29 27   BUN 14 12 14   CREATININE 0.7 0.7 0.7   CALCIUM 10.2 10.4 9.9   PHOS 2.7 2.7 2.9     Recent Labs     12/10/22  0458 12/11/22  0614 12/12/22  0535   AST 21 19 36   ALT 20 21 25   BILITOT 0.3 0.4 0.3   ALKPHOS 68 62 66     No results for input(s): INR in the last 72 hours. No results for input(s): Pedrito Avery in the last 72 hours. Urinalysis:      Lab Results   Component Value Date/Time    NITRU Negative 10/18/2022 12:04 PM    [address] 10/18/2022 12:04 PM    BACTERIA None Seen 10/18/2022 12:04 PM    RBCUA 3 10/18/2022 12:04 PM    BLOODU Negative 10/18/2022 12:04 PM    SPECGRAV 1.016 10/18/2022 12:04 PM    GLUCOSEU Negative 10/18/2022 12:04 PM    GLUCOSEU NEGATIVE 08/10/2011 08:45 AM       Radiology:  CT ABDOMEN PELVIS W IV CONTRAST Additional Contrast? Oral   Preliminary Result   Indeterminate left renal lesions.   Consider follow-up MRI with dedicated   renal protocol for further evaluation. A punctate hyperdense stone in the   superior pole of the left kidney without evidence of hydronephrosis seen. Pulmonary artery emboli are better seen on the recent chest CT. Atelectasis/opacity of the lung bases may reflect developing areas of lung   infarct or pneumonia. Prominent left periuterine vessel, which may reflect pelvic congestion   syndrome in the correct clinical setting. Suggestion of pancreatic divisum. CT SOFT TISSUE NECK W CONTRAST   Final Result   Multinodular goiter. Please refer to the thyroid ultrasound report from 09/30/2021 for further   details and follow-up recommendations. Right upper lobe PE. Please refer to the separately dictated CT chest report   from 12/07/2022 for further details and follow-up recommendations. VL Extremity Venous Bilateral         CT CHEST PULMONARY EMBOLISM W CONTRAST   Final Result   Positive for pulmonary embolism within all the lobes except the left lower   lobe. Equivocal right heart strain with the right ventricle a few mm larger than   the left. However, other signs of right heart strain are not seen. Peripheral airspace disease in the lower lungs, greater on the right, most   likely pulmonary infarct, but pneumonia remains in the differential.      Thyroid is very enlarged and heterogeneous, and has an infiltrative   appearance, with loss of the fat planes in the lower neck. Thyroid appears   increased in size when compared to the previous ultrasound. Findings are   worrisome for thyroid malignancy. Further evaluation with neck CT and   thyroid ultrasound is recommended. Findings were discussed with Joo Marks at 8:30 pm on 12/7/2022. XR CHEST PORTABLE   Final Result   Mild bibasilar airspace disease, likely atelectasis versus less likely   pneumonia or edema.       Possible small left pleural effusion versus airspace disease or scarring   within the left costophrenic angle. US THYROID    (Results Pending)   MRI ABDOMEN W CONTRAST    (Results Pending)       IP CONSULT TO HOSPITALIST  IP CONSULT TO OTOLARYNGOLOGY  IP CONSULT TO VASCULAR SURGERY  IP CONSULT TO ONCOLOGY  IP CONSULT TO PHARMACY  IP CONSULT TO GI  IP CONSULT TO FINANCIAL COUNSELOR  IP CONSULT TO DIETITIAN  IP CONSULT TO HOME CARE NEEDS    Assessment/Plan:    Active Hospital Problems    Diagnosis     ISAMAR (iron deficiency anemia) [D50.9]      Priority: Medium    Kidney lesion, native, bilateral [N28.9]      Priority: Medium    Acute pulmonary embolism (HCC) [I26.99]      Priority: Medium    Bilateral pulmonary embolism (HCC) [I26.99]      Priority: Medium    Hypothyroid [E03.9]      Bilateral Pulmonary Embolus with Heart Strain on CT:  Acute DVT bilateral lower extremities:  Venous duplex showing R peroneal DVT and left PT and peroneal DVT. CT chest: PE within all the lobes except the LLL and echo both: Right heart strain. Vascular consulted: Appreciate input. No indication for pulmonary artery thrombolysis at this time. Continue Heparin drip until colonoscopy completed. High co-pays for DOAC. Lovenox 1 mg/kg SQ twice daily as a bridge as outpatient      Enlarged Heterogenous Thyroid with Shifting of Trachea:  Patient with known hx of thyroid nodules. CT noted more enlarged then previous ultrasound worrisome findings suggestive of malignancy. ENT consulted: Recommended outpatient thyroid ultrasound with close follow-up as patient. She will need transfer to  for further management. TSH wnl. Continue synthroid      Left renal lesion:  Noted incidentally on CT A/P: Indeterminate 1.6 cm and 1.7 cm lesions on the left kidney. 2.1 cm-dense lesion of the right kidney. MRI with renal protocol recommended. Anemia:   Work up c/w with AoCD and ISAMAR. Started on Iron replacement. GI consulted: Last colonoscopy last year unremarkable.    s/p EGD 12/12/2022 with antral ulcers, hiatal hernia, nonobstructing ring web and Candida. Started on fluconazole. GI recommending repeat colonoscopy. Scheduled for tomorrow. Hypertension: Monitor BP on atenolol and Procardia. Hypokalemia: Replaced. Monitor mag and K level. DVT Prophylaxis: Heparin drip  Diet: ADULT DIET;  Clear Liquid  Code Status: Full Code  PT/OT Eval Status: Independent    Dispo -once medically stable      Carmelo Ortega MD

## 2022-12-12 NOTE — PROGRESS NOTES
Patient's awake and alert. On room air. NSR on the monitor. VSS. Abdomen soft. Denies pain. Patient meets criteria to be discharged from phase 1.  Will prepare for transfer to room

## 2022-12-12 NOTE — H&P
Gastroenterology Note             Pre-operative History and Physical    Patient: Ad Wilson  : 1950  CSN:     History Obtained From:  patient and/or guardian. HISTORY OF PRESENT ILLNESS:    The patient is a 74 y.o. female  here for EGD  This is a very pleasant 33-year-old female was iron deficient she had a colonoscopy 1 year ago by Dr. Cata Mata which did not show any evidence of colon cancers or colon polyps or doing an upper endoscopy today to evaluate    Past Medical History:    Past Medical History:   Diagnosis Date    Cough variant asthma 2020    HTN (hypertension) 2011    Mixed hyperlipidemia 10/18/2022    Primary osteoarthritis of left knee 2018     Past Surgical History:    Past Surgical History:   Procedure Laterality Date    BREAST SURGERY      left breast in 's    [facility] [address]  2015    sigmoid resection w/ left salpingo-ooph    SMALL INTESTINE SURGERY  2015    Left salpingectomy     Medications Prior to Admission:   No current facility-administered medications on file prior to encounter.      Current Outpatient Medications on File Prior to Encounter   Medication Sig Dispense Refill    atenolol (TENORMIN) 25 MG tablet Take 1 tablet by mouth daily 90 tablet 3    levothyroxine (EUTHYROX) 75 MCG tablet Take 1 tablet by mouth Daily Stop 88 mcg 90 tablet 0    fluvastatin (LESCOL) 20 MG capsule Take 1 capsule by mouth nightly 90 capsule 3    NIFEdipine (PROCARDIA XL) 30 MG extended release tablet Take 1 tablet by mouth once daily 90 tablet 1    chlorthalidone (HYGROTON) 25 MG tablet Take 1 tablet by mouth once daily 90 tablet 1    budesonide-formoterol (SYMBICORT) 160-4.5 MCG/ACT AERO Inhale 2 puffs into the lungs 2 times daily 1 each 5    acyclovir (ZOVIRAX) 400 MG tablet Take 1 tablet by mouth 2 times daily as needed      estradiol (ESTRACE) 0.1 MG/GM vaginal cream Place 0.5 g vaginally Twice a Week 1 Tube 3    Cholecalciferol (VITAMIN D) 2000 UNITS CAPS capsule Take 2 capsules by mouth daily Remind patient, can purchase over the counter and also purchase Caltrate D 600mg/ 400 iu and take bid. 60 capsule 11        Allergies:  Ace inhibitors, Amlodipine besy-benazepril hcl, Amlodipine besy-benazepril hcl, Crestor [rosuvastatin], Benicar [olmesartan], and Clonidine derivatives      Social History:   Social History     Tobacco Use    Smoking status: Never    Smokeless tobacco: Never   Substance Use Topics    Alcohol use: Not Currently     Family History:   Family History   Problem Relation Age of Onset    High Blood Pressure Father     Cancer Father 80        lung and kidney primary cancers. PHYSICAL EXAM:      BP (!) 147/78   Pulse 76   Temp 97.2 °F (36.2 °C) (Temporal)   Resp 20   Ht 5' (1.524 m)   Wt 135 lb 12.8 oz (61.6 kg)   SpO2 95%   BMI 26.52 kg/m²  I        Heart:   RRR, normal s1s2    Lungs:  CTA bilat,  Normal effort    Abdomen:   NT, ND      ASA Grade:  ASA 3 - Patient with moderate systemic disease with functional limitations    Mallampati Class: 2          ASSESSMENT AND PLAN:    1. Patient is a 74 y.o. female here for EGD with MAC.   2.  Procedure options, risks and benefits reviewed with patient. Patient expresses understanding.     Kaya Alvarez MD,   [facility]  12/12/2022

## 2022-12-12 NOTE — ANESTHESIA POSTPROCEDURE EVALUATION
Department of Anesthesiology  Postprocedure Note    Patient: Ramona Kwan  MRN: [id number]  YOB: 1950  Date of evaluation: 12/12/2022      Procedure Summary     Date: 12/12/22 Room / Location: [address]    Anesthesia Start: 2023 Anesthesia Stop: 1003    Procedure: EGD BIOPSY (Abdomen) Diagnosis:       Anemia, unspecified type      (Anemia, unspecified type [D64.9])    Surgeons: Mati Fu MD Responsible Provider: Ariana Awan MD    Anesthesia Type: MAC ASA Status: 3          Anesthesia Type: No value filed.     Kelby Phase I: Kelby Score: 9    Kelby Phase II:        Anesthesia Post Evaluation    Patient location during evaluation: PACU  Patient participation: complete - patient participated  Level of consciousness: awake and alert  Pain score: 2  Airway patency: patent  Nausea & Vomiting: no vomiting  Complications: no  Cardiovascular status: blood pressure returned to baseline  Respiratory status: acceptable  Hydration status: euvolemic  Multimodal analgesia pain management approach

## 2022-12-13 ENCOUNTER — ANESTHESIA EVENT (OUTPATIENT)
Dept: ENDOSCOPY | Age: 72
DRG: 175 | End: 2022-12-13
Payer: MEDICARE

## 2022-12-13 ENCOUNTER — ANESTHESIA (OUTPATIENT)
Dept: ENDOSCOPY | Age: 72
DRG: 175 | End: 2022-12-13
Payer: MEDICARE

## 2022-12-13 LAB
A/G RATIO: 0.9 (ref 1.1–2.2)
ALBUMIN SERPL-MCNC: 3.1 G/DL (ref 3.4–5)
ALP BLD-CCNC: 67 U/L (ref 40–129)
ALT SERPL-CCNC: 40 U/L (ref 10–40)
ANION GAP SERPL CALCULATED.3IONS-SCNC: 9 MMOL/L (ref 3–16)
ANTI-XA UNFRAC HEPARIN: 0.52 IU/ML (ref 0.3–0.7)
ANTI-XA UNFRAC HEPARIN: 0.57 IU/ML (ref 0.3–0.7)
ANTI-XA UNFRAC HEPARIN: 0.64 IU/ML (ref 0.3–0.7)
ANTI-XA UNFRAC HEPARIN: <0.1 IU/ML (ref 0.3–0.7)
AST SERPL-CCNC: 52 U/L (ref 15–37)
BILIRUB SERPL-MCNC: <0.2 MG/DL (ref 0–1)
BUN BLDV-MCNC: 7 MG/DL (ref 7–20)
CALCIUM SERPL-MCNC: 10.4 MG/DL (ref 8.3–10.6)
CHLORIDE BLD-SCNC: 106 MMOL/L (ref 99–110)
CO2: 24 MMOL/L (ref 21–32)
CREAT SERPL-MCNC: 0.6 MG/DL (ref 0.6–1.2)
GFR SERPL CREATININE-BSD FRML MDRD: >60 ML/MIN/{1.73_M2}
GLUCOSE BLD-MCNC: 106 MG/DL (ref 70–99)
HCT VFR BLD CALC: 28.9 % (ref 36–48)
HEMOGLOBIN: 9.7 G/DL (ref 12–16)
INR BLD: 1.14 (ref 0.87–1.14)
MAGNESIUM: 1.6 MG/DL (ref 1.8–2.4)
MCH RBC QN AUTO: 28.7 PG (ref 26–34)
MCHC RBC AUTO-ENTMCNC: 33.6 G/DL (ref 31–36)
MCV RBC AUTO: 85.2 FL (ref 80–100)
PDW BLD-RTO: 15.6 % (ref 12.4–15.4)
PHOSPHORUS: 2.3 MG/DL (ref 2.5–4.9)
PLATELET # BLD: 669 K/UL (ref 135–450)
PMV BLD AUTO: 7.5 FL (ref 5–10.5)
POTASSIUM SERPL-SCNC: 3.7 MMOL/L (ref 3.5–5.1)
PROTHROMBIN TIME: 14.5 SEC (ref 11.7–14.5)
RBC # BLD: 3.4 M/UL (ref 4–5.2)
SODIUM BLD-SCNC: 139 MMOL/L (ref 136–145)
TOTAL PROTEIN: 6.5 G/DL (ref 6.4–8.2)
WBC # BLD: 13.8 K/UL (ref 4–11)

## 2022-12-13 PROCEDURE — 6370000000 HC RX 637 (ALT 250 FOR IP): Performed by: INTERNAL MEDICINE

## 2022-12-13 PROCEDURE — 3700000000 HC ANESTHESIA ATTENDED CARE: Performed by: INTERNAL MEDICINE

## 2022-12-13 PROCEDURE — 2709999900 HC NON-CHARGEABLE SUPPLY: Performed by: INTERNAL MEDICINE

## 2022-12-13 PROCEDURE — 88305 TISSUE EXAM BY PATHOLOGIST: CPT

## 2022-12-13 PROCEDURE — 80053 COMPREHEN METABOLIC PANEL: CPT

## 2022-12-13 PROCEDURE — 6360000002 HC RX W HCPCS: Performed by: INTERNAL MEDICINE

## 2022-12-13 PROCEDURE — 6370000000 HC RX 637 (ALT 250 FOR IP): Performed by: NURSE PRACTITIONER

## 2022-12-13 PROCEDURE — 94680 O2 UPTK RST&XERS DIR SIMPLE: CPT

## 2022-12-13 PROCEDURE — 94640 AIRWAY INHALATION TREATMENT: CPT

## 2022-12-13 PROCEDURE — 7100000000 HC PACU RECOVERY - FIRST 15 MIN: Performed by: INTERNAL MEDICINE

## 2022-12-13 PROCEDURE — 85027 COMPLETE CBC AUTOMATED: CPT

## 2022-12-13 PROCEDURE — 97116 GAIT TRAINING THERAPY: CPT

## 2022-12-13 PROCEDURE — 36415 COLL VENOUS BLD VENIPUNCTURE: CPT

## 2022-12-13 PROCEDURE — 6360000002 HC RX W HCPCS: Performed by: ANESTHESIOLOGY

## 2022-12-13 PROCEDURE — 85610 PROTHROMBIN TIME: CPT

## 2022-12-13 PROCEDURE — 0DBN8ZZ EXCISION OF SIGMOID COLON, VIA NATURAL OR ARTIFICIAL OPENING ENDOSCOPIC: ICD-10-PCS | Performed by: INTERNAL MEDICINE

## 2022-12-13 PROCEDURE — 94761 N-INVAS EAR/PLS OXIMETRY MLT: CPT

## 2022-12-13 PROCEDURE — 3609010300 HC COLONOSCOPY W/BIOPSY SINGLE/MULTIPLE: Performed by: INTERNAL MEDICINE

## 2022-12-13 PROCEDURE — 84100 ASSAY OF PHOSPHORUS: CPT

## 2022-12-13 PROCEDURE — 85520 HEPARIN ASSAY: CPT

## 2022-12-13 PROCEDURE — 3700000001 HC ADD 15 MINUTES (ANESTHESIA): Performed by: INTERNAL MEDICINE

## 2022-12-13 PROCEDURE — 7100000001 HC PACU RECOVERY - ADDTL 15 MIN: Performed by: INTERNAL MEDICINE

## 2022-12-13 PROCEDURE — 1200000000 HC SEMI PRIVATE

## 2022-12-13 PROCEDURE — 2580000003 HC RX 258: Performed by: INTERNAL MEDICINE

## 2022-12-13 PROCEDURE — 83735 ASSAY OF MAGNESIUM: CPT

## 2022-12-13 RX ORDER — MAGNESIUM SULFATE IN WATER 40 MG/ML
2000 INJECTION, SOLUTION INTRAVENOUS ONCE
Status: COMPLETED | OUTPATIENT
Start: 2022-12-13 | End: 2022-12-13

## 2022-12-13 RX ORDER — PROPOFOL 10 MG/ML
INJECTION, EMULSION INTRAVENOUS PRN
Status: DISCONTINUED | OUTPATIENT
Start: 2022-12-13 | End: 2022-12-13 | Stop reason: SDUPTHER

## 2022-12-13 RX ADMIN — HEPARIN SODIUM 25 UNITS/KG/HR: 10000 INJECTION, SOLUTION INTRAVENOUS at 20:58

## 2022-12-13 RX ADMIN — POLYETHYLENE GLYCOL 3350, SODIUM SULFATE, SODIUM CHLORIDE, POTASSIUM CHLORIDE, ASCORBIC ACID, SODIUM ASCORBATE 100 G: KIT at 05:13

## 2022-12-13 RX ADMIN — ATENOLOL 25 MG: 25 TABLET ORAL at 08:47

## 2022-12-13 RX ADMIN — FLUCONAZOLE 200 MG: 100 TABLET ORAL at 08:46

## 2022-12-13 RX ADMIN — PROPOFOL 50 MG: 10 INJECTION, EMULSION INTRAVENOUS at 14:04

## 2022-12-13 RX ADMIN — PROPOFOL 50 MG: 10 INJECTION, EMULSION INTRAVENOUS at 13:58

## 2022-12-13 RX ADMIN — PROPOFOL 50 MG: 10 INJECTION, EMULSION INTRAVENOUS at 14:11

## 2022-12-13 RX ADMIN — HEPARIN SODIUM 4930 UNITS: 1000 INJECTION INTRAVENOUS; SUBCUTANEOUS at 16:38

## 2022-12-13 RX ADMIN — SODIUM CHLORIDE: 9 INJECTION, SOLUTION INTRAVENOUS at 09:54

## 2022-12-13 RX ADMIN — SODIUM CHLORIDE: 9 INJECTION, SOLUTION INTRAVENOUS at 20:45

## 2022-12-13 RX ADMIN — MAGNESIUM SULFATE HEPTAHYDRATE 2000 MG: 40 INJECTION, SOLUTION INTRAVENOUS at 11:13

## 2022-12-13 RX ADMIN — PROPOFOL 50 MG: 10 INJECTION, EMULSION INTRAVENOUS at 14:07

## 2022-12-13 RX ADMIN — NIFEDIPINE 30 MG: 30 TABLET, EXTENDED RELEASE ORAL at 08:46

## 2022-12-13 RX ADMIN — PROPOFOL 50 MG: 10 INJECTION, EMULSION INTRAVENOUS at 14:01

## 2022-12-13 RX ADMIN — LEVOTHYROXINE SODIUM 75 MCG: 0.07 TABLET ORAL at 05:13

## 2022-12-13 RX ADMIN — Medication 2 PUFF: at 07:29

## 2022-12-13 RX ADMIN — Medication 2 PUFF: at 21:20

## 2022-12-13 RX ADMIN — PROPOFOL 50 MG: 10 INJECTION, EMULSION INTRAVENOUS at 13:55

## 2022-12-13 ASSESSMENT — PAIN - FUNCTIONAL ASSESSMENT: PAIN_FUNCTIONAL_ASSESSMENT: 0-10

## 2022-12-13 NOTE — PROGRESS NOTES
Physical Therapy    [facility] [address]   Phone: [phone]    Physical Therapy    [] Initial Evaluation            [x] Daily Treatment Note         [] Discharge Summary      Patient: Jocelynn Allison   : 1950   MRN: [id number]   Date of Service:  2022  Admitting Diagnosis: Bilateral pulmonary embolism [facility])  Current Admission Summary: Jocelynn Allison is a 74 y.o. female with hx hypothyroid, HTN, and OA. Presents the emergency room for chest tightness and shortness of breath. She states yesterday she started developing shortness of breath upon exertion as well as chest tightness symptoms did improve with rest but became progressively worse today so she came to the emergency room. She denies any history of pulmonary embolus or DVT. She did travel to [address] [place] [address] at the beginning of November approximately 6 weeks ago. She denies any recent hospitalization or surgeries. She denies any weight changes. Past Medical History:  has a past medical history of Cough variant asthma, HTN (hypertension), Mixed hyperlipidemia, and Primary osteoarthritis of left knee. Past Surgical History:  has a past surgical history that includes Breast surgery; Small intestine surgery (2015);  section ([address] [place] [address]); Colon surgery (2015); and Upper gastrointestinal endoscopy (N/A, 2022). Discharge Recommendations: Jocelynn Allison scored a 23/24 on the AM-PAC short mobility form. At this time, no further PT is recommended upon discharge due to safe with mobility, functioning at her baseline status. Recommend patient returns to prior setting with prior services. Please see assessment section for further patient specific details. If patient discharges prior to next session this note will serve as a discharge summary. Please see below for the latest assessment towards goals.      DME Required For Discharge: patient has all required DME for discharge  Precautions/Restrictions: high fall risk, up as tolerated  Weight Bearing Restrictions: no restrictions  [] Right Upper Extremity  [] Left Upper Extremity [] Right Lower Extremity  [] Left Lower Extremity     Required Braces/Orthotics: no braces required   [] Right  [] Left  Positional Restrictions:no positional restrictions    Pre-Admission Information   Lives With: spouse                  Type of Home: house  Home Layout: two level, laundry in basement  Home Access:  2 step to enter without handrail. Bathroom Layout: walk in shower  Bathroom Equipment:  no prior equipment  Toilet Height: standard height  Home Equipment: single point cane--uses 1-2x per week due to hip issues and/or SOB. Transfer Assistance: Independent without use of device  Ambulation Assistance:Independent without use of device  ADL Assistance: independent with all ADL's  IADL Assistance: independent with homemaking tasks -shares with   Active :        [x] Yes                 [] No  Hand Dominance: [] Left                 [x] Right  Current Employment: part time employment. Occupation: works from [employer]: gardening; going to [employer], being outside  [employer]: x1 recent fall several months ago--no injury. Pt amb with no AD normally and occasionally use of cane due to hip OA issues. IPLOF. Examination   Vision:   Vision Gross Assessment: Impaired and Vision Corrective Device: wears glasses at all times  Hearing:   [facility]  Observation:   General Observation:  1LO2  Posture:   Good postural alignment. Sensation:   WFL  Proprioception:    WFL  Tone:   Normotonic  Coordination Testing:   WFL    ROM:   (B) LE AROM WFL  Strength:   (L) Hip: +4        (R) Hip: 4  (L) Knee: +4     (R) Knee: 4  (L) Ankle: +4     (R) Ankle: 4  Therapist Clinical Decision Making (Complexity): medium complexity  Clinical Presentation: stable      Subjective  General: Pt having colonoscopy this afternoon.  Pt wanting to go for a walk. Pain: 0/10  Pain Interventions: repositioned        Functional Mobility  Bed Mobility  Scooting: Independent  Comments: Pt scooting forward and back in chair. Transfers  Sit to stand transfer: modified independent  Stand to sit transfer: modified independent  Comments: Transfers from recliner chair. Ambulation  Surface:level surface  Assistive Device: no device  Assistance: supervision  Distance: 400 ft  Gait Mechanics: slow rossi, slight increased lat sway with amb, mild L LE limp due to chronic OA, assist for IV pole  Comments:  Pt amb in hallway for approx 10 min. No SOB noted and pt 97% O2 on RA at end of activity, HR 70. Stair Mobility  Stair mobility not completed on this date. Comments:  Wheelchair Mobility:  No w/c mobility completed on this date. Comments:  Balance  Static Sitting Balance: good: independent with functional balance in unsupported position  Dynamic Sitting Balance: good: independent with functional balance in unsupported position  Static Standing Balance: fair (+): maintains balance at SBA/supervision without use of UE support  Dynamic Standing Balance: fair (+): maintains balance at SBA/supervision without use of UE support  Comments: lat sway with gait    Other Therapeutic Interventions  Pt able to bertram brief independently while seated in chair. Assisted donning gown as a robe. Discussed PT recommendations.    Functional Outcomes  AM-PAC Inpatient Mobility Raw Score : 23              Cognition  WFL  Orientation:    alert and oriented x 4  Command Following:   [facility]    Education  Barriers To Learning: none  Patient Education: patient educated on PT role and benefits, plan of care, discharge recommendations  Learning Assessment:  patient verbalizes and demonstrates understanding    Assessment  Activity Tolerance: Limited by endurance  Impairments Requiring Therapeutic Intervention: decreased functional mobility, decreased endurance  Prognosis: good  Clinical Assessment: Pt independent with bed mob,  mod I transfer and Supervision gait with no AD needed. Pt needs to amb stairs and then safe for DC home. Pt presents with bilat PEs and DVT. Pt was independent with mobility and ADLs prior to this hospitalization. Pt with decreased endurance and needing O2 support currently, not functioning at her normal level. Pt would benefit from skilled PT services to address there issues. Safety Interventions: patient left in chair, call light within reach, gait belt, and nurse notified     Plan  Frequency: 3-5 x/per week  Current Treatment Recommendations: strengthening, balance training, functional mobility training, transfer training, gait training, stair training, endurance training, and safety education    Goals  Patient Goals: to return home   Short Term Goals:  Time Frame:  To be met by DC  Patient will complete bed mobility at Independent - Goal met 12/12  Patient will complete transfers at modified independent -Goal met 12/13  Patient will ambulate 300 ft with use of LRAD at modified independent  Patient will ascend/descend 12 stairs with (B) handrail at supervision    Therapy Session Time      Individual Group Co-treatment   Time In 1124       Time Out 1137       Minutes 13         Total Treatment Minutes:  13 minutes       Electronically Signed By: Hiral Garces [id number]

## 2022-12-13 NOTE — H&P
Gastroenterology Note             Pre-operative History and Physical    Patient: Roxie Marroquin  : 1950  CSN:     History Obtained From:  patient and/or guardian. HISTORY OF PRESENT ILLNESS:    The patient is a 74 y.o. female  here for /colonoscopy. This is a very pleasant patient who has had about bilateral pulmonary embolism she is got DVTs and she has and iron deficiency yesterday we did her upper endoscopy did not find a reason she had a colonoscopy about 13-14 months ago I think we have to repeat her colonoscopy to make sure that were not missing anything    Past Medical History:    Past Medical History:   Diagnosis Date    Cough variant asthma 2020    HTN (hypertension) 2011    Mixed hyperlipidemia 10/18/2022    Primary osteoarthritis of left knee 2018     Past Surgical History:    Past Surgical History:   Procedure Laterality Date    BREAST SURGERY      left breast in 's    [address]  2015    sigmoid resection w/ left salpingo-ooph    SMALL INTESTINE SURGERY  2015    Left salpingectomy    UPPER GASTROINTESTINAL ENDOSCOPY N/A 2022    EGD BIOPSY performed by Clarita Baron MD at [address] [place] [address]     Medications Prior to Admission:   No current facility-administered medications on file prior to encounter.      Current Outpatient Medications on File Prior to Encounter   Medication Sig Dispense Refill    atenolol (TENORMIN) 25 MG tablet Take 1 tablet by mouth daily 90 tablet 3    levothyroxine (EUTHYROX) 75 MCG tablet Take 1 tablet by mouth Daily Stop 88 mcg 90 tablet 0    fluvastatin (LESCOL) 20 MG capsule Take 1 capsule by mouth nightly 90 capsule 3    NIFEdipine (PROCARDIA XL) 30 MG extended release tablet Take 1 tablet by mouth once daily 90 tablet 1    chlorthalidone (HYGROTON) 25 MG tablet Take 1 tablet by mouth once daily 90 tablet 1    budesonide-formoterol (SYMBICORT) 160-4.5 MCG/ACT AERO Inhale 2 puffs into the lungs 2 times daily 1 each 5    acyclovir (ZOVIRAX) 400 MG tablet Take 1 tablet by mouth 2 times daily as needed      estradiol (ESTRACE) 0.1 MG/GM vaginal cream Place 0.5 g vaginally Twice a Week 1 Tube 3    Cholecalciferol (VITAMIN D) 2000 UNITS CAPS capsule Take 2 capsules by mouth daily Remind patient, can purchase over the counter and also purchase Caltrate D 600mg/ 400 iu and take bid. 60 capsule 11        Allergies:  Ace inhibitors, Amlodipine besy-benazepril hcl, Amlodipine besy-benazepril hcl, Crestor [rosuvastatin], Benicar [olmesartan], and Clonidine derivatives      Social History:   Social History     Tobacco Use    Smoking status: Never    Smokeless tobacco: Never   Substance Use Topics    Alcohol use: Not Currently     Family History:   Family History   Problem Relation Age of Onset    High Blood Pressure Father     Cancer Father 80        lung and kidney primary cancers. PHYSICAL EXAM:      /74   Pulse 70   Temp 97.6 °F (36.4 °C) (Temporal)   Resp 23   Ht 5' (1.524 m)   Wt 135 lb 12.8 oz (61.6 kg)   SpO2 97%   BMI 26.52 kg/m²  I        Heart:   RRR, normal s1s2    Lungs:  CTA bilat,  Normal effort    Abdomen:   NT, ND      ASA Grade:  ASA 4 - Patient with severe systemic disease that is a constant threat to life    Mallampati Class: 2          ASSESSMENT AND PLAN:    1. Patient is a 74 y.o. female here for /Colonoscopy with MAC.   2.  Procedure options, risks and benefits reviewed with patient. Patient expresses understanding.     Khadijah Cowan MD,   [address]  12/13/2022

## 2022-12-13 NOTE — PROGRESS NOTES
12/13/22 1148   Resting (Room Air)   SpO2 94   During Walk (Room Air)   SpO2 96   Walk/Assistance Device Ambulation   Rate of Dyspnea 0   During Walk (On O2)   Need Additional O2 Flow Rate Rows No   After Walk   SpO2 96   O2 Flow Rate (l/min)   (RA)   Rate of Dyspnea 0   Does the Patient Qualify for Home O2 No   Does the Patient Need Portable Oxygen Tanks No

## 2022-12-13 NOTE — PROGRESS NOTES
Pt stable and able to be transferred from PACU to room 3328. A&O , VSS, with no complaints at this time. 3A called and notified that pt is being transferred out of PACU and to room.

## 2022-12-13 NOTE — ANESTHESIA PRE PROCEDURE
Department of Anesthesiology  Preprocedure Note       Name:  Gera Johns   Age:  74 y.o.  :  1950                                          MRN:  [id number]         Date:  2022      Surgeon: Veronica Taylor):  Kalie Caraballo MD    Procedure: Procedure(s):  COLONOSCOPY DIAGNOSTIC    Medications prior to admission:   Prior to Admission medications    Medication Sig Start Date End Date Taking?  Authorizing Provider   enoxaparin (LOVENOX) 60 MG/0.6ML Inject 0.6 mLs into the skin 2 times daily 22 Yes Alcide Goldmann, MD   atenolol (TENORMIN) 25 MG tablet Take 1 tablet by mouth daily 10/19/22   Reyna Peck MD   levothyroxine (EUTHYROX) 75 MCG tablet Take 1 tablet by mouth Daily Stop 88 mcg 10/19/22   Reyna Peck MD   fluvastatin (LESCOL) 20 MG capsule Take 1 capsule by mouth nightly 10/19/22   Reyna Peck MD   NIFEdipine (PROCARDIA XL) 30 MG extended release tablet Take 1 tablet by mouth once daily 10/13/22   Reyna Peck MD   chlorthalidone (HYGROTON) 25 MG tablet Take 1 tablet by mouth once daily 22   Jani Cruz MD   budesonide-formoterol [facility]) 160-4.5 MCG/ACT AERO Inhale 2 puffs into the lungs 2 times daily 22   Jani Crzu MD   acyclovir (ZOVIRAX) 400 MG tablet Take 1 tablet by mouth 2 times daily as needed    Historical Provider, MD   estradiol (ESTRACE) 0.1 MG/GM vaginal cream Place 0.5 g vaginally Twice a Week 20   Reyna Peck MD   Cholecalciferol (VITAMIN D) 2000 UNITS CAPS capsule Take 2 capsules by mouth daily Remind patient, can purchase over the counter and also purchase Caltrate D 600mg/ 400 iu and take bid. 3/28/17   Reyna Peck MD       Current medications:    Current Facility-Administered Medications   Medication Dose Route Frequency Provider Last Rate Last Admin    heparin (porcine) injection 4,930 Units  80 Units/kg IntraVENous PRN Alcide Goldmann, MD   4,930 Units at 22    heparin (porcine) injection 2,460 Units  40 Units/kg IntraVENous PRN Carmelo Ortega MD        heparin 25,000 units in dextrose 5% 250 mL (premix) infusion  5-30 Units/kg/hr IntraVENous Continuous Carmelo Ortega MD   Held at 12/13/22 1138    fluconazole (DIFLUCAN) tablet 200 mg  200 mg Oral Daily Carmelo Ortega MD   200 mg at 12/13/22 0846    0.9 % sodium chloride infusion   IntraVENous Continuous Carmelo Ortega  mL/hr at 12/13/22 0954 New Bag at 12/13/22 0954    sodium chloride flush 0.9 % injection 5-40 mL  5-40 mL IntraVENous 2 times per day Frank Gilbert MD   10 mL at 12/12/22 0757    sodium chloride flush 0.9 % injection 5-40 mL  5-40 mL IntraVENous PRN Frank Gilbert MD        0.9 % sodium chloride infusion   IntraVENous PRN Frank Gilbert MD   Stopped at 12/11/22 2329    ondansetron (ZOFRAN-ODT) disintegrating tablet 4 mg  4 mg Oral Q8H PRN Frank Gilbert MD        Or    ondansetron (ZOFRAN) injection 4 mg  4 mg IntraVENous Q6H PRN Frank Gilbert MD        polyethylene glycol (GLYCOLAX) packet 17 g  17 g Oral Daily PRN Frank Gilbert MD        acetaminophen (TYLENOL) tablet 650 mg  650 mg Oral Q6H PRN Frank Gilbert MD   650 mg at 12/11/22 2216    Or    acetaminophen (TYLENOL) suppository 650 mg  650 mg Rectal Q6H PRN Frank Gilbert MD        HYDROcodone-acetaminophen (NORCO) 5-325 MG per tablet 1 tablet  1 tablet Oral Q6H PRN CAREN Hobson - CNP   1 tablet at 12/08/22 2134    perflutren lipid microspheres (DEFINITY) injection 1.5 mL  1.5 mL IntraVENous ONCE PRN CAREN Hobson CNP        albuterol sulfate HFA (PROVENTIL;VENTOLIN;PROAIR) 108 (90 Base) MCG/ACT inhaler 2 puff  2 puff Inhalation Q6H PRN CAREN Hobson - CNP        atenolol (TENORMIN) tablet 25 mg  25 mg Oral Daily CAREN Hobson - CNP   25 mg at 12/13/22 0847    mometasone-formoterol (DULERA) 200-5 MCG/ACT inhaler 2 puff  2 puff Inhalation BID CAREN Hobson CNP   2 puff at 12/13/22 6213    levothyroxine (SYNTHROID) tablet 75 mcg  75 mcg Oral Daily Frances Michaud, APRN - CNP   75 mcg at 12/13/22 0513    NIFEdipine (PROCARDIA XL) extended release tablet 30 mg  30 mg Oral Daily Frances Jaswant, APRN - CNP   30 mg at 12/13/22 0846       Allergies: Allergies   Allergen Reactions    Ace Inhibitors Swelling and Angioedema     Lip swelling    Amlodipine Besy-Benazepril Hcl Other (See Comments) and Angioedema     Lip swelling    Amlodipine Besy-Benazepril Hcl Angioedema    Crestor [Rosuvastatin] Myalgia     Muscle pain at night that resolved within one week of discontinuation    Benicar [Olmesartan]      cough    Clonidine Derivatives Other (See Comments)     Lethargy.        Problem List:    Patient Active Problem List   Diagnosis Code    S/P colonoscopy Z98.890    Vitamin D deficiency E55.9    Osteopenia M85.80    Prediabetes R73.03    Essential hypertension I10    Multiple thyroid nodules E04.2    Primary osteoarthritis of left knee M17.12    Primary osteoarthritis of right hip M16.11    Cough variant asthma J45.991    Hypothyroid E03.9    Hypercalcemia E83.52    Mixed hyperlipidemia E78.2    Bilateral pulmonary embolism (HCC) I26.99    Acute pulmonary embolism (HCC) I26.99    ISAMAR (iron deficiency anemia) D50.9    Kidney lesion, native, bilateral N28.9       Past Medical History:        Diagnosis Date    Cough variant asthma 8/4/2020    HTN (hypertension) 7/27/2011    Mixed hyperlipidemia 10/18/2022    Primary osteoarthritis of left knee 12/6/2018       Past Surgical History:        Procedure Laterality Date    BREAST SURGERY      left breast in 1970's   [address]    COLON SURGERY  06/28/2015    sigmoid resection w/ left salpingo-ooph    SMALL INTESTINE SURGERY  06/28/2015    Left salpingectomy    UPPER GASTROINTESTINAL ENDOSCOPY N/A 12/12/2022    EGD BIOPSY performed by Cherry Reed MD at [address] ENDOSCOPY       Social History:    Social History Tobacco Use    Smoking status: Never    Smokeless tobacco: Never   Substance Use Topics    Alcohol use: Not Currently                                Counseling given: Not Answered      Vital Signs (Current):   Vitals:    12/13/22 0515 12/13/22 0730 12/13/22 0844 12/13/22 1114   BP:   (!) 157/89 136/82   Pulse:   82 65   Resp:  16 16 16   Temp:   98.4 °F (36.9 °C) 98.4 °F (36.9 °C)   TempSrc:   Oral Oral   SpO2:  91% 95% 96%   Weight: 135 lb 12.8 oz (61.6 kg)      Height:                                                  BP Readings from Last 3 Encounters:   12/13/22 136/82   10/25/22 130/72   10/18/22 128/79       NPO Status: Time of last liquid consumption: 2200                        Time of last solid consumption: 2200                        Date of last liquid consumption: 12/11/22                        Date of last solid food consumption: 12/11/22    BMI:   Wt Readings from Last 3 Encounters:   12/13/22 135 lb 12.8 oz (61.6 kg)   11/12/22 135 lb (61.2 kg)   10/31/22 141 lb (64 kg)     Body mass index is 26.52 kg/m².     CBC:   Lab Results   Component Value Date/Time    WBC 13.8 12/13/2022 02:14 AM    RBC 3.40 12/13/2022 02:14 AM    HGB 9.7 12/13/2022 02:14 AM    HCT 28.9 12/13/2022 02:14 AM    MCV 85.2 12/13/2022 02:14 AM    RDW 15.6 12/13/2022 02:14 AM     12/13/2022 02:14 AM       CMP:   Lab Results   Component Value Date/Time     12/13/2022 02:14 AM    K 3.7 12/13/2022 02:14 AM    K 3.2 12/07/2022 05:41 PM     12/13/2022 02:14 AM    CO2 24 12/13/2022 02:14 AM    BUN 7 12/13/2022 02:14 AM    CREATININE 0.6 12/13/2022 02:14 AM    GFRAA >60 07/06/2022 10:12 AM    AGRATIO 0.9 12/13/2022 02:14 AM    LABGLOM >60 12/13/2022 02:14 AM    GLUCOSE 106 12/13/2022 02:14 AM    PROT 6.5 12/13/2022 02:14 AM    PROT 7.2 04/09/2012 09:56 AM    CALCIUM 10.4 12/13/2022 02:14 AM    BILITOT <0.2 12/13/2022 02:14 AM    ALKPHOS 67 12/13/2022 02:14 AM    AST 52 12/13/2022 02:14 AM    ALT 40 12/13/2022 02:14 AM       POC Tests: No results for input(s): POCGLU, POCNA, POCK, POCCL, POCBUN, POCHEMO, POCHCT in the last 72 hours.     Coags:   Lab Results   Component Value Date/Time    PROTIME 14.5 12/13/2022 02:14 AM    INR 1.14 12/13/2022 02:14 AM       HCG (If Applicable): No results found for: PREGTESTUR, PREGSERUM, HCG, HCGQUANT     ABGs: No results found for: PHART, PO2ART, UEE1IRL, BSB5IDJ, BEART, I6DSOXJB     Type & Screen (If Applicable):  No results found for: LABABO, LABRH    Drug/Infectious Status (If Applicable):  No results found for: HIV, HEPCAB    COVID-19 Screening (If Applicable):   Lab Results   Component Value Date/Time    COVID19 Not Detected 12/07/2022 05:45 PM           Anesthesia Evaluation  Patient summary reviewed and Nursing notes reviewed no history of anesthetic complications:   Airway: Mallampati: II  TM distance: >3 FB   Neck ROM: full  Mouth opening: > = 3 FB   Dental: normal exam         Pulmonary:   (+) asthma:                           ROS comment: There is enlargement of the thyroid gland with multiple hypoattenuating  masses, measuring up to 5.1 cm in diameter within the right thyroid lobe.  A  retrosternal component is present and the airway is displaced to the left  hand side.  The thyroid capsule appears to be grossly intact.       1-2 L nc oxygen   Cardiovascular:    (+) hypertension:,     (-) CABG/stent, dysrhythmias and  angina             ROS comment: Transthoracic Echocardiography Report (TTE)      Demographics      Patient Name       Yenifer TSANG      Date of Study      12/08/2022         Gender              Female      Patient Number     [id number]         Date of Birth       1950      Visit Number       [id number]          Age                 74 year(s)      Accession Number   [id number]         Room Number         9658      Corporate ID       [id number]           [address], [place],                                                             [address] Ordering Physician Jaime Perkins,   Interpreting        Hamlet Lewis MD,                      CNP                Physician           [address], [address]     Procedure     Type of Study      TTE procedure:ECHOCARDIOGRAM COMPLETE 2D W DOPPLER W COLOR. Procedure Date  Date: 12/08/2022 Start: 10:50 AM     Study Location: [facility] - Echo Lab  Technical Quality: Adequate visualization     Additional Indications:pulmonary embolis.     Patient Status: Routine     Height: 60 inches Weight: 135 pounds BSA: 1.58 m2 BMI: 26.37 kg/m2     HR: 65 bpm BP: 119/76 mmHg      Conclusions      Summary   Normal left ventricle size, wall thickness and systolic function with an   estimated ejection fraction of 55-60%. No regional wall motion abnormalities   are seen. Normal diastolic function. E/e' = 8.6. The aortic valve appears sclerotic but opens well. Trivial aortic regurgitation. Trivial mitral regurgitation. Mild to moderate tricuspid regurgitation. IVC size is normal (<2.1 cm) but collapses < 50% with respiration consistent   with elevated RA pressure (8 mmHg). Estimated pulmonary artery systolic pressure is elevated at 46 mmHg assuming   a right atrial pressure of 8 mmHg. The right ventricle is normal in size and function. Signature      ------------------------------------------------------------------   Electronically signed by Hamlet Lewis MD, [address], [address]   (Interpreting physician) on 12/08/2022 at 04:54 PM     Neuro/Psych:      (-) seizures, TIA and CVA           GI/Hepatic/Renal:            ROS comment: Anemia  Denies gerd sx today. Endo/Other:    (+) hypothyroidism, blood dyscrasia (heparin infusion on hold): arthritis: OA., .                 Abdominal:             Vascular:   + DVT (b/l), PE (with equivical signs of rt heart strain). Other Findings:           Anesthesia Plan      MAC     ASA 4       Induction: intravenous.       Anesthetic plan and risks discussed with patient. Plan discussed with CRNA.                     Mando Beltrán MD   12/13/2022

## 2022-12-13 NOTE — PROCEDURES
Colonoscopy Procedure  Note          Patient: Joe Hester  : 1950  CRN:  @[email or web address]@    Procedure: Colonoscopy cold biopsy polypectomy    Date:  2022    Surgeon:  Tawanda Morales MD, MD    Referring Physician:  Juan Angela MD    Preoperative Diagnosis:  Anemia, unspecified type [D64.9]    Postoperative Diagnosis: 5 Millimeters sessile sigmoid polyp removed  Scattered diverticulosis    Anesthesia:  MAC    EBL: Minimal to none. Indications: This is a 74y.o. year old female who has bilateral pulmonary embolisms DVTs and she is iron deficient yesterday we did an upper endoscopy and we did not be find a reason for the iron deficiency today redoing a colonoscopy I know that she recently did have a colonoscopy somewhere 13 to 14 months ago but we need to make sure that there is absolutely no lesion causing this        Procedure: An informed consent was obtained from the patient after explanation of indications, benefits, possible risks and complications of the procedure. The patient was then taken to the endoscopy suite, placed in the left lateral decubitus position, and the above IV anesthesia was administered. Digital rectal examination was performed. No masses good rectal tone      Rectum normal on 4 view and normal on retroflexed view    Sigmoid there is a 5 mm sessile polyp that removed with biopsy forceps    Descending anastomosis where she had a previous sigmoid resection to the descending colon is perfectly normal this looks very healthy    Transverse normal    Ascending lots of scattered diverticula    Cecum normal    TI emptied for at least 6 minutes to get into the terminal ileum but due to back looping we could not get in the terminal ileum    Was excellent prep      The patient tolerated the procedure well and was taken to the PACU in good condition. There were no immediate complications.       Impression: Small \"colon polyp otherwise normal colonoscopy as far as bleeding or causing iron deficiency    Recommendations: It is okay for the patient to have regular diet    The patient will need to have a capsule endoscopy done as an outpatient    From a GI standpoint we will take a step back from the patient's care during her hospital    When she is discharged please have her call the office to arrange for an outpatient capsule endoscopy thank you      Clarita Baron MD, MD   [facility]  12/13/2022      Please note that some or all of this record was generated using voice recognition software. If there are any questions about the content of this document, please contact the author as some errors in translation may have occurred. Standing No assistance needed

## 2022-12-13 NOTE — PROGRESS NOTES
Occupational Therapy  Josseline Koenig    Attempted to see pt for OT treatment session, however, pt politely declining OT session stating \"I already walked this morning and I'm getting ready to go down for colonoscopy\", pt offered ADLs and ambulation, however, pt still refusing stating \"not right now, maybe later\". Pt sitting up already and family in room. Will check back on pt as time permits and schedule allows.     Thanks,  Frankey Ambrosia, OTR/L [id number] [Negative] : Heme/Lymph

## 2022-12-13 NOTE — PROGRESS NOTES
Pt arrived from Endo to PACU bay 1. Report received from Endo staff. Pt arousable to voice. Pt on RA, NSR, and VSS. Will continue to monitor.

## 2022-12-13 NOTE — PROGRESS NOTES
Hospitalist Progress Note      PCP: Sharon Garcia MD    Date of Admission: 12/7/2022    Chief Complaint: SOB    Hospital Course: 74 y.o. female with hx hypothyroidism, thyroid nodules, HTN, and OA presented to the ED wi chest tightness and shortness of breath x 1 day. Work-up in the ER showed bilateral PEs with acute focal right heart strain. CT of the chest also demonstrated an enlarged with features concerning for thyroid malignancy. She mentioned 9 hour car trip about 6 weeks ago but denied recent surgeries or immobilization. No prior history of VTE. She denies any history of pulmonary embolus or DVT. Subjective:  doing ok. Heparin drip held for c-scope today. Medications:  Reviewed    Infusion Medications    heparin (PORCINE) Infusion Stopped (12/13/22 2184)    sodium chloride 100 mL/hr at 12/13/22 1353    sodium chloride Stopped (12/11/22 8038)     Scheduled Medications    fluconazole  200 mg Oral Daily    sodium chloride flush  5-40 mL IntraVENous 2 times per day    atenolol  25 mg Oral Daily    mometasone-formoterol  2 puff Inhalation BID    levothyroxine  75 mcg Oral Daily    NIFEdipine  30 mg Oral Daily     PRN Meds: heparin (porcine), heparin (porcine), sodium chloride flush, sodium chloride, ondansetron **OR** ondansetron, polyethylene glycol, acetaminophen **OR** acetaminophen, HYDROcodone 5 mg - acetaminophen, perflutren lipid microspheres, albuterol sulfate HFA      Intake/Output Summary (Last 24 hours) at 12/13/2022 1517  Last data filed at 12/13/2022 1415  Gross per 24 hour   Intake 2900 ml   Output --   Net 2900 ml       Physical Exam Performed:    /71   Pulse 69   Temp 98.1 °F (36.7 °C) (Oral)   Resp 20   Ht 5' (1.524 m)   Wt 135 lb 12.8 oz (61.6 kg)   SpO2 98%   BMI 26.52 kg/m²     General appearance: No apparent distress, appears stated age and cooperative. HEENT: Pupils equal, round, and reactive to light. Conjunctivae/corneas clear.   Neck: Supple, with full range of motion. No jugular venous distention. Trachea midline. Respiratory:  Normal respiratory effort. Clear to auscultation, bilaterally without Rales/Wheezes/Rhonchi. Cardiovascular: Regular rate and rhythm with normal S1/S2 without murmurs, rubs or gallops. Abdomen: Soft, non-tender, non-distended with normal bowel sounds. Musculoskeletal: No clubbing, cyanosis or edema bilaterally. Full range of motion without deformity. Skin: Skin color, texture, turgor normal.  No rashes or lesions. Neurologic:  Neurovascularly intact without any focal sensory/motor deficits. Cranial nerves: II-XII intact, grossly non-focal.  Psychiatric: Alert and oriented, thought content appropriate, normal insight  Capillary Refill: Brisk, 3 seconds, normal   Peripheral Pulses: +2 palpable, equal bilaterally       Labs:   Recent Labs     12/11/22  0613 12/12/22  0535 12/13/22 0214   WBC 11.0 10.7 13.8*   HGB 9.7* 9.5* 9.7*   HCT 29.0* 27.9* 28.9*   * 637* 669*     Recent Labs     12/11/22  0614 12/12/22  0535 12/13/22 0214   * 138 139   K 3.6 4.0 3.7   CL 98* 102 106   CO2 29 27 24   BUN 12 14 7   CREATININE 0.7 0.7 0.6   CALCIUM 10.4 9.9 10.4   PHOS 2.7 2.9 2.3*     Recent Labs     12/11/22  0614 12/12/22  0535 12/13/22 0214   AST 19 36 52*   ALT 21 25 40   BILITOT 0.4 0.3 <0.2   ALKPHOS 62 66 67     Recent Labs     12/12/22  0535 12/13/22 0214   INR 1.09 1.14     No results for input(s): [place] in the last 72 hours.     Urinalysis:      Lab Results   Component Value Date/Time    NITRU Negative 10/18/2022 12:04 PM    [address] 10/18/2022 12:04 PM    BACTERIA None Seen 10/18/2022 12:04 PM    RBCUA 3 10/18/2022 12:04 PM    BLOODU Negative 10/18/2022 12:04 PM    SPECGRAV 1.016 10/18/2022 12:04 PM    GLUCOSEU Negative 10/18/2022 12:04 PM    GLUCOSEU NEGATIVE 08/10/2011 08:45 AM       Radiology:  MRI ABDOMEN W WO CONTRAST   Final Result   Questionable enhancement of 1 of the nodular densities seen in the left   kidney as seen on recent CT scan. Consider follow-up pre and post-contrast   MRI scan in 6 months time for further characterization (Bosniak 2 F)      Suspected iron deposition in the liver. CT ABDOMEN PELVIS W IV CONTRAST Additional Contrast? Oral   Preliminary Result   Indeterminate left renal lesions. Consider follow-up MRI with dedicated   renal protocol for further evaluation. A punctate hyperdense stone in the   superior pole of the left kidney without evidence of hydronephrosis seen. Pulmonary artery emboli are better seen on the recent chest CT. Atelectasis/opacity of the lung bases may reflect developing areas of lung   infarct or pneumonia. Prominent left periuterine vessel, which may reflect pelvic congestion   syndrome in the correct clinical setting. Suggestion of pancreatic divisum. CT SOFT TISSUE NECK W CONTRAST   Final Result   Multinodular goiter. Please refer to the thyroid ultrasound report from 09/30/2021 for further   details and follow-up recommendations. Right upper lobe PE. Please refer to the separately dictated CT chest report   from 12/07/2022 for further details and follow-up recommendations. VL Extremity Venous Bilateral   Final Result      CT CHEST PULMONARY EMBOLISM W CONTRAST   Final Result   Positive for pulmonary embolism within all the lobes except the left lower   lobe. Equivocal right heart strain with the right ventricle a few mm larger than   the left. However, other signs of right heart strain are not seen. Peripheral airspace disease in the lower lungs, greater on the right, most   likely pulmonary infarct, but pneumonia remains in the differential.      Thyroid is very enlarged and heterogeneous, and has an infiltrative   appearance, with loss of the fat planes in the lower neck. Thyroid appears   increased in size when compared to the previous ultrasound.   Findings are   worrisome for thyroid malignancy. Further evaluation with neck CT and   thyroid ultrasound is recommended. Findings were discussed with Tyson Sosa at 8:30 pm on 12/7/2022. XR CHEST PORTABLE   Final Result   Mild bibasilar airspace disease, likely atelectasis versus less likely   pneumonia or edema. Possible small left pleural effusion versus airspace disease or scarring   within the left costophrenic angle. US THYROID    (Results Pending)       IP CONSULT TO HOSPITALIST  IP CONSULT TO OTOLARYNGOLOGY  IP CONSULT TO VASCULAR SURGERY  IP CONSULT TO ONCOLOGY  IP CONSULT TO PHARMACY  IP CONSULT TO GI  IP CONSULT TO FINANCIAL COUNSELOR  IP CONSULT TO DIETITIAN  IP CONSULT TO HOME CARE NEEDS    Assessment/Plan:    Active Hospital Problems    Diagnosis     ISAMAR (iron deficiency anemia) [D50.9]      Priority: Medium    Kidney lesion, native, bilateral [N28.9]      Priority: Medium    Acute pulmonary embolism (HCC) [I26.99]      Priority: Medium    Bilateral pulmonary embolism (HCC) [I26.99]      Priority: Medium    Hypothyroid [E03.9]        Bilateral PE: Venous Doppler showed bilateral DVT. On heparin drip which is now held for colonoscopy. Plan to restart soon after colonoscopy. Enlarged thyroid: Trachea shifting noted on CT. Concern for thyroid malignancy. ENT consulted. Plan for outpatient thyroid ultrasound. Continue Synthroid. Iron deficiency anemia: Started on iron replacement. GI consulted. EGD on 12/12/2022 showed antral ulcer and hiatal hernia, nonobstructing  Ring web and Candida. Started on fluconazole. Colonoscopy today noted. Hypomagnesemia: Replace    Hypertension:  Procardia and atenolol      DVT Prophylaxis: Heparin drip  Diet: ADULT DIET; Regular  Code Status: Full Code  PT/OT Eval Status:   Ordered    Dispo -inpatient 1 to 2 days.       Alisa Walters MD

## 2022-12-13 NOTE — PROGRESS NOTES
Pt transferred to room 3328 at this time. A&O with no signs of distress. Report given to Yusuf Purcell. V/u and denies questions or further needs at this time.

## 2022-12-13 NOTE — ANESTHESIA POSTPROCEDURE EVALUATION
Department of Anesthesiology  Postprocedure Note    Patient: Ad Wilson  MRN: [id number]  YOB: 1950  Date of evaluation: 12/13/2022      Procedure Summary     Date: 12/13/22 Room / Location: [address]    Anesthesia Start: 6212 Anesthesia Stop: 6399    Procedure: COLONOSCOPY POLYPECTOMY SNARE/COLD BIOPSY Diagnosis:       Anemia, unspecified type      (Anemia, unspecified type [D64.9])    Surgeons: Francie Rod MD Responsible Provider: Tommy Lopez MD    Anesthesia Type: MAC ASA Status: 4          Anesthesia Type: No value filed.     Kelby Phase I: Kelby Score: 10    Kelby Phase II:        Anesthesia Post Evaluation    Patient location during evaluation: PACU  Patient participation: complete - patient participated  Level of consciousness: awake and alert  Airway patency: patent  Nausea & Vomiting: no vomiting and no nausea  Complications: no  Cardiovascular status: hemodynamically stable  Respiratory status: acceptable  Hydration status: stable

## 2022-12-14 ENCOUNTER — TELEPHONE (OUTPATIENT)
Dept: PHARMACY | Age: 72
End: 2022-12-14

## 2022-12-14 VITALS
DIASTOLIC BLOOD PRESSURE: 75 MMHG | TEMPERATURE: 98.4 F | BODY MASS INDEX: 26.96 KG/M2 | WEIGHT: 137.3 LBS | RESPIRATION RATE: 16 BRPM | SYSTOLIC BLOOD PRESSURE: 126 MMHG | HEART RATE: 87 BPM | OXYGEN SATURATION: 95 % | HEIGHT: 60 IN

## 2022-12-14 LAB
A/G RATIO: 0.9 (ref 1.1–2.2)
ALBUMIN SERPL-MCNC: 3 G/DL (ref 3.4–5)
ALP BLD-CCNC: 76 U/L (ref 40–129)
ALT SERPL-CCNC: 61 U/L (ref 10–40)
ANION GAP SERPL CALCULATED.3IONS-SCNC: 10 MMOL/L (ref 3–16)
ANTI-XA UNFRAC HEPARIN: 0.78 IU/ML (ref 0.3–0.7)
ANTI-XA UNFRAC HEPARIN: 0.82 IU/ML (ref 0.3–0.7)
AST SERPL-CCNC: 72 U/L (ref 15–37)
BILIRUB SERPL-MCNC: 0.3 MG/DL (ref 0–1)
BUN BLDV-MCNC: 6 MG/DL (ref 7–20)
CALCIUM SERPL-MCNC: 9.4 MG/DL (ref 8.3–10.6)
CHLORIDE BLD-SCNC: 107 MMOL/L (ref 99–110)
CO2: 21 MMOL/L (ref 21–32)
CREAT SERPL-MCNC: 0.7 MG/DL (ref 0.6–1.2)
GFR SERPL CREATININE-BSD FRML MDRD: >60 ML/MIN/{1.73_M2}
GLUCOSE BLD-MCNC: 103 MG/DL (ref 70–99)
HCT VFR BLD CALC: 27.1 % (ref 36–48)
HEMOGLOBIN: 9.2 G/DL (ref 12–16)
INR BLD: 1.14 (ref 0.87–1.14)
MAGNESIUM: 1.8 MG/DL (ref 1.8–2.4)
MCH RBC QN AUTO: 29 PG (ref 26–34)
MCHC RBC AUTO-ENTMCNC: 33.9 G/DL (ref 31–36)
MCV RBC AUTO: 85.6 FL (ref 80–100)
PDW BLD-RTO: 15.5 % (ref 12.4–15.4)
PHOSPHORUS: 2.5 MG/DL (ref 2.5–4.9)
PLATELET # BLD: 652 K/UL (ref 135–450)
PMV BLD AUTO: 7.2 FL (ref 5–10.5)
POTASSIUM SERPL-SCNC: 3.6 MMOL/L (ref 3.5–5.1)
PROTHROMBIN TIME: 14.6 SEC (ref 11.7–14.5)
RBC # BLD: 3.17 M/UL (ref 4–5.2)
SODIUM BLD-SCNC: 138 MMOL/L (ref 136–145)
TOTAL PROTEIN: 6.2 G/DL (ref 6.4–8.2)
WBC # BLD: 13.4 K/UL (ref 4–11)

## 2022-12-14 PROCEDURE — 2580000003 HC RX 258: Performed by: INTERNAL MEDICINE

## 2022-12-14 PROCEDURE — 6370000000 HC RX 637 (ALT 250 FOR IP): Performed by: NURSE PRACTITIONER

## 2022-12-14 PROCEDURE — 84100 ASSAY OF PHOSPHORUS: CPT

## 2022-12-14 PROCEDURE — 94761 N-INVAS EAR/PLS OXIMETRY MLT: CPT

## 2022-12-14 PROCEDURE — 85027 COMPLETE CBC AUTOMATED: CPT

## 2022-12-14 PROCEDURE — 83735 ASSAY OF MAGNESIUM: CPT

## 2022-12-14 PROCEDURE — 36415 COLL VENOUS BLD VENIPUNCTURE: CPT

## 2022-12-14 PROCEDURE — 85610 PROTHROMBIN TIME: CPT

## 2022-12-14 PROCEDURE — 85520 HEPARIN ASSAY: CPT

## 2022-12-14 PROCEDURE — 80053 COMPREHEN METABOLIC PANEL: CPT

## 2022-12-14 PROCEDURE — 6370000000 HC RX 637 (ALT 250 FOR IP): Performed by: INTERNAL MEDICINE

## 2022-12-14 PROCEDURE — 94640 AIRWAY INHALATION TREATMENT: CPT

## 2022-12-14 PROCEDURE — 6370000000 HC RX 637 (ALT 250 FOR IP)

## 2022-12-14 RX ORDER — WARFARIN SODIUM 5 MG/1
TABLET ORAL
Qty: 30 TABLET | Refills: 0 | Status: SHIPPED | OUTPATIENT
Start: 2022-12-14

## 2022-12-14 RX ORDER — FLUCONAZOLE 200 MG/1
200 TABLET ORAL DAILY
Qty: 4 TABLET | Refills: 0 | Status: SHIPPED | OUTPATIENT
Start: 2022-12-15 | End: 2022-12-20 | Stop reason: CLARIF

## 2022-12-14 RX ORDER — WARFARIN SODIUM 5 MG/1
5 TABLET ORAL DAILY
Status: DISCONTINUED | OUTPATIENT
Start: 2022-12-14 | End: 2022-12-14 | Stop reason: HOSPADM

## 2022-12-14 RX ADMIN — ATENOLOL 25 MG: 25 TABLET ORAL at 09:14

## 2022-12-14 RX ADMIN — NIFEDIPINE 30 MG: 30 TABLET, EXTENDED RELEASE ORAL at 09:14

## 2022-12-14 RX ADMIN — FLUCONAZOLE 200 MG: 100 TABLET ORAL at 09:14

## 2022-12-14 RX ADMIN — Medication 2 PUFF: at 07:58

## 2022-12-14 RX ADMIN — SODIUM CHLORIDE: 9 INJECTION, SOLUTION INTRAVENOUS at 06:52

## 2022-12-14 RX ADMIN — WARFARIN SODIUM 5 MG: 5 TABLET ORAL at 16:56

## 2022-12-14 RX ADMIN — LEVOTHYROXINE SODIUM 75 MCG: 0.07 TABLET ORAL at 05:10

## 2022-12-14 ASSESSMENT — PAIN SCALES - GENERAL: PAINLEVEL_OUTOF10: 0

## 2022-12-14 NOTE — CARE COORDINATION
Discharge Plan:   Patient discharged on 12/14/22 to home with family. Spouse will transport her home .     All discharge needs met per case management

## 2022-12-14 NOTE — DISCHARGE SUMMARY
Hospital Medicine Discharge Summary    Patient ID: Joe Hester      Patient's PCP: Juan Angela MD    Admit Date: 12/7/2022     Discharge Date:   12/14/22    Admitting Provider: Frank Gilbert MD     Discharge Provider: Meño Sainz MD     Discharge Diagnoses: Active Hospital Problems    Diagnosis     ISAMAR (iron deficiency anemia) [D50.9]      Priority: Medium    Kidney lesion, native, bilateral [N28.9]      Priority: Medium    Acute pulmonary embolism (HCC) [I26.99]      Priority: Medium    Bilateral pulmonary embolism (HCC) [I26.99]      Priority: Medium    Hypothyroid [E03.9]        The patient was seen and examined on day of discharge and this discharge summary is in conjunction with any daily progress note from day of discharge. Hospital Course:     74 y.o. female with hx hypothyroidism, thyroid nodules, HTN, and OA presented to the ED wi chest tightness and shortness of breath x 1 day. Work-up in the ER showed bilateral PEs with acute focal right heart strain. pt had  9 hour car trip about 6 weeks ago but denied recent surgeries or immobilization. Venous Doppler showed bilateral DVT. Treated with heparin drip. CT of the chest also demonstrated an enlarged with features concerning for thyroid malignancy. ENT consulted. outpatient thyroid ultrasound advised. During the hospital course, patient was found to have iron deficiency anemia. Started on iron supplementation. GI consulted. EGD on 12/12/2022 showed antral ulcer and hiatal hernia, nonobstructing Ring web and Candida. Started on fluconazole. Colonoscopy showed small polyp. Patient may need outpatient capsule endoscopy for work-up of iron deficiency anemia. Patient was discharged home with Lovenox twice daily until ENT follow-up.        Physical Exam Performed:     /75   Pulse 87   Temp 98.4 °F (36.9 °C) (Oral)   Resp 16   Ht 5' (1.524 m)   Wt 137 lb 4.8 oz (62.3 kg)   SpO2 95% BMI 26.81 kg/m²       General appearance:  No apparent distress, appears stated age and cooperative. HEENT:  Normal cephalic, atraumatic without obvious deformity. Pupils equal, round, and reactive to light. Extra ocular muscles intact. Conjunctivae/corneas clear. Neck: Supple, with full range of motion. No jugular venous distention. Trachea midline. Respiratory:  Normal respiratory effort. Clear to auscultation, bilaterally without Rales/Wheezes/Rhonchi. Cardiovascular:  Regular rate and rhythm with normal S1/S2 without murmurs, rubs or gallops. Abdomen: Soft, non-tender, non-distended with normal bowel sounds. Musculoskeletal:  No clubbing, cyanosis or edema bilaterally. Full range of motion without deformity. Skin: Skin color, texture, turgor normal.  No rashes or lesions. Neurologic:  Neurovascularly intact without any focal sensory/motor deficits. Cranial nerves: II-XII intact, grossly non-focal.  Psychiatric:  Alert and oriented, thought content appropriate, normal insight  Capillary Refill: Brisk,< 3 seconds   Peripheral Pulses: +2 palpable, equal bilaterally       Labs: For convenience and continuity at follow-up the following most recent labs are provided:      CBC:    Lab Results   Component Value Date/Time    WBC 13.4 12/14/2022 04:44 AM    HGB 9.2 12/14/2022 04:44 AM    HCT 27.1 12/14/2022 04:44 AM     12/14/2022 04:44 AM       Renal:    Lab Results   Component Value Date/Time     12/14/2022 04:40 AM    K 3.6 12/14/2022 04:40 AM    K 3.2 12/07/2022 05:41 PM     12/14/2022 04:40 AM    CO2 21 12/14/2022 04:40 AM    BUN 6 12/14/2022 04:40 AM    CREATININE 0.7 12/14/2022 04:40 AM    CALCIUM 9.4 12/14/2022 04:40 AM    PHOS 2.5 12/14/2022 04:40 AM         Significant Diagnostic Studies    Radiology:   MRI ABDOMEN W WO CONTRAST   Final Result   Questionable enhancement of 1 of the nodular densities seen in the left   kidney as seen on recent CT scan.   Consider follow-up pre and post-contrast   MRI scan in 6 months time for further characterization (Bosniak 2 F)      Suspected iron deposition in the liver. CT ABDOMEN PELVIS W IV CONTRAST Additional Contrast? Oral   Preliminary Result   Indeterminate left renal lesions. Consider follow-up MRI with dedicated   renal protocol for further evaluation. A punctate hyperdense stone in the   superior pole of the left kidney without evidence of hydronephrosis seen. Pulmonary artery emboli are better seen on the recent chest CT. Atelectasis/opacity of the lung bases may reflect developing areas of lung   infarct or pneumonia. Prominent left periuterine vessel, which may reflect pelvic congestion   syndrome in the correct clinical setting. Suggestion of pancreatic divisum. CT SOFT TISSUE NECK W CONTRAST   Final Result   Multinodular goiter. Please refer to the thyroid ultrasound report from 09/30/2021 for further   details and follow-up recommendations. Right upper lobe PE. Please refer to the separately dictated CT chest report   from 12/07/2022 for further details and follow-up recommendations. VL Extremity Venous Bilateral   Final Result      CT CHEST PULMONARY EMBOLISM W CONTRAST   Final Result   Positive for pulmonary embolism within all the lobes except the left lower   lobe. Equivocal right heart strain with the right ventricle a few mm larger than   the left. However, other signs of right heart strain are not seen. Peripheral airspace disease in the lower lungs, greater on the right, most   likely pulmonary infarct, but pneumonia remains in the differential.      Thyroid is very enlarged and heterogeneous, and has an infiltrative   appearance, with loss of the fat planes in the lower neck. Thyroid appears   increased in size when compared to the previous ultrasound. Findings are   worrisome for thyroid malignancy.   Further evaluation with neck CT and   thyroid ultrasound is recommended. Findings were discussed with Edda Iraheta at 8:30 pm on 12/7/2022. XR CHEST PORTABLE   Final Result   Mild bibasilar airspace disease, likely atelectasis versus less likely   pneumonia or edema. Possible small left pleural effusion versus airspace disease or scarring   within the left costophrenic angle.          US THYROID    (Results Pending)          Consults:     IP CONSULT TO HOSPITALIST  IP CONSULT TO OTOLARYNGOLOGY  IP CONSULT TO VASCULAR SURGERY  IP CONSULT TO ONCOLOGY  IP CONSULT TO PHARMACY  IP CONSULT TO GI  IP CONSULT TO FINANCIAL COUNSELOR  IP CONSULT TO DIETITIAN  IP CONSULT TO HOME CARE NEEDS    Disposition: Home    Condition at Discharge: Stable    Discharge Instructions/Follow-up:   ENT, PCP    Code Status:  Full Code      Activity: activity as tolerated    Diet: regular diet      Discharge Medications:     Current Discharge Medication List             Details   fluconazole (DIFLUCAN) 200 MG tablet Take 1 tablet by mouth daily for 4 doses  Qty: 4 tablet, Refills: 0      enoxaparin (LOVENOX) 60 MG/0.6ML Inject 0.6 mLs into the skin 2 times daily  Qty: 36 mL, Refills: 1                Details   atenolol (TENORMIN) 25 MG tablet Take 1 tablet by mouth daily  Qty: 90 tablet, Refills: 3    Associated Diagnoses: Essential hypertension      levothyroxine (EUTHYROX) 75 MCG tablet Take 1 tablet by mouth Daily Stop 88 mcg  Qty: 90 tablet, Refills: 0    Associated Diagnoses: Acquired hypothyroidism      fluvastatin (LESCOL) 20 MG capsule Take 1 capsule by mouth nightly  Qty: 90 capsule, Refills: 3    Associated Diagnoses: Mixed hyperlipidemia      NIFEdipine (PROCARDIA XL) 30 MG extended release tablet Take 1 tablet by mouth once daily  Qty: 90 tablet, Refills: 1    Associated Diagnoses: Essential hypertension      chlorthalidone (HYGROTON) 25 MG tablet Take 1 tablet by mouth once daily  Qty: 90 tablet, Refills: 1    Associated Diagnoses: Essential hypertension budesonide-formoterol (SYMBICORT) 160-4.5 MCG/ACT AERO Inhale 2 puffs into the lungs 2 times daily  Qty: 1 each, Refills: 5      acyclovir (ZOVIRAX) 400 MG tablet Take 1 tablet by mouth 2 times daily as needed      estradiol (ESTRACE) 0.1 MG/GM vaginal cream Place 0.5 g vaginally Twice a Week  Qty: 1 Tube, Refills: 3    Associated Diagnoses: Atrophic vaginitis      Cholecalciferol (VITAMIN D) 2000 UNITS CAPS capsule Take 2 capsules by mouth daily Remind patient, can purchase over the counter and also purchase Caltrate D 600mg/ 400 iu and take bid. Qty: 60 capsule, Refills: 11    Associated Diagnoses: Vitamin D deficiency             Time Spent on discharge: 44 mts in the examination, evaluation, counseling and review of medications and discharge plan. Signed:    José Miguel Adair MD   12/14/2022      Thank you Jamir Cotto MD for the opportunity to be involved in this patient's care. If you have any questions or concerns, please feel free to contact me at [phone].

## 2022-12-14 NOTE — CARE COORDINATION
DISCHARGE PLANNING:   Discharge order  noted with recommendation for skilled hhc services. Patient declines ,stating that  she does not need therapy, not home bound and able to self administer the Lovenox injections that she is being discharged home on. Lives with her spouse and daughter will be around too. She was advised to contact her PCP's office to set up  incase she  feels that she needs the services. She verbalized  understanding.

## 2022-12-14 NOTE — PROGRESS NOTES
ONCOLOGY HEMATOLOGY CARE PROGRESS NOTE      SUBJECTIVE:  She is feeling better today, denies any bleeding. No chest pain or shortness of breath. ROS:     Constitutional:  No weight loss, No fever, No chills, No night sweats. Energy level good.   Eyes:  No impairment or change in vision  ENT / Mouth:  No pain, abnormal ulceration, bleeding, nasal drip or change in voice or hearing  Cardiovascular:  No chest pain, palpitations, new edema, or calf discomfort  Respiratory:  No pain, hemoptysis, change to breathing  Gastrointestinal:  No pain, cramping, jaundice, change to eating and bowel habits  Urinary:  No pain, bleeding or change in continence  Musculoskeletal:  No redness, pain, edema or weakness  Skin:  No pruritus, rash, change to nodules or lesions  Neurologic:  No discomfort, change in mental status, speech, sensory or motor activity  Psychiatric:  No change in concentration or change to affect or mood  Endocrine:  No hot flashes, increased thirst, or change to urine production  Hematologic: No petechiae, ecchymosis or bleeding  Lymphatic:  No lymphadenopathy or lymphedema  Allergy / Immunologic:  No eczema, hives, frequent or recurrent infections    OBJECTIVE        Physical    VITALS:  Patient Vitals for the past 24 hrs:   BP Temp Temp src Pulse Resp SpO2 Weight   12/14/22 0815 126/75 98.4 °F (36.9 °C) Oral 87 16 95 % --   12/14/22 0800 -- -- -- 78 16 95 % --   12/14/22 0515 -- -- -- -- -- -- 137 lb 4.8 oz (62.3 kg)   12/14/22 0500 129/74 98.9 °F (37.2 °C) Oral 73 16 94 % --   12/14/22 0030 133/75 99.2 °F (37.3 °C) Oral 74 16 96 % --   12/13/22 2120 -- -- -- 75 18 95 % --   12/13/22 2030 (!) 143/82 99 °F (37.2 °C) Oral 80 16 97 % --   12/13/22 1645 125/72 98 °F (36.7 °C) Oral 77 16 97 % --   12/13/22 1615 120/74 98.5 °F (36.9 °C) Oral 74 18 95 % --   12/13/22 1445 125/71 98.1 °F (36.7 °C) Oral 69 20 98 % --   12/13/22 1435 (!) 113/58 97.6 °F (36.4 °C) Temporal 71 20 96 % --   12/13/22 1430 (!) 108/59 -- -- 70 18 98 % --   12/13/22 1425 (!) 108/57 -- -- 73 17 100 % --   12/13/22 1418 100/74 97.6 °F (36.4 °C) Temporal 72 20 -- --   12/13/22 1335 136/74 97.6 °F (36.4 °C) Temporal 70 23 97 % --       24HR INTAKE/OUTPUT:    Intake/Output Summary (Last 24 hours) at 12/14/2022 1116  Last data filed at 12/13/2022 1415  Gross per 24 hour   Intake 300 ml   Output --   Net 300 ml       CONSTITUTIONAL: awake, alert, cooperative, no apparent distress   EYES: pupils equal, round and reactive to light, sclera clear and conjunctiva normal  ENT: Normocephalic, without obvious abnormality, atraumatic  NECK: supple, symmetrical, no jugular venous distension and no carotid bruits   HEMATOLOGIC/LYMPHATIC: no cervical, supraclavicular or axillary lymphadenopathy   LUNGS: no increased work of breathing and clear to auscultation   CARDIOVASCULAR: regular rate and rhythm, normal S1 and S2, no murmur noted  ABDOMEN: normal bowel sounds x 4, soft, non-distended, non-tender, no masses palpated, no hepatosplenomgaly   MUSCULOSKELETAL: full range of motion noted, tone is normal  NEUROLOGIC: awake, alert, oriented to name, place and time. Motor skills grossly intact. SKIN: Normal skin color, texture, turgor and no jaundice. appears intact   EXTREMITIES: no LE edema     DATA:  CBC:    Recent Labs     12/14/22  0444 12/13/22  0214 12/12/22  0535 12/11/22  0613 12/09/22  0539 12/07/22  1741   WBC 13.4* 13.8* 10.7 11.0   < > 17.2*   NEUTROABS  --   --   --   --   --  14.8*   LYMPHOPCT  --   --   --   --   --  5.5   RBC 3.17* 3.40* 3.26* 3.43*   < > 3.72*   HGB 9.2* 9.7* 9.5* 9.7*   < > 10.7*   HCT 27.1* 28.9* 27.9* 29.0*   < > 32.0*   MCV 85.6 85.2 85.4 84.7   < > 86.0   MCH 29.0 28.7 29.0 28.3   < > 28.8   MCHC 33.9 33.6 34.0 33.4   < > 33.5   RDW 15.5* 15.6* 15.1 15.3   < > 14.9   * 669* 637* 641*   < > 440    < > = values in this interval not displayed.        PT/INR:    Recent Labs     12/14/22  8721 12/13/22  0214 12/12/22  0535 12/11/22  0614 12/10/22  0458 12/09/22  0538 12/07/22 2039   PROT 6.2* 6.5 6.4 6.7 6.6   < >  --    INR 1.14 1.14 1.09  --   --   --  1.28*    < > = values in this interval not displayed. PTT:  No results for input(s): APTT in the last 720 hours. CMP:    Recent Labs     12/14/22  0440 12/13/22 0214 12/12/22  0535 12/11/22  0614    139 138 135*   K 3.6 3.7 4.0 3.6    106 102 98*   CO2 21 24 27 29   GLUCOSE 103* 106* 97 116*   BUN 6* 7 14 12   CREATININE 0.7 0.6 0.7 0.7   LABGLOM >60 >60 >60 >60   CALCIUM 9.4 10.4 9.9 10.4   PROT 6.2* 6.5 6.4 6.7   LABALBU 3.0* 3.1* 2.8* 3.3*   AGRATIO 0.9* 0.9* 0.8* 1.0*   BILITOT 0.3 <0.2 0.3 0.4   ALKPHOS 76 67 66 62   ALT 61* 40 25 21   AST 72* 52* 36 19   MG 1.80 1.60* 1.90 1.90       Lab Results   Component Value Date    CALCIUM 9.4 12/14/2022    PHOS 2.5 12/14/2022       LDH:No results for input(s): LDH in the last 720 hours. Radiology Review:  VL Extremity Venous Bilateral  Lower Extremities DVT Study     Demographics      Patient Name       [facility]      Date of Study      12/08/2022         Gender              Female      Patient Number     [id number]         Date of Birth       1950      Visit Number       [id number]          Age                 74 year(s)      Accession Number   [id number]         Room Number         2382      Corporate ID       [id number]           Sonographer         Jennifer Harris RVT, RT      Ordering Physician James Rdz,   Interpreting        Tresa Mcgovern MD                      CNP                Physician     Procedure    Type of Study:      Veins:Lower Extremities DVT Study, VASC EXTREMITY VENOUS DUPLEX BILATERAL. Vascular Sonographer Report     Impressions  Right Impression  Acute totally occluding deep vein thrombosis involving the right peroneal  veins. .  No other evidence of deep vein or superficial vein thrombosis involving the  right lower extremity ,  Left Impression  Acute totally occluding deep vein thrombosis involving the left posterior  tibial veins and one of the pair of peroneal veins. No other evidence of deep vein or superficial vein thrombosis involving the  left lower extremity. Conclusions      Summary      Acute totally occluding deep vein thrombosis involving the right peroneal   veins. .   Acute totally occluding deep vein thrombosis involving the left posterior   tibial veins and one of the pair of peroneal veins. Signature      ------------------------------------------------------------------   Electronically signed by Gillian Flor MD (Interpreting   physician) on 12/13/2022 at 12:02 PM   ------------------------------------------------------------------     Patient Status:Routine. [address] [place] [address] Vascular Lab. Technical Quality:Good visualization. Velocities are measured in cm/s ; Diameters are measured in mm    Right Lower Extremities DVT Study Measurements  Right 2D Measurements  +------------------------+----------+---------------+----------+  ! Location                ! Visualized! Compressibility! Thrombosis! +------------------------+----------+---------------+----------+  ! Sapheno Femoral Junction! Yes       ! Yes            ! None      !  +------------------------+----------+---------------+----------+  ! GSV Thigh               ! Yes       ! Yes            ! None      !  +------------------------+----------+---------------+----------+  ! Common Femoral          !Yes       ! Yes            ! None      !  +------------------------+----------+---------------+----------+  ! Femoral                 !Yes       ! Yes            ! None      !  +------------------------+----------+---------------+----------+  ! Prox Femoral            !Yes       ! Yes            ! None      !  +------------------------+----------+---------------+----------+  ! Mid Femoral             !Yes !Yes            !None      !  +------------------------+----------+---------------+----------+  ! Dist Femoral            !Yes       ! Yes            ! None      !  +------------------------+----------+---------------+----------+  ! Deep Femoral            !Yes       ! Yes            ! None      !  +------------------------+----------+---------------+----------+  ! Popliteal               !Yes       ! Yes            ! None      !  +------------------------+----------+---------------+----------+  ! GSV Below Knee          ! Yes       ! Yes            ! None      !  +------------------------+----------+---------------+----------+  ! Gastroc                 ! Yes       ! Yes            ! None      !  +------------------------+----------+---------------+----------+  ! PTV                     ! Yes       ! Yes            ! None      !  +------------------------+----------+---------------+----------+  ! Peroneal                !Yes       ! No             !Acute     !  +------------------------+----------+---------------+----------+    Right Doppler Measurements  +------------------------+-----------+------+------------+  ! Location                ! Signal     !Reflux! Reflux (sec)! +------------------------+-----------+------+------------+  ! Sapheno Femoral Junction! Spontaneous!      !            !  +------------------------+-----------+------+------------+  ! Common Femoral          !Spontaneous!      !            !  +------------------------+-----------+------+------------+  ! Femoral                 !Spontaneous!      !            !  +------------------------+-----------+------+------------+  ! Deep Femoral            !Spontaneous!      !            !  +------------------------+-----------+------+------------+  ! Popliteal               !Spontaneous!      !            !  +------------------------+-----------+------+------------+    Left Lower Extremities DVT Study Measurements  Left 2D Measurements  +------------------------+----------+---------------+----------+  ! Location                ! Visualized! Compressibility! Thrombosis! +------------------------+----------+---------------+----------+  ! Sapheno Femoral Junction! Yes       ! Yes            ! None      !  +------------------------+----------+---------------+----------+  ! GSV Thigh               ! Yes       ! Yes            ! None      !  +------------------------+----------+---------------+----------+  ! Common Femoral          !Yes       ! Yes            ! None      !  +------------------------+----------+---------------+----------+  ! Femoral                 !Yes       ! Yes            ! None      !  +------------------------+----------+---------------+----------+  ! Prox Femoral            !Yes       ! Yes            ! None      !  +------------------------+----------+---------------+----------+  ! Mid Femoral             !Yes       ! Yes            ! None      !  +------------------------+----------+---------------+----------+  ! Dist Femoral            !Yes       ! Yes            ! None      !  +------------------------+----------+---------------+----------+  ! Deep Femoral            !Yes       ! Yes            ! None      !  +------------------------+----------+---------------+----------+  ! Popliteal               !Yes       ! Yes            ! None      !  +------------------------+----------+---------------+----------+  ! GSV Below Knee          ! Yes       ! Yes            ! None      !  +------------------------+----------+---------------+----------+  ! Gastroc                 ! Yes       ! Yes            ! None      !  +------------------------+----------+---------------+----------+  ! PTV                     ! Yes       ! No             !Acute     !  +------------------------+----------+---------------+----------+  ! Peroneal                !Yes       ! Yes            ! None      !  +------------------------+----------+---------------+----------+    Left Doppler Measurements  +------------------------+-----------+------+------------+  ! Location                ! Signal     !Reflux! Reflux (sec)! +------------------------+-----------+------+------------+  ! Sapheno Femoral Junction! Spontaneous!      !            !  +------------------------+-----------+------+------------+  ! Common Femoral          !Spontaneous!      !            !  +------------------------+-----------+------+------------+  ! Femoral                 !Spontaneous!      !            !  +------------------------+-----------+------+------------+  ! Deep Femoral            !Spontaneous!      !            !  +------------------------+-----------+------+------------+  ! Popliteal               !Spontaneous!      !            !  +------------------------+-----------+------+------------+      Problem List  Patient Active Problem List   Diagnosis    S/P colonoscopy    Vitamin D deficiency    Osteopenia    Prediabetes    Essential hypertension    Multiple thyroid nodules    Primary osteoarthritis of left knee    Primary osteoarthritis of right hip    Cough variant asthma    Hypothyroid    Hypercalcemia    Mixed hyperlipidemia    Bilateral pulmonary embolism (HCC)    Acute pulmonary embolism (HCC)    ISAMAR (iron deficiency anemia)    Kidney lesion, native, bilateral       ASSESSMENT AND PLAN:  64-year-old female with a history of HLD, HTN, hypothyroidism, hypercalcemia, osteoarthritis, thyroid nodules and vitamin D deficiency. 1. Bilateral PE with cor pulmonale and bilateral DVT:  -Currently on heparin drip. -ECHO showing some mild strain.  -Vascular surgery is following, no plans for direct thrombolysis. -Patient has high deductible plan and DOAC cost is $305/mo, she cannot afford this.   -Would recommend Lovenox 1 mg/kg SQ twice daily as a bridge as outpatient  - If insurance does not authorize twice daily dose of Lovenox, okay to do Lovenox 1.5 mg/kg subcu daily till INR is therapeutic  - Plan is for coumadin. 2. Enlarged thyroid:  -CTPA showing enlarged heterogenous thyroid with shifting of trachea, more enlarged compared to thyroid US done 9/2021.  -CT neck demonstrated multinodular goiter.   -Thyroid ultrasound will be done as an outpatient.  -Has been evaluated by ENT. 3. Anemia:  - Hemoglobin was normal in October 2022.  - Hemoglobin had dropped to 9.6.  - Iron studies are consistent with anemia of chronic disease and iron deficiency  - CT abdomen and pelvis showed no obvious evidence of bleeding or malignancy. Left renal lesions are probably nonspecific. MRI abdomen on 12/12/22 showed enhancement of nodular density in left kidney and suspected iron deposition in the liver. - GI following  - s/p EGD on 12/12/22 that did not reveal any source of bleeding.   - s/p colonoscopy on 12/13/22 that did not reveal any source of bleeding. Plans for outpatient capsule endoscopy. - Hgb 9.2 today. ONCOLOGIC DISPOSITION:      CAREN Valencia - CNP  Please contact through [employer]     The patient was seen and examined. Agree with above. She can be transitioned over to Lovenox and warfarin  Monitor INR while she is on Diflucan  Okay for discharge from hematology perspective.     Lucila Lr MD

## 2022-12-14 NOTE — PROGRESS NOTES
Clinical Pharmacy Note: Pharmacy to Dose Warfarin    Pharmacy consulted by Dr. Nain Ventura to dose warfarin. Hallie Laguna is a 74 y.o. female  is receiving warfarin for indication: PE.    INR Goal Range: 2-3  Prior to admission warfarin dosing regimen: N/A new start    INR today:   Lab Results   Component Value Date/Time    INR 1.14 12/14/2022 04:40 AM       Assessment/Plan:  Based on today's assessment, dose warfarin 5mg daily. Plan to bridge with Lovenox. Patient's first Coumadin Clinic appointment is Monday 12/19 at 3:45 PM. Patient was instructed to arrive 15 minutes early.     Thank you for the consult,     Jatinder Lovell, PharmD, BCCCP

## 2022-12-14 NOTE — PROGRESS NOTES
Gastroenterology Progress Note      Abe Jeong is a 74 y.o. female patient. 1. Acute pulmonary embolism, unspecified pulmonary embolism type, unspecified whether acute cor pulmonale present ([address]    2. Abnormal imaging of thyroid    3. Anemia, unspecified type        SUBJECTIVE:  no GI complaints. No bleeding. ROS:  Cardiovascular ROS: no chest pain or dyspnea on exertion  Gastrointestinal ROS: see hpi  Respiratory ROS: no cough, shortness of breath, or wheezing    Physical    VITALS:  /75   Pulse 87   Temp 98.4 °F (36.9 °C) (Oral)   Resp 16   Ht 5' (1.524 m)   Wt 137 lb 4.8 oz (62.3 kg)   SpO2 95%   BMI 26.81 kg/m²   TEMPERATURE:  Current - Temp: 98.4 °F (36.9 °C); Max - Temp  Av.3 °F (36.8 °C)  Min: 97.6 °F (36.4 °C)  Max: 99.2 °F (37.3 °C)    NAD  RRR, Nl s1s2  Lungs CTA Bilaterally, normal effort  Abdomen soft, ND, NT, no HSM, Bowel sounds normal  AAOx3     Data    Data Review:    Recent Labs     22  0522   WBC 10.7 13.8* 13.4*   HGB 9.5* 9.7* 9.2*   HCT 27.9* 28.9* 27.1*   MCV 85.4 85.2 85.6   * 669* 652*     Recent Labs     22  0522  0440    139 138   K 4.0 3.7 3.6    106 107   CO2 27 24 21   PHOS 2.9 2.3* 2.5   BUN 14 7 6*   CREATININE 0.7 0.6 0.7     Recent Labs     22  0522  0440   AST 36 52* 72*   ALT 25 40 61*   BILITOT 0.3 <0.2 0.3   ALKPHOS 66 67 76     No results for input(s): LIPASE, AMYLASE in the last 72 hours. Recent Labs     22  0522  0440   PROTIME 14.0 14.5 14.6*   INR 1.09 1.14 1.14     No results for input(s): PTT in the last 72 hours. ASSESSMENT     Anemia - felt to be combination of AOCD and ISAMAR per heme eval. EGD with mild gastritis with small ulcerations in antrum. Neg for H pylori. Colonoscopy with a small colon polyp but no source of anemia. Candida esophagitis - on diflucan.   PE     PLAN    - plan outpatient capsule endoscopy  - ok for d/c from GI standpoint    Discussed with Dr. Yani Costa PA-C  [facility]  Patient was seen by our certified physicians assistant today  She has discussed with me the patient's history physical and plan for today  I am in agreement with her assessment and plan  From a GI standpoint the patient can be discharged home and do a follow-up capsule endoscopy    Macy Mccloud

## 2022-12-14 NOTE — PROGRESS NOTES
Nutrition Note    RECOMMENDATIONS  PO Diet: Continue current diet   ONS: None  Nutrition Support: None    NUTRITION ASSESSMENT   Pt seen for LOS assessment. Pt reports good appetite and PO intake, consuming % of meals. Weight is stable. Skin is in tact. Reviewed coumadin nutrition handout with pt and answered all questions. Deemed to be at low nutrition risk at this time. Will continue to monitor for changes in status. Nutrition Related Findings: LBM 12/13. No edema noted. Wounds: None  Nutrition Education:  Education completed     MALNUTRITION ASSESSMENT   Malnutrition Status: No malnutrition    NUTRITION DIAGNOSIS   No nutrition diagnosis at this time     CURRENT NUTRITION THERAPIES  ADULT DIET; Regular     PO Intake: %   PO Supplement Intake:None Ordered    ANTHROPOMETRICS  Current Height: 5' (152.4 cm)  Current Weight: 137 lb 4.8 oz (62.3 kg)    Ideal Body Weight (IBW): 100 lbs  (45 kg)      BMI: 26.8    The patient will be monitored per nutrition standards of care. Consult dietitian if additional nutrition interventions are needed prior to RD reassessment.      Ankit Zamarripa, [address] [place] [address],     Contact: [phone]

## 2022-12-15 ENCOUNTER — CARE COORDINATION (OUTPATIENT)
Dept: CASE MANAGEMENT | Age: 72
End: 2022-12-15

## 2022-12-15 ENCOUNTER — TELEPHONE (OUTPATIENT)
Dept: PRIMARY CARE CLINIC | Age: 72
End: 2022-12-15

## 2022-12-15 DIAGNOSIS — E07.9 THYROID MASS: Primary | ICD-10-CM

## 2022-12-15 DIAGNOSIS — I26.99 BILATERAL PULMONARY EMBOLISM (HCC): ICD-10-CM

## 2022-12-15 DIAGNOSIS — N28.89 RENAL MASS: ICD-10-CM

## 2022-12-15 DIAGNOSIS — I26.99 BILATERAL PULMONARY EMBOLISM (HCC): Primary | ICD-10-CM

## 2022-12-15 PROBLEM — N20.0 NEPHROLITHIASIS: Status: ACTIVE | Noted: 2022-12-12

## 2022-12-15 PROCEDURE — 1111F DSCHRG MED/CURRENT MED MERGE: CPT | Performed by: INTERNAL MEDICINE

## 2022-12-15 NOTE — CARE COORDINATION
[facility] Care Transitions Initial Follow Up Call    Call within 2 business days of discharge: Yes    Care Transition Nurse contacted the patient by telephone to perform post hospital discharge assessment. Verified name and  with patient as identifiers. Provided introduction to self, and explanation of the Care Transition Nurse role. Patient: Olivia Traylor Patient : 1950   MRN: [id number]  Reason for Admission:   Discharge Date: 22 RARS: Readmission Risk Score: 11.7      Last Discharge  Street       Date Complaint Diagnosis Description Type Department Provider    22 Shortness of Breath Acute pulmonary embolism, unspecified pulmonary embolism type, unspecified whether acute cor pulmonale present ([facility] [address] . .. ED to Hosp-Admission (Discharged) (ADMITTED) O[facility] 3A Jennifer Enriquez MD; Joseph Amezcua... Was this an external facility discharge? No Discharge Facility:     Challenges to be reviewed by the provider   Additional needs identified to be addressed with provider: No  none               Method of communication with provider: none. Pt states doing so much better, no issues or concerns. She states that she has received her message from her PCP about medications and appointments that she needs to make, she is currently following through with those. Agreed to more CTC f/u calls. Care Transition Nurse reviewed discharge instructions with patient who verbalized understanding. The patient was given an opportunity to ask questions and does not have any further questions or concerns at this time. Were discharge instructions available to patient? Yes. Reviewed appropriate site of care based on symptoms and resources available to patient including: When to call 911. The patient agrees to contact the PCP office for questions related to their healthcare. Advance Care Planning:   Does patient have an Advance Directive: reviewed and current.     Medication reconciliation was performed with patient, who verbalizes understanding of administration of home medications. Medications reviewed, 1111F entered: yes    Was patient discharged with a pulse oximeter? no    Non-face-to-face services provided:  Obtained and reviewed discharge summary and/or continuity of care documents    Offered patient enrollment in the Remote Patient Monitoring (RPM) program for in-home monitoring:  next call . Care Transitions 24 Hour Call    Do you have a copy of your discharge instructions?: Yes  Do you have all of your prescriptions and are they filled?: Yes  Have you been contacted by a [address]?: No  Have you scheduled your follow up appointment?: Yes  How are you going to get to your appointment?: Car - family or friend to transport  Do you have support at home?: Partner/Spouse/SO  Do you feel like you have everything you need to keep you well at home?: Yes  Are you an active caregiver in your home?: No  Care Transitions Interventions  No Identified Needs         Follow Up  Future Appointments   Date Time Provider Obi Garcia   12/20/2022  7:30 AM [facility] [place] [facility]   12/27/2022  9:00 AM Zoey Garza MD [address] Transition Nurse provided contact information. Plan for follow-up call in 5-7 days based on severity of symptoms and risk factors.   Plan for next call: self management-  bilat PE, enlarged thyroid, anemia, f/u appts, discuss  RPM.    Katy White, RN

## 2022-12-15 NOTE — TELEPHONE ENCOUNTER
Patient called to reschedule her appt to an earlier time. We had nothing on Monday but, patient did agree to reschedule to Tues, 12/20 7:30am.  She will come in 15 min early to register.

## 2022-12-15 NOTE — TELEPHONE ENCOUNTER
Patient says she was hospitalized and was given [facility] while there. She wants to know if Dr. Kacey Bay can prescribe it for her as Symbicort is cost prohibitive. Please advise.

## 2022-12-16 ENCOUNTER — TELEPHONE (OUTPATIENT)
Dept: PRIMARY CARE CLINIC | Age: 72
End: 2022-12-16

## 2022-12-16 NOTE — TELEPHONE ENCOUNTER
Pt called in wanting to know if she can get  another referral for Otolaryngology, states that Andrew Bateman can not see her until January 25Th and she needs to be seen before then,      Please advise  [phone]

## 2022-12-17 DIAGNOSIS — E04.1 THYROID NODULE: Primary | ICD-10-CM

## 2022-12-19 DIAGNOSIS — E07.9 THYROID MASS: Primary | ICD-10-CM

## 2022-12-19 DIAGNOSIS — E04.1 THYROID NODULE: ICD-10-CM

## 2022-12-19 NOTE — TELEPHONE ENCOUNTER
Left voicemail message for patient to go ahead and get the ultrasound and I would give results to Dr. Tiera Alva or she could see Dr. Catalina Kim and I gave her contact information. New referral placed. Patient requested to see another ENT to be seen sooner.

## 2022-12-20 ENCOUNTER — HOSPITAL ENCOUNTER (EMERGENCY)
Age: 72
Discharge: HOME | End: 2022-12-20
Attending: EMERGENCY MEDICINE
Payer: MEDICARE

## 2022-12-20 ENCOUNTER — APPOINTMENT (OUTPATIENT)
Dept: CT IMAGING | Age: 72
End: 2022-12-20
Payer: MEDICARE

## 2022-12-20 ENCOUNTER — ANTI-COAG VISIT (OUTPATIENT)
Dept: PHARMACY | Age: 72
End: 2022-12-20
Payer: MEDICARE

## 2022-12-20 VITALS
TEMPERATURE: 98.2 F | SYSTOLIC BLOOD PRESSURE: 154 MMHG | HEART RATE: 80 BPM | HEIGHT: 60 IN | WEIGHT: 130 LBS | DIASTOLIC BLOOD PRESSURE: 98 MMHG | OXYGEN SATURATION: 98 % | RESPIRATION RATE: 16 BRPM | BODY MASS INDEX: 25.52 KG/M2

## 2022-12-20 DIAGNOSIS — I26.99 BILATERAL PULMONARY EMBOLISM (HCC): Primary | ICD-10-CM
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
## 2022-12-20 LAB — INTERNATIONAL NORMALIZATION RATIO, POC: 2.6

## 2022-12-20 PROCEDURE — 85610 PROTHROMBIN TIME: CPT

## 2022-12-20 PROCEDURE — 70450 CT HEAD/BRAIN W/O DYE: CPT

## 2022-12-20 PROCEDURE — 99213 OFFICE O/P EST LOW 20 MIN: CPT

## 2022-12-20 NOTE — ED PROVIDER NOTES
[address] PROVIDER NOTE    Patient Identification  Pt Name: Rina Cuevas  MRN: [id number]  Carlagfgerri 1950  Date of evaluation: 12/20/2022  Provider: Dea Ramirez MD  PCP: Ml Choi MD    Chief Complaint  Fall (Tripped and fell and bumped right side of head. On coumadin. A&O. Ambulated with cane to room)      HPI  History provided by patient   This is a 74 y.o. female who presents to the ED for fall. She tripped and hit the right side of her head. No loss of consciousness. No preceding symptoms. No fevers or chills. No pain anywhere. No nausea or vomiting. No cough. Ambulating at her baseline. This occurred yesterday morning. She went to her Coumadin clinic today and was told to come to the emergency room because of the fall. ROS  12 systems reviewed, pertinent positives/negatives per HPI otherwise noted to be negative.     I have reviewed the following nursing documentation:  Allergies: Ace inhibitors, Amlodipine besy-benazepril hcl, Amlodipine besy-benazepril hcl, Crestor [rosuvastatin], Benicar [olmesartan], and Clonidine derivatives    Past medical history:   Past Medical History:   Diagnosis Date    Cough variant asthma 8/4/2020    HTN (hypertension) 7/27/2011    Mixed hyperlipidemia 10/18/2022    Primary osteoarthritis of left knee 12/6/2018     Past surgical history:   Past Surgical History:   Procedure Laterality Date    BREAST SURGERY      left breast in 1970's    [address] [facility]  06/28/2015    sigmoid resection w/ left salpingo-ooph    COLONOSCOPY N/A 12/13/2022    COLONOSCOPY WITH BIOPSY performed by Eva Robbins MD at [address] [place] [address]  06/28/2015    Left salpingectomy    UPPER GASTROINTESTINAL ENDOSCOPY N/A 12/12/2022    EGD BIOPSY performed by Eva Robbins MD at [facility] medications:   Previous Medications    ACYCLOVIR (ZOVIRAX) 400 MG TABLET    Take 1 tablet by mouth 2 times daily as needed    ATENOLOL (TENORMIN) 25 MG TABLET    Take 1 tablet by mouth daily    CHLORTHALIDONE (HYGROTON) 25 MG TABLET    Take 1 tablet by mouth once daily    CHOLECALCIFEROL (VITAMIN D) 2000 UNITS CAPS CAPSULE    Take 2 capsules by mouth daily Remind patient, can purchase over the counter and also purchase Caltrate D 600mg/ 400 iu and take bid. ESTRADIOL (ESTRACE) 0.1 MG/GM VAGINAL CREAM    Place 0.5 g vaginally Twice a Week    FLUVASTATIN (LESCOL) 20 MG CAPSULE    Take 1 capsule by mouth nightly    LEVOTHYROXINE (EUTHYROX) 75 MCG TABLET    Take 1 tablet by mouth Daily Stop 88 mcg    MOMETASONE-FORMOTEROL (DULERA) 200-5 MCG/ACT INHALER    Inhale 2 puffs into the lungs in the morning and 2 puffs in the evening. NIFEDIPINE (PROCARDIA XL) 30 MG EXTENDED RELEASE TABLET    Take 1 tablet by mouth once daily    WARFARIN (COUMADIN) 5 MG TABLET    Review INR prior to administration. Hazardous med- See facility policy for handling/disposal       Social history:  reports that she has never smoked. She has never used smokeless tobacco. She reports that she does not currently use alcohol. She reports that she does not use drugs. Family history:    Family History   Problem Relation Age of Onset    High Blood Pressure Father     Cancer Father 80        lung and kidney primary cancers. Exam  ED Triage Vitals   BP Temp Temp src Pulse Resp SpO2 Height Weight   -- -- -- -- -- -- -- --     Nursing note and vitals reviewed. Constitutional: In no acute distress  HENT:      Head: Normocephalic      Ears: External ears normal.      Nose: Nose normal.     Mouth: Membrane mucosa moist   Eyes: No discharge. Neck: Supple. Trachea midline. Cardiovascular: Regular rate. Warm extremities  Pulmonary/Chest: Effort normal. No respiratory distress. Abdominal: Soft. No distension. Nontender  : Deferred  Rectal: Deferred   Musculoskeletal: Moves all extremities. No gross deformity.   Nontender over neck, back, chest, abdomen, pelvis, arms, legs. Ambulating easily with a cane  Neurological: Alert and oriented. Face symmetric. Speech is clear. Cn 2-12 intact. Skin: Warm and dry. Psychiatric: Normal mood and affect. Behavior is normal.    Procedures      Radiology  CT HEAD WO CONTRAST    (Results Pending)       Labs  No results found for this visit on 12/20/22. Screenings   Víctor Coma Scale  Eye Opening: Spontaneous  Best Verbal Response: Oriented  Best Motor Response: Obeys commands  Víctor Coma Scale Score: 15       MDM and ED Course  This is a 74 y.o. female who presents to the ED for accidental trip and fall. Asymptomatic. Obtaining head CT due to history of Coumadin use. No known midline neck pain, distracting injury, intoxication, altered mental status, or focal neurodeficits. By Nexus criteria, she is at low risk for C-spine injury. Do not believe that blood work is indicated because this was a simple trip and fall without preceding symptoms and she is currently asymptomatic     CT normal. Discharge instructions, follow up instructions, and return precautions were discussed. All questions were answered. [unfilled]    Is this patient to be included in the SEP-1 Core Measure due to severe sepsis or septic shock? No   Exclusion criteria - the patient is NOT to be included for SEP-1 Core Measure due to: Infection is not suspected        Final Impression  1. Fall, initial encounter        Blood pressure (!) 154/98, pulse 80, temperature 98.2 °F (36.8 °C), temperature source Oral, resp. rate 16, height 5' (1.524 m), weight 130 lb (59 kg), SpO2 98 %, not currently breastfeeding. Disposition:  DISPOSITION        Patient Referrals:  No follow-up provider specified. Discharge Medications:  New Prescriptions    No medications on file       Discontinued Medications:  Discontinued Medications    No medications on file       This chart was generated using the [address] [place] [address] [address]  [employer] system.  I created this record but it may contain dictation errors given the limitations of this technology.         Nestor Love MD  12/20/22 0160

## 2022-12-20 NOTE — PROGRESS NOTES
Ms. Tamiko Garvin is a 74 y.o. y/o female with history of PE who presents today for anticoagulation monitoring and adjustment. Pertinent PMH:    Patient Reported Findings:  Yes     No  [x]   []       Patient verifies current dosing regimen as listed- pt started warfarin 5mg daily on 12/14 with lovenox bridge   []   [x]       S/S bleeding/bruising/swelling/SOB- denies   []   [x]       Blood in urine or stool- denies   []   [x]       Procedures scheduled in the future at this time- may have bx of thyroid in future, not scheduled yet   []   [x]       Missed Dose  []   [x]       Extra Dose  []   [x]       Change in medications- was discharged on lovenox, states just finished fluconazole Sunday (4 doses)   []   [x]       Change in health/diet/appetite-eats greens every day, loves greens   []   [x]       Change in alcohol use- doesn't drink or smoke   []   [x]       Change in activity  []   [x]       Hospital admission  []   [x]       Emergency department visit  []   [x]       Other complaints    Clinical Outcomes:  Yes     No  []   [x]       Major bleeding event  []   [x]       Thromboembolic event    Duration of warfarin Therapy: \"at least 6 months\"   INR Range:  2.0-3.0    Educated patient of signs and symptoms of bleeding and clotting, when to seek emergent care, the need to maintain a consistent diet and notification of clinic for any new medications including over the counter medications or abnormal bleeding. Patient acknowledges working in consult agreement with pharmacist as referred by his/her physician. Takes warfarin 5mg tablets at night. Bridged with lovenox shots at [place]. INR is 2.6 today after 6 doses of 5mg and lovenox shots. Pt was dcd with 4 days of fluconazole which just ended which will skew INR results. Will maintain on 5mg daily and recheck Friday. Continue taking 5mg daily. Stop lovenox shots. Encouraged to maintain a consistency of vegetables/salads.    Recheck INR on Friday, 12/23  Consent signed 12/20/2022    Referring Dr. New Srivastava   INR (no units)   Date Value   12/14/2022 1.14   12/13/2022 1.14   12/12/2022 1.09   12/07/2022 1.28 (H)     INR,(POC) (no units)   Date Value   12/20/2022 2.6         For Pharmacy Admin Tracking Only    Intervention Detail: Dose Adjustment: 1, reason: Therapy Optimization  Total # of Interventions Recommended: 1  Total # of Interventions Accepted: 1  Time Spent (min): 60

## 2022-12-20 NOTE — DISCHARGE INSTRUCTIONS
Please return if you have any new, worsening, or concerning symptoms like inability to eat/drink/walk, fevers. Contact your primary care physician tomorrow to make a follow up appointment this week.      Your CT scan was normal

## 2022-12-22 ENCOUNTER — CARE COORDINATION (OUTPATIENT)
Dept: CASE MANAGEMENT | Age: 72
End: 2022-12-22

## 2022-12-22 NOTE — CARE COORDINATION
[address] [facility] Follow Up Call    Patient: Ramona Kwan  Patient : 1950   MRN: [id number]  Reason for Admission: bilateral PE; anemia  Discharge Date: 22 RARS: Readmission Risk Score: 11.7    Attempted to reach pt for follow up call. Left message requesting call back.     Follow Up  Future Appointments   Date Time Provider Obi Garcia   2022  9:00 AM [facility]   2022  1:00 PM [facility]   2022  9:00 AM MD Jose Manuel Gasca RD [facility] [place] [facility]   2023  9:20 AM Clotilde Garza MD  [facility]

## 2022-12-23 ENCOUNTER — ANTI-COAG VISIT (OUTPATIENT)
Dept: PHARMACY | Age: 72
End: 2022-12-23
Payer: MEDICARE

## 2022-12-23 ENCOUNTER — HOSPITAL ENCOUNTER (OUTPATIENT)
Dept: ULTRASOUND IMAGING | Age: 72
Discharge: HOME | End: 2022-12-23
Payer: MEDICARE

## 2022-12-23 DIAGNOSIS — E04.1 THYROID NODULE: ICD-10-CM

## 2022-12-23 DIAGNOSIS — I26.99 BILATERAL PULMONARY EMBOLISM (HCC): Primary | ICD-10-CM

## 2022-12-23 LAB — INTERNATIONAL NORMALIZATION RATIO, POC: 6.9

## 2022-12-23 PROCEDURE — 76536 US EXAM OF HEAD AND NECK: CPT

## 2022-12-23 PROCEDURE — 85610 PROTHROMBIN TIME: CPT

## 2022-12-23 PROCEDURE — 99212 OFFICE O/P EST SF 10 MIN: CPT

## 2022-12-23 NOTE — RESULT ENCOUNTER NOTE
The thyroid ultrasound was benign. Here is the impression:  IMPRESSION:  1. Unchanged multinodular goiter. 2. As described on prior exam, there are numerous nodules in the bilateral  neck which have been documented for greater than 5 years is stable and have  previously been diagnosed as benign, requiring no additional evaluation or  follow-up imaging. I would still see the ENT to look over all the results, ut a January appointment will be fine. My Chart message sent.

## 2022-12-23 NOTE — PROGRESS NOTES
Ms. Florecita Staley is a 74 y.o. y/o female with history of PE who presents today for anticoagulation monitoring and adjustment. Pertinent PMH:    Patient Reported Findings:  Yes     No  [x]   []       Patient verifies current dosing regimen as listed- pt started warfarin 5mg daily on 12/14 with lovenox bridge --> verified dose    []   [x]       S/S bleeding/bruising/swelling/SOB- denies   []   [x]       Blood in urine or stool- denies   []   [x]       Procedures scheduled in the future at this time- may have bx of thyroid in future, not scheduled yet   []   [x]       Missed Dose  []   [x]       Extra Dose  []   [x]       Change in medications- was discharged on lovenox, states just finished fluconazole Sunday (4 doses) --> no changes   [x]   []       Change in health/diet/appetite-eats greens every day, loves greens --> had broccoli yesterday and salads before   []   [x]       Change in alcohol use- doesn't drink or smoke   []   [x]       Change in activity  []   [x]       Hospital admission  [x]   []       Emergency department visit 12/20 for fall  []   [x]       Other complaints    Clinical Outcomes:  Yes     No  []   [x]       Major bleeding event  []   [x]       Thromboembolic event    Duration of warfarin Therapy: \"at least 6 months\"   INR Range:  2.0-3.0    Takes warfarin 5mg tablets at night. Bridged with lovenox shots at [place]. INR is 6.9 today   Hold dose tonight and tomorrow then decrease weekly dose to 2.5 mg daily    Encouraged to maintain a consistency of vegetables/salads.    Recheck INR on Wednesday 12/27    Consent signed 12/20/2022    Referring Dr. Andreas Espinal   INR (no units)   Date Value   12/14/2022 1.14   12/13/2022 1.14   12/12/2022 1.09   12/07/2022 1.28 (H)     INR,(POC) (no units)   Date Value   12/20/2022 2.6         For Pharmacy Admin Tracking Only    Intervention Detail: Dose Adjustment: 1, reason: Therapy Optimization  Total # of Interventions Recommended: 1  Total # of Interventions Accepted: 1  Time Spent (min): 20

## 2022-12-27 ENCOUNTER — OFFICE VISIT (OUTPATIENT)
Dept: PRIMARY CARE CLINIC | Age: 72
End: 2022-12-27
Payer: MEDICARE

## 2022-12-27 VITALS
TEMPERATURE: 98.6 F | BODY MASS INDEX: 25.58 KG/M2 | HEART RATE: 59 BPM | WEIGHT: 131 LBS | OXYGEN SATURATION: 97 % | SYSTOLIC BLOOD PRESSURE: 139 MMHG | DIASTOLIC BLOOD PRESSURE: 71 MMHG

## 2022-12-27 DIAGNOSIS — I26.99 BILATERAL PULMONARY EMBOLISM (HCC): ICD-10-CM

## 2022-12-27 DIAGNOSIS — N28.89 LEFT RENAL MASS: ICD-10-CM

## 2022-12-27 DIAGNOSIS — I10 ESSENTIAL HYPERTENSION: ICD-10-CM

## 2022-12-27 DIAGNOSIS — E04.2 MULTINODULAR GOITER: ICD-10-CM

## 2022-12-27 DIAGNOSIS — R73.03 PREDIABETES: Primary | ICD-10-CM

## 2022-12-27 LAB — HBA1C MFR BLD: 5.7 %

## 2022-12-27 PROCEDURE — 1123F ACP DISCUSS/DSCN MKR DOCD: CPT | Performed by: INTERNAL MEDICINE

## 2022-12-27 PROCEDURE — 83036 HEMOGLOBIN GLYCOSYLATED A1C: CPT | Performed by: INTERNAL MEDICINE

## 2022-12-27 PROCEDURE — 3074F SYST BP LT 130 MM HG: CPT | Performed by: INTERNAL MEDICINE

## 2022-12-27 PROCEDURE — 99214 OFFICE O/P EST MOD 30 MIN: CPT | Performed by: INTERNAL MEDICINE

## 2022-12-27 PROCEDURE — 3078F DIAST BP <80 MM HG: CPT | Performed by: INTERNAL MEDICINE

## 2022-12-27 ASSESSMENT — ENCOUNTER SYMPTOMS
EYE PAIN: 0
EYE ITCHING: 0
WHEEZING: 0
TROUBLE SWALLOWING: 0
NAUSEA: 0
ABDOMINAL DISTENTION: 0
SORE THROAT: 0
VOICE CHANGE: 0
BACK PAIN: 0
ABDOMINAL PAIN: 0
PHOTOPHOBIA: 0
RHINORRHEA: 0
VOMITING: 0
COLOR CHANGE: 0
ANAL BLEEDING: 0
DIARRHEA: 0
RECTAL PAIN: 0
APNEA: 0
BLOOD IN STOOL: 0
STRIDOR: 0
BLURRED VISION: 0
CHEST TIGHTNESS: 0
CONSTIPATION: 0
ORTHOPNEA: 0
SHORTNESS OF BREATH: 0
CHOKING: 0
EYE REDNESS: 0
COUGH: 0
EYE DISCHARGE: 0

## 2022-12-27 NOTE — PROGRESS NOTES
Jocelynn Allison (:  1950) is a 74 y.o. female,Established patient, here for evaluation of the following chief complaint(s):  Follow-Up from Hospital         ASSESSMENT/PLAN:  1. Prediabetes: Under good control with diet and exercise continue. Lab Results   Component Value Date    LABA1C 5.7 2022    LABA1C 5.6 10/18/2022    LABA1C 5.7 2021     Lab Results   Component Value Date    GLUF 98 2013    LABMICR YES 10/18/2022    LDLCALC 80 10/18/2022    CREATININE 0.7 2022       -     POCT glycosylated hemoglobin (Hb A1C)  2. Bilateral pulmonary embolism (HCC) unprovoked currently having a medial nodule further evaluated. CT scan of chest abdomen pelvis no other sources for tumor. Currently followed in Coumadin clinic in the last 3 INRs as in HPI. 3. Left renal mass, there are 3 left renal masses but 1 inferior mass does not enhance and will be further evaluated by urology appointment this month. This is concerning because her father had kidney cancer and she had an unprovoked bilateral pulmonary emboli. 4. Essential hypertension is controlled on Procardia XL 30 mg daily chlorthalidone 25 mg daily and atenolol 25 mg daily, continue. Labs Renal Latest Ref Rng & Units 2022 2022 2022 2022 12/10/2022   BUN 7 - 20 mg/dL 6(L) 7 14 12 14   Cr 0.6 - 1.2 mg/dL 0.7 0.6 0.7 0.7 0.7   K 3.5 - 5.1 mmol/L 3.6 3.7 4.0 3.6 3.8   Na 136 - 145 mmol/L 138 139 138 135(L) 138       BP Readings from Last 3 Encounters:   22 139/71   22 (!) 154/98   22 126/75       5. Multinodular goiter, on the CT scan there was a concerning nodule so ultrasound ordered by ENT and ultrasound has been stable for 5 years and patient has a follow-up appointment with Dr. Cristin Sanders in 2023. No swallowing problems and stable. Return in about 6 weeks (around 2023).          Subjective   SUBJECTIVE/OBJECTIVE:  Discussed thyroid us results below and has appointment with ENT , Dr. Media Gaucher  1/25/23.      12/23/2022  6:36 PM EST , did not feel enlargement on exam and stable ultrasound results below. No swallowing or breathing problems. The thyroid ultrasound was benign. Here is the impression:  IMPRESSION:  1. Unchanged multinodular goiter. 2. As described on prior exam, there are numerous nodules in the bilateral          Had fall on 1/20/22 and ct negative. And mild headache is resolving. Chronic coumadin therapy since 12/7/22  for acute bilateral pulmonary emboli and going to coumadin clinic. Lab Results       Component                Value               Date                       INR                      6.9                 12/23/2022                 INR                      2.6                 12/20/2022                 INR                      1.14                12/14/2022                 PROTIME                  14.6 (H)            12/14/2022                 PROTIME                  14.5                12/13/2022                 PROTIME                  14.0                12/12/2022            Coumadin clinic made adjustments. neck which have been documented for greater than 5 years is stable and have  previously been diagnosed as benign, requiring no additional evaluation or  follow-up imaging. No shortness of breath. No black stools or blood in stools or blood in the urine. Going to Coumadin clinic. No dyspnea on exertion. Has been stable since anticoagulation started. I would still see the ENT to look over all the results, ut a January appointment will be fine. 1 kidney nodule, with mild enhancement and has apt with urology to further evaluate. Scheduling Instructions    [facility]   [address]    [place], [address]   Phone: [phone]   Fax: [phone]     Father had kidney cancer and patient has one renal lesion that enhances.   Not comfortable waiting six months to evaluate due to recent hospitalization for unprovoked PE 12/7/22. Please evaluate and treat. Scheduling Instructions    [facility]   [address] [facility], [address]   Phone: [phone]   Fax: [phone]     Father had kidney cancer and patient has one renal lesion that enhances. Not comfortable waiting six months to evaluate due to recent hospitalization for unprovoked PE  12/7/22. Please evaluate and treat. Diabetes  She presents for her follow-up (Prediabetes) diabetic visit. She has type 2 (Present for years and stable on diet) diabetes mellitus. Her disease course has been stable. Pertinent negatives for hypoglycemia include no confusion, dizziness, headaches, nervousness/anxiousness, pallor, seizures, speech difficulty, sweats or tremors. There are no diabetic associated symptoms. Pertinent negatives for diabetes include no blurred vision, no chest pain, no fatigue and no weakness. There are no hypoglycemic complications. Symptoms are stable. There are no diabetic complications. Risk factors for coronary artery disease include diabetes mellitus, hypertension and post-menopausal. Current diabetic treatment includes diet. She is compliant with treatment all of the time. When asked about meal planning, she reported none. She has not had a previous visit with a dietitian. She participates in exercise daily. There is no change in her home blood glucose trend. Her overall blood glucose range is  mg/dl. Hypertension  This is a chronic problem. The current episode started more than 1 year ago. The problem has been waxing and waning since onset. The problem is controlled. Pertinent negatives include no anxiety, blurred vision, chest pain, headaches, malaise/fatigue, neck pain, orthopnea, palpitations, peripheral edema, PND, shortness of breath or sweats. There are no associated agents to hypertension. Past treatments include calcium channel blockers, beta blockers, diuretics and lifestyle changes.  The current treatment provides significant improvement. There is no history of kidney disease, CAD/MI or heart failure. Review of Systems   Constitutional: Negative. Negative for activity change, appetite change, chills, diaphoresis, fatigue, fever and malaise/fatigue. HENT:  Negative for dental problem, ear pain, hearing loss, mouth sores, nosebleeds, postnasal drip, rhinorrhea, sore throat, trouble swallowing and voice change. Eyes:  Negative for blurred vision, photophobia, pain, discharge, redness, itching and visual disturbance. Respiratory:  Negative for apnea, cough, choking, chest tightness, shortness of breath, wheezing and stridor. Cardiovascular:  Negative for chest pain, palpitations, orthopnea, leg swelling and PND. Hypertension  Slow pulse   Gastrointestinal:  Negative for abdominal distention, abdominal pain, anal bleeding, blood in stool, constipation, diarrhea, nausea, rectal pain and vomiting. Endocrine: Negative. Hypothyroid on supplement. Hyperlipidemia with myalgias with Crestor so discontinued   Genitourinary:  Negative for dysuria, flank pain, frequency and hematuria. Musculoskeletal:  Positive for arthralgias. Negative for back pain, gait problem, joint swelling, myalgias, neck pain and neck stiffness. Hip pain intermittently with weightbearing activities   Skin: Negative. Negative for color change, pallor, rash and wound. Neurological:  Negative for dizziness, tremors, seizures, syncope, facial asymmetry, speech difficulty, weakness, light-headedness, numbness and headaches. Hematological:  Negative for adenopathy. Does not bruise/bleed easily. Psychiatric/Behavioral:  Negative for agitation, behavioral problems, confusion, decreased concentration, dysphoric mood, hallucinations, self-injury, sleep disturbance and suicidal ideas. The patient is not nervous/anxious and is not hyperactive.          Objective   Physical Exam  Constitutional:       General: She is not in acute distress. Appearance: She is well-developed. She is not diaphoretic. HENT:      Head: Normocephalic. Right Ear: External ear normal.      Left Ear: External ear normal.      Mouth/Throat:      Pharynx: No oropharyngeal exudate. Eyes:      Extraocular Movements: Extraocular movements intact. Conjunctiva/sclera: Conjunctivae normal.      Pupils: Pupils are equal, round, and reactive to light. Neck:      Thyroid: No thyromegaly. Vascular: No JVD. Trachea: No tracheal deviation. Comments: Chronic multinodular goiter  Cardiovascular:      Rate and Rhythm: Normal rate. Heart sounds: Normal heart sounds. Pulmonary:      Effort: Pulmonary effort is normal.      Breath sounds: Normal breath sounds. Abdominal:      General: Bowel sounds are normal. There is no distension. Palpations: There is no mass. Tenderness: There is no abdominal tenderness. There is no right CVA tenderness, left CVA tenderness, guarding or rebound. Hernia: No hernia is present. Musculoskeletal:         General: Deformity present. No swelling or tenderness. Cervical back: Normal range of motion and neck supple. Right knee: No swelling, deformity, effusion, ecchymosis or lacerations. Normal range of motion. No tenderness. No medial joint line or lateral joint line tenderness. Right lower leg: No edema. Left lower leg: No edema. Comments:     Deformity of knees. Skin:     General: Skin is warm and dry. Neurological:      General: No focal deficit present. Mental Status: She is alert and oriented to person, place, and time. Cranial Nerves: No cranial nerve deficit. Psychiatric:         Mood and Affect: Mood normal.         Behavior: Behavior normal.         Thought Content: Thought content normal.         Judgment: Judgment normal.              An electronic signature was used to authenticate this note.     --Sharon Garcia MD

## 2022-12-28 ENCOUNTER — ANTI-COAG VISIT (OUTPATIENT)
Dept: PHARMACY | Age: 72
End: 2022-12-28
Payer: MEDICARE

## 2022-12-28 DIAGNOSIS — I26.99 BILATERAL PULMONARY EMBOLISM (HCC): Primary | ICD-10-CM

## 2022-12-28 LAB — INTERNATIONAL NORMALIZATION RATIO, POC: 7

## 2022-12-28 PROCEDURE — 85610 PROTHROMBIN TIME: CPT

## 2022-12-28 PROCEDURE — 99212 OFFICE O/P EST SF 10 MIN: CPT

## 2022-12-28 NOTE — PROGRESS NOTES
Ms. Cindi Romano is a 74 y.o. y/o female with history of PE who presents today for anticoagulation monitoring and adjustment. Pertinent PMH:    Patient Reported Findings:  Yes     No  [x]   []       Patient verifies current dosing regimen as listed- pt started warfarin 5mg daily on 12/14 with lovenox bridge --> verified dose    []   [x]       S/S bleeding/bruising/swelling/SOB- denies   []   [x]       Blood in urine or stool- denies   []   [x]       Procedures scheduled in the future at this time- may have bx of thyroid in future, not scheduled yet   []   [x]       Missed Dose  []   [x]       Extra Dose  []   [x]       Change in medications- was discharged on lovenox, states just finished fluconazole Sunday (4 doses) --> no changes   [x]   []       Change in health/diet/appetite-eats greens every day, loves greens --> had broccoli yesterday and salads before --> had broccoli one day. Had less vit k to help INR. Discussed consistency and not adjusting diet to help INR. Pt will return to vit k daily. No V/D    []   [x]       Change in alcohol use- doesn't drink or smoke   []   [x]       Change in activity  []   [x]       Hospital admission  [x]   []       Emergency department visit 12/20 for fall  []   [x]       Other complaints    Clinical Outcomes:  Yes     No  []   [x]       Major bleeding event  []   [x]       Thromboembolic event    Duration of warfarin Therapy: \"at least 6 months\"   INR Range:  2.0-3.0    Takes warfarin 5mg tablets at night    INR is 7 today despite holding doses and cutting warfarin dose in half   Hold dose for next 3 days then decrease weekly dose to 2 mg daily    Encouraged to maintain a consistency of vegetables/salads. Called in script for warfarin 2 mg tablets to opp. Pt verified understanding of change in tablet strength and color.    Recheck INR on Tues 1/3/23    Consent signed 12/20/2022    Referring Dr. Kay Du   INR (no units)   Date Value   12/14/2022 1.14   12/13/2022 1.14 12/12/2022 1.09   12/07/2022 1.28 (H)     INR,(POC) (no units)   Date Value   12/23/2022 6.9   12/20/2022 2.6         For Pharmacy Admin Tracking Only    Intervention Detail: Dose Adjustment: 1, reason: Therapy Optimization and Refill(s) Provided  Total # of Interventions Recommended: 2  Total # of Interventions Accepted: 2  Time Spent (min): 20

## 2022-12-28 NOTE — TELEPHONE ENCOUNTER
Warfarin prescription phoned into [facility] [address] to [address] under Dr. Jimenez He  Warfarin 2 mg tabs  Take 2 mg daily  30 days   1 refill

## 2022-12-29 ENCOUNTER — CARE COORDINATION (OUTPATIENT)
Dept: CASE MANAGEMENT | Age: 72
End: 2022-12-29

## 2022-12-29 NOTE — CARE COORDINATION
[facility] Care Transitions Follow Up Call    Patient: Gila Stapleton  Patient : 1950   MRN: [id number]  Reason for Admission:   Discharge Date: 22 RARS: Readmission Risk Score: 11.7    2nd and final attempt at a Follow up call, left contact info on         Follow Up  Future Appointments   Date Time Provider Obi Garcia   1/3/2023 12:00 PM Andrew Carbone 107 HO   2023  9:20 AM Eligio Alvarez MD FF ENT [facility]       Taylor Green RN

## 2023-01-03 ENCOUNTER — ANTI-COAG VISIT (OUTPATIENT)
Dept: PHARMACY | Age: 73
End: 2023-01-03
Payer: MEDICARE

## 2023-01-03 DIAGNOSIS — I26.99 BILATERAL PULMONARY EMBOLISM (HCC): Primary | ICD-10-CM

## 2023-01-03 LAB — INTERNATIONAL NORMALIZATION RATIO, POC: 1.2

## 2023-01-03 PROCEDURE — 99212 OFFICE O/P EST SF 10 MIN: CPT

## 2023-01-03 PROCEDURE — 85610 PROTHROMBIN TIME: CPT

## 2023-01-03 NOTE — PROGRESS NOTES
Ms. Austin Cosme is a 74 y.o. y/o female with history of PE who presents today for anticoagulation monitoring and adjustment. Pertinent PMH:    Patient Reported Findings:  Yes     No  [x]   []       Patient verifies current dosing regimen as listed- pt started warfarin 5mg daily on 12/14 with lovenox bridge --> verified dose    []   [x]       S/S bleeding/bruising/swelling/SOB- denies   []   [x]       Blood in urine or stool- denies   []   [x]       Procedures scheduled in the future at this time- may have bx of thyroid in future, not scheduled yet   []   [x]       Missed Dose- held 3 days   []   [x]       Extra Dose  []   [x]       Change in medications- was discharged on lovenox, states just finished fluconazole Sunday (4 doses) --> no changes   [x]   []       Change in health/diet/appetite-eats greens every day, loves greens --> had broccoli yesterday and salads before --> had broccoli one day. Had less vit k to help INR. Discussed consistency and not adjusting diet to help INR. Pt will return to vit k daily. No V/D---> had greens daily (mixed greens - [place]in/kale/mustard/turnip, salad, broccoli)    []   [x]       Change in alcohol use- doesn't drink or smoke   []   [x]       Change in activity  []   [x]       Hospital admission  [x]   []       Emergency department visit 12/20 for fall  []   [x]       Other complaints    Clinical Outcomes:  Yes     No  []   [x]       Major bleeding event  []   [x]       Thromboembolic event    Duration of warfarin Therapy: \"at least 6 months\"   INR Range:  2.0-3.0    Takes warfarin 5mg tablets at night---> now using 2mg tablets. INR is 1.2 today after switching to 2mg tablets and decreasing dose and eating greens daily. Wants to continue eating them daily. Take 2mg daily. Encouraged to maintain a consistency of vegetables/salads.    Recheck INR on Friday 1/6    Consent signed 12/20/2022    Referring Dr. Paulette Cook   INR (no units)   Date Value   12/14/2022 1.14 12/13/2022 1.14   12/12/2022 1.09   12/07/2022 1.28 (H)     INR,(POC) (no units)   Date Value   01/03/2023 1.2   12/28/2022 7.0   12/23/2022 6.9   12/20/2022 2.6     For Pharmacy Admin Tracking Only    Intervention Detail: Dose Adjustment: 1, reason: Therapy Optimization  Total # of Interventions Recommended: 1  Total # of Interventions Accepted: 1  Time Spent (min): 15

## 2023-01-06 ENCOUNTER — ANTI-COAG VISIT (OUTPATIENT)
Dept: PHARMACY | Age: 73
End: 2023-01-06
Payer: MEDICARE

## 2023-01-06 DIAGNOSIS — I26.99 BILATERAL PULMONARY EMBOLISM (HCC): Primary | ICD-10-CM

## 2023-01-06 LAB — INTERNATIONAL NORMALIZATION RATIO, POC: 1.1

## 2023-01-06 PROCEDURE — 85610 PROTHROMBIN TIME: CPT

## 2023-01-06 PROCEDURE — 99211 OFF/OP EST MAY X REQ PHY/QHP: CPT

## 2023-01-06 NOTE — PROGRESS NOTES
Ms. Loly Meléndez is a 74 y.o. y/o female with history of PE who presents today for anticoagulation monitoring and adjustment. Pertinent PMH:    Patient Reported Findings:  Yes     No  [x]   []       Patient verifies current dosing regimen as listed- pt started warfarin 5mg daily on 12/14 with lovenox bridge --> verified dose    []   [x]       S/S bleeding/bruising/swelling/SOB- denies   []   [x]       Blood in urine or stool- denies   []   [x]       Procedures scheduled in the future at this time- may have bx of thyroid in future, not scheduled yet   []   [x]       Missed Dose- held 3 days   []   [x]       Extra Dose  []   [x]       Change in medications- was discharged on lovenox, states just finished fluconazole Sunday (4 doses) --> no changes   [x]   []       Change in health/diet/appetite-eats greens every day, loves greens --> had broccoli yesterday and salads before --> had broccoli one day. Had less vit k to help INR. Discussed consistency and not adjusting diet to help INR. Pt will return to vit k daily. No V/D---> had greens daily (mixed greens - [place]in/kale/mustard/turnip, salad, broccoli) --> continues with vit k daily    []   [x]       Change in alcohol use- doesn't drink or smoke   []   [x]       Change in activity  []   [x]       Hospital admission  [x]   []       Emergency department visit 12/20 for fall  []   [x]       Other complaints    Clinical Outcomes:  Yes     No  []   [x]       Major bleeding event  []   [x]       Thromboembolic event    Duration of warfarin Therapy: \"at least 6 months\"   INR Range:  2.0-3.0    Takes warfarin 5mg tablets at night---> now using 2mg tablets. INR is 1.1 today after switching to 2mg tablets and decreasing dose and eating greens daily. Wants to continue eating them daily. Take 4 mg daily. Encouraged to maintain a consistency of vegetables/salads.    Recheck INR on Tues 1/10    Consent signed 12/20/2022    Referring Dr. Bonnie Morrison   INR (no units)   Date Value   12/14/2022 1.14   12/13/2022 1.14   12/12/2022 1.09   12/07/2022 1.28 (H)     INR,(POC) (no units)   Date Value   01/03/2023 1.2   12/28/2022 7.0   12/23/2022 6.9   12/20/2022 2.6     For Pharmacy Admin Tracking Only    Intervention Detail: Dose Adjustment: 1, reason: Therapy Optimization  Total # of Interventions Recommended: 1  Total # of Interventions Accepted: 1  Time Spent (min): 15

## 2023-01-10 ENCOUNTER — ANTI-COAG VISIT (OUTPATIENT)
Dept: PHARMACY | Age: 73
End: 2023-01-10
Payer: MEDICARE

## 2023-01-10 DIAGNOSIS — I26.99 BILATERAL PULMONARY EMBOLISM (HCC): Primary | ICD-10-CM

## 2023-01-10 LAB — INTERNATIONAL NORMALIZATION RATIO, POC: 1.3

## 2023-01-10 PROCEDURE — 85610 PROTHROMBIN TIME: CPT

## 2023-01-10 PROCEDURE — 99211 OFF/OP EST MAY X REQ PHY/QHP: CPT

## 2023-01-10 NOTE — PROGRESS NOTES
Ms. James Frey is a 74 y.o. y/o female with history of PE who presents today for anticoagulation monitoring and adjustment. Pertinent PMH:    Patient Reported Findings:  Yes     No  [x]   []       Patient verifies current dosing regimen as listed- pt started warfarin 5mg daily on 12/14 with lovenox bridge --> verified dose    []   [x]       S/S bleeding/bruising/swelling/SOB- denies   []   [x]       Blood in urine or stool- denies   []   [x]       Procedures scheduled in the future at this time- may have bx of thyroid in future, not scheduled yet   []   [x]       Missed Dose- held 3 days   []   [x]       Extra Dose  []   [x]       Change in medications- was discharged on lovenox, states just finished fluconazole Sunday (4 doses) --> no changes   [x]   []       Change in health/diet/appetite-eats greens every day, loves greens --> had broccoli yesterday and salads before --> had broccoli one day. Had less vit k to help INR. Discussed consistency and not adjusting diet to help INR. Pt will return to vit k daily. No V/D---> had greens daily (mixed greens - [place]/kale/mustard/turnip, salad, broccoli) --> continues with vit k daily --> vit k 1-2 times/day   []   [x]       Change in alcohol use- doesn't drink or smoke   []   [x]       Change in activity  []   [x]       Hospital admission  [x]   []       Emergency department visit 12/20 for fall  []   [x]       Other complaints    Clinical Outcomes:  Yes     No  []   [x]       Major bleeding event  []   [x]       Thromboembolic event    Duration of warfarin Therapy: \"at least 6 months\"   INR Range:  2.0-3.0    Takes warfarin 5mg tablets at night---> now using 2mg tablets. INR is 1.3 today after maintaining significant vit k intake and increasing dose. Not observing full effects of dose increase  Take 5 mg tonight then continue to take 4 mg daily   Encouraged to maintain a consistency of vegetables/salads.    Pt going out of town this weekend, unable to RTC on Fri   Recheck INR on Mon 1/16    Consent signed 12/20/2022    Referring Dr. Billie Dhaliwal   INR (no units)   Date Value   12/14/2022 1.14   12/13/2022 1.14   12/12/2022 1.09   12/07/2022 1.28 (H)     INR,(POC) (no units)   Date Value   01/06/2023 1.1   01/03/2023 1.2   12/28/2022 7.0   12/23/2022 6.9     For Pharmacy Admin Tracking Only    Intervention Detail: Dose Adjustment: 1, reason: Therapy Optimization  Total # of Interventions Recommended: 1  Total # of Interventions Accepted: 1  Time Spent (min): 15

## 2023-01-16 ENCOUNTER — ANTI-COAG VISIT (OUTPATIENT)
Dept: PHARMACY | Age: 73
End: 2023-01-16
Payer: MEDICARE

## 2023-01-16 DIAGNOSIS — I26.99 BILATERAL PULMONARY EMBOLISM (HCC): Primary | ICD-10-CM

## 2023-01-16 LAB — INTERNATIONAL NORMALIZATION RATIO, POC: 1.7

## 2023-01-16 PROCEDURE — 85610 PROTHROMBIN TIME: CPT

## 2023-01-16 PROCEDURE — 99211 OFF/OP EST MAY X REQ PHY/QHP: CPT

## 2023-01-16 NOTE — PROGRESS NOTES
Ms. Vitor Bang is a 74 y.o. y/o female with history of PE who presents today for anticoagulation monitoring and adjustment. Pertinent PMH:    Patient Reported Findings:  Yes     No  [x]   []       Patient verifies current dosing regimen as listed- pt started warfarin 5mg daily on 12/14 with lovenox bridge --> verified dose    []   [x]       S/S bleeding/bruising/swelling/SOB- denies   []   [x]       Blood in urine or stool- denies   []   [x]       Procedures scheduled in the future at this time- may have bx of thyroid in future, not scheduled yet   []   [x]       Missed Dose- denies  []   [x]       Extra Dose denies   []   [x]       Change in medications- was discharged on lovenox, states just finished fluconazole Sunday (4 doses) --> no changes   []   [x]       Change in health/diet/appetite-eats greens every day, loves greens --> had broccoli yesterday and salads before --> had broccoli one day. Had less vit k to help INR. Discussed consistency and not adjusting diet to help INR. Pt will return to vit k daily. No V/D---> had greens daily (mixed greens - [place]/kale/mustard/turnip, salad, broccoli) --> continues with vit k daily --> vit k 1-2 times/day --> no changes    []   [x]       Change in alcohol use- doesn't drink or smoke   []   [x]       Change in activity  []   [x]       Hospital admission  [x]   []       Emergency department visit 12/20 for fall  []   [x]       Other complaints    Clinical Outcomes:  Yes     No  []   [x]       Major bleeding event  []   [x]       Thromboembolic event    Duration of warfarin Therapy: \"at least 6 months\"   INR Range:  2.0-3.0    Takes warfarin 5mg tablets at night---> now using 2mg tablets. INR is 1.7 today   Increase weekly dose to 6 mg on Mon and Fri and 4 mg all other days of the day (14% inc)  Encouraged to maintain a consistency of vegetables/salads.    Refilled warfarin at opp   Recheck INR in 1 week, 1/23    Consent signed 12/20/2022    Referring  Jada   INR (no units)   Date Value   12/14/2022 1.14   12/13/2022 1.14   12/12/2022 1.09   12/07/2022 1.28 (H)     INR,(POC) (no units)   Date Value   01/10/2023 1.3   01/06/2023 1.1   01/03/2023 1.2   12/28/2022 7.0     For Pharmacy Admin Tracking Only    Intervention Detail: Dose Adjustment: 1, reason: Therapy Optimization and Refill(s) Provided  Total # of Interventions Recommended: 2  Total # of Interventions Accepted: 2  Time Spent (min): 15

## 2023-01-17 DIAGNOSIS — N95.2 ATROPHIC VAGINITIS: ICD-10-CM

## 2023-01-17 RX ORDER — ESTRADIOL 0.1 MG/G
0.5 CREAM VAGINAL
Qty: 1 EACH | Refills: 3 | Status: SHIPPED | OUTPATIENT
Start: 2023-01-19

## 2023-01-23 ENCOUNTER — ANTI-COAG VISIT (OUTPATIENT)
Dept: PHARMACY | Age: 73
End: 2023-01-23
Payer: MEDICARE

## 2023-01-23 DIAGNOSIS — I26.99 BILATERAL PULMONARY EMBOLISM (HCC): Primary | ICD-10-CM

## 2023-01-23 LAB — INTERNATIONAL NORMALIZATION RATIO, POC: 1.9

## 2023-01-23 PROCEDURE — 85610 PROTHROMBIN TIME: CPT

## 2023-01-23 PROCEDURE — 99211 OFF/OP EST MAY X REQ PHY/QHP: CPT

## 2023-01-23 NOTE — PROGRESS NOTES
Ms. Severa Chess is a 74 y.o. y/o female with history of PE who presents today for anticoagulation monitoring and adjustment. Pertinent PMH:    Patient Reported Findings:  Yes     No  [x]   []       Patient verifies current dosing regimen as listed- pt started warfarin 5mg daily on 12/14 with lovenox bridge --> verified dose    []   [x]       S/S bleeding/bruising/swelling/SOB- denies   []   [x]       Blood in urine or stool- denies   [x]   []       Procedures scheduled in the future at this time- may have bx of thyroid in future, not scheduled yet --> had capsule endoscopy last week, did not hold warfarin   []   [x]       Missed Dose- denies  []   [x]       Extra Dose denies   []   [x]       Change in medications- was discharged on lovenox, states just finished fluconazole Sunday (4 doses) --> no changes   []   [x]       Change in health/diet/appetite-eats greens every day, loves greens --> had broccoli yesterday and salads before --> had broccoli one day. Had less vit k to help INR. Discussed consistency and not adjusting diet to help INR. Pt will return to vit k daily. No V/D---> had greens daily (mixed greens - [place]/kale/mustard/turnip, salad, broccoli) --> continues with vit k daily --> vit k 1-2 times/day --> no changes    []   [x]       Change in alcohol use- doesn't drink or smoke   []   [x]       Change in activity  []   [x]       Hospital admission  [x]   []       Emergency department visit 12/20 for fall  []   [x]       Other complaints    Clinical Outcomes:  Yes     No  []   [x]       Major bleeding event  []   [x]       Thromboembolic event    Duration of warfarin Therapy: \"at least 6 months\"   INR Range:  2.0-3.0    Takes warfarin 5mg tablets at night---> now using 2mg tablets. INR is 1.9 today   Increase weekly dose to 6 mg on Mon Wed and Fri and 4 mg all other days of the day (6% inc)  Encouraged to maintain a consistency of vegetables/salads.    Recheck INR in 1 week, 1/30    Consent signed 12/20/2022    Referring Dr. Len Vzáquez   INR (no units)   Date Value   12/14/2022 1.14   12/13/2022 1.14   12/12/2022 1.09   12/07/2022 1.28 (H)     INR,(POC) (no units)   Date Value   01/16/2023 1.7   01/10/2023 1.3   01/06/2023 1.1   01/03/2023 1.2     For Pharmacy Admin Tracking Only    Intervention Detail: Dose Adjustment: 1, reason: Therapy Optimization  Total # of Interventions Recommended: 1  Total # of Interventions Accepted: 1  Time Spent (min): 15

## 2023-01-27 ENCOUNTER — OFFICE VISIT (OUTPATIENT)
Dept: ENT CLINIC | Age: 73
End: 2023-01-27
Payer: MEDICARE

## 2023-01-27 VITALS
DIASTOLIC BLOOD PRESSURE: 80 MMHG | SYSTOLIC BLOOD PRESSURE: 131 MMHG | BODY MASS INDEX: 25.91 KG/M2 | HEIGHT: 60 IN | HEART RATE: 72 BPM | WEIGHT: 132 LBS | TEMPERATURE: 97.7 F

## 2023-01-27 DIAGNOSIS — D44.0 NEOPLASM OF UNCERTAIN BEHAVIOR OF THYROID GLAND: Primary | Chronic | ICD-10-CM

## 2023-01-27 DIAGNOSIS — R42 DIZZINESS: ICD-10-CM

## 2023-01-27 DIAGNOSIS — E04.2 MULTINODULAR GOITER (NONTOXIC): Chronic | ICD-10-CM

## 2023-01-27 DIAGNOSIS — E04.9 SUBSTERNAL GOITER: ICD-10-CM

## 2023-01-27 DIAGNOSIS — J39.8 TRACHEAL DEVIATION: ICD-10-CM

## 2023-01-27 PROCEDURE — 99204 OFFICE O/P NEW MOD 45 MIN: CPT | Performed by: OTOLARYNGOLOGY

## 2023-01-27 PROCEDURE — 3079F DIAST BP 80-89 MM HG: CPT | Performed by: OTOLARYNGOLOGY

## 2023-01-27 PROCEDURE — 3075F SYST BP GE 130 - 139MM HG: CPT | Performed by: OTOLARYNGOLOGY

## 2023-01-27 PROCEDURE — 1123F ACP DISCUSS/DSCN MKR DOCD: CPT | Performed by: OTOLARYNGOLOGY

## 2023-01-27 ASSESSMENT — ENCOUNTER SYMPTOMS
RHINORRHEA: 0
SINUS PAIN: 0
SORE THROAT: 0

## 2023-01-27 NOTE — PROGRESS NOTES
[facility]       \"NEW\"  PATIENT VISIT   (ESTABLISHED, who has not been seen by a [facility] Otolaryngologist in over 3 years)          PCP:  Prashant Rodríguez MD      REFERRED BY:   Prashant Rodríguez MD       CHIEF COMPLAINT  Chief Complaint   Patient presents with    Thyroid Problem        HISTORY OF PRESENT Silvano Bar is a 74 y.o. female referred here for evaluation and management of a thyroid mass. She stated that she has had this thyroid condition for many years. Jacob Kinney keep checking with ultrasounds. \"  A fine needle aspiration for cytology in 2013 was benign. She denied dyspnea, SOB, dysphagia, pain, or pressure. No further dizziness after the last visit, until two days ago began experiencing \"the same dizziness. Dizziness is only happening when I get up in the morning; it lasts for about 30 minutes and goes away after I do the exercises. \"  No dizziness the rest of the day. REVIEW OF SYSTEMS   Review of Systems   Constitutional:  Negative for chills and fever. HENT:  Negative for ear discharge, ear pain, hearing loss, rhinorrhea, sinus pain, sore throat and tinnitus.           PAST MEDICAL HISTORY    Past Medical History:   Diagnosis Date    Cough variant asthma 8/4/2020    HTN (hypertension) 7/27/2011    Mixed hyperlipidemia 10/18/2022    Primary osteoarthritis of left knee 12/6/2018       Past Surgical History:   Procedure Laterality Date    BREAST SURGERY      left breast in 1970's    [address]  06/28/2015    sigmoid resection w/ left salpingo-ooph    COLONOSCOPY N/A 12/13/2022    COLONOSCOPY WITH BIOPSY performed by Inocencia Barlow MD at [address]  06/28/2015    Left salpingectomy    UPPER GASTROINTESTINAL ENDOSCOPY N/A 12/12/2022    EGD BIOPSY performed by Inocencia Barlow MD at [facility]: 01/27/23 0857   BP: 131/80   Pulse: 72   Temp: 97.7 °F (36.5 °C)   TempSrc: Temporal   Weight: 132 lb (59.9 kg)   Height: 5' (1.524 m)     General:  WDWN, NAD, alert and oriented  Face: There was no swelling or lesions detected. Voice:  Normal with no hoarseness or hot potato voice. Ears: The external ears, mastoids, TMs and EACs appeared to be normal.   (+) Nose:  Mild NSD to the right. The external nose, turbinates, secretions, and mucosa appeared to be normal.   Sinuses:  Maxillary and frontal sinuses were nontender to palpation and percussion. Oral cavity:  Mucosa, secretions, tongue, and gingiva appeared to be normal.   Oropharynx:  The palatine tonsils, hard and soft palates, uvula, tongue, posterior oropharyngeal wall, mucosa and secretions appeared to be normal.     Salivary Glands:  Normal bilateral parotid and bilateral submandibular salivary glands. Neck:  No masses or tenderness. Trachea deflected to the left about 1-2 cm. Laryngeal cartilages and hyoid bone normal.      (+) Thyroid:  Bilateral goiter, nodular. Notender. Lymph nodes:  No cervical lymphadenopathy. REVIEW OF IMAGING           Narrative   EXAMINATION:   THYROID ULTRASOUND       12/23/2022       COMPARISON:   CT neck 12/08/2022, thyroid ultrasound 09/30/2021       HISTORY:   ORDERING SYSTEM PROVIDED HISTORY: Thyroid nodule   TECHNOLOGIST PROVIDED HISTORY:   This procedure can be scheduled via [email or web address]. Access your [email or web address] account by   visiting [employer]. Reason for exam:-> nodule seen on ct       3 nodules described on prior thyroid ultrasound all described as benign   findings requiring no additional evaluation or follow-up. FINDINGS:   Right thyroid lobe:  7.1 x 5.6 x 2.8 cm       Left thyroid lobe:  6.1 x 2.6 x 1.8 cm       Isthmus:  3 mm thickness       Thyroid Gland:  Thyroid gland re-demonstrates heterogeneous, multinodular   echotexture and unremarkable appearing vascularity.        Nodules: Multiple stable thyroid nodules are present. Generally stable   appearance of prior indexed benign nodules. It does not appear to the   ultrasound technologist correlated measurements from prior study as the 3   indexed nodules described previously are measured with different margins. When directly correlating with the prior study and measurements obtained from   that exam I do not appreciate any significant interval change. No definite   discrete stand alone or new thyroid nodule is evident left or right. Impression   1. Unchanged multinodular goiter. 2. As described on prior exam, there are numerous nodules in the bilateral   neck which have been documented for greater than 5 years is stable and have   previously been diagnosed as benign, requiring no additional evaluation or   follow-up imaging. Narrative   EXAMINATION:   CT OF THE NECK SOFT TISSUE WITH CONTRAST  12/8/2022       TECHNIQUE:   CT of the neck was performed with the administration of intravenous contrast.   Multiplanar reformatted images are provided for review. Automated exposure   control, iterative reconstruction, and/or weight based adjustment of the   mA/kV was utilized to reduce the radiation dose to as low as reasonably   achievable. COMPARISON:   None. HISTORY:   ORDERING SYSTEM PROVIDED HISTORY: evaluation of thyromegaly   TECHNOLOGIST PROVIDED HISTORY:   Reason for exam:->evaluation of thyromegaly   Reason for Exam: Evaluation of thyromegaly. FINDINGS:   A right upper lobe pulmonary artery filling defect is present, consistent   with PE. There is enlargement of the thyroid gland with multiple hypoattenuating   masses, measuring up to 5.1 cm in diameter within the right thyroid lobe. A   retrosternal component is present and the airway is displaced to the left   hand side. The thyroid capsule appears to be grossly intact. No retropharyngeal free fluid is identified.        The major salivary glands are symmetric in size and enhancement and   visualized portions of the tongue are normal.       The nasopharynx, oropharynx, hypopharynx, larynx, and upper esophagus are   grossly within normal limits. The major vessels of the neck enhance symmetrically. No pathologically enlarged lymph nodes are seen within neck, to the extent   visualized. No acute osseous abnormality identified. Impression   Multinodular goiter. Please refer to the thyroid ultrasound report from 09/30/2021 for further   details and follow-up recommendations. Right upper lobe PE. Please refer to the separately dictated CT chest report   from 12/07/2022 for further details and follow-up recommendations. [place] Siu / Lorenza Kan / Aj Olivier was seen today for thyroid problem. 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 The alternative is fine needle aspiration for cytology versus observation for dyspnea, dysphagia, hoarseness, or neck pressure/pain. Over the counter allergy medications, such as Allegra/fexofenadine, Claritin/loratadine or Zyrtec/cetirizine or Flonase/fluticasone. Return if dizziness continues longer than 4 weeks, or any symptoms of thyroid enlargement, or, for any further ENT or sinus problems or symptoms.

## 2023-01-30 ENCOUNTER — ANTI-COAG VISIT (OUTPATIENT)
Dept: PHARMACY | Age: 73
End: 2023-01-30
Payer: MEDICARE

## 2023-01-30 DIAGNOSIS — I26.99 BILATERAL PULMONARY EMBOLISM (HCC): Primary | ICD-10-CM

## 2023-01-30 LAB — INTERNATIONAL NORMALIZATION RATIO, POC: 2

## 2023-01-30 PROCEDURE — 99211 OFF/OP EST MAY X REQ PHY/QHP: CPT

## 2023-01-30 PROCEDURE — 85610 PROTHROMBIN TIME: CPT

## 2023-01-30 NOTE — TELEPHONE ENCOUNTER
Warfarin prescription phoned into [facility] to [facility] under Dr. John Paul Cerda  Warfarin 4 mg tabs  Take 6 mg on Mon, Wed and Fri and 4 mg all other days of the week  90 days   2 refills

## 2023-01-30 NOTE — PROGRESS NOTES
Ms. Roxie Marroquin is a 74 y.o. y/o female with history of PE who presents today for anticoagulation monitoring and adjustment. Pertinent PMH:    Patient Reported Findings:  Yes     No  [x]   []       Patient verifies current dosing regimen as listed- pt started warfarin 5mg daily on 12/14 with lovenox bridge --> verified dose    []   [x]       S/S bleeding/bruising/swelling/SOB- denies   []   [x]       Blood in urine or stool- denies   [x]   []       Procedures scheduled in the future at this time- may have bx of thyroid in future, not scheduled yet --> had capsule endoscopy last week, did not hold warfarin --> will need to have thyroidectomy in future, not scheduled. []   [x]       Missed Dose- denies  []   [x]       Extra Dose denies   []   [x]       Change in medications- was discharged on lovenox, states just finished fluconazole Sunday (4 doses) --> no changes   []   [x]       Change in health/diet/appetite-eats greens every day, loves greens --> had broccoli yesterday and salads before --> had broccoli one day. Had less vit k to help INR. Discussed consistency and not adjusting diet to help INR. Pt will return to vit k daily. No V/D---> had greens daily (mixed greens - [place]/kale/mustard/turnip, salad, broccoli) --> continues with vit k daily --> vit k 1-2 times/day --> no changes    []   [x]       Change in alcohol use- doesn't drink or smoke   []   [x]       Change in activity  []   [x]       Hospital admission  [x]   []       Emergency department visit 12/20 for fall  []   [x]       Other complaints    Clinical Outcomes:  Yes     No  []   [x]       Major bleeding event  []   [x]       Thromboembolic event    Duration of warfarin Therapy: \"at least 6 months\"   INR Range:  2.0-3.0    Takes warfarin 5mg tablets at night---> now using 2mg tablets.      INR is 2 today   Continue weekly dose of 6 mg on Mon Wed and Fri and 4 mg all other days of the day    Encouraged to maintain a consistency of vegetables/salads. Called in script for warfarin 4 mg tablets to opp. Pt verified understanding or change in tablet strength and color.   Recheck INR in 2 weeks, 2/13    Consent signed 12/20/2022    Referring Dr. Tammy Alvarado   INR (no units)   Date Value   12/14/2022 1.14   12/13/2022 1.14   12/12/2022 1.09   12/07/2022 1.28 (H)     INR,(POC) (no units)   Date Value   01/23/2023 1.9   01/16/2023 1.7   01/10/2023 1.3   01/06/2023 1.1     For Pharmacy Admin Tracking Only    Intervention Detail: Refill(s) Provided  Total # of Interventions Recommended: 1  Total # of Interventions Accepted: 1  Time Spent (min): 15

## 2023-02-12 DIAGNOSIS — E03.9 ACQUIRED HYPOTHYROIDISM: ICD-10-CM

## 2023-02-13 ENCOUNTER — ANTI-COAG VISIT (OUTPATIENT)
Dept: PHARMACY | Age: 73
End: 2023-02-13
Payer: MEDICARE

## 2023-02-13 DIAGNOSIS — I26.99 BILATERAL PULMONARY EMBOLISM (HCC): Primary | ICD-10-CM

## 2023-02-13 LAB — INTERNATIONAL NORMALIZATION RATIO, POC: 2

## 2023-02-13 PROCEDURE — 85610 PROTHROMBIN TIME: CPT

## 2023-02-13 PROCEDURE — 99211 OFF/OP EST MAY X REQ PHY/QHP: CPT

## 2023-02-13 RX ORDER — LEVOTHYROXINE SODIUM 0.07 MG/1
TABLET ORAL
Qty: 90 TABLET | Refills: 0 | Status: SHIPPED | OUTPATIENT
Start: 2023-02-13

## 2023-02-13 NOTE — PROGRESS NOTES
Ms. Sandee Peralta is a 74 y.o. y/o female with history of PE who presents today for anticoagulation monitoring and adjustment. Pertinent PMH:    Patient Reported Findings:  Yes     No  [x]   []       Patient verifies current dosing regimen as listed- pt started warfarin 5mg daily on 12/14 with lovenox bridge --> verified dose    []   [x]       S/S bleeding/bruising/swelling/SOB- denies   []   [x]       Blood in urine or stool- denies   [x]   []       Procedures scheduled in the future at this time- may have bx of thyroid in future, not scheduled yet --> had capsule endoscopy last week, did not hold warfarin --> will need to have thyroidectomy in future, not scheduled. []   [x]       Missed Dose- denies  []   [x]       Extra Dose denies   []   [x]       Change in medications- was discharged on lovenox, states just finished fluconazole Sunday (4 doses) --> no changes   []   [x]       Change in health/diet/appetite-eats greens every day, loves greens --> had broccoli yesterday and salads before --> had broccoli one day. Had less vit k to help INR. Discussed consistency and not adjusting diet to help INR. Pt will return to vit k daily. No V/D---> had greens daily (mixed greens - austin/kale/mustard/turnip, salad, broccoli) --> continues with vit k daily --> vit k 1-2 times/day --> no changes, no NVD  []   [x]       Change in alcohol use- doesn't drink or smoke   []   [x]       Change in activity  []   [x]       Hospital admission  [x]   []       Emergency department visit 12/20 for fall  []   [x]       Other complaints    Clinical Outcomes:  Yes     No  []   [x]       Major bleeding event  []   [x]       Thromboembolic event    Duration of warfarin Therapy: \"at least 6 months\"   INR Range:  2.0-3.0    Takes warfarin 5mg tablets at night---> now using 2mg tablets.      INR is 2 today again  Continue weekly dose of 6 mg on Mon Wed and Fri and 4 mg all other days of the day    Encouraged to maintain a consistency of vegetables/salads.    Recheck INR in 2 weeks, 2/27    Consent signed 12/20/2022    Referring Dr. Army Bang   INR (no units)   Date Value   12/14/2022 1.14   12/13/2022 1.14   12/12/2022 1.09   12/07/2022 1.28 (H)     INR,(POC) (no units)   Date Value   02/13/2023 2.0   01/30/2023 2.0   01/23/2023 1.9   01/16/2023 1.7     For Pharmacy Admin Tracking Only    Total # of Interventions Recommended: 0  Total # of Interventions Accepted: 0  Time Spent (min): 15

## 2023-02-27 ENCOUNTER — ANTI-COAG VISIT (OUTPATIENT)
Dept: PHARMACY | Age: 73
End: 2023-02-27
Payer: MEDICARE

## 2023-02-27 DIAGNOSIS — I26.99 BILATERAL PULMONARY EMBOLISM (HCC): Primary | ICD-10-CM

## 2023-02-27 LAB — INTERNATIONAL NORMALIZATION RATIO, POC: 1.6

## 2023-02-27 PROCEDURE — 99212 OFFICE O/P EST SF 10 MIN: CPT

## 2023-02-27 PROCEDURE — 85610 PROTHROMBIN TIME: CPT

## 2023-02-27 NOTE — PROGRESS NOTES
Ms. Barron Bullock is a 74 y.o. y/o female with history of PE who presents today for anticoagulation monitoring and adjustment. Pertinent PMH:    Patient Reported Findings:  Yes     No  [x]   []       Patient verifies current dosing regimen as listed- pt started warfarin 5mg daily on 12/14 with lovenox bridge --> verified dose    []   [x]       S/S bleeding/bruising/swelling/SOB- denies   []   [x]       Blood in urine or stool- denies   [x]   []       Procedures scheduled in the future at this time- may have bx of thyroid in future, not scheduled yet --> had capsule endoscopy last week, did not hold warfarin --> will need to have thyroidectomy in future, not scheduled. []   [x]       Missed Dose- denies  []   [x]       Extra Dose denies   []   [x]       Change in medications- was discharged on lovenox, states just finished fluconazole Sunday (4 doses) --> no changes   []   [x]       Change in health/diet/appetite-eats greens every day, loves greens --> had broccoli yesterday and salads before --> had broccoli one day. Had less vit k to help INR. Discussed consistency and not adjusting diet to help INR. Pt will return to vit k daily. No V/D---> had greens daily (mixed greens - [place]in/kale/mustard/turnip, salad, broccoli) --> continues with vit k daily --> vit k 1-2 times/day --> no changes, no NVD --> ~1/2 cup 1-2 times/day of greens, no NVD  []   [x]       Change in alcohol use- doesn't drink or smoke   []   [x]       Change in activity  []   [x]       Hospital admission  [x]   []       Emergency department visit 12/20 for fall  []   [x]       Other complaints    Clinical Outcomes:  Yes     No  []   [x]       Major bleeding event  []   [x]       Thromboembolic event    Duration of warfarin Therapy: \"at least 6 months\"   INR Range:  2.0-3.0    Takes warfarin 5mg tablets at night---> now using 2mg tablets.      INR is 1.6 today after consistently trending low & increasing doses since recent, new start  Increase weekly dose to 4 mg every Sun, Thurs, and 6 mg all other days of the week (11%)   Encouraged to maintain a consistency of vegetables/salads.    Recheck INR in 2 weeks, 3/13    Consent signed 12/20/2022    Referring Dr. Bethany Cisneros   INR (no units)   Date Value   12/14/2022 1.14   12/13/2022 1.14   12/12/2022 1.09   12/07/2022 1.28 (H)     INR,(POC) (no units)   Date Value   02/27/2023 1.6   02/13/2023 2.0   01/30/2023 2.0   01/23/2023 1.9     For Pharmacy Admin Tracking Only    Intervention Detail: Dose Adjustment: 1, reason: Therapy Optimization  Total # of Interventions Recommended: 1  Total # of Interventions Accepted: 1  Time Spent (min): 15

## 2023-03-02 ENCOUNTER — HOSPITAL ENCOUNTER (OUTPATIENT)
Age: 73
Discharge: HOME | End: 2023-03-02
Payer: MEDICARE

## 2023-03-02 LAB
HCT VFR BLD CALC: 34.3 % (ref 36–48)
HEMOGLOBIN: 11.4 G/DL (ref 12–16)
IRON SATURATION: 22 % (ref 15–50)
IRON: 53 UG/DL (ref 37–145)
MCH RBC QN AUTO: 29.2 PG (ref 26–34)
MCHC RBC AUTO-ENTMCNC: 33.4 G/DL (ref 31–36)
MCV RBC AUTO: 87.4 FL (ref 80–100)
PDW BLD-RTO: 16 % (ref 12.4–15.4)
PLATELET # BLD: 472 K/UL (ref 135–450)
PMV BLD AUTO: 9.3 FL (ref 5–10.5)
RBC # BLD: 3.92 M/UL (ref 4–5.2)
TOTAL IRON BINDING CAPACITY: 245 UG/DL (ref 260–445)
WBC # BLD: 7.6 K/UL (ref 4–11)

## 2023-03-02 PROCEDURE — 85027 COMPLETE CBC AUTOMATED: CPT

## 2023-03-02 PROCEDURE — 83540 ASSAY OF IRON: CPT

## 2023-03-02 PROCEDURE — 83550 IRON BINDING TEST: CPT

## 2023-03-13 ENCOUNTER — ANTI-COAG VISIT (OUTPATIENT)
Dept: PHARMACY | Age: 73
End: 2023-03-13
Payer: MEDICARE

## 2023-03-13 DIAGNOSIS — I26.99 BILATERAL PULMONARY EMBOLISM (HCC): Primary | ICD-10-CM

## 2023-03-13 LAB — INTERNATIONAL NORMALIZATION RATIO, POC: 1.7

## 2023-03-13 PROCEDURE — 99212 OFFICE O/P EST SF 10 MIN: CPT

## 2023-03-13 PROCEDURE — 85610 PROTHROMBIN TIME: CPT

## 2023-03-13 NOTE — PROGRESS NOTES
Ms. Maurice Riley is a 74 y.o. y/o female with history of PE who presents today for anticoagulation monitoring and adjustment. Pertinent PMH:    Patient Reported Findings:  Yes     No  [x]   []       Patient verifies current dosing regimen as listed- pt started warfarin 5mg daily on 12/14 with lovenox bridge --> verified dose    []   [x]       S/S bleeding/bruising/swelling/SOB- denies   []   [x]       Blood in urine or stool- denies   [x]   []       Procedures scheduled in the future at this time- may have bx of thyroid in future, not scheduled yet --> had capsule endoscopy last week, did not hold warfarin --> will need to have thyroidectomy in future, not scheduled. []   [x]       Missed Dose- denies  []   [x]       Extra Dose 6 mg yesterday    []   [x]       Change in medications- was discharged on lovenox, states just finished fluconazole Sunday (4 doses) --> wants to start AREDs and vit D3   []   [x]       Change in health/diet/appetite-eats greens every day, loves greens --> had broccoli yesterday and salads before --> had broccoli one day. Had less vit k to help INR. Discussed consistency and not adjusting diet to help INR. Pt will return to vit k daily. No V/D---> had greens daily (mixed greens - [place]/kale/mustard/turnip, salad, broccoli) --> continues with vit k daily --> vit k 1-2 times/day --> no changes, no NVD --> ~1/2 cup 1-2 times/day of greens, no NVD---> broccoli 4 times, salads 3 times (darker greens), was confused about greens and continued to keep eating more of them.  Realistically can keep up with greens 3-4 times/week    []   [x]       Change in alcohol use- doesn't drink or smoke   []   [x]       Change in activity  []   [x]       Hospital admission  [x]   []       Emergency department visit 12/20 for fall  []   [x]       Other complaints    Clinical Outcomes:  Yes     No  []   [x]       Major bleeding event  []   [x]       Thromboembolic event    Duration of warfarin Therapy: Aguila Khan least 6 months\"   INR Range:  2.0-3.0    Takes warfarin 5mg tablets at night---> now using 4mg tablets. INR is 1.7 today after increasing dose continuously dt subtherapeutic INRs likely dt increased vit k (confusion about vit k and effect on INR). Patient can consistently eat greens 4 times/week so will stick with that. Patient also took 6mg yesterday so will continue on this dose and reassess with diet in 1 week. Increase weekly dose to 4mg on  only and 6mg all other days (5.3%). Encouraged to maintain a consistency of vegetables/salads.    Recheck INR in 1 week, 3/20    Consent signed 2022    Referring Dr. Jimenez He   INR (no units)   Date Value   2022 1.14   2022 1.14   2022 1.09   2022 1.28 (H)     INR,(POC) (no units)   Date Value   2023 1.7   2023 1.6   2023 2.0   2023 2.0     For Pharmacy Admin Tracking Only    Intervention Detail: Adherence Monitorin and Dose Adjustment: 1, reason: Patient Preference, Therapy Optimization  Total # of Interventions Recommended: 2  Total # of Interventions Accepted: 2  Time Spent (min): 20

## 2023-03-20 ENCOUNTER — ANTI-COAG VISIT (OUTPATIENT)
Dept: PHARMACY | Age: 73
End: 2023-03-20
Payer: MEDICARE

## 2023-03-20 DIAGNOSIS — I26.99 BILATERAL PULMONARY EMBOLISM (HCC): Primary | ICD-10-CM

## 2023-03-20 LAB — INTERNATIONAL NORMALIZATION RATIO, POC: 2.4

## 2023-03-20 PROCEDURE — 99211 OFF/OP EST MAY X REQ PHY/QHP: CPT

## 2023-03-20 PROCEDURE — 85610 PROTHROMBIN TIME: CPT

## 2023-03-20 NOTE — PROGRESS NOTES
bleeding event  []   [x]       Thromboembolic event    Duration of warfarin Therapy: \"at least 6 months\"   INR Range:  2.0-3.0    Takes warfarin 5mg tablets at night---> now using 4mg tablets. INR is 2.4 today after increasing dose at last visit. Pt therapeutic after eating 3 servings vit k weekly. Continue 4mg on Thursdays only and 6mg all other days. Eat greens 3 times/week. Encouraged to maintain a consistency of vegetables/salads.    Recheck INR in 2 week, 4/3    Consent signed 12/20/2022    Referring Dr. Go Gupta   INR (no units)   Date Value   12/14/2022 1.14   12/13/2022 1.14   12/12/2022 1.09   12/07/2022 1.28 (H)     INR,(POC) (no units)   Date Value   03/13/2023 1.7   02/27/2023 1.6   02/13/2023 2.0   01/30/2023 2.0     For Pharmacy Admin Tracking Only    Total # of Interventions Recommended: 0  Total # of Interventions Accepted: 0  Time Spent (min): 15

## 2023-03-30 DIAGNOSIS — D50.0 IRON DEFICIENCY ANEMIA DUE TO CHRONIC BLOOD LOSS: Primary | ICD-10-CM

## 2023-04-03 ENCOUNTER — ANTI-COAG VISIT (OUTPATIENT)
Dept: PHARMACY | Age: 73
End: 2023-04-03
Payer: MEDICARE

## 2023-04-03 DIAGNOSIS — I26.99 BILATERAL PULMONARY EMBOLISM (HCC): Primary | ICD-10-CM

## 2023-04-03 LAB — INTERNATIONAL NORMALIZATION RATIO, POC: 1.6

## 2023-04-03 PROCEDURE — 99211 OFF/OP EST MAY X REQ PHY/QHP: CPT

## 2023-04-03 PROCEDURE — 85610 PROTHROMBIN TIME: CPT

## 2023-04-03 NOTE — PROGRESS NOTES
Ms. Laura Colon is a 74 y.o. y/o female with history of PE who presents today for anticoagulation monitoring and adjustment. Pertinent PMH:    Patient Reported Findings:  Yes     No  [x]   []       Patient verifies current dosing regimen as listed- pt started warfarin 5mg daily on 12/14 with lovenox bridge --> verified dose    []   [x]       S/S bleeding/bruising/swelling/SOB- denies   []   [x]       Blood in urine or stool- denies   [x]   []       Procedures scheduled in the future at this time- may have bx of thyroid in future, not scheduled yet --> had capsule endoscopy last week, did not hold warfarin --> will need to have thyroidectomy in future, not scheduled. []   [x]       Missed Dose- denies  []   [x]       Extra Dose - denies  []   [x]       Change in medications- was discharged on lovenox, states just finished fluconazole Sunday (4 doses) --> wants to start AREDs and vit D3 --> started taking AREDs daily but not Vit D3---> no changes   []   [x]       Change in health/diet/appetite-eats greens every day, loves greens --> had broccoli yesterday and salads before --> had broccoli one day. Had less vit k to help INR. Discussed consistency and not adjusting diet to help INR. Pt will return to vit k daily. No V/D---> had greens daily (mixed greens - [place]/kale/mustard/turnip, salad, broccoli) --> continues with vit k daily --> vit k 1-2 times/day --> no changes, no NVD --> ~1/2 cup 1-2 times/day of greens, no NVD---> broccoli 4 times, salads 3 times (darker greens), was confused about greens and continued to keep eating more of them.  Realistically can keep up with greens 3-4 times/week --> brought food log, 3 servings of vit k in past week---> states had greens 5 times in past week, including broccoli yesterday   []   [x]       Change in alcohol use- doesn't drink or smoke   []   [x]       Change in activity  []   [x]       Hospital admission  [x]   []       Emergency department visit 12/20 for

## 2023-04-10 ENCOUNTER — TELEPHONE (OUTPATIENT)
Dept: PRIMARY CARE CLINIC | Age: 73
End: 2023-04-10

## 2023-04-10 DIAGNOSIS — I10 ESSENTIAL HYPERTENSION: ICD-10-CM

## 2023-04-10 DIAGNOSIS — E78.2 MIXED HYPERLIPIDEMIA: ICD-10-CM

## 2023-04-10 DIAGNOSIS — Z79.01 ANTICOAGULATED ON COUMADIN: ICD-10-CM

## 2023-04-10 DIAGNOSIS — G89.29 CHRONIC PAIN OF RIGHT KNEE: ICD-10-CM

## 2023-04-10 DIAGNOSIS — M25.551 RIGHT HIP PAIN: Primary | ICD-10-CM

## 2023-04-10 DIAGNOSIS — E03.4 HYPOTHYROIDISM DUE TO ACQUIRED ATROPHY OF THYROID: ICD-10-CM

## 2023-04-10 DIAGNOSIS — R73.03 PREDIABETES: ICD-10-CM

## 2023-04-10 DIAGNOSIS — M25.561 CHRONIC PAIN OF RIGHT KNEE: ICD-10-CM

## 2023-04-10 DIAGNOSIS — E83.52 HYPERCALCEMIA: ICD-10-CM

## 2023-04-27 ENCOUNTER — ANTI-COAG VISIT (OUTPATIENT)
Dept: PHARMACY | Age: 73
End: 2023-04-27
Payer: MEDICARE

## 2023-04-27 DIAGNOSIS — I26.99 BILATERAL PULMONARY EMBOLISM (HCC): Primary | ICD-10-CM

## 2023-04-27 LAB — INTERNATIONAL NORMALIZATION RATIO, POC: 2.2

## 2023-04-27 PROCEDURE — 85610 PROTHROMBIN TIME: CPT

## 2023-04-27 PROCEDURE — 99211 OFF/OP EST MAY X REQ PHY/QHP: CPT

## 2023-04-27 NOTE — PROGRESS NOTES
Ms. Simeon Aquino is a 74 y.o. y/o female with history of PE who presents today for anticoagulation monitoring and adjustment. Pertinent PMH:    Patient Reported Findings:  Yes     No  [x]   []       Patient verifies current dosing regimen as listed- pt started warfarin 5mg daily on 12/14 with lovenox bridge --> verified dose    []   [x]       S/S bleeding/bruising/swelling/SOB- denies   []   [x]       Blood in urine or stool- denies   [x]   []       Procedures scheduled in the future at this time- may have bx of thyroid in future, not scheduled yet --> had capsule endoscopy last week, did not hold warfarin --> will need to have thyroidectomy in future, not scheduled. []   [x]       Missed Dose- denies  []   [x]       Extra Dose - denies  []   [x]       Change in medications- was discharged on lovenox, states just finished fluconazole Sunday (4 doses) --> wants to start AREDs and vit D3 --> started taking AREDs daily but not Vit D3---> no changes   []   [x]       Change in health/diet/appetite-eats greens every day, loves greens --> had broccoli yesterday and salads before --> had broccoli one day. Had less vit k to help INR. Discussed consistency and not adjusting diet to help INR. Pt will return to vit k daily. No V/D---> had greens daily (mixed greens - [place]/kale/mustard/turnip, salad, broccoli) --> continues with vit k daily --> vit k 1-2 times/day --> no changes, no NVD --> ~1/2 cup 1-2 times/day of greens, no NVD---> broccoli 4 times, salads 3 times (darker greens), was confused about greens and continued to keep eating more of them.  Realistically can keep up with greens 3-4 times/week --> brought food log, 3 servings of vit k in past week---> states had greens 5 times in past week, including broccoli yesterday---> had broccoli twice and 1 iceberg salad in past week, wants to do 2 salads (iceberg, some mixed greens) and then 1 day/week of EITHER broccoli or spinach   ---> no changes, trying to remain

## 2023-04-28 ENCOUNTER — OFFICE VISIT (OUTPATIENT)
Dept: ORTHOPEDIC SURGERY | Age: 73
End: 2023-04-28

## 2023-04-28 VITALS — RESPIRATION RATE: 16 BRPM | HEIGHT: 60 IN | BODY MASS INDEX: 25.91 KG/M2 | WEIGHT: 132 LBS

## 2023-04-28 DIAGNOSIS — M17.11 ARTHRITIS OF RIGHT KNEE: ICD-10-CM

## 2023-04-28 DIAGNOSIS — M16.11 PRIMARY OSTEOARTHRITIS OF RIGHT HIP: Primary | ICD-10-CM

## 2023-04-28 DIAGNOSIS — I26.99 BILATERAL PULMONARY EMBOLISM (HCC): ICD-10-CM

## 2023-04-28 RX ORDER — TRIAMCINOLONE ACETONIDE 40 MG/ML
40 INJECTION, SUSPENSION INTRA-ARTICULAR; INTRAMUSCULAR ONCE
Status: COMPLETED | OUTPATIENT
Start: 2023-04-28 | End: 2023-04-28

## 2023-04-28 RX ORDER — BUPIVACAINE HYDROCHLORIDE 2.5 MG/ML
2 INJECTION, SOLUTION INFILTRATION; PERINEURAL ONCE
Status: COMPLETED | OUTPATIENT
Start: 2023-04-28 | End: 2023-04-28

## 2023-04-28 RX ADMIN — TRIAMCINOLONE ACETONIDE 40 MG: 40 INJECTION, SUSPENSION INTRA-ARTICULAR; INTRAMUSCULAR at 11:14

## 2023-04-28 RX ADMIN — BUPIVACAINE HYDROCHLORIDE 5 MG: 2.5 INJECTION, SOLUTION INFILTRATION; PERINEURAL at 11:11

## 2023-04-28 NOTE — PROGRESS NOTES
[facility]  Office Visit    Chief Complaint: Right hip pain; right knee pain    HPI:  Makenna Quinteros is a 74 y. o. who is here for follow-up of of right hip pain. She also comes in today reporting right knee pain. She was originally scheduled for right hip replacement in January of this year but developed pulmonary embolism in December 2022 the surgery was canceled. She is currently on warfarin. She has no history of blood clotting disorder prior to this. In regards to her right hip, she has seen Dr. Juan Nichole and Dr. Vale Gill for this issue in the past.  She has undergone right hip injections in the past with each of these providers. Her most recent injection was on July 18, 2022 and helped for about 1 month. She describes anterior right hip pain in her groin for multiple years that has been relieved by injections. Pain is 2/10 at rest and up to 7/10 with activity. The pain is achy and worse with going from a seated to a standing position and walking. She also reports pain associated catching, locking, popping in the right knee. These issues are mostly lateral.  There is no history of surgery or injury to this knee. She has had steroid injections in the knee but has been more than 5 years ago. She feels swelling in the knee.       Patient Active Problem List   Diagnosis    S/P colonoscopy    Vitamin D deficiency    Osteopenia    Prediabetes    Essential hypertension    Multinodular goiter    Primary osteoarthritis of left knee    Primary osteoarthritis of right hip    Cough variant asthma    Hypothyroid    Hypercalcemia    Mixed hyperlipidemia    Bilateral pulmonary embolism (HCC)    ISAMAR (iron deficiency anemia)    Nephrolithiasis    Thyroid mass    Left renal mass       ROS:  Constitutional: denies fever, chills, weight loss  MSK: denies pain in other joints, muscle aches  Neurological: denies numbness, tingling, weakness    Exam:  Height 4' 10\" (1.473 m), weight 141 lb

## 2023-05-11 ENCOUNTER — HOSPITAL ENCOUNTER (OUTPATIENT)
Dept: GENERAL RADIOLOGY | Age: 73
Discharge: HOME | End: 2023-05-11
Payer: MEDICARE

## 2023-05-11 ENCOUNTER — OFFICE VISIT (OUTPATIENT)
Dept: ORTHOPEDIC SURGERY | Age: 73
End: 2023-05-11

## 2023-05-11 DIAGNOSIS — M16.11 PRIMARY OSTEOARTHRITIS OF RIGHT HIP: Primary | ICD-10-CM

## 2023-05-11 DIAGNOSIS — M16.11 PRIMARY OSTEOARTHRITIS OF RIGHT HIP: ICD-10-CM

## 2023-05-11 PROCEDURE — 20610 DRAIN/INJ JOINT/BURSA W/O US: CPT

## 2023-05-11 PROCEDURE — 77002 NEEDLE LOCALIZATION BY XRAY: CPT

## 2023-05-11 PROCEDURE — 2500000003 HC RX 250 WO HCPCS

## 2023-05-11 PROCEDURE — 6360000002 HC RX W HCPCS

## 2023-05-18 ENCOUNTER — HOSPITAL ENCOUNTER (OUTPATIENT)
Age: 73
Discharge: HOME | End: 2023-05-18
Payer: MEDICARE

## 2023-05-18 ENCOUNTER — ANTI-COAG VISIT (OUTPATIENT)
Dept: PHARMACY | Age: 73
End: 2023-05-18
Payer: MEDICARE

## 2023-05-18 DIAGNOSIS — I26.99 BILATERAL PULMONARY EMBOLISM (HCC): Primary | ICD-10-CM

## 2023-05-18 LAB
DEPRECATED RDW RBC AUTO: 16 % (ref 12.4–15.4)
HCT VFR BLD AUTO: 35.9 % (ref 36–48)
HGB BLD-MCNC: 11.9 G/DL (ref 12–16)
INTERNATIONAL NORMALIZATION RATIO, POC: 2.2
IRON SATN MFR SERPL: 26 % (ref 15–50)
IRON SERPL-MCNC: 74 UG/DL (ref 37–145)
MCH RBC QN AUTO: 29.1 PG (ref 26–34)
MCHC RBC AUTO-ENTMCNC: 33.2 G/DL (ref 31–36)
MCV RBC AUTO: 87.6 FL (ref 80–100)
PLATELET # BLD AUTO: 393 K/UL (ref 135–450)
PMV BLD AUTO: 9.7 FL (ref 5–10.5)
RBC # BLD AUTO: 4.1 M/UL (ref 4–5.2)
TIBC SERPL-MCNC: 290 UG/DL (ref 260–445)
WBC # BLD AUTO: 7.5 K/UL (ref 4–11)

## 2023-05-18 PROCEDURE — 83540 ASSAY OF IRON: CPT

## 2023-05-18 PROCEDURE — 83550 IRON BINDING TEST: CPT

## 2023-05-18 PROCEDURE — 99211 OFF/OP EST MAY X REQ PHY/QHP: CPT

## 2023-05-18 PROCEDURE — 36415 COLL VENOUS BLD VENIPUNCTURE: CPT

## 2023-05-18 PROCEDURE — 85610 PROTHROMBIN TIME: CPT

## 2023-05-18 PROCEDURE — 85027 COMPLETE CBC AUTOMATED: CPT

## 2023-05-18 NOTE — PROGRESS NOTES
Ms. Roxie Marroquin is a 74 y.o. y/o female with history of PE who presents today for anticoagulation monitoring and adjustment. Pertinent PMH:    Patient Reported Findings:  Yes     No  [x]   []       Patient verifies current dosing regimen as listed- pt started warfarin 5mg daily on 12/14 with lovenox bridge --> verified dose    []   [x]       S/S bleeding/bruising/swelling/SOB- denies   []   [x]       Blood in urine or stool- denies   [x]   []       Procedures scheduled in the future at this time- may have bx of thyroid in future, not scheduled yet --> had capsule endoscopy last week, did not hold warfarin --> will need to have thyroidectomy in future, not scheduled. []   [x]       Missed Dose- denies  []   [x]       Extra Dose - denies  []   [x]       Change in medications- was discharged on lovenox, states just finished fluconazole Sunday (4 doses) --> wants to start AREDs and vit D3 --> started taking AREDs daily but not Vit D3---> no changes   []   [x]       Change in health/diet/appetite-eats greens every day, loves greens --> had broccoli yesterday and salads before --> had broccoli one day. Had less vit k to help INR. Discussed consistency and not adjusting diet to help INR. Pt will return to vit k daily. No V/D---> had greens daily (mixed greens - [place]in/kale/mustard/turnip, salad, broccoli) --> continues with vit k daily --> vit k 1-2 times/day --> no changes, no NVD --> ~1/2 cup 1-2 times/day of greens, no NVD---> broccoli 4 times, salads 3 times (darker greens), was confused about greens and continued to keep eating more of them.  Realistically can keep up with greens 3-4 times/week --> brought food log, 3 servings of vit k in past week---> states had greens 5 times in past week, including broccoli yesterday---> had broccoli twice and 1 iceberg salad in past week, wants to do 2 salads (iceberg, some mixed greens) and then 1 day/week of EITHER broccoli or spinach   ---> no changes, trying to remain

## 2023-06-24 DIAGNOSIS — I10 ESSENTIAL HYPERTENSION: ICD-10-CM

## 2023-06-24 DIAGNOSIS — E03.9 ACQUIRED HYPOTHYROIDISM: ICD-10-CM

## 2023-06-26 RX ORDER — NIFEDIPINE 30 MG/1
TABLET, EXTENDED RELEASE ORAL
Qty: 90 TABLET | Refills: 1 | Status: SHIPPED | OUTPATIENT
Start: 2023-06-26

## 2023-06-26 RX ORDER — LEVOTHYROXINE SODIUM 0.07 MG/1
75 TABLET ORAL DAILY
Qty: 90 TABLET | Refills: 0 | Status: SHIPPED | OUTPATIENT
Start: 2023-06-26

## 2023-06-29 ENCOUNTER — ANTI-COAG VISIT (OUTPATIENT)
Dept: PHARMACY | Age: 73
End: 2023-06-29
Payer: MEDICARE

## 2023-06-29 DIAGNOSIS — I26.99 BILATERAL PULMONARY EMBOLISM (HCC): Primary | ICD-10-CM

## 2023-06-29 LAB — INTERNATIONAL NORMALIZATION RATIO, POC: 1.9

## 2023-06-29 PROCEDURE — 99212 OFFICE O/P EST SF 10 MIN: CPT

## 2023-06-29 PROCEDURE — 85610 PROTHROMBIN TIME: CPT

## 2023-07-13 ENCOUNTER — ANTI-COAG VISIT (OUTPATIENT)
Dept: PHARMACY | Age: 73
End: 2023-07-13
Payer: MEDICARE

## 2023-07-13 ENCOUNTER — OFFICE VISIT (OUTPATIENT)
Dept: ORTHOPEDIC SURGERY | Age: 73
End: 2023-07-13
Payer: MEDICARE

## 2023-07-13 VITALS — WEIGHT: 132 LBS | HEIGHT: 60 IN | BODY MASS INDEX: 25.91 KG/M2

## 2023-07-13 DIAGNOSIS — I26.99 BILATERAL PULMONARY EMBOLISM (HCC): Primary | ICD-10-CM

## 2023-07-13 DIAGNOSIS — M16.11 PRIMARY OSTEOARTHRITIS OF RIGHT HIP: Primary | ICD-10-CM

## 2023-07-13 LAB — INTERNATIONAL NORMALIZATION RATIO, POC: 5.3

## 2023-07-13 PROCEDURE — 99212 OFFICE O/P EST SF 10 MIN: CPT

## 2023-07-13 PROCEDURE — 85610 PROTHROMBIN TIME: CPT

## 2023-07-13 PROCEDURE — 1123F ACP DISCUSS/DSCN MKR DOCD: CPT | Performed by: ORTHOPAEDIC SURGERY

## 2023-07-13 PROCEDURE — 99213 OFFICE O/P EST LOW 20 MIN: CPT | Performed by: ORTHOPAEDIC SURGERY

## 2023-07-13 NOTE — PROGRESS NOTES
Ms. Clearance Meckel is a 74 y.o. y/o female with history of PE who presents today for anticoagulation monitoring and adjustment. Pertinent PMH:    Patient Reported Findings:  Yes     No  [x]   []       Patient verifies current dosing regimen as listed- pt started warfarin 5mg daily on 12/14 with lovenox bridge --> verified dose    []   [x]       S/S bleeding/bruising/swelling/SOB- denies   []   [x]       Blood in urine or stool- denies   [x]   []       Procedures scheduled in the future at this time- may have bx of thyroid in future, not scheduled yet --> had capsule endoscopy last week, did not hold warfarin --> will need to have thyroidectomy in future, not scheduled. ---> not yet   []   [x]       Missed Dose- doesn't know about Sat- may have missed or taken late ---> denies   []   [x]       Extra Dose - denies  []   [x]       Change in medications- was discharged on lovenox, states just finished fluconazole Sunday (4 doses) --> wants to start AREDs and vit D3 --> started taking AREDs daily but not Vit D3---> extra tylenol continues---> even more Tylenol, discussing with doctor today      []   [x]       Change in health/diet/appetite-eats greens every day, loves greens --> had broccoli yesterday and salads before --> had broccoli one day. Had less vit k to help INR. Discussed consistency and not adjusting diet to help INR. Pt will return to vit k daily. No V/D---> had greens daily (mixed greens - [place]in/kale/mustard/turnip, salad, broccoli) --> continues with vit k daily --> vit k 1-2 times/day --> no changes, no NVD --> ~1/2 cup 1-2 times/day of greens, no NVD---> broccoli 4 times, salads 3 times (darker greens), was confused about greens and continued to keep eating more of them.  Realistically can keep up with greens 3-4 times/week --> brought food log, 3 servings of vit k in past week---> states had greens 5 times in past week, including broccoli yesterday---> had broccoli twice and 1 iceberg salad in past

## 2023-07-14 ENCOUNTER — TELEPHONE (OUTPATIENT)
Dept: ORTHOPEDIC SURGERY | Age: 73
End: 2023-07-14

## 2023-07-14 NOTE — TELEPHONE ENCOUNTER
Auth: # [id number]    Date Range: 08/09/23-01/14/24  Type of SX:  OUTPATIENT/23 HR  Location: W  CPT: 44866   DX Code: M16.11  SX area: RIGHT HIP  Insurance: [employer]  AUTH LETTER SCANNED.

## 2023-07-14 NOTE — PROGRESS NOTES
[address] [facility]  Office Visit    Chief Complaint: Right hip pain    HPI:  Bev An is a 74 y. o. who is here for follow-up of of right hip pain. She was originally scheduled for right hip replacement in January of this year but developed pulmonary embolism in December 2022 the surgery was canceled. She is currently on warfarin. She has no history of blood clotting disorder prior to this. In regards to her right hip, she has seen Dr. Alonzo Otto and Dr. Bandar Robins for this issue in the past.  She has undergone right hip injections in the past with each of these providers. Her most recent injection was on 11/2023 and helped for about 1 month. She describes anterior right hip pain in her groin for multiple years that has been relieved by injections. Pain is 2/10 at rest and up to 7/10 with activity. The pain is achy and worse with going from a seated to a standing position and walking. She walks with the use of a cane. She is otherwise healthy and denies diabetes, tobacco use, heart or lung issues.       Patient Active Problem List   Diagnosis    S/P colonoscopy    Vitamin D deficiency    Osteopenia    Prediabetes    Essential hypertension    Multinodular goiter    Primary osteoarthritis of left knee    Primary osteoarthritis of right hip    Cough variant asthma    Hypothyroid    Hypercalcemia    Mixed hyperlipidemia    Bilateral pulmonary embolism (HCC)    ISAMAR (iron deficiency anemia)    Nephrolithiasis    Thyroid mass    Left renal mass       ROS:  Constitutional: denies fever, chills, weight loss  MSK: denies pain in other joints, muscle aches  Neurological: denies numbness, tingling, weakness    Exam:  Height 4' 10\" (1.473 m), weight 141 lb (64 kg)    Appearance: sitting in exam room chair, appears to be in no acute distress, awake and alert  Resp: unlabored breathing on room air  Skin: warm, dry and intact with out erythema or significant increased temperature  Neuro: grossly

## 2023-07-17 ENCOUNTER — OFFICE VISIT (OUTPATIENT)
Dept: PRIMARY CARE CLINIC | Age: 73
End: 2023-07-17

## 2023-07-17 VITALS
OXYGEN SATURATION: 96 % | HEART RATE: 59 BPM | WEIGHT: 132 LBS | BODY MASS INDEX: 25.78 KG/M2 | DIASTOLIC BLOOD PRESSURE: 76 MMHG | SYSTOLIC BLOOD PRESSURE: 143 MMHG | TEMPERATURE: 97.3 F

## 2023-07-17 DIAGNOSIS — E03.9 ACQUIRED HYPOTHYROIDISM: ICD-10-CM

## 2023-07-17 DIAGNOSIS — M16.11 PRIMARY OSTEOARTHRITIS OF RIGHT HIP: ICD-10-CM

## 2023-07-17 DIAGNOSIS — I26.99 BILATERAL PULMONARY EMBOLISM (HCC): ICD-10-CM

## 2023-07-17 DIAGNOSIS — I26.09 OTHER CHRONIC PULMONARY EMBOLISM WITH ACUTE COR PULMONALE (HCC): ICD-10-CM

## 2023-07-17 DIAGNOSIS — I27.82 OTHER CHRONIC PULMONARY EMBOLISM WITH ACUTE COR PULMONALE (HCC): ICD-10-CM

## 2023-07-17 DIAGNOSIS — N28.89 LEFT RENAL MASS: ICD-10-CM

## 2023-07-17 DIAGNOSIS — I10 ESSENTIAL HYPERTENSION: ICD-10-CM

## 2023-07-17 DIAGNOSIS — Z01.818 PRE-OPERATIVE CLEARANCE: Primary | ICD-10-CM

## 2023-07-17 DIAGNOSIS — E55.9 VITAMIN D DEFICIENCY: ICD-10-CM

## 2023-07-17 DIAGNOSIS — N20.0 NEPHROLITHIASIS: ICD-10-CM

## 2023-07-17 SDOH — ECONOMIC STABILITY: INCOME INSECURITY: HOW HARD IS IT FOR YOU TO PAY FOR THE VERY BASICS LIKE FOOD, HOUSING, MEDICAL CARE, AND HEATING?: NOT HARD AT ALL

## 2023-07-17 SDOH — ECONOMIC STABILITY: FOOD INSECURITY: WITHIN THE PAST 12 MONTHS, YOU WORRIED THAT YOUR FOOD WOULD RUN OUT BEFORE YOU GOT MONEY TO BUY MORE.: NEVER TRUE

## 2023-07-17 SDOH — ECONOMIC STABILITY: FOOD INSECURITY: WITHIN THE PAST 12 MONTHS, THE FOOD YOU BOUGHT JUST DIDN'T LAST AND YOU DIDN'T HAVE MONEY TO GET MORE.: NEVER TRUE

## 2023-07-17 SDOH — ECONOMIC STABILITY: HOUSING INSECURITY
IN THE LAST 12 MONTHS, WAS THERE A TIME WHEN YOU DID NOT HAVE A STEADY PLACE TO SLEEP OR SLEPT IN A SHELTER (INCLUDING NOW)?: NO

## 2023-07-17 ASSESSMENT — ENCOUNTER SYMPTOMS
RECTAL PAIN: 0
COLOR CHANGE: 0
EYE PAIN: 0
SORE THROAT: 0
RHINORRHEA: 0
VOICE CHANGE: 0
EYE REDNESS: 0
CONSTIPATION: 0
APNEA: 0
BACK PAIN: 0
PHOTOPHOBIA: 0
WHEEZING: 0
TROUBLE SWALLOWING: 0
EYE ITCHING: 0
EYE DISCHARGE: 0
BLOOD IN STOOL: 0
CHOKING: 0
DIARRHEA: 0
CHEST TIGHTNESS: 0
COUGH: 0
SHORTNESS OF BREATH: 0
ABDOMINAL PAIN: 0
ANAL BLEEDING: 0
NAUSEA: 0
ABDOMINAL DISTENTION: 0
VOMITING: 0
STRIDOR: 0

## 2023-07-17 ASSESSMENT — PATIENT HEALTH QUESTIONNAIRE - PHQ9
SUM OF ALL RESPONSES TO PHQ QUESTIONS 1-9: 0
1. LITTLE INTEREST OR PLEASURE IN DOING THINGS: 0
SUM OF ALL RESPONSES TO PHQ QUESTIONS 1-9: 0
2. FEELING DOWN, DEPRESSED OR HOPELESS: 0
SUM OF ALL RESPONSES TO PHQ9 QUESTIONS 1 & 2: 0
SUM OF ALL RESPONSES TO PHQ QUESTIONS 1-9: 0
SUM OF ALL RESPONSES TO PHQ QUESTIONS 1-9: 0

## 2023-07-17 NOTE — PROGRESS NOTES
2023    Cristin Nunez (:  1950) is a 74 y.o. female, here for a pre-operative medicine evaluation for a right total hip replacement scheduled at [facility] on 23 with Dr. Olinda Bruner. Patient Active Problem List   Diagnosis    S/P colonoscopy    Vitamin D deficiency    Osteopenia    Prediabetes    Essential hypertension    Multinodular goiter    Primary osteoarthritis of left knee    Primary osteoarthritis of right hip    Cough variant asthma    Hypothyroid    Hypercalcemia    Mixed hyperlipidemia    Other chronic pulmonary embolism with acute cor pulmonale (HCC)    ISAMAR (iron deficiency anemia)    Nephrolithiasis    Thyroid mass    Left renal mass       Review of Systems   Constitutional: Negative. Negative for activity change, appetite change, chills, diaphoresis, fatigue and fever. HENT:  Negative for dental problem, ear pain, hearing loss, mouth sores, nosebleeds, postnasal drip, rhinorrhea, sore throat, trouble swallowing and voice change. Eyes:  Negative for photophobia, pain, discharge, redness, itching and visual disturbance. Respiratory:  Negative for apnea, cough, choking, chest tightness, shortness of breath, wheezing and stridor. Cardiovascular:  Negative for chest pain, palpitations and leg swelling. Hypertension  Slow pulse   Gastrointestinal:  Negative for abdominal distention, abdominal pain, anal bleeding, blood in stool, constipation, diarrhea, nausea, rectal pain and vomiting. Endocrine: Negative. Hypothyroid on supplement. Hyperlipidemia with myalgias with Crestor so discontinued   Genitourinary:  Negative for dysuria, flank pain, frequency and hematuria. Musculoskeletal:  Positive for arthralgias. Negative for back pain, gait problem, joint swelling, myalgias, neck pain and neck stiffness. Hip pain intermittently with weightbearing activities   Skin: Negative. Negative for color change, pallor, rash and wound.    Neurological:

## 2023-07-24 ENCOUNTER — HOSPITAL ENCOUNTER (OUTPATIENT)
Age: 73
Discharge: HOME | End: 2023-07-24
Payer: MEDICARE

## 2023-07-24 ENCOUNTER — ANTI-COAG VISIT (OUTPATIENT)
Dept: PHARMACY | Age: 73
End: 2023-07-24
Payer: MEDICARE

## 2023-07-24 ENCOUNTER — PATIENT MESSAGE (OUTPATIENT)
Dept: PRIMARY CARE CLINIC | Age: 73
End: 2023-07-24

## 2023-07-24 DIAGNOSIS — I26.09 OTHER CHRONIC PULMONARY EMBOLISM WITH ACUTE COR PULMONALE (HCC): Primary | ICD-10-CM

## 2023-07-24 DIAGNOSIS — I27.82 OTHER CHRONIC PULMONARY EMBOLISM WITH ACUTE COR PULMONALE (HCC): Primary | ICD-10-CM

## 2023-07-24 DIAGNOSIS — M16.11 PRIMARY OSTEOARTHRITIS OF RIGHT HIP: ICD-10-CM

## 2023-07-24 LAB
ERYTHROCYTE [SEDIMENTATION RATE] IN BLOOD BY WESTERGREN METHOD: 96 MM/HR (ref 0–30)
INTERNATIONAL NORMALIZATION RATIO, POC: 3.3
RHEUMATOID FACT SER IA-ACNC: <10 IU/ML

## 2023-07-24 PROCEDURE — 85610 PROTHROMBIN TIME: CPT

## 2023-07-24 PROCEDURE — 85652 RBC SED RATE AUTOMATED: CPT

## 2023-07-24 PROCEDURE — 86431 RHEUMATOID FACTOR QUANT: CPT

## 2023-07-24 PROCEDURE — 86038 ANTINUCLEAR ANTIBODIES: CPT

## 2023-07-24 PROCEDURE — 36415 COLL VENOUS BLD VENIPUNCTURE: CPT

## 2023-07-24 PROCEDURE — 99212 OFFICE O/P EST SF 10 MIN: CPT

## 2023-07-24 NOTE — PROGRESS NOTES
had greens 5 times in past week, including broccoli yesterday---> had broccoli twice and 1 iceberg salad in past week, wants to do 2 salads (iceberg, some mixed greens) and then 1 day/week of EITHER broccoli or spinach   ---> no changes, trying to remain consistent. ---> less greens with daughter's wedding, no NVD---> greens last several days---> spinach on Sunday---> no changes     []   [x]       Change in alcohol use- doesn't drink or smoke   []   [x]       Change in activity  []   [x]       Hospital admission  [x]   []       Emergency department visit 12/20 for fall  []   [x]       Other complaints    Clinical Outcomes:  Yes     No  []   [x]       Major bleeding event  []   [x]       Thromboembolic event    Duration of warfarin Therapy: \"at least 6 months\"   INR Range:  2.0-3.0    Takes warfarin 5mg tablets at night---> now using 4mg tablets. INR is 3.3 today after dose decrease  Decrease dose to 4mg on Sundays, Tuesdays and Thursdays and 6mg all other days (5.3%). Encouraged to maintain a consistency of vegetables/salads.   Recheck INR in 10 days, 8/2  Consent signed 12/20/2022    Referring Dr. Ralph Frey   INR (no units)   Date Value   12/14/2022 1.14   12/13/2022 1.14   12/12/2022 1.09   12/07/2022 1.28 (H)     INR,(POC) (no units)   Date Value   07/24/2023 3.3   07/13/2023 5.3   06/29/2023 1.9   06/15/2023 3.8   For Pharmacy Admin Tracking Only    Intervention Detail: Dose Adjustment: 1, reason: Therapy Optimization  Total # of Interventions Recommended: 1  Total # of Interventions Accepted: 1  Time Spent (min): 15

## 2023-07-25 LAB — ANA SER QL IA: NEGATIVE

## 2023-07-28 ENCOUNTER — TELEPHONE (OUTPATIENT)
Dept: PHARMACY | Age: 73
End: 2023-07-28

## 2023-07-28 NOTE — TELEPHONE ENCOUNTER
----- Message from Gayle Mendez sent at 7/28/2023  3:40 PM EDT -----  Fozia Himanshu would like you to call her back, she wants to talk to you about something.    [phone]

## 2023-07-28 NOTE — RESULT ENCOUNTER NOTE
Please review the elevated sed rate. I have asked her to see next week to make sure no headaches or jaw pain. I will also give her a rheumatology referral at that time.

## 2023-07-28 NOTE — TELEPHONE ENCOUNTER
Called patient, wanted number for [facility].     Carmelita Chen, PharmD, [address]    Total # of Interventions Recommended: 0  Total # of Interventions Accepted: 0  Time Spent (min): 15

## 2023-07-28 NOTE — RESULT ENCOUNTER NOTE
Make an appointment with me next week to further evaluate the elevated sed rate. I need to make sure you are not having headaches or jaw pain. Also I will give you a rheumatology referral. The sed rate is an indicator of inflammation.

## 2023-07-31 ENCOUNTER — HOSPITAL ENCOUNTER (OUTPATIENT)
Dept: PREADMISSION TESTING | Age: 73
Discharge: HOME | End: 2023-08-04
Payer: MEDICARE

## 2023-07-31 ENCOUNTER — TELEPHONE (OUTPATIENT)
Dept: PHARMACY | Age: 73
End: 2023-07-31

## 2023-07-31 DIAGNOSIS — M16.11 PRIMARY OSTEOARTHRITIS OF RIGHT HIP: ICD-10-CM

## 2023-07-31 LAB
25(OH)D3 SERPL-MCNC: 38.1 NG/ML
ABO + RH BLD: NORMAL
ALBUMIN SERPL-MCNC: 3.8 G/DL (ref 3.4–5)
ANION GAP SERPL CALCULATED.3IONS-SCNC: 14 MMOL/L (ref 3–16)
APTT BLD: 89.3 SEC (ref 22.7–35.9)
BASOPHILS # BLD: 0.1 K/UL (ref 0–0.2)
BASOPHILS NFR BLD: 0.5 %
BILIRUB UR QL STRIP.AUTO: NEGATIVE
BLD GP AB SCN SERPL QL: NORMAL
BUN SERPL-MCNC: 15 MG/DL (ref 7–20)
CALCIUM SERPL-MCNC: 10.3 MG/DL (ref 8.3–10.6)
CHARACTER UR: NORMAL
CHLORIDE SERPL-SCNC: 104 MMOL/L (ref 99–110)
CLARITY UR: CLEAR
CO2 SERPL-SCNC: 22 MMOL/L (ref 21–32)
COLOR UR: YELLOW
CREAT SERPL-MCNC: 0.8 MG/DL (ref 0.6–1.2)
DEPRECATED RDW RBC AUTO: 14.6 % (ref 12.4–15.4)
EOSINOPHIL # BLD: 0 K/UL (ref 0–0.6)
EOSINOPHIL NFR BLD: 0.1 %
EPI CELLS #/AREA URNS HPF: NORMAL /HPF (ref 0–5)
EST. AVERAGE GLUCOSE BLD GHB EST-MCNC: 119.8 MG/DL
GFR SERPLBLD CREATININE-BSD FMLA CKD-EPI: >60 ML/MIN/{1.73_M2}
GLUCOSE SERPL-MCNC: 110 MG/DL (ref 70–99)
GLUCOSE UR STRIP.AUTO-MCNC: NEGATIVE MG/DL
HBA1C MFR BLD: 5.8 %
HCT VFR BLD AUTO: 29.8 % (ref 36–48)
HGB BLD-MCNC: 10.2 G/DL (ref 12–16)
HGB UR QL STRIP.AUTO: ABNORMAL
INR PPP: 3.65 (ref 0.84–1.16)
KETONES UR STRIP.AUTO-MCNC: NEGATIVE MG/DL
LEUKOCYTE ESTERASE UR QL STRIP.AUTO: ABNORMAL
LYMPHOCYTES # BLD: 0.9 K/UL (ref 1–5.1)
LYMPHOCYTES NFR BLD: 8.6 %
MCH RBC QN AUTO: 28.3 PG (ref 26–34)
MCHC RBC AUTO-ENTMCNC: 34.1 G/DL (ref 31–36)
MCV RBC AUTO: 83 FL (ref 80–100)
MONOCYTES # BLD: 0.8 K/UL (ref 0–1.3)
MONOCYTES NFR BLD: 7.5 %
MRSA DNA SPEC QL NAA+PROBE: NORMAL
NEUTROPHILS # BLD: 9.1 K/UL (ref 1.7–7.7)
NEUTROPHILS NFR BLD: 83.3 %
NITRITE UR QL STRIP.AUTO: NEGATIVE
PH UR STRIP.AUTO: 6.5 [PH] (ref 5–8)
PLATELET # BLD AUTO: 726 K/UL (ref 135–450)
PMV BLD AUTO: 8.1 FL (ref 5–10.5)
POTASSIUM SERPL-SCNC: 3.7 MMOL/L (ref 3.5–5.1)
PREALB SERPL-MCNC: 17 MG/DL (ref 20–40)
PROT UR STRIP.AUTO-MCNC: ABNORMAL MG/DL
PROTHROMBIN TIME: 36 SEC (ref 11.5–14.8)
RBC # BLD AUTO: 3.59 M/UL (ref 4–5.2)
RBC #/AREA URNS HPF: NORMAL /HPF (ref 0–4)
SODIUM SERPL-SCNC: 140 MMOL/L (ref 136–145)
SP GR UR STRIP.AUTO: 1.01 (ref 1–1.03)
UA COMPLETE W REFLEX CULTURE PNL UR: ABNORMAL
UA DIPSTICK W REFLEX MICRO PNL UR: YES
URN SPEC COLLECT METH UR: ABNORMAL
UROBILINOGEN UR STRIP-ACNC: 0.2 E.U./DL
WBC # BLD AUTO: 10.9 K/UL (ref 4–11)
WBC #/AREA URNS HPF: NORMAL /HPF (ref 0–5)

## 2023-07-31 PROCEDURE — 85610 PROTHROMBIN TIME: CPT

## 2023-07-31 PROCEDURE — 85025 COMPLETE CBC W/AUTO DIFF WBC: CPT

## 2023-07-31 PROCEDURE — 86901 BLOOD TYPING SEROLOGIC RH(D): CPT

## 2023-07-31 PROCEDURE — 84134 ASSAY OF PREALBUMIN: CPT

## 2023-07-31 PROCEDURE — 82040 ASSAY OF SERUM ALBUMIN: CPT

## 2023-07-31 PROCEDURE — 80048 BASIC METABOLIC PNL TOTAL CA: CPT

## 2023-07-31 PROCEDURE — 85730 THROMBOPLASTIN TIME PARTIAL: CPT

## 2023-07-31 PROCEDURE — 81001 URINALYSIS AUTO W/SCOPE: CPT

## 2023-07-31 PROCEDURE — 86850 RBC ANTIBODY SCREEN: CPT

## 2023-07-31 PROCEDURE — 83036 HEMOGLOBIN GLYCOSYLATED A1C: CPT

## 2023-07-31 PROCEDURE — 87641 MR-STAPH DNA AMP PROBE: CPT

## 2023-07-31 PROCEDURE — 86900 BLOOD TYPING SEROLOGIC ABO: CPT

## 2023-07-31 PROCEDURE — 82306 VITAMIN D 25 HYDROXY: CPT

## 2023-07-31 NOTE — TELEPHONE ENCOUNTER
Dr. Carol Link office called, hip procedure scheduled for 8/9, holding 5 days before and has to bridge. Needs to confirm with surgeon on resumption instructions but they think the next day, 8/10. Already called in shots to [facility]. Just wanted to inform us. Patient has apt with us on 8/2. Can provide with holding instructions on AVS once finalized. Bridging instructions to come from doctor and have already been discussed with patient. To continue until back in therapeutic range after procedure. Need to ask about admission post-op.     Sheldon Calderon, PharmD, [address]    Total # of Interventions Recommended: 0  Total # of Interventions Accepted: 0  Time Spent (min): 15

## 2023-07-31 NOTE — PROGRESS NOTES
PROVIDED TO PATIENT DURING THIS INTERVIEW. HOME CARE CHOICE(S): open to any agency, home health care list was provided to patient. .    SNF/REHAB CHOICES (S):     Declined a need for skilled nursing facility. MEDS TO BEDS FROM [facility]: Patient is agreeable to have medications filled via meds to beds program with [facility]. The Patient and/or patient representative was provided with a choice of provider and agrees   with the discharge plan. [x] Yes [] No    Freedom of choice list was provided with basic dialogue that supports the patient's individualized plan of care/goals, treatment preferences and shares the quality data associated with the providers. [x] Yes [] No    Facesheet with discharge needs provided to [address] west / .

## 2023-08-01 ENCOUNTER — TELEPHONE (OUTPATIENT)
Dept: ORTHOPEDIC SURGERY | Age: 73
End: 2023-08-01

## 2023-08-01 NOTE — TELEPHONE ENCOUNTER
Yunior Alexander says the pt is having Sx 8.9.23 for hip replacement, and for that ( Warfarin will be hold for 5 days prior to [address], then we will hold on the Lovenox  for two days prior to [address].  When she is done with the SX, the pt  will resume the two of them after the [address].

## 2023-08-02 ENCOUNTER — ANTI-COAG VISIT (OUTPATIENT)
Dept: PHARMACY | Age: 73
End: 2023-08-02
Payer: MEDICARE

## 2023-08-02 DIAGNOSIS — I26.09 OTHER CHRONIC PULMONARY EMBOLISM WITH ACUTE COR PULMONALE (HCC): Primary | ICD-10-CM

## 2023-08-02 DIAGNOSIS — I27.82 OTHER CHRONIC PULMONARY EMBOLISM WITH ACUTE COR PULMONALE (HCC): Primary | ICD-10-CM

## 2023-08-02 LAB — INTERNATIONAL NORMALIZATION RATIO, POC: 5

## 2023-08-02 PROCEDURE — 99212 OFFICE O/P EST SF 10 MIN: CPT

## 2023-08-02 PROCEDURE — 85610 PROTHROMBIN TIME: CPT

## 2023-08-02 NOTE — TELEPHONE ENCOUNTER
Elier Jarrett called from Dr. Brandon Ngo office, she spoke with Dr. Tamiko Major office (surgeon) and let them know the plan but has not heard back. At this point the plan is to have patient resume warfarin and lovenox the day after surgery, 8/10. She will notify me if that changes. Patient already has lovenox and instructions for bridging from doctor. Need to provide her with warfarin calendar at apt today and review.     To Clinton, PharmD, [address] [facility] [address]    Total # of Interventions Recommended: 0  Total # of Interventions Accepted: 0  Time Spent (min): 20

## 2023-08-02 NOTE — DISCHARGE INSTRUCTIONS
Wear brent hoses (compression stockings) for 2 weeks and to only remove when showering or at bedtime. Please wear Teds to follow up appointment with orthopedic surgeon. If you use a bi-pap/cpap for sleep apnea, please wear when sleeping while taking narcotics. Use your Incentive Spirometer 4 times a day (10 breaths) for 1 week after surgery to prevent pneumonia. Use ice packs as needed for swelling and pain. DO NOT apply ice directly to your skin. Use a clean towel or pillow case as a barrier between your skin and the ice pack. When to clean your hands: For patients  In the hospital or in your home, you can come in contact with many harmful germs. To help prevent infection, wash your hands with soap and water often, especially:  Before and After touching or changing a dressing or bandage  After using the bathroom  Before and after eating  After coughing or sneezing  After using a tissue  After touching any object or surface that may be contaminated  After touching an animal during a pet therapy session (hospital)  After touching an animal, cleaning up after a pet, or preparing food for pets (home)    Please contact [address] [place] [address] or the Orthopedic office with any questions or concerns after your discharge. If you have any issues or concerns after hours please call the Orthopedic Office to reach the Surgeon on call first at  [phone]. If you need a pain medication refill please contact your surgeon's office.      [facility] Orth[facility]:    Monday-Friday 8am- 5pm      [facility] Nurse Navigator: Monday-Wednesday 8am- 5pm, Thursday 8am-3pm  [phone]    After Hours Clinic Walk in [facility]  [address] [facility], [address] [facility] [address]    Monday-Friday 5pm-9pm  &  Saturday 9am-1pm          [phone]

## 2023-08-02 NOTE — PROGRESS NOTES
Notification sent to Dr Rick Thornton and medical assistant [place] B regarding abnormal preoperative labs and pertinent medical history. fall

## 2023-08-02 NOTE — PROGRESS NOTES
Ms. Sania Khan is a 74 y.o. y/o female with history of PE who presents today for anticoagulation monitoring and adjustment. Pertinent PMH:    Patient Reported Findings:  Yes     No  [x]   []       Patient verifies current dosing regimen as listed- pt started warfarin 5mg daily on 12/14 with lovenox bridge --> verified dose    []   [x]       S/S bleeding/bruising/swelling/SOB- denies   []   [x]       Blood in urine or stool- denies   [x]   []       Procedures scheduled in the future at this time- may have bx of thyroid in future, not scheduled yet --> had capsule endoscopy last week, did not hold warfarin --> will need to have thyroidectomy in future, not scheduled. ---> hip procedure 8/9, awaiting instructions, likely hold and bridge, has hematologist apt to discuss---> hip procedure was 8/9 now 8/8, holding 5 days, bridging per [facility], has shots and instructions, restarting 8/9   []   [x]       Missed Dose- doesn't know about Sat- may have missed or taken late ---> denies   []   [x]       Extra Dose - denies  []   [x]       Change in medications- was discharged on lovenox, states just finished fluconazole Sunday (4 doses) --> wants to start AREDs and vit D3 --> started taking AREDs daily but not Vit D3---> extra tylenol continues---> even more Tylenol, discussing with doctor today ---> Cut back on tylenol---> increased Tylenol again       []   [x]       Change in health/diet/appetite-eats greens every day, loves greens --> had broccoli yesterday and salads before --> had broccoli one day. Had less vit k to help INR. Discussed consistency and not adjusting diet to help INR. Pt will return to vit k daily.  No V/D---> had greens daily (mixed greens - [place]/kale/mustard/turnip, salad, broccoli) --> continues with vit k daily --> vit k 1-2 times/day --> no changes, no NVD --> ~1/2 cup 1-2 times/day of greens, no NVD---> broccoli 4 times, salads 3 times (darker greens), was confused about greens and continued to

## 2023-08-03 ENCOUNTER — OFFICE VISIT (OUTPATIENT)
Dept: ORTHOPEDIC SURGERY | Age: 73
End: 2023-08-03
Payer: MEDICARE

## 2023-08-03 VITALS — WEIGHT: 141 LBS | BODY MASS INDEX: 29.6 KG/M2 | HEIGHT: 58 IN

## 2023-08-03 DIAGNOSIS — M16.11 PRIMARY OSTEOARTHRITIS OF RIGHT HIP: Primary | ICD-10-CM

## 2023-08-03 PROCEDURE — 1124F ACP DISCUSS-NO DSCNMKR DOCD: CPT | Performed by: ORTHOPAEDIC SURGERY

## 2023-08-03 PROCEDURE — 99214 OFFICE O/P EST MOD 30 MIN: CPT | Performed by: ORTHOPAEDIC SURGERY

## 2023-08-03 NOTE — PROGRESS NOTES
[address] [facility]  Office Visit    Chief Complaint: Right hip pain    HPI:  Shaquille Fink is a 74 y. o. who is here for follow-up of of right hip pain. She was originally scheduled for right hip replacement in January of this year but developed pulmonary embolism in December 2022 and the surgery was canceled. She is scheduled for right total hip arthroplasty next week. She has been on warfarin and is bridging off of this for surgery. In regards to her right hip, she has seen Dr. Tanya Duke and Dr. Gemma Tejeda for this issue in the past.  She has undergone right hip injections in the past with each of these providers. Her most recent injection was in May 2023 and helped for about 1 month. She describes anterior right hip pain in her groin for multiple years that has been relieved by injections. Pain is 2/10 at rest and up to 7/10 with activity. The pain is achy and worse with going from a seated to a standing position and walking. She walks with the use of a cane. She is otherwise healthy and denies diabetes, tobacco use, heart or lung issues.       Patient Active Problem List   Diagnosis    S/P colonoscopy    Vitamin D deficiency    Osteopenia    Prediabetes    Essential hypertension    Multinodular goiter    Primary osteoarthritis of left knee    Primary osteoarthritis of right hip    Cough variant asthma    Hypothyroid    Hypercalcemia    Mixed hyperlipidemia    Other chronic pulmonary embolism with acute cor pulmonale (HCC)    ISAMAR (iron deficiency anemia)    Nephrolithiasis    Thyroid mass    Left renal mass       ROS:  Constitutional: denies fever, chills, weight loss  MSK: denies pain in other joints, muscle aches  Neurological: denies numbness, tingling, weakness    Exam:  Height 4' 10\" (1.473 m), weight 141 lb (64 kg)    Appearance: sitting in exam room chair, appears to be in no acute distress, awake and alert  Resp: unlabored breathing on room air  Skin: warm, dry and intact with

## 2023-08-04 ENCOUNTER — TELEPHONE (OUTPATIENT)
Dept: PRIMARY CARE CLINIC | Age: 73
End: 2023-08-04

## 2023-08-04 DIAGNOSIS — R70.0 ELEVATED SED RATE: ICD-10-CM

## 2023-08-04 DIAGNOSIS — N28.89 RENAL MASS: ICD-10-CM

## 2023-08-04 DIAGNOSIS — M19.90 INFLAMMATORY ARTHRITIS: Primary | ICD-10-CM

## 2023-08-04 DIAGNOSIS — D75.839 THROMBOCYTOSIS: ICD-10-CM

## 2023-08-04 DIAGNOSIS — Z86.711 HISTORY OF PULMONARY EMBOLISM: ICD-10-CM

## 2023-08-04 NOTE — TELEPHONE ENCOUNTER
Spoke with patient about pre op labs, elevated sed rate thrombocytosis. No headaches or jaw claudication. Polyarthralgias with negative rheumatoid factor and sed rate. Has rheumatology referral.  History one minimally enhancing mass. Will get Ct  abdomen pelvis with contrast to make sure no growth in lesion. Up to date with colonoscopy . Need to make sure no tumor prior to surgery, will order stat.  Patient will call scheduling in am.

## 2023-08-05 ENCOUNTER — HOSPITAL ENCOUNTER (OUTPATIENT)
Dept: CT IMAGING | Age: 73
Discharge: HOME | End: 2023-08-05
Attending: INTERNAL MEDICINE
Payer: MEDICARE

## 2023-08-05 DIAGNOSIS — R70.0 ELEVATED SED RATE: ICD-10-CM

## 2023-08-05 DIAGNOSIS — M19.90 INFLAMMATORY ARTHRITIS: ICD-10-CM

## 2023-08-05 DIAGNOSIS — Z86.711 HISTORY OF PULMONARY EMBOLISM: ICD-10-CM

## 2023-08-05 DIAGNOSIS — D75.839 THROMBOCYTOSIS: ICD-10-CM

## 2023-08-05 DIAGNOSIS — N28.89 RENAL MASS: ICD-10-CM

## 2023-08-05 PROCEDURE — 71260 CT THORAX DX C+: CPT

## 2023-08-05 PROCEDURE — 6360000004 HC RX CONTRAST MEDICATION: Performed by: INTERNAL MEDICINE

## 2023-08-05 RX ADMIN — IOHEXOL 75 ML: 350 INJECTION, SOLUTION INTRAVENOUS at 10:30

## 2023-08-05 RX ADMIN — DIATRIZOATE MEGLUMINE AND DIATRIZOATE SODIUM 20 ML: 660; 100 LIQUID ORAL; RECTAL at 10:29

## 2023-08-07 ENCOUNTER — ANESTHESIA EVENT (OUTPATIENT)
Dept: OPERATING ROOM | Age: 73
End: 2023-08-07
Payer: MEDICARE

## 2023-08-07 ENCOUNTER — TELEPHONE (OUTPATIENT)
Dept: ORTHOPEDIC SURGERY | Age: 73
End: 2023-08-07

## 2023-08-07 NOTE — TELEPHONE ENCOUNTER
General Question     Subject: PRE OP QUESTION  Patient and /or Facility Request: Carlos Rivas  Contact Number: [phone]    PT HAS SOME PRE OP QUESTION PLEASE CLL PT AT N NUMBER ABOVE

## 2023-08-08 ENCOUNTER — ANESTHESIA (OUTPATIENT)
Dept: OPERATING ROOM | Age: 73
End: 2023-08-08
Payer: MEDICARE

## 2023-08-08 ENCOUNTER — APPOINTMENT (OUTPATIENT)
Dept: GENERAL RADIOLOGY | Age: 73
End: 2023-08-08
Attending: ORTHOPAEDIC SURGERY
Payer: MEDICARE

## 2023-08-08 ENCOUNTER — HOSPITAL ENCOUNTER (OUTPATIENT)
Age: 73
Setting detail: OBSERVATION
Discharge: HOME | End: 2023-08-09
Attending: ORTHOPAEDIC SURGERY | Admitting: ORTHOPAEDIC SURGERY
Payer: MEDICARE

## 2023-08-08 DIAGNOSIS — M16.11 PRIMARY OSTEOARTHRITIS OF RIGHT HIP: Primary | ICD-10-CM

## 2023-08-08 LAB
ABO + RH BLD: NORMAL
APTT BLD: 61.1 SEC (ref 22.7–35.9)
BLD GP AB SCN SERPL QL: NORMAL
GLUCOSE BLD-MCNC: 139 MG/DL (ref 70–99)
INR PPP: 1.41 (ref 0.84–1.16)
PERFORMED ON: ABNORMAL
PROTHROMBIN TIME: 17.2 SEC (ref 11.5–14.8)
REASON FOR REJECTION: NORMAL
REJECTED TEST: NORMAL

## 2023-08-08 PROCEDURE — 97530 THERAPEUTIC ACTIVITIES: CPT

## 2023-08-08 PROCEDURE — 86901 BLOOD TYPING SEROLOGIC RH(D): CPT

## 2023-08-08 PROCEDURE — 73501 X-RAY EXAM HIP UNI 1 VIEW: CPT

## 2023-08-08 PROCEDURE — 97162 PT EVAL MOD COMPLEX 30 MIN: CPT

## 2023-08-08 PROCEDURE — G0378 HOSPITAL OBSERVATION PER HR: HCPCS

## 2023-08-08 PROCEDURE — 2580000003 HC RX 258: Performed by: ANESTHESIOLOGY

## 2023-08-08 PROCEDURE — A4217 STERILE WATER/SALINE, 500 ML: HCPCS | Performed by: ORTHOPAEDIC SURGERY

## 2023-08-08 PROCEDURE — 2580000003 HC RX 258

## 2023-08-08 PROCEDURE — 2500000003 HC RX 250 WO HCPCS: Performed by: ANESTHESIOLOGY

## 2023-08-08 PROCEDURE — 36415 COLL VENOUS BLD VENIPUNCTURE: CPT

## 2023-08-08 PROCEDURE — 6360000002 HC RX W HCPCS: Performed by: ANESTHESIOLOGY

## 2023-08-08 PROCEDURE — 2580000003 HC RX 258: Performed by: ORTHOPAEDIC SURGERY

## 2023-08-08 PROCEDURE — 85730 THROMBOPLASTIN TIME PARTIAL: CPT

## 2023-08-08 PROCEDURE — 6360000002 HC RX W HCPCS: Performed by: ORTHOPAEDIC SURGERY

## 2023-08-08 PROCEDURE — 3600000005 HC SURGERY LEVEL 5 BASE: Performed by: ORTHOPAEDIC SURGERY

## 2023-08-08 PROCEDURE — 7100000001 HC PACU RECOVERY - ADDTL 15 MIN: Performed by: ORTHOPAEDIC SURGERY

## 2023-08-08 PROCEDURE — 3700000001 HC ADD 15 MINUTES (ANESTHESIA): Performed by: ORTHOPAEDIC SURGERY

## 2023-08-08 PROCEDURE — 97535 SELF CARE MNGMENT TRAINING: CPT

## 2023-08-08 PROCEDURE — 7100000000 HC PACU RECOVERY - FIRST 15 MIN: Performed by: ORTHOPAEDIC SURGERY

## 2023-08-08 PROCEDURE — 3600000015 HC SURGERY LEVEL 5 ADDTL 15MIN: Performed by: ORTHOPAEDIC SURGERY

## 2023-08-08 PROCEDURE — C1776 JOINT DEVICE (IMPLANTABLE): HCPCS | Performed by: ORTHOPAEDIC SURGERY

## 2023-08-08 PROCEDURE — 97166 OT EVAL MOD COMPLEX 45 MIN: CPT

## 2023-08-08 PROCEDURE — 86900 BLOOD TYPING SEROLOGIC ABO: CPT

## 2023-08-08 PROCEDURE — 6370000000 HC RX 637 (ALT 250 FOR IP): Performed by: ORTHOPAEDIC SURGERY

## 2023-08-08 PROCEDURE — 85610 PROTHROMBIN TIME: CPT

## 2023-08-08 PROCEDURE — 2709999900 HC NON-CHARGEABLE SUPPLY: Performed by: ORTHOPAEDIC SURGERY

## 2023-08-08 PROCEDURE — 86850 RBC ANTIBODY SCREEN: CPT

## 2023-08-08 PROCEDURE — 3700000000 HC ANESTHESIA ATTENDED CARE: Performed by: ORTHOPAEDIC SURGERY

## 2023-08-08 PROCEDURE — 97116 GAIT TRAINING THERAPY: CPT

## 2023-08-08 PROCEDURE — 27130 TOTAL HIP ARTHROPLASTY: CPT | Performed by: ORTHOPAEDIC SURGERY

## 2023-08-08 PROCEDURE — C9399 UNCLASSIFIED DRUGS OR BIOLOG: HCPCS | Performed by: ANESTHESIOLOGY

## 2023-08-08 PROCEDURE — 2720000010 HC SURG SUPPLY STERILE: Performed by: ORTHOPAEDIC SURGERY

## 2023-08-08 PROCEDURE — 6360000002 HC RX W HCPCS: Performed by: NURSE PRACTITIONER

## 2023-08-08 DEVICE — SCREW BNE L35MM DIA6.5MM CANC HIP DOME PINN: Type: IMPLANTABLE DEVICE | Site: HIP | Status: FUNCTIONAL

## 2023-08-08 DEVICE — HEAD FEM DIA36MM +1.5MM OFFSET 12/14 TAPR HIP CERAMIC: Type: IMPLANTABLE DEVICE | Site: HIP | Status: FUNCTIONAL

## 2023-08-08 DEVICE — CUP ACET DIA52MM HIP GRIPTION PRI CEMENTLESS FIX SECT SER: Type: IMPLANTABLE DEVICE | Site: HIP | Status: FUNCTIONAL

## 2023-08-08 DEVICE — STEM FEM SZ 4 L103MM 12/14 TAPR HI OFFSET HIP DUOFIX CLLRD: Type: IMPLANTABLE DEVICE | Site: HIP | Status: FUNCTIONAL

## 2023-08-08 DEVICE — LINER ACET OD52MM ID36MM HIP ALTRX PINN: Type: IMPLANTABLE DEVICE | Site: HIP | Status: FUNCTIONAL

## 2023-08-08 RX ORDER — SODIUM CHLORIDE 0.9 % (FLUSH) 0.9 %
5-40 SYRINGE (ML) INJECTION PRN
Status: DISCONTINUED | OUTPATIENT
Start: 2023-08-08 | End: 2023-08-09 | Stop reason: HOSPADM

## 2023-08-08 RX ORDER — SODIUM CHLORIDE 9 MG/ML
INJECTION, SOLUTION INTRAVENOUS PRN
Status: DISCONTINUED | OUTPATIENT
Start: 2023-08-08 | End: 2023-08-08 | Stop reason: HOSPADM

## 2023-08-08 RX ORDER — SODIUM CHLORIDE 0.9 % (FLUSH) 0.9 %
5-40 SYRINGE (ML) INJECTION EVERY 12 HOURS SCHEDULED
Status: DISCONTINUED | OUTPATIENT
Start: 2023-08-08 | End: 2023-08-09 | Stop reason: HOSPADM

## 2023-08-08 RX ORDER — INSULIN LISPRO 100 [IU]/ML
0.08 INJECTION, SOLUTION INTRAVENOUS; SUBCUTANEOUS
Status: DISCONTINUED | OUTPATIENT
Start: 2023-08-08 | End: 2023-08-09 | Stop reason: HOSPADM

## 2023-08-08 RX ORDER — NIFEDIPINE 30 MG/1
30 TABLET, EXTENDED RELEASE ORAL DAILY
Status: DISCONTINUED | OUTPATIENT
Start: 2023-08-09 | End: 2023-08-09 | Stop reason: HOSPADM

## 2023-08-08 RX ORDER — ONDANSETRON 2 MG/ML
4 INJECTION INTRAMUSCULAR; INTRAVENOUS
Status: DISCONTINUED | OUTPATIENT
Start: 2023-08-08 | End: 2023-08-08 | Stop reason: HOSPADM

## 2023-08-08 RX ORDER — ENOXAPARIN SODIUM 100 MG/ML
40 INJECTION SUBCUTANEOUS NIGHTLY
Status: DISCONTINUED | OUTPATIENT
Start: 2023-08-08 | End: 2023-08-09 | Stop reason: HOSPADM

## 2023-08-08 RX ORDER — LEVOTHYROXINE SODIUM 0.07 MG/1
75 TABLET ORAL DAILY
Status: DISCONTINUED | OUTPATIENT
Start: 2023-08-09 | End: 2023-08-09 | Stop reason: HOSPADM

## 2023-08-08 RX ORDER — ATENOLOL 25 MG/1
25 TABLET ORAL DAILY
Status: DISCONTINUED | OUTPATIENT
Start: 2023-08-09 | End: 2023-08-09 | Stop reason: HOSPADM

## 2023-08-08 RX ORDER — EPHEDRINE SULFATE/0.9% NACL/PF 50 MG/5 ML
SYRINGE (ML) INTRAVENOUS PRN
Status: DISCONTINUED | OUTPATIENT
Start: 2023-08-08 | End: 2023-08-08 | Stop reason: SDUPTHER

## 2023-08-08 RX ORDER — DEXTROSE MONOHYDRATE 100 MG/ML
INJECTION, SOLUTION INTRAVENOUS CONTINUOUS PRN
Status: DISCONTINUED | OUTPATIENT
Start: 2023-08-08 | End: 2023-08-09 | Stop reason: HOSPADM

## 2023-08-08 RX ORDER — ONDANSETRON 2 MG/ML
INJECTION INTRAMUSCULAR; INTRAVENOUS PRN
Status: DISCONTINUED | OUTPATIENT
Start: 2023-08-08 | End: 2023-08-08 | Stop reason: SDUPTHER

## 2023-08-08 RX ORDER — MELOXICAM 7.5 MG/1
7.5 TABLET ORAL DAILY
Status: DISCONTINUED | OUTPATIENT
Start: 2023-08-09 | End: 2023-08-08

## 2023-08-08 RX ORDER — MORPHINE SULFATE 4 MG/ML
4 INJECTION, SOLUTION INTRAMUSCULAR; INTRAVENOUS
Status: DISCONTINUED | OUTPATIENT
Start: 2023-08-08 | End: 2023-08-09 | Stop reason: HOSPADM

## 2023-08-08 RX ORDER — OXYCODONE HYDROCHLORIDE 10 MG/1
10 TABLET ORAL EVERY 4 HOURS PRN
Status: DISCONTINUED | OUTPATIENT
Start: 2023-08-08 | End: 2023-08-09 | Stop reason: HOSPADM

## 2023-08-08 RX ORDER — LIDOCAINE HYDROCHLORIDE 20 MG/ML
INJECTION, SOLUTION EPIDURAL; INFILTRATION; INTRACAUDAL; PERINEURAL PRN
Status: DISCONTINUED | OUTPATIENT
Start: 2023-08-08 | End: 2023-08-08 | Stop reason: SDUPTHER

## 2023-08-08 RX ORDER — TRANEXAMIC ACID 650 MG/1
1950 TABLET ORAL ONCE
Status: COMPLETED | OUTPATIENT
Start: 2023-08-08 | End: 2023-08-08

## 2023-08-08 RX ORDER — SODIUM CHLORIDE 9 MG/ML
INJECTION, SOLUTION INTRAVENOUS PRN
Status: DISCONTINUED | OUTPATIENT
Start: 2023-08-08 | End: 2023-08-09 | Stop reason: HOSPADM

## 2023-08-08 RX ORDER — WARFARIN SODIUM 5 MG/1
TABLET ORAL
Qty: 30 TABLET | Refills: 0
Start: 2023-08-08

## 2023-08-08 RX ORDER — INSULIN LISPRO 100 [IU]/ML
0-4 INJECTION, SOLUTION INTRAVENOUS; SUBCUTANEOUS
Status: DISCONTINUED | OUTPATIENT
Start: 2023-08-08 | End: 2023-08-09 | Stop reason: HOSPADM

## 2023-08-08 RX ORDER — MAGNESIUM HYDROXIDE 1200 MG/15ML
LIQUID ORAL CONTINUOUS PRN
Status: COMPLETED | OUTPATIENT
Start: 2023-08-08 | End: 2023-08-08

## 2023-08-08 RX ORDER — INSULIN GLARGINE 100 [IU]/ML
0.25 INJECTION, SOLUTION SUBCUTANEOUS NIGHTLY
Status: DISCONTINUED | OUTPATIENT
Start: 2023-08-08 | End: 2023-08-09 | Stop reason: HOSPADM

## 2023-08-08 RX ORDER — PREGABALIN 75 MG/1
75 CAPSULE ORAL 2 TIMES DAILY
Qty: 28 CAPSULE | Refills: 0 | Status: SHIPPED | OUTPATIENT
Start: 2023-08-08 | End: 2023-08-22

## 2023-08-08 RX ORDER — OXYCODONE HYDROCHLORIDE 5 MG/1
5 TABLET ORAL EVERY 4 HOURS PRN
Status: DISCONTINUED | OUTPATIENT
Start: 2023-08-08 | End: 2023-08-09 | Stop reason: HOSPADM

## 2023-08-08 RX ORDER — ONDANSETRON 4 MG/1
4 TABLET, ORALLY DISINTEGRATING ORAL EVERY 8 HOURS PRN
Status: DISCONTINUED | OUTPATIENT
Start: 2023-08-08 | End: 2023-08-09 | Stop reason: HOSPADM

## 2023-08-08 RX ORDER — OXYCODONE HYDROCHLORIDE 10 MG/1
10 TABLET ORAL ONCE
Status: COMPLETED | OUTPATIENT
Start: 2023-08-08 | End: 2023-08-08

## 2023-08-08 RX ORDER — SODIUM CHLORIDE 450 MG/100ML
INJECTION, SOLUTION INTRAVENOUS CONTINUOUS
Status: DISCONTINUED | OUTPATIENT
Start: 2023-08-08 | End: 2023-08-09 | Stop reason: HOSPADM

## 2023-08-08 RX ORDER — PREGABALIN 75 MG/1
75 CAPSULE ORAL 2 TIMES DAILY
Status: DISCONTINUED | OUTPATIENT
Start: 2023-08-08 | End: 2023-08-09 | Stop reason: HOSPADM

## 2023-08-08 RX ORDER — WARFARIN SODIUM 3 MG/1
6 TABLET ORAL
Status: COMPLETED | OUTPATIENT
Start: 2023-08-08 | End: 2023-08-08

## 2023-08-08 RX ORDER — KETOROLAC TROMETHAMINE 15 MG/ML
15 INJECTION, SOLUTION INTRAMUSCULAR; INTRAVENOUS
Status: DISCONTINUED | OUTPATIENT
Start: 2023-08-08 | End: 2023-08-09 | Stop reason: HOSPADM

## 2023-08-08 RX ORDER — OXYCODONE HYDROCHLORIDE 5 MG/1
5-10 TABLET ORAL EVERY 6 HOURS PRN
Qty: 40 TABLET | Refills: 0 | Status: SHIPPED | OUTPATIENT
Start: 2023-08-08 | End: 2023-08-13

## 2023-08-08 RX ORDER — SODIUM CHLORIDE 0.9 % (FLUSH) 0.9 %
5-40 SYRINGE (ML) INJECTION EVERY 12 HOURS SCHEDULED
Status: DISCONTINUED | OUTPATIENT
Start: 2023-08-08 | End: 2023-08-08 | Stop reason: HOSPADM

## 2023-08-08 RX ORDER — FENTANYL CITRATE 0.05 MG/ML
25 INJECTION, SOLUTION INTRAMUSCULAR; INTRAVENOUS EVERY 5 MIN PRN
Status: DISCONTINUED | OUTPATIENT
Start: 2023-08-08 | End: 2023-08-08 | Stop reason: HOSPADM

## 2023-08-08 RX ORDER — PROPOFOL 10 MG/ML
INJECTION, EMULSION INTRAVENOUS PRN
Status: DISCONTINUED | OUTPATIENT
Start: 2023-08-08 | End: 2023-08-08 | Stop reason: SDUPTHER

## 2023-08-08 RX ORDER — ROCURONIUM BROMIDE 10 MG/ML
INJECTION, SOLUTION INTRAVENOUS PRN
Status: DISCONTINUED | OUTPATIENT
Start: 2023-08-08 | End: 2023-08-08 | Stop reason: SDUPTHER

## 2023-08-08 RX ORDER — SODIUM CHLORIDE 0.9 % (FLUSH) 0.9 %
5-40 SYRINGE (ML) INJECTION PRN
Status: DISCONTINUED | OUTPATIENT
Start: 2023-08-08 | End: 2023-08-08 | Stop reason: HOSPADM

## 2023-08-08 RX ORDER — DEXAMETHASONE SODIUM PHOSPHATE 4 MG/ML
INJECTION, SOLUTION INTRA-ARTICULAR; INTRALESIONAL; INTRAMUSCULAR; INTRAVENOUS; SOFT TISSUE PRN
Status: DISCONTINUED | OUTPATIENT
Start: 2023-08-08 | End: 2023-08-08 | Stop reason: SDUPTHER

## 2023-08-08 RX ORDER — ENOXAPARIN SODIUM 100 MG/ML
40 INJECTION SUBCUTANEOUS DAILY
Qty: 5 EACH | Refills: 0 | Status: SHIPPED | OUTPATIENT
Start: 2023-08-08

## 2023-08-08 RX ORDER — ACETAMINOPHEN 325 MG/1
650 TABLET ORAL EVERY 6 HOURS
Status: DISCONTINUED | OUTPATIENT
Start: 2023-08-08 | End: 2023-08-09 | Stop reason: HOSPADM

## 2023-08-08 RX ORDER — FENTANYL CITRATE 50 UG/ML
INJECTION, SOLUTION INTRAMUSCULAR; INTRAVENOUS PRN
Status: DISCONTINUED | OUTPATIENT
Start: 2023-08-08 | End: 2023-08-08 | Stop reason: SDUPTHER

## 2023-08-08 RX ORDER — ONDANSETRON 2 MG/ML
4 INJECTION INTRAMUSCULAR; INTRAVENOUS EVERY 6 HOURS PRN
Status: DISCONTINUED | OUTPATIENT
Start: 2023-08-08 | End: 2023-08-09 | Stop reason: HOSPADM

## 2023-08-08 RX ORDER — MORPHINE SULFATE 2 MG/ML
2 INJECTION, SOLUTION INTRAMUSCULAR; INTRAVENOUS
Status: DISCONTINUED | OUTPATIENT
Start: 2023-08-08 | End: 2023-08-09 | Stop reason: HOSPADM

## 2023-08-08 RX ORDER — SODIUM CHLORIDE 9 MG/ML
INJECTION, SOLUTION INTRAVENOUS CONTINUOUS PRN
Status: DISCONTINUED | OUTPATIENT
Start: 2023-08-08 | End: 2023-08-08 | Stop reason: SDUPTHER

## 2023-08-08 RX ORDER — CALCIUM CARBONATE 500 MG/1
500 TABLET, CHEWABLE ORAL 3 TIMES DAILY PRN
Status: DISCONTINUED | OUTPATIENT
Start: 2023-08-08 | End: 2023-08-09 | Stop reason: HOSPADM

## 2023-08-08 RX ORDER — MELOXICAM 7.5 MG/1
7.5 TABLET ORAL ONCE
Status: COMPLETED | OUTPATIENT
Start: 2023-08-08 | End: 2023-08-08

## 2023-08-08 RX ORDER — CEFAZOLIN SODIUM 1 G/3ML
2000 INJECTION, POWDER, FOR SOLUTION INTRAMUSCULAR; INTRAVENOUS ONCE
Status: DISCONTINUED | OUTPATIENT
Start: 2023-08-08 | End: 2023-08-08

## 2023-08-08 RX ORDER — INSULIN LISPRO 100 [IU]/ML
0-4 INJECTION, SOLUTION INTRAVENOUS; SUBCUTANEOUS NIGHTLY
Status: DISCONTINUED | OUTPATIENT
Start: 2023-08-08 | End: 2023-08-09 | Stop reason: HOSPADM

## 2023-08-08 RX ORDER — SUCCINYLCHOLINE/SOD CL,ISO/PF 200MG/10ML
SYRINGE (ML) INTRAVENOUS PRN
Status: DISCONTINUED | OUTPATIENT
Start: 2023-08-08 | End: 2023-08-08 | Stop reason: SDUPTHER

## 2023-08-08 RX ADMIN — LIDOCAINE HYDROCHLORIDE 80 MG: 20 INJECTION, SOLUTION EPIDURAL; INFILTRATION; INTRACAUDAL; PERINEURAL at 13:05

## 2023-08-08 RX ADMIN — HYDROMORPHONE HYDROCHLORIDE 0.5 MG: 1 INJECTION, SOLUTION INTRAMUSCULAR; INTRAVENOUS; SUBCUTANEOUS at 14:54

## 2023-08-08 RX ADMIN — ACETAMINOPHEN 650 MG: 325 TABLET ORAL at 17:14

## 2023-08-08 RX ADMIN — SUGAMMADEX 200 MG: 100 INJECTION, SOLUTION INTRAVENOUS at 14:22

## 2023-08-08 RX ADMIN — OXYCODONE HYDROCHLORIDE 5 MG: 5 TABLET ORAL at 23:23

## 2023-08-08 RX ADMIN — Medication 5 ML: at 23:50

## 2023-08-08 RX ADMIN — SODIUM CHLORIDE 2000 MG: 900 INJECTION INTRAVENOUS at 23:31

## 2023-08-08 RX ADMIN — Medication 120 MG: at 13:05

## 2023-08-08 RX ADMIN — ROCURONIUM BROMIDE 45 MG: 10 INJECTION, SOLUTION INTRAVENOUS at 13:15

## 2023-08-08 RX ADMIN — Medication 10 MG: at 13:18

## 2023-08-08 RX ADMIN — CEFAZOLIN 2000 MG: 2 INJECTION, POWDER, FOR SOLUTION INTRAMUSCULAR; INTRAVENOUS at 13:12

## 2023-08-08 RX ADMIN — SODIUM CHLORIDE: 9 INJECTION, SOLUTION INTRAVENOUS at 12:55

## 2023-08-08 RX ADMIN — SODIUM CHLORIDE: 9 INJECTION, SOLUTION INTRAVENOUS at 14:18

## 2023-08-08 RX ADMIN — ONDANSETRON 4 MG: 2 INJECTION INTRAMUSCULAR; INTRAVENOUS at 14:14

## 2023-08-08 RX ADMIN — HYDROMORPHONE HYDROCHLORIDE 0.5 MG: 1 INJECTION, SOLUTION INTRAMUSCULAR; INTRAVENOUS; SUBCUTANEOUS at 14:47

## 2023-08-08 RX ADMIN — ENOXAPARIN SODIUM 40 MG: 100 INJECTION SUBCUTANEOUS at 23:24

## 2023-08-08 RX ADMIN — DEXAMETHASONE SODIUM PHOSPHATE 4 MG: 4 INJECTION, SOLUTION INTRAMUSCULAR; INTRAVENOUS at 13:14

## 2023-08-08 RX ADMIN — OXYCODONE HYDROCHLORIDE 10 MG: 10 TABLET ORAL at 10:45

## 2023-08-08 RX ADMIN — FENTANYL CITRATE 50 MCG: 50 INJECTION INTRAMUSCULAR; INTRAVENOUS at 13:03

## 2023-08-08 RX ADMIN — ROCURONIUM BROMIDE 5 MG: 10 INJECTION, SOLUTION INTRAVENOUS at 13:05

## 2023-08-08 RX ADMIN — FENTANYL CITRATE 50 MCG: 50 INJECTION INTRAMUSCULAR; INTRAVENOUS at 12:50

## 2023-08-08 RX ADMIN — SODIUM CHLORIDE: 9 INJECTION, SOLUTION INTRAVENOUS at 11:06

## 2023-08-08 RX ADMIN — PROPOFOL 100 MG: 10 INJECTION, EMULSION INTRAVENOUS at 13:05

## 2023-08-08 RX ADMIN — HYDROMORPHONE HYDROCHLORIDE 0.25 MG: 1 INJECTION, SOLUTION INTRAMUSCULAR; INTRAVENOUS; SUBCUTANEOUS at 14:05

## 2023-08-08 RX ADMIN — MELOXICAM 7.5 MG: 7.5 TABLET ORAL at 10:46

## 2023-08-08 RX ADMIN — TRANEXAMIC ACID 1950 MG: 650 TABLET ORAL at 10:46

## 2023-08-08 RX ADMIN — SODIUM CHLORIDE: 4.5 INJECTION, SOLUTION INTRAVENOUS at 17:17

## 2023-08-08 RX ADMIN — WARFARIN SODIUM 6 MG: 3 TABLET ORAL at 17:14

## 2023-08-08 RX ADMIN — ACETAMINOPHEN 650 MG: 325 TABLET ORAL at 23:22

## 2023-08-08 RX ADMIN — PREGABALIN 75 MG: 75 CAPSULE ORAL at 23:21

## 2023-08-08 ASSESSMENT — PAIN SCALES - GENERAL
PAINLEVEL_OUTOF10: 1
PAINLEVEL_OUTOF10: 0
PAINLEVEL_OUTOF10: 0
PAINLEVEL_OUTOF10: 7
PAINLEVEL_OUTOF10: 0
PAINLEVEL_OUTOF10: 7
PAINLEVEL_OUTOF10: 6
PAINLEVEL_OUTOF10: 7

## 2023-08-08 ASSESSMENT — PAIN - FUNCTIONAL ASSESSMENT
PAIN_FUNCTIONAL_ASSESSMENT: PREVENTS OR INTERFERES SOME ACTIVE ACTIVITIES AND ADLS
PAIN_FUNCTIONAL_ASSESSMENT: ACTIVITIES ARE NOT PREVENTED
PAIN_FUNCTIONAL_ASSESSMENT: ACTIVITIES ARE NOT PREVENTED
PAIN_FUNCTIONAL_ASSESSMENT: PREVENTS OR INTERFERES SOME ACTIVE ACTIVITIES AND ADLS

## 2023-08-08 ASSESSMENT — PAIN DESCRIPTION - PAIN TYPE
TYPE: SURGICAL PAIN

## 2023-08-08 ASSESSMENT — PAIN DESCRIPTION - LOCATION
LOCATION: HIP

## 2023-08-08 ASSESSMENT — PAIN DESCRIPTION - FREQUENCY
FREQUENCY: CONTINUOUS

## 2023-08-08 ASSESSMENT — PAIN DESCRIPTION - DESCRIPTORS
DESCRIPTORS: DISCOMFORT
DESCRIPTORS: SORE
DESCRIPTORS: ACHING
DESCRIPTORS: DISCOMFORT
DESCRIPTORS: DISCOMFORT
DESCRIPTORS: SORE

## 2023-08-08 ASSESSMENT — PAIN DESCRIPTION - ORIENTATION
ORIENTATION: RIGHT
ORIENTATION: RIGHT
ORIENTATION: LEFT
ORIENTATION: RIGHT

## 2023-08-08 ASSESSMENT — PAIN DESCRIPTION - ONSET
ONSET: ON-GOING

## 2023-08-08 NOTE — PROGRESS NOTES
Occupational Therapy  Facility/Department: [address] ORTHOPEDICS  Occupational Therapy Initial Assessment    Name: Sanjay Aquino  : 1950  MRN: [id number]  Date of Service: 2023    Discharge Recommendations:  2-3 sessions per week, Patient would benefit from continued therapy after discharge, 24 hour supervision or assist     Sanjay Aquino scored a 16/24 on the AM-PAC ADL Inpatient form. Current research shows that an AM-PAC score of 18 or greater is typically associated with a discharge to the patient's home setting. Based on the patient's AM-PAC score, and their current ADL deficits, it is recommended that the patient have 2-3 sessions per week of Occupational Therapy at d/c to increase the patient's independence. At this time, this patient demonstrates the endurance and safety to discharge home with home OT (home vs OP services) and a follow up treatment frequency of 2-3x/wk. Please see assessment section for further patient specific details. If patient discharges prior to next session this note will serve as a discharge summary. Please see below for the latest assessment towards goals. Patient Diagnosis(es): The encounter diagnosis was Primary osteoarthritis of right hip. Past Medical History:  has a past medical history of Bilateral pulmonary embolism (HCC), Cough variant asthma, HTN (hypertension), Mixed hyperlipidemia, Primary osteoarthritis of left knee, and Thyroid disease. Past Surgical History:  has a past surgical history that includes Breast surgery; Small intestine surgery (2015);  section ([facility]); Colon surgery (2015); Upper gastrointestinal endoscopy (N/A, 2022); and Colonoscopy (N/A, 2022). Treatment Diagnosis: decreased fxl mobility/ADL status    Assessment   Performance deficits / Impairments: Decreased functional mobility ; Decreased strength;Decreased endurance;Decreased ADL status; Decreased balance  Assessment: Pt is a 75 y/o Exercises: CGA/Shmuel in stance with RW  Dynamic Standing Balance Exercises: CGA/Shmuel with RW during fxl mobility  Education Given To: Patient  Education Provided: Role of Therapy;Plan of Care;Transfer Training;Precautions  Education Provided Comments: educated on anterior precautions  Education Method: Verbal;Demonstration  Barriers to Learning: None  Education Outcome: Verbalized understanding;Demonstrated understanding;Continued education needed                      AM-PAC Score        AM-PAC Inpatient Daily Activity Raw Score: 16 (08/08/23 1653)  AM-PAC Inpatient ADL T-Scale Score : 35.96 (08/08/23 1653)  ADL Inpatient CMS 0-100% Score: 53.32 (08/08/23 1653)  ADL Inpatient CMS G-Code Modifier : CK (08/08/23 1653)    Goals  Short Term Goals  Time Frame for Short Term Goals: prior to d/c  Short Term Goal 1: SUP fxl tx and fxl mobility to complete ADL tasks  Short Term Goal 2: SUP toileting  Short Term Goal 3: SUP LB dressing/bathing with AE  Short Term Goal 4: SUP standing x5 minutes to complete ADL task  Short Term Goal 5: Pt will improve activity tolerance in order to achieve above goals  Long Term Goals  Time Frame for Long Term Goals : STGs=LTGs  Patient Goals   Patient goals :  To return home with spouse and to walk pain free       Therapy Time   Individual Concurrent Group Co-treatment   Time In 1600         Time Out 1645         Minutes 45         Timed Code Treatment Minutes: 30 Minutes (15 minute eval)       Electronically signed by FERMIN Hodges on 8/8/2023 at 4:55 PM

## 2023-08-08 NOTE — PROGRESS NOTES
4 Eyes Admission Assessment     I agree as the admission nurse that 2 RN's have performed a thorough Head to Toe Skin Assessment on the patient. ALL assessment sites listed below have been assessed on admission. Areas assessed by both nurses:   [x]   Head, Face, and Ears   [x]   Shoulders, Back, and Chest  [x]   Arms, Elbows, and Hands   [x]   Coccyx, Sacrum, and Ischum  [x]   Legs, Feet, and Heels        Does the Patient have Skin Breakdown?   No         Sadi Prevention initiated:  NA   Wound Care Orders initiated:  NA      WOC nurse consulted for Pressure Injury (Stage 3,4, Unstageable, DTI, NWPT, and Complex wounds):  NA      Nurse 1 eSignature: Electronically signed by Guera Vora RN on 8/8/23 at 5:04 PM EDT    **SHARE this note so that the co-signing nurse is able to place an eSignature**    Nurse 2 eSignature: {Esignature:[id number]}

## 2023-08-08 NOTE — PROGRESS NOTES
Pt arrived to PACU from OR. Pt asleep on 4l/nc. Right hip dressing C/D/I. +pulses noted. Ice pack applied.

## 2023-08-08 NOTE — PROGRESS NOTES
Pt sitting up in chair after working with therapy,  at bedside. She reports mild pain in R hip, rated 1/10. Dressing remains CDI. Pulses palpable, skin warm to touch. Ambulating with walker and gait belt, tolerating well. Fall precautions in place, belongings within reach. Will continue to monitor and assess.

## 2023-08-08 NOTE — PROGRESS NOTES
Met with patient at bedside, patient is drowsy but orientedx4. Discussed role of nurse navigator. Reviewed reasons to call with questions or concerns, importance of TEDS, Incentive spirometer and incentive spirometer at bedside, pain medication, and physical and occupational therapy. 2/4 bed rails up, bed locked and in lowest position, fall precautions in place, call light within reach. Pulses present bilaterally +2 Moderate pedal, Right Hip Dry dressing assessed and is clean,dry, and intact, no signs of redness, drainage, or odor noted Ice in place. Otf and scds on bilateral lower extremities. moderate dorsi and plantar flexions noted bilaterally, toes appear appropriate to ethnicity in color, Cool to touch bilaterally. patient denies numbness or tingling in extremities. DC Plan: home with  will and home health care. will to transport patient. DME needs:Denies any DME needs at this time. Patient states they already has a rolling walker.  bringing in walker from home to verify it is the correct height.       Xavi Minaya  Orthopedic Nurse Navigator  Phone number: [phone]    Future Appointments   Date Time Provider [address] [place] [address]   8/14/2023  8:15 AM [facility]    8/24/2023 11:00 AM MD Amena Yuan       Electronically signed by Amish Zuleta RN on 8/8/2023 at 5:04 PM

## 2023-08-08 NOTE — H&P
Update History & Physical    The patient's History and Physical of July 17, 2023 was reviewed with the patient and I examined the patient. There was no change. The surgical site was confirmed by the patient and me. Plan: The risks, benefits, expected outcome, and alternative to the recommended procedure have been discussed with the patient. Patient understands and wants to proceed with the procedure.      Electronically signed by Dana Zhang MD on 8/8/2023 at 11:55 AM

## 2023-08-08 NOTE — PLAN OF CARE
Problem: Discharge Planning  Goal: Discharge to home or other facility with appropriate resources  Outcome: Progressing  Flowsheets (Taken 8/8/2023 1704)  Discharge to home or other facility with appropriate resources:   Identify barriers to discharge with patient and caregiver   Identify discharge learning needs (meds, wound care, etc)   Refer to discharge planning if patient needs post-hospital services based on physician order or complex needs related to functional status, cognitive ability or social support system     Problem: Chronic Conditions and Co-morbidities  Goal: Patient's chronic conditions and co-morbidity symptoms are monitored and maintained or improved  Outcome: Progressing  Flowsheets (Taken 8/8/2023 1704)  Care Plan - Patient's Chronic Conditions and Co-Morbidity Symptoms are Monitored and Maintained or Improved:   Monitor and assess patient's chronic conditions and comorbid symptoms for stability, deterioration, or improvement   Collaborate with multidisciplinary team to address chronic and comorbid conditions and prevent exacerbation or deterioration   Update acute care plan with appropriate goals if chronic or comorbid symptoms are exacerbated and prevent overall improvement and discharge     Problem: Neurosensory - Adult  Goal: Achieves stable or improved neurological status  Outcome: Progressing  Flowsheets (Taken 8/8/2023 1704)  Achieves stable or improved neurological status:   Assess for and report changes in neurological status   Monitor temperature, glucose, and sodium.  Initiate appropriate interventions as ordered     Problem: Cardiovascular - Adult  Goal: Maintains optimal cardiac output and hemodynamic stability  Outcome: Progressing  Flowsheets (Taken 8/8/2023 1704)  Maintains optimal cardiac output and hemodynamic stability:   Monitor blood pressure and heart rate   Monitor urine output and notify Licensed Independent Practitioner for values outside of normal range   Assess for signs of decreased cardiac output     Problem: Skin/Tissue Integrity - Adult  Goal: Skin integrity remains intact  Outcome: Progressing  Flowsheets (Taken 8/8/2023 1704)  Skin Integrity Remains Intact: Monitor for areas of redness and/or skin breakdown  Goal: Incisions, wounds, or drain sites healing without S/S of infection  Outcome: Progressing  Flowsheets (Taken 8/8/2023 1704)  Incisions, Wounds, or Drain Sites Healing Without Sign and Symptoms of Infection:   ADMISSION and DAILY: Assess and document risk factors for pressure ulcer development   TWICE DAILY: Assess and document dressing/incision, wound bed, drain sites and surrounding tissue     Problem: Musculoskeletal - Adult  Goal: Return mobility to safest level of function  Outcome: Progressing  Flowsheets (Taken 8/8/2023 1704)  Return Mobility to Safest Level of Function:   Assess patient stability and activity tolerance for standing, transferring and ambulating with or without assistive devices   Assist with transfers and ambulation using safe patient handling equipment as needed   Ensure adequate protection for wounds/incisions during mobilization   Obtain physical therapy/occupational therapy consults as needed   Instruct patient/family in ordered activity level  Goal: Maintain proper alignment of affected body part  Outcome: Progressing  Flowsheets (Taken 8/8/2023 1704)  Maintain proper alignment of affected body part:   Support and protect limb and body alignment per provider's orders   Instruct and reinforce with patient and family use of appropriate assistive device and precautions (e.g. spinal or hip dislocation precautions)  Goal: Return ADL status to a safe level of function  Outcome: Progressing  Flowsheets (Taken 8/8/2023 1704)  Return ADL Status to a Safe Level of Function:   Administer medication as ordered   Obtain physical therapy/occupational therapy consults as needed   Assess activities of daily living deficits and provide assistive

## 2023-08-08 NOTE — CONSULTS
Clinical Pharmacy Note  Warfarin Consult    Eliana Hernández is a 74 y.o. female receiving warfarin managed by pharmacy. Patient being bridged with LMW heparin. Warfarin Indication: hx DVT/PE, post hip replacement  Target INR range: 2-3   Dose prior to admission: 6mg Mon, Wed and Fri and 4mg all other days [managed by [facility]]    Current warfarin drug-drug interactions: -    Recent Labs     08/08/23  1223   INR 1.41*       Assessment/Plan:    Restarting post-op  INR low since it was  held   Warfarin 6 mg tonight. Daily PT/INR until stable within therapeutic range. Recommend her dosing to be managed by [address] clinic at discharge for better continuity of care. Thank you for the consult.    Sadi Torres PharmD  8/8/2023  4:25 PM

## 2023-08-08 NOTE — OP NOTE
Patient: Jovani Faust  YOB: 1950  MRN: [id number]    Date of Procedure: 8/8/2023      Pre-Op Diagnosis: Osteoarthritis, right hip     Post-Op Diagnosis: Same       Procedure Performed: Right anterior total hip arthroplasty     Surgeon: Gabby Garcia MD     Anesthesia: General     Estimated Blood Loss: 888 mL      Complications: None    Implants:  Depuy [place] Gription cup, 52 mm  36 mm polyethylene liner, neutral offset  Depuy Actis stem, size 4 high offset  36 mm diameter ceramic femoral head, +1.5 mm offset    Indications: This is a 74 y.o. female with osteoarthritis of the hip that continues to be painful despite conservative management. We discussed the diagnosis and treatment options and I recommended total hip arthroplasty. The operative procedure, alternatives, and risks were discussed in detail with the patient. The risks include but are not limited to: Infection, vessel injury, nerve injury, DVT, pulmonary embolism, implant loosening, need for revision surgery, leg length discrepancy, dislocation, lateral femoral cutaneous nerve palsy, intraoperative fracture. Informed consent for surgery was signed by the patient. Details: The patient was seen in the preoperative holding area where the site of surgery was marked and informed consent was confirmed. The patient was brought back to the operating room by OR personnel. Anesthesia was administered. The patient was positioned supine on the [place] table. The right lower extremity was then prepped and draped in a standard and sterile fashion. A final and formal timeout was then performed which confirmed the correct patient, correct position, and correct site of surgery. IV antibiotics were administered within 1 hour of the skin incision. A direct anterior supine approach was utilized. The skin and subcutaneous tissue were dissected down to the body of the tensor fascia aldo.  Incision into the fascia of the TFL was made and dissection was

## 2023-08-08 NOTE — PROGRESS NOTES
female s/p elective R SANDRITA on 8/8 by Dr. Wendi Lamar. WBAT RLE with anterior precautions. Pt lives on the main level of the house with 1+1 MAEVE with spouse and was indep ambulating with cane or RW PTA. Today, pt drowsy after surgery and required Shmuel for transfers and short distance ambulation with RW. Anticipate that pt will progress to be able to d/c home with 24 hour supervision from  and HHPT. Will continue to follow while hospitalized. Treatment Diagnosis: difficulty walking  Therapy Prognosis: Good  Decision Making: Medium Complexity  History: Pt is a 73 y/o female s/p elective R SANDRITA on 8/8 by Dr. Wendi Lamar. WBAT RLE with anterior precautions. Exam: functional mobility  Clinical Presentation: evolving  Barriers to Learning: none  Requires PT Follow-Up: Yes  Activity Tolerance  Activity Tolerance: Patient tolerated evaluation without incident;Patient limited by fatigue  Activity Tolerance Comments: limited by mild dizziness, very drowsy     Plan   Physcial Therapy Plan  General Plan: 1 time a day 7 days a week  Current Treatment Recommendations: Strengthening, ROM, Balance training, Functional mobility training, Transfer training, Gait training, Home exercise program, Safety education & training, Patient/Caregiver education & training, Equipment evaluation, education, & procurement, Positioning, Therapeutic activities, Stair training  Safety Devices  Type of Devices:  All fall risk precautions in place, Left in chair, Chair alarm in place, Nurse notified, Patient at risk for falls, Call light within reach, Gait belt  Restraints  Restraints Initially in Place: No     Restrictions  Restrictions/Precautions  Restrictions/Precautions: Fall Risk, Weight Bearing  Lower Extremity Weight Bearing Restrictions  Right Lower Extremity Weight Bearing: Weight Bearing As Tolerated  Position Activity Restriction  Hip Precautions: Anterior hip precautions  Other position/activity restrictions: No hip ER past 60 deg and no hip hip precautions, hip rests in an internally rotated position in the bed and tends to IR during gait (baseline?)  AROM LLE (degrees)  LLE AROM : WFL  Strength RLE  Comment: not formally tested but at least 3+/5 BLE strength demonstrated by functional mobility           Bed mobility  Supine to Sit: Moderate assistance (with R LE)  Sit to Supine: Unable to assess  Scooting: Moderate assistance  Bed Mobility Comments: HOB elevated; use of bedrail; increased time and effortful to complete    Transfers  Sit to Stand: Minimal Assistance  Stand to Sit: Minimal Assistance  Comment: cues for hand placement and technique, extra time    Ambulation  WB Status: WBAT RLE  Ambulation  Surface: Level tile  Device: Rolling Walker  Other Apparatus: O2 (therapist managed 02 and IV)  Assistance: Contact guard assistance;Minimal assistance  Quality of Gait: very slow step to gait pattern with frequent cues for sequencing and walker placement, R hip IR (baseline?), slight posterior lean- pt reports feeling \"off balance\"  Gait Deviations: Slow Anastasiya;Decreased step height;Decreased step length  Distance: 10' x2  Comments: very drowsy and required extended amoutnt of time to complete ambulation  More Ambulation?: No  Stairs/Curb  Stairs?: No     Balance  Posture: Fair  Sitting - Static: Good  Sitting - Dynamic: Good  Standing - Static: Fair  Standing - Dynamic: Fair    Exercise Treatment: pt instructed on ankle pumps and encouraged to complete 20-30 per hour on her own    Other activities: pt used restroom during session and attempted to void but unable.       AM-PAC Score  AM-PAC Inpatient Mobility Raw Score : 15 (08/08/23 1701)  AM-PAC Inpatient T-Scale Score : 39.45 (08/08/23 1701)  Mobility Inpatient CMS 0-100% Score: 57.7 (08/08/23 1701)  Mobility Inpatient CMS G-Code Modifier : CK (08/08/23 1701)             Goals  Short Term Goals  Time Frame for Short Term Goals: upon d/c  Short Term Goal 1: bed mobility SBA  Short Term Goal 2:

## 2023-08-08 NOTE — PROGRESS NOTES
Pt arrived to room 3103 from PACU. Vital signs taken and were WNL. Assessment completed. Pt oriented to room, bed, phone, and call light. Fall precautions in place. Call light, bedside table and phone within easy reach. Pt instructed to use call light to call for assistance.

## 2023-08-09 VITALS
HEART RATE: 59 BPM | OXYGEN SATURATION: 99 % | BODY MASS INDEX: 27.92 KG/M2 | WEIGHT: 142.2 LBS | RESPIRATION RATE: 16 BRPM | HEIGHT: 60 IN | TEMPERATURE: 97.4 F | SYSTOLIC BLOOD PRESSURE: 127 MMHG | DIASTOLIC BLOOD PRESSURE: 76 MMHG

## 2023-08-09 LAB
ANION GAP SERPL CALCULATED.3IONS-SCNC: 9 MMOL/L (ref 3–16)
BUN SERPL-MCNC: 16 MG/DL (ref 7–20)
CALCIUM SERPL-MCNC: 9.7 MG/DL (ref 8.3–10.6)
CHLORIDE SERPL-SCNC: 105 MMOL/L (ref 99–110)
CO2 SERPL-SCNC: 25 MMOL/L (ref 21–32)
CREAT SERPL-MCNC: 0.8 MG/DL (ref 0.6–1.2)
GFR SERPLBLD CREATININE-BSD FMLA CKD-EPI: >60 ML/MIN/{1.73_M2}
GLUCOSE BLD-MCNC: 153 MG/DL (ref 70–99)
GLUCOSE BLD-MCNC: 181 MG/DL (ref 70–99)
GLUCOSE SERPL-MCNC: 137 MG/DL (ref 70–99)
PERFORMED ON: ABNORMAL
PERFORMED ON: ABNORMAL
POTASSIUM SERPL-SCNC: 4.2 MMOL/L (ref 3.5–5.1)
SODIUM SERPL-SCNC: 139 MMOL/L (ref 136–145)

## 2023-08-09 PROCEDURE — 97535 SELF CARE MNGMENT TRAINING: CPT

## 2023-08-09 PROCEDURE — 6360000002 HC RX W HCPCS: Performed by: ORTHOPAEDIC SURGERY

## 2023-08-09 PROCEDURE — 94760 N-INVAS EAR/PLS OXIMETRY 1: CPT

## 2023-08-09 PROCEDURE — 97110 THERAPEUTIC EXERCISES: CPT

## 2023-08-09 PROCEDURE — 96360 HYDRATION IV INFUSION INIT: CPT

## 2023-08-09 PROCEDURE — 2580000003 HC RX 258: Performed by: ORTHOPAEDIC SURGERY

## 2023-08-09 PROCEDURE — 97116 GAIT TRAINING THERAPY: CPT

## 2023-08-09 PROCEDURE — G0378 HOSPITAL OBSERVATION PER HR: HCPCS

## 2023-08-09 PROCEDURE — 36415 COLL VENOUS BLD VENIPUNCTURE: CPT

## 2023-08-09 PROCEDURE — 97530 THERAPEUTIC ACTIVITIES: CPT

## 2023-08-09 PROCEDURE — 6370000000 HC RX 637 (ALT 250 FOR IP): Performed by: ORTHOPAEDIC SURGERY

## 2023-08-09 PROCEDURE — 96361 HYDRATE IV INFUSION ADD-ON: CPT

## 2023-08-09 PROCEDURE — 80048 BASIC METABOLIC PNL TOTAL CA: CPT

## 2023-08-09 RX ADMIN — PREGABALIN 75 MG: 75 CAPSULE ORAL at 09:20

## 2023-08-09 RX ADMIN — SODIUM CHLORIDE 2000 MG: 900 INJECTION INTRAVENOUS at 06:27

## 2023-08-09 RX ADMIN — NIFEDIPINE 30 MG: 30 TABLET, EXTENDED RELEASE ORAL at 09:20

## 2023-08-09 RX ADMIN — OXYCODONE HYDROCHLORIDE 5 MG: 5 TABLET ORAL at 06:29

## 2023-08-09 RX ADMIN — ACETAMINOPHEN 650 MG: 325 TABLET ORAL at 06:29

## 2023-08-09 RX ADMIN — ATENOLOL 25 MG: 25 TABLET ORAL at 09:20

## 2023-08-09 RX ADMIN — ACETAMINOPHEN 650 MG: 325 TABLET ORAL at 10:38

## 2023-08-09 RX ADMIN — LEVOTHYROXINE SODIUM 75 MCG: 0.07 TABLET ORAL at 06:29

## 2023-08-09 ASSESSMENT — PAIN DESCRIPTION - ORIENTATION
ORIENTATION: RIGHT

## 2023-08-09 ASSESSMENT — PAIN SCALES - GENERAL
PAINLEVEL_OUTOF10: 3
PAINLEVEL_OUTOF10: 3
PAINLEVEL_OUTOF10: 1

## 2023-08-09 ASSESSMENT — PAIN DESCRIPTION - ONSET: ONSET: ON-GOING

## 2023-08-09 ASSESSMENT — PAIN SCALES - WONG BAKER
WONGBAKER_NUMERICALRESPONSE: 0
WONGBAKER_NUMERICALRESPONSE: 0

## 2023-08-09 ASSESSMENT — PAIN - FUNCTIONAL ASSESSMENT
PAIN_FUNCTIONAL_ASSESSMENT: PREVENTS OR INTERFERES SOME ACTIVE ACTIVITIES AND ADLS

## 2023-08-09 ASSESSMENT — PAIN DESCRIPTION - PAIN TYPE
TYPE: SURGICAL PAIN

## 2023-08-09 ASSESSMENT — PAIN DESCRIPTION - DESCRIPTORS
DESCRIPTORS: ACHING
DESCRIPTORS: ACHING;DISCOMFORT
DESCRIPTORS: ACHING

## 2023-08-09 ASSESSMENT — PAIN DESCRIPTION - LOCATION
LOCATION: HIP

## 2023-08-09 ASSESSMENT — PAIN DESCRIPTION - FREQUENCY: FREQUENCY: CONTINUOUS

## 2023-08-09 NOTE — CARE COORDINATION
DISCHARGE SUMMARY     DATE OF DISCHARGE: 8/9/23    DISCHARGE DESTINATION: home w/      HOME CARE: Yes    Agency Name: [facility]  Discharging to Facility/ Agency   Name:  [facility]    [facility] [address]  Phone: [phone]  Fax: [phone]   Notified: RN, Family, and Facility/Agency      TRANSPORTATION: Private Car  NEW DME ORDERED: no  Electronically signed by Flavia Marin on 8/9/2023 at 10:04 AM  #[phone]

## 2023-08-09 NOTE — PLAN OF CARE
Problem: Discharge Planning  Goal: Discharge to home or other facility with appropriate resources  8/9/2023 0947 by Nahomi Blankenship RN  Outcome: Progressing  Flowsheets (Taken 8/9/2023 3692)  Discharge to home or other facility with appropriate resources: Identify barriers to discharge with patient and caregiver  8/9/2023 0441 by Mae Hu RN  Outcome: Progressing  Flowsheets (Taken 8/9/2023 1087)  Discharge to home or other facility with appropriate resources:   Identify barriers to discharge with patient and caregiver   Identify discharge learning needs (meds, wound care, etc)     Problem: Chronic Conditions and Co-morbidities  Goal: Patient's chronic conditions and co-morbidity symptoms are monitored and maintained or improved  8/9/2023 0947 by Nahomi Blankenship RN  Outcome: Progressing  Flowsheets (Taken 8/9/2023 0947)  Care Plan - Patient's Chronic Conditions and Co-Morbidity Symptoms are Monitored and Maintained or Improved: Monitor and assess patient's chronic conditions and comorbid symptoms for stability, deterioration, or improvement  8/9/2023 0441 by Mae Hu RN  Outcome: Progressing  Flowsheets (Taken 8/9/2023 0441)  Care Plan - Patient's Chronic Conditions and Co-Morbidity Symptoms are Monitored and Maintained or Improved: Monitor and assess patient's chronic conditions and comorbid symptoms for stability, deterioration, or improvement     Problem: Neurosensory - Adult  Goal: Achieves stable or improved neurological status  8/9/2023 0947 by Nahomi Blankenship RN  Outcome: Progressing  Flowsheets (Taken 8/9/2023 0947)  Achieves stable or improved neurological status: Assess for and report changes in neurological status  8/9/2023 0441 by Mae Hu RN  Outcome: Progressing  Flowsheets (Taken 8/9/2023 0441)  Achieves stable or improved neurological status: Assess for and report changes in neurological status     Problem: Cardiovascular - Adult  Goal: Maintains optimal Progressing  Flowsheets (Taken 8/9/2023 0441)  Return Mobility to Safest Level of Function: Assess patient stability and activity tolerance for standing, transferring and ambulating with or without assistive devices  Goal: Maintain proper alignment of affected body part  8/9/2023 0947 by Charline Palmer RN  Outcome: Progressing  Flowsheets (Taken 8/9/2023 2430)  Maintain proper alignment of affected body part: Support and protect limb and body alignment per provider's orders  8/9/2023 0441 by Fallon Pearce RN  Outcome: Progressing  Flowsheets (Taken 8/9/2023 0441)  Maintain proper alignment of affected body part: Support and protect limb and body alignment per provider's orders  Goal: Return ADL status to a safe level of function  8/9/2023 0947 by Charline Palmer RN  Outcome: Progressing  Flowsheets (Taken 8/9/2023 0947)  Return ADL Status to a Safe Level of Function: Administer medication as ordered  8/9/2023 0441 by Fallon Pearce RN  Outcome: Progressing  Flowsheets (Taken 8/9/2023 0441)  Return ADL Status to a Safe Level of Function: Administer medication as ordered     Problem: Infection - Adult  Goal: Absence of infection at discharge  8/9/2023 0947 by Charline Palmer RN  Outcome: Progressing  Flowsheets (Taken 8/9/2023 0947)  Absence of infection at discharge: Assess and monitor for signs and symptoms of infection  8/9/2023 0441 by Fallon Pearce RN  Outcome: Progressing  Flowsheets (Taken 8/9/2023 0441)  Absence of infection at discharge:   Assess and monitor for signs and symptoms of infection   Monitor lab/diagnostic results  Goal: Absence of infection during hospitalization  8/9/2023 0947 by Charline Palmer RN  Outcome: Progressing  Flowsheets (Taken 8/9/2023 0947)  Absence of infection during hospitalization: Assess and monitor for signs and symptoms of infection  8/9/2023 0441 by Fallon Pearce RN  Outcome: Progressing  Flowsheets (Taken 8/9/2023 0441)  Absence of

## 2023-08-09 NOTE — CARE COORDINATION
08/09/23 0953   Service Assessment   Patient Orientation Alert and Oriented;Person;Place;Situation   Cognition Alert   History Provided By Patient   Primary Caregiver Self   Accompanied By/Relationship    Support Systems Spouse/Significant Other   Patient's Radha Rojas is: Legal Next of Kin   PCP Verified by CM Yes   Last Visit to PCP Within last 3 months   Prior Functional Level Independent in ADLs/IADLs   Current Functional Level Assistance with the following:;Mobility; Bathing   Can patient return to prior living arrangement Yes   Ability to make needs known: Good   Family able to assist with home care needs: Yes   Would you like for me to discuss the discharge plan with any other family members/significant others, and if so, who? Yes  ( at bedside)   Financial Resources Medicare  ([employer] Medicare)   [employer] None   CM/SW Referral Other (see comment)  (DC planning)   Discharge Planning   Type of Residence House   Living Arrangements Spouse/Significant Other   Current Services Prior To Admission Durable Medical Equipment   Current DME Prior to [address] [employer]   DME Ordered? No   Potential Assistance Purchasing Medications No   Type of Home Care Services None   Patient expects to be discharged to: [facility] Discharge   Transition of Care Consult (CM Consult) [address] [place] [address] Discharge Home Health   Mode of Transport at Discharge Other (see comment)  (car)   Condition of Participation: Discharge Planning   The Plan for Transition of Care is related to the following treatment goals: home health   The Patient and/or Patient Representative was provided with a Choice of Provider? Patient   The Patient and/Or Patient Representative agree with the Discharge Plan?  Yes   Freedom of Choice list was provided with basic dialogue that supports the patient's individualized plan of care/goals, treatment preferences, and

## 2023-08-09 NOTE — CARE COORDINATION
08/09/23 0950   IMM Letter   Observation Status Letter date given: 08/09/23   Observation Status Letter time given: 0945   Observation Status Letter given to Patient/Family/Significant other/Guardian/POA/by: CALIXTO letter explained to pt and copy provided per ANN Soni RN

## 2023-08-09 NOTE — PLAN OF CARE
Problem: Discharge Planning  Goal: Discharge to home or other facility with appropriate resources  8/9/2023 1123 by Mukesh Freeman RN  Outcome: Completed  8/9/2023 0947 by Mukesh Freeman RN  Outcome: Progressing  Flowsheets (Taken 8/9/2023 5631)  Discharge to home or other facility with appropriate resources: Identify barriers to discharge with patient and caregiver  8/9/2023 0441 by Niki Lovell RN  Outcome: Progressing  Flowsheets (Taken 8/9/2023 8989)  Discharge to home or other facility with appropriate resources:   Identify barriers to discharge with patient and caregiver   Identify discharge learning needs (meds, wound care, etc)     Problem: Chronic Conditions and Co-morbidities  Goal: Patient's chronic conditions and co-morbidity symptoms are monitored and maintained or improved  8/9/2023 1123 by Mukesh Freeman RN  Outcome: Completed  8/9/2023 0947 by Mukesh Freeman RN  Outcome: Progressing  Flowsheets (Taken 8/9/2023 0947)  Care Plan - Patient's Chronic Conditions and Co-Morbidity Symptoms are Monitored and Maintained or Improved: Monitor and assess patient's chronic conditions and comorbid symptoms for stability, deterioration, or improvement  8/9/2023 0441 by Niki Lovell RN  Outcome: Progressing  Flowsheets (Taken 8/9/2023 0441)  Care Plan - Patient's Chronic Conditions and Co-Morbidity Symptoms are Monitored and Maintained or Improved: Monitor and assess patient's chronic conditions and comorbid symptoms for stability, deterioration, or improvement     Problem: Neurosensory - Adult  Goal: Achieves stable or improved neurological status  8/9/2023 1123 by Mukesh Freeman RN  Outcome: Completed  8/9/2023 0947 by Mueksh Freeman RN  Outcome: Progressing  Flowsheets (Taken 8/9/2023 0947)  Achieves stable or improved neurological status: Assess for and report changes in neurological status  8/9/2023 0441 by Niki Lovell RN  Outcome: Progressing  Flowsheets signs and symptoms of infection  8/9/2023 0441 by Nitesh Cornell RN  Outcome: Progressing  Flowsheets (Taken 8/9/2023 1179)  Absence of infection at discharge:   Assess and monitor for signs and symptoms of infection   Monitor lab/diagnostic results  Goal: Absence of infection during hospitalization  8/9/2023 1123 by Rajesh Robin RN  Outcome: Completed  8/9/2023 0947 by Rajesh Robin RN  Outcome: Progressing  Flowsheets (Taken 8/9/2023 0307)  Absence of infection during hospitalization: Assess and monitor for signs and symptoms of infection  8/9/2023 0441 by Nitesh Cornell RN  Outcome: Progressing  Flowsheets (Taken 8/9/2023 0441)  Absence of infection during hospitalization:   Assess and monitor for signs and symptoms of infection   Monitor lab/diagnostic results     Problem: Metabolic/Fluid and Electrolytes - Adult  Goal: Glucose maintained within prescribed range  8/9/2023 1123 by Rajesh Robin RN  Outcome: Completed  8/9/2023 0947 by Rajesh Robin RN  Outcome: Progressing  Flowsheets (Taken 8/9/2023 0947)  Glucose maintained within prescribed range: Monitor blood glucose as ordered  8/9/2023 0441 by Nitesh Cornell RN  Outcome: Progressing  Flowsheets (Taken 8/9/2023 0441)  Glucose maintained within prescribed range: Monitor blood glucose as ordered     Problem: Safety - Adult  Goal: Free from fall injury  8/9/2023 1123 by Rajesh Robin RN  Outcome: Completed  8/9/2023 0947 by Rajesh Robin RN  Outcome: Progressing  [address] (Taken 8/9/2023 0947)  Free From Fall Injury: Instruct family/caregiver on patient safety  8/9/2023 0441 by Nitesh Cornell RN  Outcome: Progressing  Flowsheets (Taken 8/9/2023 0441)  Free From Fall Injury: Instruct family/caregiver on patient safety     Problem: Pain  Goal: Verbalizes/displays adequate comfort level or baseline comfort level  8/9/2023 1123 by Rajesh Robin RN  Outcome: Completed  8/9/2023 0947 by Rajesh Robin

## 2023-08-09 NOTE — PROGRESS NOTES
Occupational Therapy  Facility/Department: [address] [place] [address]  Occupational Therapy Treatment Session    Name: Sonia Richey  : 1950  MRN: [id number]  Date of Service: 2023    Discharge Recommendations:  2-3 sessions per week, Patient would benefit from continued therapy after discharge, 24 hour supervision or assist      Sonia Richey scored a 21/24 on the AM-PAC ADL Inpatient form. Current research shows that an AM-PAC score of 18 or greater is typically associated with a discharge to the patient's home setting. Based on the patient's AM-PAC score, and their current ADL deficits, it is recommended that the patient have 2-3 sessions per week of Occupational Therapy at d/c to increase the patient's independence. At this time, this patient demonstrates the endurance and safety to discharge home with [facility]  (home vs OP services) and a follow up treatment frequency of 2-3x/wk. Please see assessment section for further patient specific details. If patient discharges prior to next session this note will serve as a discharge summary. Please see below for the latest assessment towards goals. Patient Diagnosis(es): The encounter diagnosis was Primary osteoarthritis of right hip. Past Medical History:  has a past medical history of Bilateral pulmonary embolism (HCC), Cough variant asthma, HTN (hypertension), Mixed hyperlipidemia, Primary osteoarthritis of left knee, and Thyroid disease. Past Surgical History:  has a past surgical history that includes Breast surgery; Small intestine surgery (2015);  section ([facility]); Colon surgery (2015); Upper gastrointestinal endoscopy (N/A, 2022); Colonoscopy (N/A, 2022); and Total hip arthroplasty (Right, 2023). Treatment Diagnosis: decreased fxl mobility/ADL status      Assessment   Performance deficits / Impairments: Decreased functional mobility ; Decreased strength;Decreased endurance;Decreased ADL status; Decreased

## 2023-08-09 NOTE — ACP (ADVANCE CARE PLANNING)
Advance Care Planning     Advance Care Planning Inpatient Note  [facility] Department    Today's Date: 8/9/2023  Unit: [facility] ORTHOPEDICS    Received request from [employer]. Upon review of chart and communication with care team, patient's decision making abilities are not in question. . Patient and Spouse was/were present in the room during visit. Goals of ACP Conversation:  Discuss advance care planning documents    Health Care Decision Makers:     No healthcare decision makers have been documented. Click here to complete [address] including selection of the Healthcare Decision Maker Relationship (ie \"Primary\")  Summary:  No Decision Maker named by patient at this time    Advance Care Planning Documents (Patient Wishes):  Living Will/Advance Directive     Assessment:  Pt said she has a completed LW document at home and wants to review the [facility] doc before completing it. This  asked pt to either give a copy of the docs to doc or upload in [address] [place] [address] to get it into her record.      Interventions:  Provided education on documents for clarity and greater understanding  Discussed and provided education on state decision maker hierarchy  Encouraged ongoing ACP conversation with future decision makers and loved ones  Reviewed but did not complete ACP document    Care Preferences Communicated:   No    Outcomes/Plan:  ACP Discussion: Postponed  Pt to follow-up    Electronically signed by Huma Lowe on 8/9/2023 at 10:05 AM

## 2023-08-09 NOTE — PROGRESS NOTES
Huddle performed this morning including Nurse navigator Paulina Kwan, Physical therapist Stanley Li, and Occupational therapist Robert Kwok. Discussed plan of care, discharge plan, and dme needs if applicable for orthopedic total joint patient.

## 2023-08-09 NOTE — PLAN OF CARE
Problem: Discharge Planning  Goal: Discharge to home or other facility with appropriate resources  8/9/2023 0441 by Lorenza Samson RN  Outcome: Progressing  Flowsheets (Taken 8/9/2023 3306)  Discharge to home or other facility with appropriate resources:   Identify barriers to discharge with patient and caregiver   Identify discharge learning needs (meds, wound care, etc)     Problem: Chronic Conditions and Co-morbidities  Goal: Patient's chronic conditions and co-morbidity symptoms are monitored and maintained or improved  8/9/2023 0441 by Lorenza Samson RN  Outcome: Progressing  Flowsheets (Taken 8/9/2023 0441)  Care Plan - Patient's Chronic Conditions and Co-Morbidity Symptoms are Monitored and Maintained or Improved: Monitor and assess patient's chronic conditions and comorbid symptoms for stability, deterioration, or improvement     Problem: Neurosensory - Adult  Goal: Achieves stable or improved neurological status  8/9/2023 0441 by Lorenza Samson RN  Outcome: Progressing  Flowsheets (Taken 8/9/2023 0441)  Achieves stable or improved neurological status: Assess for and report changes in neurological status     Problem: Cardiovascular - Adult  Goal: Maintains optimal cardiac output and hemodynamic stability  8/9/2023 0441 by Lorenza Samson RN  Outcome: Progressing  Flowsheets (Taken 8/9/2023 0441)  Maintains optimal cardiac output and hemodynamic stability:   Monitor urine output and notify Licensed Independent Practitioner for values outside of normal range   Monitor blood pressure and heart rate     Problem: Skin/Tissue Integrity - Adult  Goal: Skin integrity remains intact  8/9/2023 0441 by Lorenza Samson RN  Outcome: Progressing  Flowsheets (Taken 8/9/2023 0441)  Skin Integrity Remains Intact: Monitor for areas of redness and/or skin breakdown     Problem: Skin/Tissue Integrity - Adult  Goal: Incisions, wounds, or drain sites healing without S/S of infection  8/9/2023 0441 by Federico Marcum

## 2023-08-09 NOTE — PROGRESS NOTES
Data- Discharge order received, patient verbalized agreement to discharge, disposition to previous residence, needs noted for [facility] and informed Victor Hugo Villeda NP. Action- discharge instructions prepared and given to patient and  Will, patient and Will verbalized understanding. Medication information packet given related to NEW and/or CHANGED prescriptions emphasizing name/purpose/side effects, pt verbalized understanding. Discharge instruction summary: Diet- General, Activity- Full weight bearing, Primary Care Physician as follows: Nelson Eli [phone]. Follow up appointment with orthopedic office noted below, immunizations reviewed and discussed with patient, prescription medications to be filled by retail pharmacy and then delivered. Inpatient surgical procedure precautions reviewed: Anterior Hip Precautions. Educated patient on using incentive spirometer post-discharge per surgeon's instructions. Neurovascular checks performed and moderate dorsi and plantar flexions noted bilaterally, toes appear appropriate to ethnicity in color, Warm to touch, patient denies numbness or tingling in extremities. Right Hip Incision site prineo glue dressing assessed and is clean,dry, and intact, no signs of redness, drainage, or odor noted. patient's bedside RN Jovani notified of patient completing discharge instructions. Nurse Navigator and Orthopedic Office contact information on discharge instructions and provided to patient. Response- prescription medications to be filled by [facility] and then delivered. Disposition is home with Will and [facility]. Patient to be transported by Will. Notified Collins Peng of home pt/inr orders. Patient states she feels comfortable administering lovenox injections herself as she has experience performing. Patient's rolling walker is a teresita height and adjusted to appropriate height.      Future Appointments   Date Time Provider Department [place]   8/14/2023  8:15 AM [facility] ANTICOAGULATION CLINIC [facility] Geri Gates    8/24/2023 11:00 AM MD Nilsa Steele [facility]     Electronically signed by Hal Skinner RN on 8/9/2023 at 9:55 AM

## 2023-08-09 NOTE — CARE COORDINATION
[facility]    DC order noted, all docs needed have been faxed to [facility] for home care services.     Home care to see patient within 24-48 hrs    Napoleon Manning RN, BSN CTN  [facility] [phone]

## 2023-08-10 ENCOUNTER — TELEPHONE (OUTPATIENT)
Dept: ORTHOPEDICS UNIT | Age: 73
End: 2023-08-10

## 2023-08-10 ENCOUNTER — ANTI-COAG VISIT (OUTPATIENT)
Dept: PHARMACY | Age: 73
End: 2023-08-10

## 2023-08-10 ENCOUNTER — CARE COORDINATION (OUTPATIENT)
Dept: CASE MANAGEMENT | Age: 73
End: 2023-08-10

## 2023-08-10 DIAGNOSIS — I26.09 OTHER CHRONIC PULMONARY EMBOLISM WITH ACUTE COR PULMONALE (HCC): Primary | ICD-10-CM

## 2023-08-10 DIAGNOSIS — I27.82 OTHER CHRONIC PULMONARY EMBOLISM WITH ACUTE COR PULMONALE (HCC): Primary | ICD-10-CM

## 2023-08-10 LAB — INR BLD: 1.8

## 2023-08-10 NOTE — TELEPHONE ENCOUNTER
Attempted to contact patient. Left hippa compliant voicemail for patient stating purpose and call back number.    Talisha Armand  Orthopedic Nurse Navigator  Phone number: [phone]  Future Appointments   Date Time Provider 4600  [facility]   8/10/2023  5:15 PM [facility], [address] [place] [address] Mukund    8/11/2023  5:15 PM [facility] ANTICOAGULATION CLINIC [facility] Mukund    8/24/2023 11:00 AM Hilliard Ort, MD Daryle Moellers [facility]

## 2023-08-10 NOTE — PROGRESS NOTES
[facility] called. Patient had procedure  and resumed warfarin  and took 4mg, has lovenox shots still at home-dosing and bridging from doctor. They told her to have INR checked today and tomorrow, INR is 1.8 today already after resuming warfarin just 1 day ago. Will have her take 4mg today and recheck INR tomorrow, continue shots. However, will need major dose decrease once able to discontinue lovenox.      Marito Orellana, PharmD, [address] [place] [address] Tracking Only    Intervention Detail: Adherence Monitorin and Dose Adjustment: 1, reason: Therapy Optimization  Total # of Interventions Recommended: 2  Total # of Interventions Accepted: 2  Time Spent (min): 15

## 2023-08-10 NOTE — CARE COORDINATION
[facility] Care Transitions Initial Follow Up Call    Call within 2 business days of discharge: Yes    Patient Current Location:  Home: [id number] [place] [id number]    Care Transition Nurse contacted the patient by telephone to perform post hospital discharge assessment. Verified name and  with patient as identifiers. Provided introduction to self, and explanation of the Care Transition Nurse role. Patient: Elie Hebert Patient : 1950   MRN: [id number]  Reason for Admission: Scheduled ortho surgical procedure  Discharge Date: 23 RARS: Readmission Risk Score: 11.7      Last Discharge [address] [facility]       Date Complaint Diagnosis Description Type Department Provider    23  Primary osteoarthritis of right hip Admission (Discharged) Sonia Watts MD            Was this an external facility discharge? No Discharge Facility: [facility] to be reviewed by the provider   Additional needs identified to be addressed with provider: No                 Method of communication with provider: none. Initial attempt at CT discharge phone call. Jacqueline Allison states she is \"doing great. \" She confirmed that [facility] is active - the nurse and the therapist have been out today. She confirmed her post op appt with Lo Martinez on 2023. She states her  will provide her transportation. Jacqueline Allison states the dressing is dry - no drainage and the incision is intact. She has some edema and is using ice to the surgical site as directed per the ortho team. She states she is using the walker for ambulation. Jacqueline Allison states she is wearing the TONJA hose. She states she is tolerating food & fluids - denies any N/V. Josseline rates her pain at 1/10. She states she took some Tylenol this morning and it has been effective. She states her bowels have not moved and she has not passed any flatus nor experienced any belching.  She states she has Miralax and has been constipated in the past and knows what

## 2023-08-11 ENCOUNTER — ANTI-COAG VISIT (OUTPATIENT)
Dept: PHARMACY | Age: 73
End: 2023-08-11

## 2023-08-11 DIAGNOSIS — I26.09 OTHER CHRONIC PULMONARY EMBOLISM WITH ACUTE COR PULMONALE (HCC): Primary | ICD-10-CM

## 2023-08-11 DIAGNOSIS — I27.82 OTHER CHRONIC PULMONARY EMBOLISM WITH ACUTE COR PULMONALE (HCC): Primary | ICD-10-CM

## 2023-08-11 LAB — INR BLD: 1.9

## 2023-08-11 NOTE — DISCHARGE SUMMARY
Physician Discharge Summary     Patient ID:  Elie Hebert  [id number]  4 y.o.  1950    Admit date: 8/8/2023    Discharge date and time: 8/9/2023 11:13 AM     Admitting Physician: Eric Mcgowan MD     Discharge Physician: Saba Daniels    Admission Diagnoses: Arthritis of right hip [M16.11]  Primary osteoarthritis of right hip [M16.11]    Discharge Diagnoses: right hip OA    Admission Condition: good    Discharged Condition: good    Indication for Admission: Failed conservative treatment as outpatient for joint pain including PT and pain meds. This patient was then electively scheduled for total joint replacement surgery    Surgical procedure: right SANDRITA    Consults: PT OT SS    This patient had no postoperative complications. They has PT and OT for ADL's . IV and PO pain med for pain control and was eventually DC in stable condition    Treatments: analgesia,  therapies: PT OT,  and surgery      Disposition: home    Patient Instructions:   [unfilled]  Activity: activity as tolerated  Diet: regular diet  Wound Care: keep wound clean and dry    Follow-up with ANN Jenkins in 2 weeks.     Signed:  CAREN Lowe CNP  8/11/2023  12:40 PM

## 2023-08-11 NOTE — PROGRESS NOTES
[facility] w/ [facility] called in pts INR results of 1.9, she was instructed to take 4 mgs of warfarin last night. Pt has been taking Lovenox 40mgs 1 x daily until INR is 2.0. [phone]         Returned call to [facility]. Instructed for patient to take 4 mg daily. Continue lovenox inj. Recheck INR on Mon 8/14.         For Pharmacy Admin Tracking Only    Intervention Detail: Dose Adjustment: 1, reason: Therapy Optimization  Total # of Interventions Recommended: 1  Total # of Interventions Accepted: 1  Time Spent (min): 15

## 2023-08-14 ENCOUNTER — ANTI-COAG VISIT (OUTPATIENT)
Dept: PHARMACY | Age: 73
End: 2023-08-14

## 2023-08-14 ENCOUNTER — TELEPHONE (OUTPATIENT)
Dept: PHARMACY | Age: 73
End: 2023-08-14

## 2023-08-14 DIAGNOSIS — I27.82 OTHER CHRONIC PULMONARY EMBOLISM WITH ACUTE COR PULMONALE (HCC): Primary | ICD-10-CM

## 2023-08-14 DIAGNOSIS — I26.09 OTHER CHRONIC PULMONARY EMBOLISM WITH ACUTE COR PULMONALE (HCC): Primary | ICD-10-CM

## 2023-08-14 LAB — INR BLD: 2.4

## 2023-08-14 NOTE — TELEPHONE ENCOUNTER
Patient called to say she cannot make her 5:15p appt on Monday, 8/14. Called and LVM for patient explaining that the 5:15p appt is a place alonso for her home care appt. This just makes us aware to expect a nurse to call with patient's INR today.

## 2023-08-14 NOTE — PROGRESS NOTES
INR 2.4 Patient taking 4mg warfarin daily + lovenox injections x2 per day. Please call [facility] [phone] with warfarin dosing and order for next INR check. Called [facility]N back, stop lovenox, no other changes. Will need dose decrease now that off lovenox. Take 2mg today, then continue taking 4mg daily. Recheck Friday, 8/18.     Angel Luis Mcfarlane, PharmD, [address] [place] [address] Tracking Only    Intervention Detail: Dose Adjustment: 1, reason: Therapy Optimization  Total # of Interventions Recommended: 1  Total # of Interventions Accepted: 1  Time Spent (min): 15

## 2023-08-17 ENCOUNTER — CARE COORDINATION (OUTPATIENT)
Dept: CASE MANAGEMENT | Age: 73
End: 2023-08-17

## 2023-08-17 NOTE — CARE COORDINATION
[facility] Care Transitions Follow Up Call    Patient Current Location:  Home: [address] [place], [place] [id number]    Care Transition Nurse contacted the patient by telephone to follow up after admission on 2023. Verified name and  with patient as identifiers. Patient: Shaquille Fink  Patient : 1950   MRN: [id number]  Reason for Admission: Scheduled ortho surgical procedure - Right SANDRITA  Discharge Date: 23 RARS: Readmission Risk Score: 11.7      Needs to be reviewed by the provider   Additional needs identified to be addressed with provider: No               Method of communication with provider: none. Renetta Olea states she is doing \"good. \" She states [facility] remains active. Renetta Olea states she feels like \"every day I feel stronger - with my mobility & balance. \" She states her dressing remains dry & intact. She states she is up and about the home with the walker. Renetta Olea states she continues to wear the TONJA hose. She states she is tolerating food & fluids - denies any N/V. Renetta Olea states today is an \"achy\" day. She states she took 2 Tylenol this morning. She states her bowels are moving and she is urinating without difficulty. Renetta Olea denies any fever at this time. She states she continues to use ice to the surgical site. Renetta Olea denies any needs at this time.       Follow Up  Future Appointments   Date Time Provider [address]   2023  5:15 PM [facility] ANTICOAGULATION CLINIC [facility] [place] [facility]   2023 11:00 AM MD Kyree Eaton        Care Transitions Subsequent and Final Call    Subsequent and Final Calls  Do you have any ongoing symptoms?: No  Have your medications changed?: No  Do you have any questions related to your medications?: No  Do you currently have any active services?: Yes  Are you currently active with any services?: Home Health  Do you have any needs or concerns that I can assist you with?: No  Identified Barriers: None  Care Transitions

## 2023-08-18 ENCOUNTER — ANTI-COAG VISIT (OUTPATIENT)
Dept: PHARMACY | Age: 73
End: 2023-08-18

## 2023-08-18 DIAGNOSIS — I26.09 OTHER CHRONIC PULMONARY EMBOLISM WITH ACUTE COR PULMONALE (HCC): Primary | ICD-10-CM

## 2023-08-18 DIAGNOSIS — I27.82 OTHER CHRONIC PULMONARY EMBOLISM WITH ACUTE COR PULMONALE (HCC): Primary | ICD-10-CM

## 2023-08-18 LAB — INR BLD: 2.8

## 2023-08-18 NOTE — PROGRESS NOTES
PT 33.5 / INR 2.8 Patient took 2mg on Monday and 4mg all other days. Please call [facility] [phone] with warfarin dosing and order for next INR check. Called patient/HHN. Take 2mg Mon and 4mg all other days and recheck in 1 week, 8/25 (soonest pt could do). Pt will be consistent with salads.      Barney Shahid, PharmD, [address] [place] [address] Tracking Only    Intervention Detail: Dose Adjustment: 1, reason: Therapy Optimization  Total # of Interventions Recommended: 1  Total # of Interventions Accepted: 1  Time Spent (min): 15

## 2023-08-23 ENCOUNTER — CARE COORDINATION (OUTPATIENT)
Dept: CASE MANAGEMENT | Age: 73
End: 2023-08-23

## 2023-08-23 NOTE — CARE COORDINATION
[address] Beatrice Guzmán [facility] 250 Care Transitions Follow Up Call    Patient: Carlos Rivas  Patient : 1950   MRN: [id number]  Reason for Admission: Scheduled ortho surgical procedure - Right SANDRITA  Discharge Date: 23 RARS: Readmission Risk Score: 11.7      Attempted to contact patient for follow up transition call. Left voicemail message to return call with an update on condition since discharge. Contact information provided. Will continue to follow up.         Follow Up  Future Appointments   Date Time Provider [address] [place] [address]   2023 11:00 AM Tara Garza MD [facility]   2023  5:15 PM [facility] ANTICOAGULATION CLINIC [facility]         Care Transitions Subsequent and Final Call    Subsequent and Final Calls  Are you currently active with any services?: Home Health  Care Transitions Interventions  Other Interventions:             Haydee Wang LPN  Care Coordinator  [phone]

## 2023-08-24 ENCOUNTER — OFFICE VISIT (OUTPATIENT)
Dept: ORTHOPEDIC SURGERY | Age: 73
End: 2023-08-24

## 2023-08-24 VITALS — BODY MASS INDEX: 29.6 KG/M2 | HEIGHT: 58 IN | WEIGHT: 141 LBS

## 2023-08-24 DIAGNOSIS — Z96.641 STATUS POST TOTAL REPLACEMENT OF RIGHT HIP: Primary | ICD-10-CM

## 2023-08-24 PROCEDURE — 99024 POSTOP FOLLOW-UP VISIT: CPT | Performed by: ORTHOPAEDIC SURGERY

## 2023-08-25 ENCOUNTER — ANTI-COAG VISIT (OUTPATIENT)
Dept: PHARMACY | Age: 73
End: 2023-08-25

## 2023-08-25 DIAGNOSIS — I26.09 OTHER CHRONIC PULMONARY EMBOLISM WITH ACUTE COR PULMONALE (HCC): Primary | ICD-10-CM

## 2023-08-25 DIAGNOSIS — I27.82 OTHER CHRONIC PULMONARY EMBOLISM WITH ACUTE COR PULMONALE (HCC): Primary | ICD-10-CM

## 2023-08-25 LAB — INR BLD: 2.5

## 2023-08-25 NOTE — PROGRESS NOTES
Areli Steiner w/ [facility] called w/ pts INR results of 2.5, pt takes 4 mgs of warfarin daily. 552 to double check that pt took 2mg Mon.---> confirmed. Take 2mg Mon and 4mg all other days, recheck in 1 week, .     Dena Lang, PharmD, [address] [place] [address] Tracking Only    Intervention Detail: Adherence Monitorin and Dose Adjustment: 1, reason: Therapy Optimization  Total # of Interventions Recommended: 2  Total # of Interventions Accepted: 2  Time Spent (min): 15

## 2023-08-30 ENCOUNTER — CARE COORDINATION (OUTPATIENT)
Dept: CASE MANAGEMENT | Age: 73
End: 2023-08-30

## 2023-08-30 NOTE — CARE COORDINATION
[address] [facility] 250 Care Transitions Follow Up Call    Patient Current Location:  Home: [address] [place] [address] Drive [address] Care Coordinator contacted the patient by telephone to follow up after admission on 2023. Verified name and  with patient as identifiers. Patient: Charisma Rasmussen  Patient : 1950   MRN: [id number]  Reason for Admission: Scheduled ortho surgical procedure - Right SANDRITA  Discharge Date: 23 RARS: Readmission Risk Score: 11.7      Needs to be reviewed by the provider   Additional needs identified to be addressed with provider: No  none             Method of communication with provider: none. Patient reports she is doing good. Denies pain, fever, chills, nvd, cough or weakness. Appetite an fluid intake good. Completed PT last week. Uses cane or walker for mobility. No urinary or bowel issues. Taking medication as prescribed. No questions, needs or concerns voiced. Will continue to follow. Addressed changes since last contact:  PT-signed off last week  Discussed follow-up appointments. If no appointment was previously scheduled, appointment scheduling offered: No.   Is follow up appointment scheduled within 7 days of discharge? No.    Follow Up  Future Appointments   Date Time Provider [address]   2023  5:15 PM [facility] ANTICOAGULATION CLINIC [facility] Luis Enrique HO   2023 10:45 AM MD [facility] follow up appointment(s):       Advance Care Planning:   not on file. Healthcare Decision Maker:    Primary Decision Maker: Fernando Koenig - Spouse - [phone]     Patients top risk factors for readmission: functional physical ability, falls, ineffective coping, and medical condition-recent Rt.  SANDRITA  Interventions to address risk factors: Education of patient/family/caregiver/guardian to support self-management-     Offered patient enrollment in the Remote Patient Monitoring (RPM) program for in-home monitoring: NA.     Care Transitions

## 2023-08-31 ENCOUNTER — CARE COORDINATION (OUTPATIENT)
Dept: CASE MANAGEMENT | Age: 73
End: 2023-08-31

## 2023-09-01 ENCOUNTER — ANTI-COAG VISIT (OUTPATIENT)
Dept: PHARMACY | Age: 73
End: 2023-09-01

## 2023-09-01 DIAGNOSIS — I26.09 OTHER CHRONIC PULMONARY EMBOLISM WITH ACUTE COR PULMONALE (HCC): Primary | ICD-10-CM

## 2023-09-01 DIAGNOSIS — I27.82 OTHER CHRONIC PULMONARY EMBOLISM WITH ACUTE COR PULMONALE (HCC): Primary | ICD-10-CM

## 2023-09-01 LAB — INR BLD: 2.6

## 2023-09-01 NOTE — PROGRESS NOTES
PT 31.6 / INR 2.6 Patient taking 2mg warfarin on Monday and 4mg all other days. Please call [facility] [phone] with warfarin dosing and order for next INR check. Returned call to [facility]. Instructed for patient to continue to take 2 mg on Mon and 4 mg all other days of the week.  Recheck INR in 1 week, 9/8      For Pharmacy Admin Tracking Only    Total # of Interventions Recommended: 0  Total # of Interventions Accepted: 0  Time Spent (min): 10

## 2023-09-06 ENCOUNTER — CARE COORDINATION (OUTPATIENT)
Dept: CASE MANAGEMENT | Age: 73
End: 2023-09-06

## 2023-09-06 NOTE — CARE COORDINATION
[facility] Care Transitions Follow Up Call      Patient: Leo Hill  Patient : 1950   MRN: [id number]  Reason for Admission: scheduled ortho surgical procedure - right SANDRITA  Discharge Date: 23 RARS: Readmission Risk Score: 11.7      Needs to be reviewed by the provider   Additional needs identified to be addressed with provider: No               Method of communication with provider: none. Final attempt at CT follow up phone call. Unable to reach patient. Left message. Informed Josseline this would be the final CTN outreach. Encouraged her to contact [address] [place] [address] or [facility] with any new medical issues or concerns in the future.       Follow Up  Future Appointments   Date Time Provider 4600  [facility]   2023  5:15 PM [facility] ANTICOAGULATION CLINIC [facility] Mukund    2023 10:45 AM MD JORDYN Bazan ORTHO AKANKSHA Muñoz RN BSN  Care Transition Nurse  [phone]

## 2023-09-08 ENCOUNTER — ANTI-COAG VISIT (OUTPATIENT)
Dept: PHARMACY | Age: 73
End: 2023-09-08

## 2023-09-08 DIAGNOSIS — I26.09 OTHER CHRONIC PULMONARY EMBOLISM WITH ACUTE COR PULMONALE (HCC): Primary | ICD-10-CM

## 2023-09-08 DIAGNOSIS — I27.82 OTHER CHRONIC PULMONARY EMBOLISM WITH ACUTE COR PULMONALE (HCC): Primary | ICD-10-CM

## 2023-09-08 LAB — INR BLD: 2

## 2023-09-08 NOTE — PROGRESS NOTES
Joyce w/ [facility] called in INR results of  2.0, pt takes 2 mgs of warfarin on Monday and 4 mgs all other days. No medication or diet changes. Does have a secure v/m if you need to LM. [phone]     Take 2mg Mon and 4mg all other days, recheck in 1 week, 9/15. RN thinks that will be last .     Anna Craig, PharmD, [address] Only    Total # of Interventions Recommended: 0  Total # of Interventions Accepted: 0  Time Spent (min): 15

## 2023-09-14 ENCOUNTER — TELEPHONE (OUTPATIENT)
Dept: ORTHOPEDIC SURGERY | Age: 73
End: 2023-09-14

## 2023-09-14 RX ORDER — AMOXICILLIN 500 MG/1
CAPSULE ORAL
Qty: 4 CAPSULE | Refills: 1 | Status: SHIPPED | OUTPATIENT
Start: 2023-09-14

## 2023-09-14 NOTE — TELEPHONE ENCOUNTER
General Question     Subject: DENTAL PROCEDURE  Patient and /or Facility Request: Desmond Thomas  Contact Number: [phone]    PT IS HAVING TEETH CLEANED NEXT WEEK REQ ANTIBIOTIC TO BE SENT TO [facility] ON [address]  PLEASE CLL PT WHEN SENT IN

## 2023-09-15 ENCOUNTER — ANTI-COAG VISIT (OUTPATIENT)
Dept: PHARMACY | Age: 73
End: 2023-09-15

## 2023-09-15 DIAGNOSIS — I27.82 OTHER CHRONIC PULMONARY EMBOLISM WITH ACUTE COR PULMONALE (HCC): Primary | ICD-10-CM

## 2023-09-15 DIAGNOSIS — I26.09 OTHER CHRONIC PULMONARY EMBOLISM WITH ACUTE COR PULMONALE (HCC): Primary | ICD-10-CM

## 2023-09-15 LAB — INR BLD: 3.2

## 2023-09-15 NOTE — PROGRESS NOTES
PT 38.3 / INR 3.2 Patient took 2mg warfarin on Monday and 4mg all other days. Patient states she has had less greens this week and restarted fluvastatin 20mg daily. Please call [facility] [phone] with warfarin dosing and order for next INR check. Home care scheduled to continue thru the 1st week of October. Returned call to Joyce MCGINNIS and [facility]. Instructed for patient to take 2 mg on Mon and Fri and 4 mg all other days of the week. Recheck INR in 1 week, 9/22.  Any questions or concerns return call to clinic         For Pharmacy Admin Tracking Only    Intervention Detail: Dose Adjustment: 1, reason: Therapy Optimization  Total # of Interventions Recommended: 1  Total # of Interventions Accepted: 1  Time Spent (min): 15

## 2023-09-16 DIAGNOSIS — E03.9 ACQUIRED HYPOTHYROIDISM: ICD-10-CM

## 2023-09-18 NOTE — TELEPHONE ENCOUNTER
Patient requesting a medication refill.   Pharmacy: [facility]  Next office visit: 10/17/2023  Last regular office visit: 7/17/2023

## 2023-09-20 RX ORDER — LEVOTHYROXINE SODIUM 0.07 MG/1
75 TABLET ORAL DAILY
Qty: 90 TABLET | Refills: 0 | Status: SHIPPED | OUTPATIENT
Start: 2023-09-20

## 2023-09-22 ENCOUNTER — ANTI-COAG VISIT (OUTPATIENT)
Dept: PHARMACY | Age: 73
End: 2023-09-22

## 2023-09-22 DIAGNOSIS — I26.09 OTHER CHRONIC PULMONARY EMBOLISM WITH ACUTE COR PULMONALE (HCC): Primary | ICD-10-CM

## 2023-09-22 DIAGNOSIS — I27.82 OTHER CHRONIC PULMONARY EMBOLISM WITH ACUTE COR PULMONALE (HCC): Primary | ICD-10-CM

## 2023-09-22 LAB — INR BLD: 5.1

## 2023-09-22 NOTE — PROGRESS NOTES
Joyce w/ [facility] lvm w/ pts INR results of 5.1 pt took 2 mgs of warfarin on Friday & Monday ( 9/15 & 918) and 4 mgs all other days. No medication changes, pt did have more greens this week. [phone]     Returned call to Joyce MCGINNIS and [facility]. Instructed for patient to hold tonight, take 2mg tomorrow then take 2mg on Monday and 4mg all other days. Recheck INR in 1 week, 9/29.  Any questions or concerns return call to clinic     For Pharmacy Admin Tracking Only    Intervention Detail: Dose Adjustment: 1, reason: Therapy Optimization  Total # of Interventions Recommended: 1  Total # of Interventions Accepted: 1  Time Spent (min): 15

## 2023-09-25 ENCOUNTER — OFFICE VISIT (OUTPATIENT)
Dept: ORTHOPEDIC SURGERY | Age: 73
End: 2023-09-25
Payer: MEDICARE

## 2023-09-25 DIAGNOSIS — M17.0 PRIMARY OSTEOARTHRITIS OF BOTH KNEES: ICD-10-CM

## 2023-09-25 DIAGNOSIS — M25.562 LEFT KNEE PAIN, UNSPECIFIED CHRONICITY: Primary | ICD-10-CM

## 2023-09-25 PROCEDURE — 20610 DRAIN/INJ JOINT/BURSA W/O US: CPT | Performed by: PHYSICIAN ASSISTANT

## 2023-09-25 RX ORDER — BUPIVACAINE HYDROCHLORIDE 2.5 MG/ML
2 INJECTION, SOLUTION INFILTRATION; PERINEURAL ONCE
Status: COMPLETED | OUTPATIENT
Start: 2023-09-25 | End: 2023-09-25

## 2023-09-25 RX ORDER — TRIAMCINOLONE ACETONIDE 40 MG/ML
40 INJECTION, SUSPENSION INTRA-ARTICULAR; INTRAMUSCULAR ONCE
Status: COMPLETED | OUTPATIENT
Start: 2023-09-25 | End: 2023-09-25

## 2023-09-25 RX ADMIN — BUPIVACAINE HYDROCHLORIDE 5 MG: 2.5 INJECTION, SOLUTION INFILTRATION; PERINEURAL at 11:23

## 2023-09-25 RX ADMIN — TRIAMCINOLONE ACETONIDE 40 MG: 40 INJECTION, SUSPENSION INTRA-ARTICULAR; INTRAMUSCULAR at 11:23

## 2023-09-29 ENCOUNTER — ANTI-COAG VISIT (OUTPATIENT)
Dept: PHARMACY | Age: 73
End: 2023-09-29

## 2023-09-29 DIAGNOSIS — I26.09 OTHER CHRONIC PULMONARY EMBOLISM WITH ACUTE COR PULMONALE (HCC): Primary | ICD-10-CM

## 2023-09-29 DIAGNOSIS — I27.82 OTHER CHRONIC PULMONARY EMBOLISM WITH ACUTE COR PULMONALE (HCC): Primary | ICD-10-CM

## 2023-09-29 LAB — INR BLD: 4.1

## 2023-09-29 NOTE — PROGRESS NOTES
PT 48.8 / INR 4.1 Patient held warfarin on Friday, took 2mg warfarin on Sat/Sun/Mon and 4mg all other days. Patient had dental cleaning on Saturday and took 4 capsules of amoxicillin prior to this procedure. [facility] will see patient next week and this will be a discharge visit. Please call [facility] [phone] with warfarin dosing and order for next INR check. Returned call to Joyce RAHEL and [facility]. Instructed for patient to hold tonight, take 2mg Sat Mon Wed and 4mg all other days. Recheck INR in 1 week, 10/6.  Any questions or concerns return call to clinic     For Pharmacy Admin Tracking Only    Intervention Detail: Dose Adjustment: 1, reason: Therapy Optimization  Total # of Interventions Recommended: 1  Total # of Interventions Accepted: 1  Time Spent (min): 10

## 2023-10-06 ENCOUNTER — ANTI-COAG VISIT (OUTPATIENT)
Dept: PHARMACY | Age: 73
End: 2023-10-06

## 2023-10-06 DIAGNOSIS — I27.82 OTHER CHRONIC PULMONARY EMBOLISM WITH ACUTE COR PULMONALE (HCC): Primary | ICD-10-CM

## 2023-10-06 DIAGNOSIS — I26.09 OTHER CHRONIC PULMONARY EMBOLISM WITH ACUTE COR PULMONALE (HCC): Primary | ICD-10-CM

## 2023-10-06 LAB — INR BLD: 2.7

## 2023-10-06 NOTE — PROGRESS NOTES
[facility] called in INR of 2.7, confirmed dose. States that [facility] Dr. Adriel Huertas gave her transitioning instructions to Eliquis. She is to stop warfarin for 5 days then start Eliquis. States she has it all sorted out with them and will no longer be on warfarin. Last HH as well. Asked pt to call if this changes. Doree Gilford, JennyD, [facility].     For Pharmacy Admin Tracking Only    Intervention Detail: Dose Adjustment: 1, reason: Therapy De-escalation  Total # of Interventions Recommended: 1  Total # of Interventions Accepted: 1  Time Spent (min): 15

## 2023-10-16 ENCOUNTER — ANTI-COAG VISIT (OUTPATIENT)
Dept: PHARMACY | Age: 73
End: 2023-10-16

## 2023-10-16 SDOH — HEALTH STABILITY: PHYSICAL HEALTH: ON AVERAGE, HOW MANY MINUTES DO YOU ENGAGE IN EXERCISE AT THIS LEVEL?: 30 MIN

## 2023-10-16 SDOH — HEALTH STABILITY: PHYSICAL HEALTH: ON AVERAGE, HOW MANY DAYS PER WEEK DO YOU ENGAGE IN MODERATE TO STRENUOUS EXERCISE (LIKE A BRISK WALK)?: 0 DAYS

## 2023-10-16 ASSESSMENT — PATIENT HEALTH QUESTIONNAIRE - PHQ9
SUM OF ALL RESPONSES TO PHQ QUESTIONS 1-9: 0
1. LITTLE INTEREST OR PLEASURE IN DOING THINGS: 0
SUM OF ALL RESPONSES TO PHQ QUESTIONS 1-9: 0
SUM OF ALL RESPONSES TO PHQ9 QUESTIONS 1 & 2: 0
2. FEELING DOWN, DEPRESSED OR HOPELESS: 0
SUM OF ALL RESPONSES TO PHQ QUESTIONS 1-9: 0
SUM OF ALL RESPONSES TO PHQ QUESTIONS 1-9: 0

## 2023-10-16 ASSESSMENT — LIFESTYLE VARIABLES
HOW MANY STANDARD DRINKS CONTAINING ALCOHOL DO YOU HAVE ON A TYPICAL DAY: PATIENT DOES NOT DRINK
HOW OFTEN DO YOU HAVE A DRINK CONTAINING ALCOHOL: NEVER
HOW MANY STANDARD DRINKS CONTAINING ALCOHOL DO YOU HAVE ON A TYPICAL DAY: 0
HOW OFTEN DO YOU HAVE A DRINK CONTAINING ALCOHOL: 1
HOW OFTEN DO YOU HAVE SIX OR MORE DRINKS ON ONE OCCASION: 1

## 2023-10-16 NOTE — PROGRESS NOTES
Physical Therapy  Facility/Department: [address] ORTHOPEDICS  Daily Treatment Note  NAME: Franck Staley  : 1950  MRN: [id number]    Date of Service: 2023    Discharge Recommendations:  24 hour supervision or assist, Home with Home health PT, Patient would benefit from continued therapy after discharge   PT Equipment Recommendations  Equipment Needed: Yes  Mobility Devices: Mino Plana: Rolling  Other: Teresita    Patient Diagnosis(es): The encounter diagnosis was Primary osteoarthritis of right hip. Assessment   Assessment: Pt able to ambulate with RW, slow speed, SBA, no LOB. Also completed stair climb 4 steps, and up/down curb step with CGA-SBA, cues for LE sequencing, no knee buckling, minimal pain. PT provided pt with seated/supine HEP, and review anterior hip precautions. She would benefit from continued therapy to maximize her strength, balance, ROM, endurance, and independence ambulating. Anticipate safe return home with assisit from , home PT level 1, and a teresita RW to maximize stability ambulating household distances. Franck Staley scored a 18/24 on the AM-PAC short mobility form. Current research shows that an AM-PAC score of 18 or greater is typically associated with a discharge to the patient's home setting. Based on the patient's AM-PAC score and their current functional mobility deficits, it is recommended that the patient have 2-3 sessions per week of Physical Therapy at d/c to increase the patient's independence. At this time, this patient demonstrates the endurance and safety to discharge home with home PT and a follow up treatment frequency of 2-3x/wk. Please see assessment section for further patient specific details. If patient discharges prior to next session this note will serve as a discharge summary. Please see below for the latest assessment towards goals.       Activity Tolerance: Patient tolerated treatment well  Equipment Needed: Yes  Mobility Devices: Detail Level: Detailed

## 2023-10-17 ENCOUNTER — OFFICE VISIT (OUTPATIENT)
Dept: PRIMARY CARE CLINIC | Age: 73
End: 2023-10-17

## 2023-10-17 VITALS
TEMPERATURE: 97.5 F | HEART RATE: 60 BPM | HEIGHT: 58 IN | SYSTOLIC BLOOD PRESSURE: 133 MMHG | OXYGEN SATURATION: 99 % | WEIGHT: 127 LBS | DIASTOLIC BLOOD PRESSURE: 76 MMHG | BODY MASS INDEX: 26.66 KG/M2

## 2023-10-17 DIAGNOSIS — Z00.00 MEDICARE ANNUAL WELLNESS VISIT, SUBSEQUENT: Primary | ICD-10-CM

## 2023-10-17 DIAGNOSIS — Z23 NEEDS FLU SHOT: ICD-10-CM

## 2023-10-17 DIAGNOSIS — M17.0 PRIMARY OSTEOARTHRITIS OF BOTH KNEES: ICD-10-CM

## 2023-10-17 DIAGNOSIS — D50.8 OTHER IRON DEFICIENCY ANEMIA: ICD-10-CM

## 2023-10-17 DIAGNOSIS — I10 ESSENTIAL HYPERTENSION: ICD-10-CM

## 2023-10-17 DIAGNOSIS — E78.2 MIXED HYPERLIPIDEMIA: ICD-10-CM

## 2023-10-17 DIAGNOSIS — R73.03 PREDIABETES: ICD-10-CM

## 2023-10-17 PROBLEM — K90.9 INTESTINAL MALABSORPTION: Status: ACTIVE | Noted: 2023-10-17

## 2023-10-17 NOTE — PROGRESS NOTES
heart sounds. Pulmonary:      Effort: Pulmonary effort is normal.      Breath sounds: Normal breath sounds. Abdominal:      General: Bowel sounds are normal. There is no distension. Palpations: There is no mass. Tenderness: There is no abdominal tenderness. There is no right CVA tenderness, left CVA tenderness, guarding or rebound. Hernia: No hernia is present. Musculoskeletal:         General: Swelling and deformity present. No tenderness. Cervical back: Normal range of motion and neck supple. Right knee: No swelling, deformity, effusion, ecchymosis or lacerations. Normal range of motion. No tenderness. No medial joint line or lateral joint line tenderness. Right lower leg: No edema. Left lower leg: No edema. Comments: Bilateral knee swelling and deformity. Skin:     General: Skin is warm and dry. Neurological:      General: No focal deficit present. Mental Status: She is alert and oriented to person, place, and time. Cranial Nerves: No cranial nerve deficit. Psychiatric:         Mood and Affect: Mood normal.         Behavior: Behavior normal.         Thought Content: Thought content normal.         Judgment: Judgment normal.              Allergies   Allergen Reactions    Ace Inhibitors Swelling and Angioedema     Lip swelling    Amlodipine Besy-Benazepril Hcl Other (See Comments) and Angioedema     Lip swelling    Amlodipine Besy-Benazepril Hcl Angioedema    Crestor [Rosuvastatin] Myalgia     Muscle pain at night that resolved within one week of discontinuation    Benicar [Olmesartan]      cough    Clonidine Derivatives Other (See Comments)     Lethargy. Prior to Visit Medications    Medication Sig Taking?  Authorizing Provider   apixaban (ELIQUIS) 5 MG TABS tablet Take 1 tablet by mouth Yes Provider, MD Jelly   levothyroxine (SYNTHROID) 75 MCG tablet Take 1 tablet by mouth once daily Yes Roseann Johnson DO   enoxaparin (LOVENOX) 40 MG/0.4ML

## 2023-10-19 ENCOUNTER — OFFICE VISIT (OUTPATIENT)
Dept: ORTHOPEDIC SURGERY | Age: 73
End: 2023-10-19

## 2023-10-19 VITALS — RESPIRATION RATE: 16 BRPM | WEIGHT: 127 LBS | BODY MASS INDEX: 26.66 KG/M2 | HEIGHT: 58 IN

## 2023-10-19 DIAGNOSIS — M17.0 PRIMARY OSTEOARTHRITIS OF BOTH KNEES: Primary | ICD-10-CM

## 2023-10-24 ENCOUNTER — HOSPITAL ENCOUNTER (OUTPATIENT)
Age: 73
Discharge: HOME | End: 2023-10-24
Payer: MEDICARE

## 2023-10-24 ENCOUNTER — HOSPITAL ENCOUNTER (OUTPATIENT)
Dept: GENERAL RADIOLOGY | Age: 73
Discharge: HOME | End: 2023-10-24
Payer: MEDICARE

## 2023-10-24 DIAGNOSIS — M25.541 ARTHRALGIA OF BOTH HANDS: ICD-10-CM

## 2023-10-24 DIAGNOSIS — M25.542 ARTHRALGIA OF BOTH HANDS: ICD-10-CM

## 2023-10-24 PROCEDURE — 73130 X-RAY EXAM OF HAND: CPT

## 2023-10-31 ENCOUNTER — HOSPITAL ENCOUNTER (OUTPATIENT)
Age: 73
Discharge: HOME | End: 2023-10-31
Payer: MEDICARE

## 2023-10-31 DIAGNOSIS — R73.03 PREDIABETES: ICD-10-CM

## 2023-10-31 LAB
ALBUMIN SERPL-MCNC: 3.6 G/DL (ref 3.4–5)
ALBUMIN/GLOB SERPL: 1 {RATIO} (ref 1.1–2.2)
ALP SERPL-CCNC: 62 U/L (ref 40–129)
ALT SERPL-CCNC: 6 U/L (ref 10–40)
ANION GAP SERPL CALCULATED.3IONS-SCNC: 12 MMOL/L (ref 3–16)
AST SERPL-CCNC: 10 U/L (ref 15–37)
BILIRUB SERPL-MCNC: <0.2 MG/DL (ref 0–1)
BUN SERPL-MCNC: 10 MG/DL (ref 7–20)
CALCIUM SERPL-MCNC: 10.3 MG/DL (ref 8.3–10.6)
CHLORIDE SERPL-SCNC: 102 MMOL/L (ref 99–110)
CO2 SERPL-SCNC: 23 MMOL/L (ref 21–32)
CREAT SERPL-MCNC: 0.6 MG/DL (ref 0.6–1.2)
CREAT UR-MCNC: 78.4 MG/DL (ref 28–259)
CRP SERPL-MCNC: 80.6 MG/L (ref 0–5.1)
ERYTHROCYTE [SEDIMENTATION RATE] IN BLOOD BY WESTERGREN METHOD: 104 MM/HR (ref 0–30)
GFR SERPLBLD CREATININE-BSD FMLA CKD-EPI: >60 ML/MIN/{1.73_M2}
GLUCOSE SERPL-MCNC: 85 MG/DL (ref 70–99)
MICROALBUMIN UR DL<=1MG/L-MCNC: 3.1 MG/DL
MICROALBUMIN/CREAT UR: 39.5 MG/G (ref 0–30)
POTASSIUM SERPL-SCNC: 3.6 MMOL/L (ref 3.5–5.1)
PROT SERPL-MCNC: 7.1 G/DL (ref 6.4–8.2)
RHEUMATOID FACT SER IA-ACNC: 13 IU/ML
SODIUM SERPL-SCNC: 137 MMOL/L (ref 136–145)

## 2023-10-31 PROCEDURE — 80053 COMPREHEN METABOLIC PANEL: CPT

## 2023-10-31 PROCEDURE — 86140 C-REACTIVE PROTEIN: CPT

## 2023-10-31 PROCEDURE — 85652 RBC SED RATE AUTOMATED: CPT

## 2023-10-31 PROCEDURE — 86200 CCP ANTIBODY: CPT

## 2023-10-31 PROCEDURE — 82985 ASSAY OF GLYCATED PROTEIN: CPT

## 2023-10-31 PROCEDURE — 82570 ASSAY OF URINE CREATININE: CPT

## 2023-10-31 PROCEDURE — 86431 RHEUMATOID FACTOR QUANT: CPT

## 2023-10-31 PROCEDURE — 36415 COLL VENOUS BLD VENIPUNCTURE: CPT

## 2023-10-31 PROCEDURE — 82043 UR ALBUMIN QUANTITATIVE: CPT

## 2023-10-31 PROCEDURE — 83036 HEMOGLOBIN GLYCOSYLATED A1C: CPT

## 2023-11-01 LAB
CCP IGG SERPL-ACNC: <0.5 U/ML (ref 0–2.9)
EST. AVERAGE GLUCOSE BLD GHB EST-MCNC: 99.7 MG/DL
HBA1C MFR BLD: 5.1 %

## 2023-11-02 LAB — FRUCTOSAMINE SERPL-SCNC: 205 UMOL/L (ref 205–285)

## 2023-11-02 NOTE — RESULT ENCOUNTER NOTE
Urine microalbumin shows a little bit of protein. Normal kidney and liver blood test.  The A1c is in the nondiabetic range which is an improvement. It went from prediabetes to non diabetes. Good job! Keeping the blood pressure down helps to keep the protein level down, so make sure your blood pressure is always below 130/80. Monitor blood pressure daily. We will continue to monitor the protein levels and drink plenty of water and avoid soft drinks. Eat more of a plant-based diet.

## 2023-11-13 ENCOUNTER — OFFICE VISIT (OUTPATIENT)
Dept: ORTHOPEDIC SURGERY | Age: 73
End: 2023-11-13
Payer: MEDICARE

## 2023-11-13 VITALS — RESPIRATION RATE: 16 BRPM | BODY MASS INDEX: 26.66 KG/M2 | HEIGHT: 58 IN | WEIGHT: 127 LBS

## 2023-11-13 DIAGNOSIS — M17.0 PRIMARY OSTEOARTHRITIS OF BOTH KNEES: Primary | ICD-10-CM

## 2023-11-13 PROCEDURE — 1123F ACP DISCUSS/DSCN MKR DOCD: CPT | Performed by: PHYSICIAN ASSISTANT

## 2023-11-13 PROCEDURE — 99213 OFFICE O/P EST LOW 20 MIN: CPT | Performed by: PHYSICIAN ASSISTANT

## 2023-11-20 ENCOUNTER — OFFICE VISIT (OUTPATIENT)
Dept: ORTHOPEDIC SURGERY | Age: 73
End: 2023-11-20
Payer: MEDICARE

## 2023-11-20 DIAGNOSIS — M17.0 PRIMARY OSTEOARTHRITIS OF BOTH KNEES: Primary | ICD-10-CM

## 2023-11-20 PROCEDURE — 20610 DRAIN/INJ JOINT/BURSA W/O US: CPT | Performed by: PHYSICIAN ASSISTANT

## 2023-11-20 NOTE — PROGRESS NOTES
This dictation was done with Senicon dictation and may contain mechanical errors related to translation. This is a pleasant 39-year-old female who is doing well after the first in a series of 3 Orthovisc solution injections for the bilateral knee osteoarthritis. She rates her pain score at rest is 0 out of 10 pain she does have very minimal swelling and stiffness and has had some improvement with the first injection. On examination today both knees have maybe 1+ edema with some crepitus during flexion extension through the patellofemoral joint medial joint line tenderness but negative for Librado negative for Lachman. My impression is bilateral knee osteoarthritis    With her consent she was injected with the second in a series of 3 Orthovisc solution injections prepackaged amount into the left and the right knee joint.   She tolerated well we had a discussion about postinjection activities and we will see her in follow-up in 1 week

## 2023-11-22 ENCOUNTER — HOSPITAL ENCOUNTER (OUTPATIENT)
Age: 73
Discharge: HOME | End: 2023-11-22
Payer: MEDICARE

## 2023-11-22 LAB
HEMOCCULT SP1 STL QL: NORMAL
HEMOCCULT SP2 STL QL: NORMAL
HEMOCCULT SP3 STL QL: NORMAL

## 2023-11-22 PROCEDURE — 82270 OCCULT BLOOD FECES: CPT

## 2023-11-27 ENCOUNTER — OFFICE VISIT (OUTPATIENT)
Dept: ORTHOPEDIC SURGERY | Age: 73
End: 2023-11-27
Payer: MEDICARE

## 2023-11-27 VITALS — WEIGHT: 127 LBS | HEIGHT: 58 IN | RESPIRATION RATE: 16 BRPM | BODY MASS INDEX: 26.66 KG/M2

## 2023-11-27 DIAGNOSIS — M17.0 PRIMARY OSTEOARTHRITIS OF BOTH KNEES: Primary | ICD-10-CM

## 2023-11-27 PROCEDURE — 99999 PR OFFICE/OUTPT VISIT,PROCEDURE ONLY: CPT | Performed by: ORTHOPAEDIC SURGERY

## 2023-11-27 PROCEDURE — 20610 DRAIN/INJ JOINT/BURSA W/O US: CPT | Performed by: ORTHOPAEDIC SURGERY

## 2023-11-27 NOTE — PROGRESS NOTES
[address] [facility]  Office Visit    Chief Complaint: Follow-up for bilateral knee pain    HPI:  Abdullahi Hirsch is a 74 y.o. who is here in follow-up of bilateral knee pain due to osteoarthritis. She is undergoing the Orthovisc areas of injections and presents today for her third injection in each knee. She reports the injections have been helping. She denies adverse events. She walks with the use of a walker. She has no issues with her recent right hip replacement. Assessment:  Bilateral knee osteoarthritis    Plan:  She presents today for the third Orthovisc injection in each knee. These were performed as described below. She will otherwise continue activity as tolerated and follow-up as needed should her symptoms return. PROCEDURE NOTE:  After verbal consent was obtained, bilateral knees were prepped with alcohol and anesthetized with ethyl chloride. Each knee joint was then injected under sterile technique with the prepackaged dose of Orthovisc. Bandages were applied. The patient tolerated the procedure well and there were no complications. This dictation was done with Dragon dictation and may contain mechanical errors related to translation.

## 2023-12-27 DIAGNOSIS — I10 ESSENTIAL HYPERTENSION: ICD-10-CM

## 2023-12-27 DIAGNOSIS — E03.9 ACQUIRED HYPOTHYROIDISM: ICD-10-CM

## 2023-12-28 RX ORDER — ATENOLOL 25 MG/1
25 TABLET ORAL DAILY
Qty: 90 TABLET | Refills: 3 | Status: SHIPPED | OUTPATIENT
Start: 2023-12-28

## 2023-12-28 RX ORDER — NIFEDIPINE 30 MG/1
TABLET, EXTENDED RELEASE ORAL
Qty: 90 TABLET | Refills: 1 | Status: SHIPPED | OUTPATIENT
Start: 2023-12-28

## 2023-12-28 RX ORDER — LEVOTHYROXINE SODIUM 0.07 MG/1
75 TABLET ORAL DAILY
Qty: 90 TABLET | Refills: 0 | Status: SHIPPED | OUTPATIENT
Start: 2023-12-28

## 2024-01-17 ENCOUNTER — OFFICE VISIT (OUTPATIENT)
Dept: PRIMARY CARE CLINIC | Age: 74
End: 2024-01-17

## 2024-01-17 VITALS
WEIGHT: 134 LBS | OXYGEN SATURATION: 98 % | TEMPERATURE: 97.2 F | DIASTOLIC BLOOD PRESSURE: 80 MMHG | BODY MASS INDEX: 26.31 KG/M2 | HEART RATE: 61 BPM | SYSTOLIC BLOOD PRESSURE: 138 MMHG | HEIGHT: 60 IN

## 2024-01-17 DIAGNOSIS — I10 ESSENTIAL HYPERTENSION: Primary | ICD-10-CM

## 2024-01-17 DIAGNOSIS — M06.00 SERONEGATIVE RHEUMATOID ARTHRITIS (HCC): ICD-10-CM

## 2024-01-17 DIAGNOSIS — Z86.711 HISTORY OF PULMONARY EMBOLUS (PE): ICD-10-CM

## 2024-01-17 DIAGNOSIS — E04.2 MULTINODULAR GOITER: ICD-10-CM

## 2024-01-17 DIAGNOSIS — E78.2 MIXED HYPERLIPIDEMIA: ICD-10-CM

## 2024-01-17 PROBLEM — I82.442: Status: ACTIVE | Noted: 2024-01-17

## 2024-01-17 ASSESSMENT — ENCOUNTER SYMPTOMS
COLOR CHANGE: 0
PHOTOPHOBIA: 0
EYE DISCHARGE: 0
EYE ITCHING: 0
SORE THROAT: 0
BLOOD IN STOOL: 0
DIARRHEA: 0
APNEA: 0
ABDOMINAL PAIN: 0
EYE PAIN: 0
CHOKING: 0
VOICE CHANGE: 0
STRIDOR: 0
CONSTIPATION: 0
BACK PAIN: 0
SHORTNESS OF BREATH: 0
COUGH: 0
NAUSEA: 0
EYE REDNESS: 0
WHEEZING: 0
RHINORRHEA: 0
TROUBLE SWALLOWING: 0
ANAL BLEEDING: 0
RECTAL PAIN: 0
CHEST TIGHTNESS: 0
ABDOMINAL DISTENTION: 0
VOMITING: 0

## 2024-01-17 ASSESSMENT — PATIENT HEALTH QUESTIONNAIRE - PHQ9
SUM OF ALL RESPONSES TO PHQ QUESTIONS 1-9: 0
1. LITTLE INTEREST OR PLEASURE IN DOING THINGS: 0
SUM OF ALL RESPONSES TO PHQ9 QUESTIONS 1 & 2: 0
2. FEELING DOWN, DEPRESSED OR HOPELESS: 0
SUM OF ALL RESPONSES TO PHQ QUESTIONS 1-9: 0

## 2024-01-17 NOTE — PROGRESS NOTES
Josseline Koenig (:  1950) is a 73 y.o. female,Established patient, here for evaluation of the following chief complaint(s):  3 Month Follow-Up         ASSESSMENT/PLAN:  \   Diagnosis Orders   1. Essential hypertension   BP Readings from Last 3 Encounters:   24 138/80   10/17/23 133/76   23 127/76     Controlled on procardia xl 30 mg qd and atenolol 25 mg qd, continue.  CBC with Auto Differential and renal.    Microalbumin / Creatinine Urine Ratio    Urinalysis with Microscopic      2. Seronegative rheumatoid arthritis (HCC) , diagnosed and followed by rheumatology with much improvement on iv methotrexate weekly , 15 mg weekly, folic acid 1 mg daily and prednisone. 5 mg daily with  60 % improvement over past month since started treatment. Renal Function Panel      3. History of pulmonary embolus (PE) , unprovoked on life time, now on eliquis 5 mg qd with no signs of bleeding or clotting.  Check cbc       4. Mixed hyperlipidemia tolerating fluvastatin 20 mg qd, will check fasting lipid. Lipid Panel      5. Multinodular goiter  feels larger, last us was 2022, will repeat.  No difficulty swallowing and no neck med US THYROID            Return in about 3 months (around 2024) for awv.         Subjective   SUBJECTIVE/OBJECTIVE:  Arthritis  Presents for follow-up (diagnosed with seronegative  rheumtoid arthritis in 2023 and started on iv methotrexate 15 mg qd, prednisone 5 mg qd and folic acid 1 mg qd.) visit. She reports no joint swelling. The symptoms have been improving. Affected locations include the left knee, right knee, right elbow, left elbow, left PIP and right PIP. Her pain is at a severity of 3/10. Associated symptoms include pain at night and pain while resting. Pertinent negatives include no diarrhea, dry eyes, dry mouth, dysuria, fatigue, fever, rash, Raynaud's syndrome, uveitis or weight loss. Compliance with total regimen is %. Compliance with medications is

## 2024-01-22 ENCOUNTER — HOSPITAL ENCOUNTER (OUTPATIENT)
Dept: ULTRASOUND IMAGING | Age: 74
Discharge: HOME | End: 2024-01-22
Attending: INTERNAL MEDICINE
Payer: MEDICARE

## 2024-01-22 DIAGNOSIS — E04.2 MULTINODULAR GOITER: ICD-10-CM

## 2024-01-22 PROCEDURE — 76536 US EXAM OF HEAD AND NECK: CPT

## 2024-01-26 ENCOUNTER — HOSPITAL ENCOUNTER (OUTPATIENT)
Age: 74
Discharge: HOME | End: 2024-01-26
Payer: MEDICARE

## 2024-01-26 DIAGNOSIS — I10 ESSENTIAL HYPERTENSION: ICD-10-CM

## 2024-01-26 DIAGNOSIS — M06.00 SERONEGATIVE RHEUMATOID ARTHRITIS (HCC): ICD-10-CM

## 2024-01-26 DIAGNOSIS — E78.2 MIXED HYPERLIPIDEMIA: ICD-10-CM

## 2024-01-26 LAB
ALBUMIN SERPL-MCNC: 4.3 G/DL (ref 3.4–5)
ALBUMIN SERPL-MCNC: 4.4 G/DL (ref 3.4–5)
ALP SERPL-CCNC: 57 U/L (ref 40–129)
ALT SERPL-CCNC: 8 U/L (ref 10–40)
ANION GAP SERPL CALCULATED.3IONS-SCNC: 9 MMOL/L (ref 3–16)
AST SERPL-CCNC: 14 U/L (ref 15–37)
BACTERIA URNS QL MICRO: ABNORMAL /HPF
BASOPHILS # BLD: 0.1 K/UL (ref 0–0.2)
BASOPHILS NFR BLD: 1.2 %
BILIRUB DIRECT SERPL-MCNC: <0.2 MG/DL (ref 0–0.3)
BILIRUB INDIRECT SERPL-MCNC: ABNORMAL MG/DL (ref 0–1)
BILIRUB SERPL-MCNC: 0.4 MG/DL (ref 0–1)
BILIRUB UR QL STRIP.AUTO: NEGATIVE
BUN SERPL-MCNC: 11 MG/DL (ref 7–20)
CALCIUM SERPL-MCNC: 10 MG/DL (ref 8.3–10.6)
CHLORIDE SERPL-SCNC: 102 MMOL/L (ref 99–110)
CHOLEST SERPL-MCNC: 166 MG/DL (ref 0–199)
CLARITY UR: CLEAR
CO2 SERPL-SCNC: 28 MMOL/L (ref 21–32)
COLOR UR: YELLOW
CREAT SERPL-MCNC: 0.7 MG/DL (ref 0.6–1.2)
CREAT UR-MCNC: 77.5 MG/DL (ref 28–259)
DEPRECATED RDW RBC AUTO: 21.9 % (ref 12.4–15.4)
EOSINOPHIL # BLD: 0.6 K/UL (ref 0–0.6)
EOSINOPHIL NFR BLD: 7 %
EPI CELLS #/AREA URNS AUTO: 1 /HPF (ref 0–5)
GFR SERPLBLD CREATININE-BSD FMLA CKD-EPI: >60 ML/MIN/{1.73_M2}
GLUCOSE SERPL-MCNC: 89 MG/DL (ref 70–99)
GLUCOSE UR STRIP.AUTO-MCNC: NEGATIVE MG/DL
HCT VFR BLD AUTO: 31 % (ref 36–48)
HDLC SERPL-MCNC: 77 MG/DL (ref 40–60)
HGB BLD-MCNC: 10.3 G/DL (ref 12–16)
HGB UR QL STRIP.AUTO: ABNORMAL
HYALINE CASTS #/AREA URNS AUTO: 0 /LPF (ref 0–8)
KETONES UR STRIP.AUTO-MCNC: NEGATIVE MG/DL
LDLC SERPL CALC-MCNC: 63 MG/DL
LEUKOCYTE ESTERASE UR QL STRIP.AUTO: ABNORMAL
LYMPHOCYTES # BLD: 1.2 K/UL (ref 1–5.1)
LYMPHOCYTES NFR BLD: 15.4 %
MCH RBC QN AUTO: 29.1 PG (ref 26–34)
MCHC RBC AUTO-ENTMCNC: 33.1 G/DL (ref 31–36)
MCV RBC AUTO: 87.8 FL (ref 80–100)
MICROALBUMIN UR DL<=1MG/L-MCNC: 4.2 MG/DL
MICROALBUMIN/CREAT UR: 54.2 MG/G (ref 0–30)
MONOCYTES # BLD: 0.4 K/UL (ref 0–1.3)
MONOCYTES NFR BLD: 4.9 %
NEUTROPHILS # BLD: 5.7 K/UL (ref 1.7–7.7)
NEUTROPHILS NFR BLD: 71.5 %
NITRITE UR QL STRIP.AUTO: NEGATIVE
PH UR STRIP.AUTO: 7.5 [PH] (ref 5–8)
PHOSPHATE SERPL-MCNC: 2.9 MG/DL (ref 2.5–4.9)
PLATELET # BLD AUTO: 569 K/UL (ref 135–450)
PMV BLD AUTO: 8.7 FL (ref 5–10.5)
POTASSIUM SERPL-SCNC: 3.6 MMOL/L (ref 3.5–5.1)
PROT SERPL-MCNC: 7.6 G/DL (ref 6.4–8.2)
PROT UR STRIP.AUTO-MCNC: NEGATIVE MG/DL
RBC # BLD AUTO: 3.53 M/UL (ref 4–5.2)
RBC CLUMPS #/AREA URNS AUTO: 5 /HPF (ref 0–4)
SODIUM SERPL-SCNC: 139 MMOL/L (ref 136–145)
SP GR UR STRIP.AUTO: 1.01 (ref 1–1.03)
TRIGL SERPL-MCNC: 129 MG/DL (ref 0–150)
UA DIPSTICK W REFLEX MICRO PNL UR: YES
URN SPEC COLLECT METH UR: ABNORMAL
UROBILINOGEN UR STRIP-ACNC: 1 E.U./DL
VLDLC SERPL CALC-MCNC: 26 MG/DL
WBC # BLD AUTO: 8 K/UL (ref 4–11)
WBC #/AREA URNS AUTO: 2 /HPF (ref 0–5)

## 2024-01-26 PROCEDURE — 80076 HEPATIC FUNCTION PANEL: CPT

## 2024-01-26 PROCEDURE — 36415 COLL VENOUS BLD VENIPUNCTURE: CPT

## 2024-01-26 PROCEDURE — 80069 RENAL FUNCTION PANEL: CPT

## 2024-01-26 PROCEDURE — 81001 URINALYSIS AUTO W/SCOPE: CPT

## 2024-01-26 PROCEDURE — 82043 UR ALBUMIN QUANTITATIVE: CPT

## 2024-01-26 PROCEDURE — 82570 ASSAY OF URINE CREATININE: CPT

## 2024-01-26 PROCEDURE — 80061 LIPID PANEL: CPT

## 2024-01-26 PROCEDURE — 85025 COMPLETE CBC W/AUTO DIFF WBC: CPT

## 2024-01-28 DIAGNOSIS — E04.2 MULTIPLE THYROID NODULES: Primary | ICD-10-CM

## 2024-02-05 DIAGNOSIS — R31.29 MICROSCOPIC HEMATURIA: Primary | ICD-10-CM

## 2024-02-06 DIAGNOSIS — Z88.8 ALLERGY TO ACE INHIBITORS: ICD-10-CM

## 2024-02-06 DIAGNOSIS — R80.9 MICROALBUMINURIA: Primary | ICD-10-CM

## 2024-02-06 DIAGNOSIS — M06.00 SERONEGATIVE RHEUMATOID ARTHRITIS (HCC): ICD-10-CM

## 2024-02-06 DIAGNOSIS — R31.29 MICROSCOPIC HEMATURIA: ICD-10-CM

## 2024-02-10 ENCOUNTER — HOSPITAL ENCOUNTER (OUTPATIENT)
Age: 74
Discharge: HOME | End: 2024-02-10

## 2024-02-10 DIAGNOSIS — R31.29 MICROSCOPIC HEMATURIA: ICD-10-CM

## 2024-02-15 DIAGNOSIS — R31.29 MICROSCOPIC HEMATURIA: Primary | ICD-10-CM

## 2024-02-23 ENCOUNTER — OFFICE VISIT (OUTPATIENT)
Dept: ENT CLINIC | Age: 74
End: 2024-02-23
Payer: MEDICARE

## 2024-02-23 VITALS
BODY MASS INDEX: 27.42 KG/M2 | HEIGHT: 59 IN | SYSTOLIC BLOOD PRESSURE: 138 MMHG | OXYGEN SATURATION: 99 % | TEMPERATURE: 97.8 F | HEART RATE: 55 BPM | WEIGHT: 136 LBS | DIASTOLIC BLOOD PRESSURE: 79 MMHG

## 2024-02-23 DIAGNOSIS — J39.8 TRACHEAL DEVIATION: ICD-10-CM

## 2024-02-23 DIAGNOSIS — E04.2 MULTINODULAR GOITER (NONTOXIC): Primary | Chronic | ICD-10-CM

## 2024-02-23 DIAGNOSIS — D44.0 NEOPLASM OF UNCERTAIN BEHAVIOR OF THYROID GLAND: Chronic | ICD-10-CM

## 2024-02-23 DIAGNOSIS — E04.9 SUBSTERNAL GOITER: Chronic | ICD-10-CM

## 2024-02-23 PROCEDURE — 99214 OFFICE O/P EST MOD 30 MIN: CPT | Performed by: OTOLARYNGOLOGY

## 2024-02-23 PROCEDURE — 1123F ACP DISCUSS/DSCN MKR DOCD: CPT | Performed by: OTOLARYNGOLOGY

## 2024-02-23 PROCEDURE — 3078F DIAST BP <80 MM HG: CPT | Performed by: OTOLARYNGOLOGY

## 2024-02-23 PROCEDURE — 3075F SYST BP GE 130 - 139MM HG: CPT | Performed by: OTOLARYNGOLOGY

## 2024-02-23 NOTE — PROGRESS NOTES
CHIEF COMPLAINT  Chief Complaint   Patient presents with    Thyroid Problem     Multinodular goiter with >5 cm right lobe nodule.       HISTORY OF PRESENT ILLNESS    Josseline Koenig is a 73 y.o. female here for recheck and follow up of multinodular goiter with a large >5 cm right lobe nodule.  Denied dysphagia or dyspnea or hoarseness.      REVIEW OF SYSTEMS  Constitutional:  Denied fever and chills.  ENT/sinus:  Denied otalgia, otorrhea, nasal pain, rhinorrhea, sore throat, and sinus/facial pain.        EXAMINATION    WDWN, NAD  Face:  Normal skin with no lesions detected.    Voice:  Normal, with no hoarseness, breathiness, or hot potato quality.  Ears:  TMs, EACs, mastoids and pinnae were normal.    Nose:  Normal with no lesions.  Sinuses: Nontender x 4   Throat,  OC/OP:  Tonsils 2+ bilateral with no erythema or exudate.  Normal with no lesions.  (+)  Neck:  Trachea deviated to the left.  NT, No masses.    Nodes:  No lymphadenopathy.   (+)  Thyroid:  Bilateral goiter, nontender.     I performed this head and neck physical exam personally.      REVIEW OF IMAGES:         I  reviewed the thyroid ultrasound, showing MNG with a large right lobe nodule > 5 cm.      THYROID ULTRASOUND     1/22/2024      FINDINGS:  Right thyroid lobe:  Measures 9.2 x 3.6 x 6.1 cm     Left thyroid lobe:  Measures 8.8 x 2.5 x 3.3 cm     Isthmus:  Measures 7 mm     Thyroid Gland:  The thyroid parenchyma is heterogeneous in echotexture and  demonstrates normal internal vascularity.     Nodules: There are multiple scattered clinically irrelevant nodules  throughout the thyroid parenchyma, which require no further follow-up.  The  four most relevant thyroid nodules are as follows:     NODULE: Right 1     Size: 33 x 33 x 15 mm     Location: Superior right thyroid lobe     1. Composition:  Almost completely solid (2)     2. Echogenicity:  Isoechoic (1)     3. Shape:  Wider-than-tall (0)     4. Margins:  Smooth (0)     5. Echogenic foci:  None

## 2024-02-26 ENCOUNTER — HOSPITAL ENCOUNTER (OUTPATIENT)
Dept: CT IMAGING | Age: 74
Discharge: HOME | End: 2024-02-26
Attending: INTERNAL MEDICINE
Payer: MEDICARE

## 2024-02-26 ENCOUNTER — HOSPITAL ENCOUNTER (OUTPATIENT)
Age: 74
Discharge: HOME | End: 2024-02-26
Attending: INTERNAL MEDICINE
Payer: MEDICARE

## 2024-02-26 DIAGNOSIS — R31.21 ASYMPTOMATIC MICROSCOPIC HEMATURIA: ICD-10-CM

## 2024-02-26 DIAGNOSIS — R31.29 MICROSCOPIC HEMATURIA: ICD-10-CM

## 2024-02-26 DIAGNOSIS — N18.2 CKD (CHRONIC KIDNEY DISEASE), STAGE II: ICD-10-CM

## 2024-02-26 LAB
BACTERIA URNS QL MICRO: ABNORMAL /HPF
BILIRUB UR QL STRIP.AUTO: NEGATIVE
C3 SERPL-MCNC: 119.9 MG/DL (ref 90–180)
C4 SERPL-MCNC: 27.4 MG/DL (ref 10–40)
CLARITY UR: CLEAR
COLOR UR: YELLOW
CREAT UR-MCNC: 140.1 MG/DL (ref 28–259)
EPI CELLS #/AREA URNS AUTO: 3 /HPF (ref 0–5)
GLUCOSE UR STRIP.AUTO-MCNC: NEGATIVE MG/DL
HGB UR QL STRIP.AUTO: ABNORMAL
HYALINE CASTS #/AREA URNS AUTO: 2 /LPF (ref 0–8)
KETONES UR STRIP.AUTO-MCNC: NEGATIVE MG/DL
LEUKOCYTE ESTERASE UR QL STRIP.AUTO: ABNORMAL
MICROALBUMIN UR DL<=1MG/L-MCNC: 7.9 MG/DL
MICROALBUMIN/CREAT UR: 56.4 MG/G (ref 0–30)
NITRITE UR QL STRIP.AUTO: NEGATIVE
PH UR STRIP.AUTO: 6 [PH] (ref 5–8)
PROT UR STRIP.AUTO-MCNC: ABNORMAL MG/DL
PROT UR-MCNC: 27 MG/DL
PROT/CREAT UR-RTO: 0.2 MG/DL
RBC CLUMPS #/AREA URNS AUTO: 9 /HPF (ref 0–4)
SP GR UR STRIP.AUTO: 1.01 (ref 1–1.03)
UA DIPSTICK W REFLEX MICRO PNL UR: YES
URN SPEC COLLECT METH UR: ABNORMAL
UROBILINOGEN UR STRIP-ACNC: 0.2 E.U./DL
WBC #/AREA URNS AUTO: 2 /HPF (ref 0–5)

## 2024-02-26 PROCEDURE — 36415 COLL VENOUS BLD VENIPUNCTURE: CPT

## 2024-02-26 PROCEDURE — 83521 IG LIGHT CHAINS FREE EACH: CPT

## 2024-02-26 PROCEDURE — 82570 ASSAY OF URINE CREATININE: CPT

## 2024-02-26 PROCEDURE — 81001 URINALYSIS AUTO W/SCOPE: CPT

## 2024-02-26 PROCEDURE — 84155 ASSAY OF PROTEIN SERUM: CPT

## 2024-02-26 PROCEDURE — 84165 PROTEIN E-PHORESIS SERUM: CPT

## 2024-02-26 PROCEDURE — 86160 COMPLEMENT ANTIGEN: CPT

## 2024-02-26 PROCEDURE — 74176 CT ABD & PELVIS W/O CONTRAST: CPT

## 2024-02-26 PROCEDURE — 86036 ANCA SCREEN EACH ANTIBODY: CPT

## 2024-02-26 PROCEDURE — 84156 ASSAY OF PROTEIN URINE: CPT

## 2024-02-26 PROCEDURE — 86038 ANTINUCLEAR ANTIBODIES: CPT

## 2024-02-26 PROCEDURE — 82043 UR ALBUMIN QUANTITATIVE: CPT

## 2024-02-26 PROCEDURE — 83516 IMMUNOASSAY NONANTIBODY: CPT

## 2024-02-27 LAB
ANA SER QL IA: NEGATIVE
KAPPA LC FREE SER-MCNC: 25.9 MG/L (ref 3.3–19.4)
KAPPA LC FREE/LAMBDA FREE SER: 2 {RATIO} (ref 0.26–1.65)
LAMBDA LC FREE SERPL-MCNC: 12.97 MG/L (ref 5.71–26.3)
RPT COMMENT: ABNORMAL

## 2024-02-28 LAB
ALBUMIN SERPL ELPH-MCNC: 3.4 G/DL (ref 3.1–4.9)
ALPHA1 GLOB SERPL ELPH-MCNC: 0.3 G/DL (ref 0.2–0.4)
ALPHA2 GLOB SERPL ELPH-MCNC: 1 G/DL (ref 0.4–1.1)
B-GLOBULIN SERPL ELPH-MCNC: 1.2 G/DL (ref 0.9–1.6)
GAMMA GLOB SERPL ELPH-MCNC: 1.4 G/DL (ref 0.6–1.8)
PROT SERPL-MCNC: 7.3 G/DL (ref 6.4–8.2)
SPE/IFE INTERPRETATION: NORMAL

## 2024-03-01 LAB — MISCELLANEOUS LAB TEST ORDER: ABNORMAL

## 2024-03-02 NOTE — RESULT ENCOUNTER NOTE
PR3 MPO negative  P-ANCA positive   Given neg MPO / PR3 , normal kidney function, low level proteinuria- GN unlikley and this is likely a false +  Plan:  Recheck PR3 MPO  Check GRECIA   BMP and urine studies again

## 2024-03-07 ENCOUNTER — HOSPITAL ENCOUNTER (OUTPATIENT)
Age: 74
Discharge: HOME | End: 2024-03-07
Payer: MEDICARE

## 2024-03-07 DIAGNOSIS — R31.21 ASYMPTOMATIC MICROSCOPIC HEMATURIA: ICD-10-CM

## 2024-03-07 LAB
ANION GAP SERPL CALCULATED.3IONS-SCNC: 9 MMOL/L (ref 3–16)
BACTERIA URNS QL MICRO: ABNORMAL /HPF
BILIRUB UR QL STRIP.AUTO: NEGATIVE
BUN SERPL-MCNC: 17 MG/DL (ref 7–20)
CALCIUM SERPL-MCNC: 10.1 MG/DL (ref 8.3–10.6)
CHLORIDE SERPL-SCNC: 102 MMOL/L (ref 99–110)
CLARITY UR: CLEAR
CO2 SERPL-SCNC: 25 MMOL/L (ref 21–32)
COLOR UR: YELLOW
CREAT SERPL-MCNC: 0.7 MG/DL (ref 0.6–1.2)
CREAT UR-MCNC: 85.1 MG/DL (ref 28–259)
EPI CELLS #/AREA URNS AUTO: 1 /HPF (ref 0–5)
GFR SERPLBLD CREATININE-BSD FMLA CKD-EPI: >60 ML/MIN/{1.73_M2}
GLUCOSE SERPL-MCNC: 74 MG/DL (ref 70–99)
GLUCOSE UR STRIP.AUTO-MCNC: NEGATIVE MG/DL
HGB UR QL STRIP.AUTO: NEGATIVE
HYALINE CASTS #/AREA URNS AUTO: 0 /LPF (ref 0–8)
KETONES UR STRIP.AUTO-MCNC: NEGATIVE MG/DL
LEUKOCYTE ESTERASE UR QL STRIP.AUTO: ABNORMAL
MICROALBUMIN UR DL<=1MG/L-MCNC: 4 MG/DL
MICROALBUMIN/CREAT UR: 47 MG/G (ref 0–30)
NITRITE UR QL STRIP.AUTO: NEGATIVE
PH UR STRIP.AUTO: 5.5 [PH] (ref 5–8)
POTASSIUM SERPL-SCNC: 3.7 MMOL/L (ref 3.5–5.1)
PROT UR STRIP.AUTO-MCNC: NEGATIVE MG/DL
PROT UR-MCNC: 13 MG/DL
PROT/CREAT UR-RTO: 0.2 MG/DL
RBC CLUMPS #/AREA URNS AUTO: 3 /HPF (ref 0–4)
SODIUM SERPL-SCNC: 136 MMOL/L (ref 136–145)
SP GR UR STRIP.AUTO: 1.01 (ref 1–1.03)
UA DIPSTICK W REFLEX MICRO PNL UR: YES
URN SPEC COLLECT METH UR: ABNORMAL
UROBILINOGEN UR STRIP-ACNC: 0.2 E.U./DL
WBC #/AREA URNS AUTO: 3 /HPF (ref 0–5)

## 2024-03-07 PROCEDURE — 83516 IMMUNOASSAY NONANTIBODY: CPT

## 2024-03-07 PROCEDURE — 80048 BASIC METABOLIC PNL TOTAL CA: CPT

## 2024-03-07 PROCEDURE — 84156 ASSAY OF PROTEIN URINE: CPT

## 2024-03-07 PROCEDURE — 82043 UR ALBUMIN QUANTITATIVE: CPT

## 2024-03-07 PROCEDURE — 86036 ANCA SCREEN EACH ANTIBODY: CPT

## 2024-03-07 PROCEDURE — 82570 ASSAY OF URINE CREATININE: CPT

## 2024-03-07 PROCEDURE — 81001 URINALYSIS AUTO W/SCOPE: CPT

## 2024-03-07 PROCEDURE — 86038 ANTINUCLEAR ANTIBODIES: CPT

## 2024-03-07 PROCEDURE — 36415 COLL VENOUS BLD VENIPUNCTURE: CPT

## 2024-03-08 LAB — ANA SER QL IA: NEGATIVE

## 2024-03-12 ENCOUNTER — HOSPITAL ENCOUNTER (OUTPATIENT)
Dept: CT IMAGING | Age: 74
Discharge: HOME | End: 2024-03-12
Attending: OTOLARYNGOLOGY
Payer: MEDICARE

## 2024-03-12 DIAGNOSIS — E04.9 SUBSTERNAL GOITER: Chronic | ICD-10-CM

## 2024-03-12 DIAGNOSIS — E04.2 MULTINODULAR GOITER (NONTOXIC): Chronic | ICD-10-CM

## 2024-03-12 DIAGNOSIS — J39.8 TRACHEAL DEVIATION: ICD-10-CM

## 2024-03-12 LAB — MISCELLANEOUS LAB TEST ORDER: NORMAL

## 2024-03-12 PROCEDURE — 6360000004 HC RX CONTRAST MEDICATION: Performed by: OTOLARYNGOLOGY

## 2024-03-12 PROCEDURE — 70491 CT SOFT TISSUE NECK W/DYE: CPT

## 2024-03-12 RX ADMIN — IOPAMIDOL 75 ML: 755 INJECTION, SOLUTION INTRAVENOUS at 12:12

## 2024-03-14 DIAGNOSIS — J45.40 MODERATE PERSISTENT ASTHMA WITHOUT COMPLICATION: Primary | ICD-10-CM

## 2024-03-14 RX ORDER — FLUTICASONE PROPIONATE AND SALMETEROL 250; 50 UG/1; UG/1
1 POWDER RESPIRATORY (INHALATION) EVERY 12 HOURS
Qty: 60 EACH | Refills: 3 | Status: SHIPPED | OUTPATIENT
Start: 2024-03-14

## 2024-03-15 DIAGNOSIS — E03.9 ACQUIRED HYPOTHYROIDISM: ICD-10-CM

## 2024-03-15 DIAGNOSIS — I10 ESSENTIAL HYPERTENSION: ICD-10-CM

## 2024-03-15 RX ORDER — NIFEDIPINE 30 MG/1
TABLET, EXTENDED RELEASE ORAL
Qty: 90 TABLET | Refills: 1 | Status: SHIPPED | OUTPATIENT
Start: 2024-03-15

## 2024-03-15 RX ORDER — ATENOLOL 25 MG/1
25 TABLET ORAL DAILY
Qty: 90 TABLET | Refills: 3 | Status: SHIPPED | OUTPATIENT
Start: 2024-03-15

## 2024-03-15 RX ORDER — LEVOTHYROXINE SODIUM 0.07 MG/1
75 TABLET ORAL DAILY
Qty: 90 TABLET | Refills: 0 | Status: SHIPPED | OUTPATIENT
Start: 2024-03-15

## 2024-03-15 NOTE — TELEPHONE ENCOUNTER
Medication:   Requested Prescriptions     Pending Prescriptions Disp Refills    atenolol (TENORMIN) 25 MG tablet 90 tablet 3     Sig: Take 1 tablet by mouth daily    NIFEdipine (PROCARDIA XL) 30 MG extended release tablet 90 tablet 1     Sig: Take 1 tablet by mouth once daily    levothyroxine (SYNTHROID) 75 MCG tablet 90 tablet 0     Sig: Take 1 tablet by mouth daily        Last Filled:      Patient Phone Number: [phone] (home)     Last appt: 1/17/2024   Next appt: 4/17/2024    Last OARRS:        No data to display

## 2024-03-22 DIAGNOSIS — I10 ESSENTIAL HYPERTENSION: ICD-10-CM

## 2024-03-22 RX ORDER — NIFEDIPINE 30 MG/1
TABLET, EXTENDED RELEASE ORAL
Qty: 90 TABLET | Refills: 3 | Status: SHIPPED | OUTPATIENT
Start: 2024-03-22

## 2024-04-02 ENCOUNTER — TELEPHONE (OUTPATIENT)
Dept: ENT CLINIC | Age: 74
End: 2024-04-02

## 2024-04-02 NOTE — TELEPHONE ENCOUNTER
Phone call.  I spoke to Josseline.  I discussed the CT scan findings showing the substernal thyroid extension into the upper mediastinum.  I advised her that we need to have thoracic surgery available and on standby in case of the need for median sternotomy and intrathoracic approach.  I advised her that Dr. Toscano is the thoracic surgeon here at [facility] and that I would refer her to Dr. Toscano for initial evaluation.  Patient is agreeable to that.  I advised her that Dr. Toscano's office will be calling her to schedule an appointment for evaluation .

## 2024-04-03 ENCOUNTER — TELEPHONE (OUTPATIENT)
Age: 74
End: 2024-04-03

## 2024-04-03 NOTE — TELEPHONE ENCOUNTER
As requested by Dr. Durham, attempted to reach pt. NA. LMOR requesting call back to sched an appt. Await a return call.

## 2024-04-12 ENCOUNTER — HOSPITAL ENCOUNTER (OUTPATIENT)
Dept: WOMENS IMAGING | Age: 74
Discharge: HOME | End: 2024-04-12
Payer: MEDICARE

## 2024-04-12 VITALS — HEIGHT: 59 IN | BODY MASS INDEX: 27.05 KG/M2 | WEIGHT: 134.2 LBS

## 2024-04-12 DIAGNOSIS — Z12.31 VISIT FOR SCREENING MAMMOGRAM: ICD-10-CM

## 2024-04-12 PROCEDURE — 77063 BREAST TOMOSYNTHESIS BI: CPT

## 2024-04-15 ENCOUNTER — TELEPHONE (OUTPATIENT)
Age: 74
End: 2024-04-15

## 2024-04-16 ENCOUNTER — OFFICE VISIT (OUTPATIENT)
Age: 74
End: 2024-04-16
Payer: MEDICARE

## 2024-04-16 VITALS
WEIGHT: 134 LBS | BODY MASS INDEX: 27.01 KG/M2 | DIASTOLIC BLOOD PRESSURE: 70 MMHG | HEART RATE: 62 BPM | OXYGEN SATURATION: 97 % | SYSTOLIC BLOOD PRESSURE: 124 MMHG | TEMPERATURE: 97 F | HEIGHT: 59 IN

## 2024-04-16 DIAGNOSIS — E04.9 SUBSTERNAL THYROID GOITER: Primary | ICD-10-CM

## 2024-04-16 PROCEDURE — 3078F DIAST BP <80 MM HG: CPT | Performed by: THORACIC SURGERY (CARDIOTHORACIC VASCULAR SURGERY)

## 2024-04-16 PROCEDURE — 99205 OFFICE O/P NEW HI 60 MIN: CPT | Performed by: THORACIC SURGERY (CARDIOTHORACIC VASCULAR SURGERY)

## 2024-04-16 PROCEDURE — 3074F SYST BP LT 130 MM HG: CPT | Performed by: THORACIC SURGERY (CARDIOTHORACIC VASCULAR SURGERY)

## 2024-04-16 PROCEDURE — 1123F ACP DISCUSS/DSCN MKR DOCD: CPT | Performed by: THORACIC SURGERY (CARDIOTHORACIC VASCULAR SURGERY)

## 2024-04-16 NOTE — PROGRESS NOTES
01/26/2024 09:08 AM     Albumin:  No results found for: \"LABALBU\"  Calcium:    Lab Results   Component Value Date/Time    CALCIUM 10.1 03/07/2024 11:48 AM     Ionized Calcium:  No results found for: \"IONCA\"  Magnesium:    Lab Results   Component Value Date/Time    MG 1.80 12/14/2022 04:40 AM     Phosphorus:    Lab Results   Component Value Date/Time    PHOS 2.9 01/26/2024 09:08 AM       HgBA1c:    Lab Results   Component Value Date/Time    LABA1C 5.1 10/31/2023 12:15 PM     FLP:    Lab Results   Component Value Date/Time    TRIG 129 01/26/2024 09:08 AM    HDL 77 01/26/2024 09:08 AM    LDLCALC 63 01/26/2024 09:08 AM     TSH:    Lab Results   Component Value Date/Time    TSH 0.67 09/19/2015 08:13 AM    TSH 0.02 07/24/2015 11:51 AM     CT scan of head and neck reviewed and shows large right thyroid mass partially behind the upper aspect of the sternum.    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  We will be available for her surgery for standby in case Upper sternal splint is needed for exposure.  She has an appointment with her hematologist in the next week or 2 and will discuss with him and Dr. Durham management of her anticoagulation around the time of surgery.  She is still deciding when she wants to have the surgery done.      Electronically signed by Ayesha Toscano MD on 4/16/2024 at 1:39 PM

## 2024-04-17 ENCOUNTER — OFFICE VISIT (OUTPATIENT)
Dept: PRIMARY CARE CLINIC | Age: 74
End: 2024-04-17

## 2024-04-17 ENCOUNTER — TELEPHONE (OUTPATIENT)
Age: 74
End: 2024-04-17

## 2024-04-17 VITALS
OXYGEN SATURATION: 98 % | HEIGHT: 59 IN | SYSTOLIC BLOOD PRESSURE: 150 MMHG | BODY MASS INDEX: 27.82 KG/M2 | RESPIRATION RATE: 16 BRPM | WEIGHT: 138 LBS | DIASTOLIC BLOOD PRESSURE: 86 MMHG | HEART RATE: 59 BPM | TEMPERATURE: 97.2 F

## 2024-04-17 DIAGNOSIS — I10 ESSENTIAL HYPERTENSION: Primary | ICD-10-CM

## 2024-04-17 DIAGNOSIS — N28.89 LEFT RENAL MASS: ICD-10-CM

## 2024-04-17 DIAGNOSIS — E04.9 THYROID GOITER: ICD-10-CM

## 2024-04-17 DIAGNOSIS — Z86.711 HISTORY OF PULMONARY EMBOLUS (PE): ICD-10-CM

## 2024-04-17 PROBLEM — I82.442: Status: RESOLVED | Noted: 2024-01-17 | Resolved: 2024-04-17

## 2024-04-17 RX ORDER — LABETALOL 100 MG/1
100 TABLET, FILM COATED ORAL 2 TIMES DAILY
Qty: 60 TABLET | Refills: 3 | Status: SHIPPED | OUTPATIENT
Start: 2024-04-17

## 2024-04-17 RX ORDER — SPIRONOLACTONE 25 MG/1
25 TABLET ORAL DAILY
Qty: 90 TABLET | Refills: 1 | Status: SHIPPED | OUTPATIENT
Start: 2024-04-17

## 2024-04-17 ASSESSMENT — PATIENT HEALTH QUESTIONNAIRE - PHQ9
SUM OF ALL RESPONSES TO PHQ QUESTIONS 1-9: 0
SUM OF ALL RESPONSES TO PHQ9 QUESTIONS 1 & 2: 0
2. FEELING DOWN, DEPRESSED OR HOPELESS: NOT AT ALL
1. LITTLE INTEREST OR PLEASURE IN DOING THINGS: NOT AT ALL

## 2024-04-17 NOTE — TELEPHONE ENCOUNTER
Pt requesting CT films be sent to Buffy Zuleta MD. HIPAA form completed and notified Melina in  radiology. Pt to call and speak with Melina. Spoke with pt and number provided.

## 2024-04-17 NOTE — PROGRESS NOTES
.  Very unstable but that got us come see with that guidance that could copy the face leg again for the rest  Water.    [place] route we can go to work harder her work [employer] arthritis will  Josseline Koenig (:  1950) is a 73 y.o. female,Established patient, here for evaluation of the following chief complaint(s):  Hypertension, Medication Check (Discuss niphedpine/), and Swelling (Ankles/)      Assessment & Plan   ASSESSMENT/PLAN:  1. Essential hypertension blood pressure has been elevated ARB discontinued due to angioedema and patient started swelling with the calcium channel blocker after stopping day ARB.  Patient saw nephrology and gave a temporary Lasix and discontinued the calcium channel blocker due to the edema.  No orthopnea or dyspnea on exertion and normal echo 2 years ago.  Will start spironolactone and discontinue the atenolol and start labetalol which will have more blood pressure effect and  less effect on heart rate , as adjust dosage of labetalol, as patient's pulse will allow.  Follow-up in a month.  Check renin and aldosterone level electrolytes.  If no improvement in control upon return consider CTA angio of the abdomen.  -     spironolactone (ALDACTONE) 25 MG tablet; Take 1 tablet by mouth daily, Disp-90 tablet, R-1Normal  -     labetalol (NORMODYNE) 100 MG tablet; Take 1 tablet by mouth 2 times daily Stop atenolol, Disp-60 tablet, R-3Normal  -     Comprehensive Metabolic Panel; Future  -     Renin Activity and Aldosterone; Future  -     TSH with Reflex to FT4; Future  -     Microalbumin / Creatinine Urine Ratio; Future  2. History of pulmonary embolus (PE) on Eliquis tolerating well.  No signs of bleeding will get follow-up CBC to make sure tolerating Eliquis.    Lab Results   Component Value Date    WBC 8.0 2024    HGB 10.3 (L) 2024    HCT 31.0 (L) 2024    MCV 87.8 2024     (H) 2024       3. Left renal mass has been stable for 2 years per CT in

## 2024-04-18 ASSESSMENT — ENCOUNTER SYMPTOMS
NAUSEA: 0
EYE ITCHING: 0
VOMITING: 0
VOICE CHANGE: 0
EYE REDNESS: 0
BACK PAIN: 0
HAIR LOSS: 0
WHEEZING: 0
COLOR CHANGE: 0
TROUBLE SWALLOWING: 0
PHOTOPHOBIA: 0
ABDOMINAL PAIN: 0
HOARSE VOICE: 0
BLOOD IN STOOL: 0
APNEA: 0
SHORTNESS OF BREATH: 0
SORE THROAT: 0
EYE DISCHARGE: 0
ABDOMINAL DISTENTION: 0
CHEST TIGHTNESS: 0
RHINORRHEA: 0
COUGH: 0
ANAL BLEEDING: 0
STRIDOR: 0
DIARRHEA: 0
EYE PAIN: 0
CHOKING: 0
RECTAL PAIN: 0
VISUAL CHANGE: 0
CONSTIPATION: 0

## 2024-04-23 ENCOUNTER — TELEPHONE (OUTPATIENT)
Dept: ORTHOPEDIC SURGERY | Age: 74
End: 2024-04-23

## 2024-04-23 ENCOUNTER — TELEPHONE (OUTPATIENT)
Dept: ENT CLINIC | Age: 74
End: 2024-04-23

## 2024-04-23 NOTE — TELEPHONE ENCOUNTER
Please call patient and see if she wants to schedule thyroidectomy surgery.  If so, see if we can schedule her also on June 4, with Dr. Toscano on standby for possible median sternotomy if necessary.  If this is feasible, coordinate with Dr. Toscano's office.  Emphasize to the patient that we have to coordinate with the thoracic surgeon to be on standby, and that day may work out, since we may be scheduling another patient for thyroidectomy with thoracic surgery standby on that day.  Otherwise, we would have to find another date that also fits with Dr. Toscano schedule

## 2024-04-23 NOTE — TELEPHONE ENCOUNTER
General Question     Subject: GEL INJ   Patient and /or Facility Request: Josseline Koenig   Contact Number: [phone]       PT CALLING IN DUE WANTING TO HAVE AN AUTHORIZATION TO BE SENT OVER TO HER INSURANCE FOR GEL INJ.     PLEASE CALL BACK PT AT THE ABOVE NUMBER LISTED

## 2024-04-23 NOTE — TELEPHONE ENCOUNTER
I called the patient and left a message that her insurance does require authorization.  She is not due until the end of May

## 2024-04-23 NOTE — TELEPHONE ENCOUNTER
Other patient would like to know if an authorization from her insurance is required in order for her to have her gel injections scheduled in may    Please contact patient at [phone] to clarify

## 2024-04-24 ENCOUNTER — HOSPITAL ENCOUNTER (OUTPATIENT)
Age: 74
Discharge: HOME | End: 2024-04-24
Payer: MEDICARE

## 2024-04-24 DIAGNOSIS — I10 ESSENTIAL HYPERTENSION: ICD-10-CM

## 2024-04-24 LAB
ALBUMIN SERPL-MCNC: 4.3 G/DL (ref 3.4–5)
ALBUMIN/GLOB SERPL: 1.5 {RATIO} (ref 1.1–2.2)
ALP SERPL-CCNC: 68 U/L (ref 40–129)
ALT SERPL-CCNC: 13 U/L (ref 10–40)
ANION GAP SERPL CALCULATED.3IONS-SCNC: 13 MMOL/L (ref 3–16)
AST SERPL-CCNC: 21 U/L (ref 15–37)
BASOPHILS # BLD: 0 K/UL (ref 0–0.2)
BASOPHILS NFR BLD: 0.6 %
BILIRUB SERPL-MCNC: 0.4 MG/DL (ref 0–1)
BUN SERPL-MCNC: 12 MG/DL (ref 7–20)
CALCIUM SERPL-MCNC: 10.3 MG/DL (ref 8.3–10.6)
CHLORIDE SERPL-SCNC: 104 MMOL/L (ref 99–110)
CO2 SERPL-SCNC: 23 MMOL/L (ref 21–32)
CREAT SERPL-MCNC: 0.7 MG/DL (ref 0.6–1.2)
CREAT UR-MCNC: 70.9 MG/DL (ref 28–259)
DEPRECATED RDW RBC AUTO: 19.5 % (ref 12.4–15.4)
EOSINOPHIL # BLD: 0.1 K/UL (ref 0–0.6)
EOSINOPHIL NFR BLD: 2.1 %
GFR SERPLBLD CREATININE-BSD FMLA CKD-EPI: 90 ML/MIN/{1.73_M2}
GLUCOSE SERPL-MCNC: 90 MG/DL (ref 70–99)
HCT VFR BLD AUTO: 34.9 % (ref 36–48)
HGB BLD-MCNC: 11.4 G/DL (ref 12–16)
LYMPHOCYTES # BLD: 1 K/UL (ref 1–5.1)
LYMPHOCYTES NFR BLD: 16.7 %
MCH RBC QN AUTO: 29.1 PG (ref 26–34)
MCHC RBC AUTO-ENTMCNC: 32.7 G/DL (ref 31–36)
MCV RBC AUTO: 89 FL (ref 80–100)
MICROALBUMIN UR DL<=1MG/L-MCNC: <1.2 MG/DL
MICROALBUMIN/CREAT UR: NORMAL MG/G (ref 0–30)
MONOCYTES # BLD: 0.5 K/UL (ref 0–1.3)
MONOCYTES NFR BLD: 8.8 %
NEUTROPHILS # BLD: 4.2 K/UL (ref 1.7–7.7)
NEUTROPHILS NFR BLD: 71.8 %
PLATELET # BLD AUTO: 467 K/UL (ref 135–450)
PMV BLD AUTO: 9.1 FL (ref 5–10.5)
POTASSIUM SERPL-SCNC: 4.1 MMOL/L (ref 3.5–5.1)
PROT SERPL-MCNC: 7.2 G/DL (ref 6.4–8.2)
RBC # BLD AUTO: 3.92 M/UL (ref 4–5.2)
SODIUM SERPL-SCNC: 140 MMOL/L (ref 136–145)
T4 FREE SERPL-MCNC: 1.6 NG/DL (ref 0.9–1.8)
TSH SERPL DL<=0.005 MIU/L-ACNC: 0.11 UIU/ML (ref 0.27–4.2)
WBC # BLD AUTO: 5.8 K/UL (ref 4–11)

## 2024-04-24 PROCEDURE — 84244 ASSAY OF RENIN: CPT

## 2024-04-24 PROCEDURE — 84439 ASSAY OF FREE THYROXINE: CPT

## 2024-04-24 PROCEDURE — 82570 ASSAY OF URINE CREATININE: CPT

## 2024-04-24 PROCEDURE — 36415 COLL VENOUS BLD VENIPUNCTURE: CPT

## 2024-04-24 PROCEDURE — 82088 ASSAY OF ALDOSTERONE: CPT

## 2024-04-24 PROCEDURE — 85025 COMPLETE CBC W/AUTO DIFF WBC: CPT

## 2024-04-24 PROCEDURE — 84443 ASSAY THYROID STIM HORMONE: CPT

## 2024-04-24 PROCEDURE — 82043 UR ALBUMIN QUANTITATIVE: CPT

## 2024-04-24 PROCEDURE — 80053 COMPREHEN METABOLIC PANEL: CPT

## 2024-04-26 DIAGNOSIS — E03.9 ACQUIRED HYPOTHYROIDISM: ICD-10-CM

## 2024-04-26 LAB
ALDOST SERPL-MCNC: 8.3 NG/DL
ALDOST/RENIN PLAS-RTO: 82.6 RATIO
RENIN PLAS-CCNC: 0.1 NG/ML/HR

## 2024-04-26 RX ORDER — LEVOTHYROXINE SODIUM 0.05 MG/1
50 TABLET ORAL DAILY
Qty: 90 TABLET | Refills: 3 | Status: SHIPPED | OUTPATIENT
Start: 2024-04-26

## 2024-04-26 NOTE — RESULT ENCOUNTER NOTE
The thyroid test show that your medication needs to be slightly decreased.  This will help with nervousness and not sleeping well.  Will reduce levothyroxine from 75 to 50 mcg daily. I will send a new script to your pharmacy..  If you want to use up your 75 mcg tablets, you CAN TAKE EVERY DAY , EXCEPT NO MEDICATION ON SATURDAY AND SUNDAYS UNTIL GONE. THEN START 50 MCG DAILY.   WILL SEND IN THE 50 MCG SCRIPT.

## 2024-05-01 ENCOUNTER — PREP FOR PROCEDURE (OUTPATIENT)
Dept: ENT CLINIC | Age: 74
End: 2024-05-01

## 2024-05-01 DIAGNOSIS — D44.0 NEOPLASM OF UNCERTAIN BEHAVIOR OF THYROID GLAND: ICD-10-CM

## 2024-05-01 DIAGNOSIS — E04.2 MULTINODULAR NON-TOXIC GOITER: ICD-10-CM

## 2024-05-13 ENCOUNTER — OFFICE VISIT (OUTPATIENT)
Dept: PRIMARY CARE CLINIC | Age: 74
End: 2024-05-13
Payer: MEDICARE

## 2024-05-13 VITALS
OXYGEN SATURATION: 98 % | TEMPERATURE: 97.3 F | HEIGHT: 59 IN | DIASTOLIC BLOOD PRESSURE: 80 MMHG | HEART RATE: 66 BPM | BODY MASS INDEX: 28.83 KG/M2 | SYSTOLIC BLOOD PRESSURE: 156 MMHG | WEIGHT: 143 LBS

## 2024-05-13 DIAGNOSIS — E78.2 MIXED HYPERLIPIDEMIA: ICD-10-CM

## 2024-05-13 DIAGNOSIS — R73.03 PREDIABETES: ICD-10-CM

## 2024-05-13 DIAGNOSIS — Z01.818 PRE-OP EXAMINATION: Primary | ICD-10-CM

## 2024-05-13 DIAGNOSIS — M17.12 PRIMARY OSTEOARTHRITIS OF LEFT KNEE: ICD-10-CM

## 2024-05-13 DIAGNOSIS — I10 ESSENTIAL HYPERTENSION: ICD-10-CM

## 2024-05-13 PROCEDURE — 3077F SYST BP >= 140 MM HG: CPT | Performed by: INTERNAL MEDICINE

## 2024-05-13 PROCEDURE — 93000 ELECTROCARDIOGRAM COMPLETE: CPT | Performed by: INTERNAL MEDICINE

## 2024-05-13 PROCEDURE — 99214 OFFICE O/P EST MOD 30 MIN: CPT | Performed by: INTERNAL MEDICINE

## 2024-05-13 PROCEDURE — 3079F DIAST BP 80-89 MM HG: CPT | Performed by: INTERNAL MEDICINE

## 2024-05-13 PROCEDURE — 1123F ACP DISCUSS/DSCN MKR DOCD: CPT | Performed by: INTERNAL MEDICINE

## 2024-05-13 RX ORDER — LABETALOL 200 MG/1
200 TABLET, FILM COATED ORAL 2 TIMES DAILY
Qty: 60 TABLET | Refills: 1 | Status: SHIPPED | OUTPATIENT
Start: 2024-05-13

## 2024-05-13 ASSESSMENT — ENCOUNTER SYMPTOMS
TROUBLE SWALLOWING: 0
CONSTIPATION: 0
CHEST TIGHTNESS: 0
ABDOMINAL PAIN: 0
SHORTNESS OF BREATH: 0
DIARRHEA: 0
BACK PAIN: 0
CHOKING: 0
ANAL BLEEDING: 0
EYE ITCHING: 0
COUGH: 0
RECTAL PAIN: 0
COLOR CHANGE: 0
VOICE CHANGE: 0
VOMITING: 0
STRIDOR: 0
EYE PAIN: 0
WHEEZING: 0
ABDOMINAL DISTENTION: 0
SORE THROAT: 0
PHOTOPHOBIA: 0
NAUSEA: 0
RHINORRHEA: 0
EYE REDNESS: 0
BLOOD IN STOOL: 0

## 2024-05-13 NOTE — PROGRESS NOTES
Preoperative Consultation      Josseline Koenig  YOB: 1950    Date of Service:  5/13/2024    Vitals:    05/13/24 1420 05/13/24 1427   BP: (!) 160/82 (!) 156/80   Site: Left Upper Arm Right Upper Arm   Position: Sitting Sitting   Cuff Size: Medium Adult Medium Adult   Pulse: 66    Temp: 97.3 °F (36.3 °C)    TempSrc: Temporal    SpO2: 98%    Weight: 64.9 kg (143 lb)    Height: 1.499 m (4' 11\")       Wt Readings from Last 2 Encounters:   05/13/24 64.9 kg (143 lb)   04/17/24 62.6 kg (138 lb)     BP Readings from Last 3 Encounters:   05/13/24 (!) 156/80   04/17/24 (!) 150/86   04/16/24 124/70        Chief Complaint   Patient presents with    1 Month Follow-Up     BP check     Allergies   Allergen Reactions    Ace Inhibitors Swelling and Angioedema     Lip swelling    Amlodipine Besy-Benazepril Hcl Other (See Comments) and Angioedema     Lip swelling    Crestor [Rosuvastatin] Myalgia     Muscle pain at night that resolved within one week of discontinuation    Benicar [Olmesartan]      cough    Clonidine Derivatives Other (See Comments)     Lethargy.     Outpatient Medications Marked as Taking for the 5/13/24 encounter (Office Visit) with Sonal Maki MD   Medication Sig Dispense Refill    labetalol (NORMODYNE) 200 MG tablet Take 1 tablet by mouth 2 times daily Stop atenolol 60 tablet 1    levothyroxine (SYNTHROID) 50 MCG tablet Take 1 tablet by mouth daily 90 tablet 3    spironolactone (ALDACTONE) 25 MG tablet Take 1 tablet by mouth daily 90 tablet 1    fluticasone-salmeterol (ADVAIR) 250-50 MCG/ACT AEPB diskus inhaler Inhale 1 puff into the lungs in the morning and 1 puff in the evening. 60 each 3    methotrexate Sodium 50 MG/2ML chemo injection INJECT 0.6 MILLILITER (15 MG) BY SUBCUTANEOUS ROUTE ONCE WEEKLY      folic acid (FOLVITE) 1 MG tablet Take 1 tablet by mouth daily      apixaban (ELIQUIS) 5 MG TABS tablet Take 1 tablet by mouth 2 times daily         This patient presents to the office

## 2024-05-14 DIAGNOSIS — M17.0 PRIMARY OSTEOARTHRITIS OF BOTH KNEES: Primary | ICD-10-CM

## 2024-05-16 ENCOUNTER — OFFICE VISIT (OUTPATIENT)
Dept: ORTHOPEDIC SURGERY | Age: 74
End: 2024-05-16

## 2024-05-16 VITALS — RESPIRATION RATE: 16 BRPM | WEIGHT: 143 LBS | HEIGHT: 59 IN | BODY MASS INDEX: 28.83 KG/M2

## 2024-05-16 DIAGNOSIS — M17.0 PRIMARY OSTEOARTHRITIS OF BOTH KNEES: Primary | ICD-10-CM

## 2024-05-16 RX ORDER — TRIAMCINOLONE ACETONIDE 40 MG/ML
40 INJECTION, SUSPENSION INTRA-ARTICULAR; INTRAMUSCULAR ONCE
Status: COMPLETED | OUTPATIENT
Start: 2024-05-16 | End: 2024-05-16

## 2024-05-16 RX ORDER — BUPIVACAINE HYDROCHLORIDE 2.5 MG/ML
2 INJECTION, SOLUTION INFILTRATION; PERINEURAL ONCE
Status: COMPLETED | OUTPATIENT
Start: 2024-05-16 | End: 2024-05-16

## 2024-05-16 RX ADMIN — BUPIVACAINE HYDROCHLORIDE 5 MG: 2.5 INJECTION, SOLUTION INFILTRATION; PERINEURAL at 10:37

## 2024-05-16 RX ADMIN — TRIAMCINOLONE ACETONIDE 40 MG: 40 INJECTION, SUSPENSION INTRA-ARTICULAR; INTRAMUSCULAR at 10:37

## 2024-05-16 NOTE — PROGRESS NOTES
[facility]  Office Visit    Chief Complaint: Follow-up for bilateral knee pain    HPI:  Josseline Koenig is a 73 y.o. who is here in follow-up of bilateral knee pain.  She has been treated in the past with steroid and Orthovisc injections in both knees.  She comes in today for further evaluation of new symptoms in her right knee.  She has a recent history of right total hip arthroplasty and her right hip is doing well.  She walks with use of a cane.  Both knees feel stiff.  She is on methotrexate for rheumatoid arthritis.  She reports tingling and sensation of the lateral left knee that has been present for just under 1 week.  There is no inciting event.  Symptoms are also returning in the left knee.      Past Medical History:   Diagnosis Date    Bilateral pulmonary embolism (HCC) 2022    on coumadin    Cough variant asthma 08/04/2020    HTN (hypertension) 07/27/2011    Mixed hyperlipidemia 10/18/2022    Primary osteoarthritis of left knee 12/06/2018    Thyroid disease         ROS:  Constitutional: denies fever, chills, weight loss  MSK: denies pain in other joints, muscle aches  Neurological: denies numbness, tingling, weakness    Exam:  Resp 16   Ht 1.499 m (4' 11\")   Wt 64.9 kg (143 lb)   BMI 28.88 kg/m²      Appearance: sitting in exam room chair, appears to be in no acute distress, awake and alert  Resp: unlabored breathing on room air  Skin: warm, dry and intact with out erythema or significant increased temperature  Neuro: grossly intact both lower extremities. Intact sensation to light touch. Motor exam 4+ to 5/5 in all major motor groups.  BLE: Examination reveals that active knee range of motion is 5 to 110 degrees.  There is varus deformity, positive crepitus, positive joint line tenderness, positive antalgic gait.  Neurologically, plantar flexion and dorsiflexion is intact. 5/5 strength.     Imaging:  Prior bilateral knee radiographs were reviewed today significant for

## 2024-06-04 ENCOUNTER — HOSPITAL ENCOUNTER (OUTPATIENT)
Age: 74
Discharge: HOME | End: 2024-06-04
Payer: MEDICARE

## 2024-06-04 DIAGNOSIS — I10 ESSENTIAL HYPERTENSION: ICD-10-CM

## 2024-06-04 DIAGNOSIS — R31.21 ASYMPTOMATIC MICROSCOPIC HEMATURIA: ICD-10-CM

## 2024-06-04 DIAGNOSIS — E78.2 MIXED HYPERLIPIDEMIA: ICD-10-CM

## 2024-06-04 DIAGNOSIS — Z01.818 PRE-OP EXAMINATION: ICD-10-CM

## 2024-06-04 LAB
ALBUMIN SERPL-MCNC: 4.4 G/DL (ref 3.4–5)
ANION GAP SERPL CALCULATED.3IONS-SCNC: 11 MMOL/L (ref 3–16)
BACTERIA URNS QL MICRO: NORMAL /HPF
BASOPHILS # BLD: 0 K/UL (ref 0–0.2)
BASOPHILS NFR BLD: 0.8 %
BILIRUB UR QL STRIP.AUTO: NEGATIVE
BUN SERPL-MCNC: 16 MG/DL (ref 7–20)
CALCIUM SERPL-MCNC: 10.4 MG/DL (ref 8.3–10.6)
CHLORIDE SERPL-SCNC: 101 MMOL/L (ref 99–110)
CHOLEST SERPL-MCNC: 147 MG/DL (ref 0–199)
CLARITY UR: CLEAR
CO2 SERPL-SCNC: 24 MMOL/L (ref 21–32)
COLOR UR: YELLOW
CREAT SERPL-MCNC: 0.9 MG/DL (ref 0.6–1.2)
CREAT UR-MCNC: 70.9 MG/DL (ref 28–259)
DEPRECATED RDW RBC AUTO: 18.7 % (ref 12.4–15.4)
EOSINOPHIL # BLD: 0.1 K/UL (ref 0–0.6)
EOSINOPHIL NFR BLD: 1.5 %
EPI CELLS #/AREA URNS AUTO: 0 /HPF (ref 0–5)
GFR SERPLBLD CREATININE-BSD FMLA CKD-EPI: 66 ML/MIN/{1.73_M2}
GLUCOSE SERPL-MCNC: 90 MG/DL (ref 70–99)
GLUCOSE UR STRIP.AUTO-MCNC: NEGATIVE MG/DL
HCT VFR BLD AUTO: 33.3 % (ref 36–48)
HDLC SERPL-MCNC: 82 MG/DL (ref 40–60)
HGB BLD-MCNC: 11.3 G/DL (ref 12–16)
HGB UR QL STRIP.AUTO: NEGATIVE
HYALINE CASTS #/AREA URNS AUTO: 1 /LPF (ref 0–8)
KETONES UR STRIP.AUTO-MCNC: NEGATIVE MG/DL
LDLC SERPL CALC-MCNC: 51 MG/DL
LEUKOCYTE ESTERASE UR QL STRIP.AUTO: NEGATIVE
LYMPHOCYTES # BLD: 0.8 K/UL (ref 1–5.1)
LYMPHOCYTES NFR BLD: 12.6 %
MCH RBC QN AUTO: 30.9 PG (ref 26–34)
MCHC RBC AUTO-ENTMCNC: 33.9 G/DL (ref 31–36)
MCV RBC AUTO: 91.2 FL (ref 80–100)
MICROALBUMIN UR DL<=1MG/L-MCNC: <1.2 MG/DL
MICROALBUMIN/CREAT UR: NORMAL MG/G (ref 0–30)
MONOCYTES # BLD: 0.7 K/UL (ref 0–1.3)
MONOCYTES NFR BLD: 10.4 %
NEUTROPHILS # BLD: 4.7 K/UL (ref 1.7–7.7)
NEUTROPHILS NFR BLD: 74.7 %
NITRITE UR QL STRIP.AUTO: NEGATIVE
PH UR STRIP.AUTO: 6 [PH] (ref 5–8)
PHOSPHATE SERPL-MCNC: 3.4 MG/DL (ref 2.5–4.9)
PLATELET # BLD AUTO: 446 K/UL (ref 135–450)
PMV BLD AUTO: 8.9 FL (ref 5–10.5)
POTASSIUM SERPL-SCNC: 4.1 MMOL/L (ref 3.5–5.1)
PROT UR STRIP.AUTO-MCNC: NEGATIVE MG/DL
PROT UR-MCNC: 6 MG/DL
PROT/CREAT UR-RTO: 0.1 MG/DL
RBC # BLD AUTO: 3.65 M/UL (ref 4–5.2)
RBC CLUMPS #/AREA URNS AUTO: 1 /HPF (ref 0–4)
SODIUM SERPL-SCNC: 136 MMOL/L (ref 136–145)
SP GR UR STRIP.AUTO: 1.01 (ref 1–1.03)
TRIGL SERPL-MCNC: 70 MG/DL (ref 0–150)
UA DIPSTICK W REFLEX MICRO PNL UR: NORMAL
URN SPEC COLLECT METH UR: NORMAL
UROBILINOGEN UR STRIP-ACNC: 1 E.U./DL
VLDLC SERPL CALC-MCNC: 14 MG/DL
WBC # BLD AUTO: 6.4 K/UL (ref 4–11)
WBC #/AREA URNS AUTO: 1 /HPF (ref 0–5)

## 2024-06-04 PROCEDURE — 36415 COLL VENOUS BLD VENIPUNCTURE: CPT

## 2024-06-04 PROCEDURE — 80061 LIPID PANEL: CPT

## 2024-06-04 PROCEDURE — 85025 COMPLETE CBC W/AUTO DIFF WBC: CPT

## 2024-06-04 PROCEDURE — 84156 ASSAY OF PROTEIN URINE: CPT

## 2024-06-04 PROCEDURE — 81001 URINALYSIS AUTO W/SCOPE: CPT

## 2024-06-04 PROCEDURE — 80069 RENAL FUNCTION PANEL: CPT

## 2024-06-04 PROCEDURE — 82043 UR ALBUMIN QUANTITATIVE: CPT

## 2024-06-04 PROCEDURE — 82570 ASSAY OF URINE CREATININE: CPT

## 2024-06-04 RX ORDER — LABETALOL 200 MG/1
200 TABLET, FILM COATED ORAL 2 TIMES DAILY
Qty: 180 TABLET | Refills: 1 | Status: SHIPPED | OUTPATIENT
Start: 2024-06-04

## 2024-06-04 NOTE — PROGRESS NOTES
Name_______________________________________Printed:____________________  Date and time of surgery____6/11/2024______1045______________Arrival Time:_0900____/per office____   1. The instructions given regarding when and if a patient needs to stop oral intake prior to surgery varies.Follow the specific instructions you were given                  __x  _Nothing to eat or to drink after Midnight the night before.                   ____Carbo loading or instructions will be given to select patients-if you have been given those instructions -please do the following                           The evening before your surgery after dinner before midnight drink 40 ounces of gatorade.If you are diabetic use sugar free.  The morning of surgery drink 40 ounces of water.This needs to be finished 3 hours prior to your surgery start time.    2. Take the following pills with a small sip of water on the morning of surgery___levothyroxine and labetolol________________________________________________                  Do not take blood pressure medications ending in pril or sartan the edward prior to surgery or the morning of surgery. Dr Encarnacion's patient are not to take any medications the AM of surgery.         3. Aspirin, Ibuprofen, Advil, Naproxen, Vitamin E and other Anti-inflammatory products and supplements should be stopped for 5 -7days before surgery or as directed by your physician.   4. Check with your Doctor regarding stopping Plavix, Coumadin,Eliquis, Lovenox,Effient,Pradaxa,Xarelto, Fragmin or other blood thinners and follow their instructions.   5. Do not smoke, and do not drink any alcoholic beverages 24 hours prior to surgery.  This includes NA Beer.Refrain from the usage of any recreational drugs.   6. You may brush your teeth and gargle the morning of surgery.  DO NOT SWALLOW WATER   7. You MUST make arrangements for a responsible adult to stay on site while you are here and take you home after your surgery. You will not be

## 2024-06-04 NOTE — TELEPHONE ENCOUNTER
Medication:   Requested Prescriptions     Pending Prescriptions Disp Refills    labetalol (NORMODYNE) 200 MG tablet [Pharmacy Med Name: LABETALOL  MG TABLET] 180 tablet 1     Sig: TAKE 1 TABLET BY MOUTH 2 TIMES DAILY STOP ATENOLOL        Last Filled:      Patient Phone Number: [phone] (home)     Last appt: 5/13/2024   Next appt: 7/15/2024    Last OARRS:        No data to display

## 2024-06-05 ENCOUNTER — ANESTHESIA EVENT (OUTPATIENT)
Dept: OPERATING ROOM | Age: 74
End: 2024-06-05
Payer: MEDICARE

## 2024-06-05 NOTE — RESULT ENCOUNTER NOTE
The kidney function tests are normal.  Normal urinalysis.  Since you have been taking the rheumatoid arthritis medication your platelet count which is a measure of inflammation is down to normal the hemoglobin is stable at 11.3 and normal white blood cell count.  The cholesterol is excellent.

## 2024-06-11 ENCOUNTER — ANESTHESIA (OUTPATIENT)
Dept: OPERATING ROOM | Age: 74
End: 2024-06-11
Payer: MEDICARE

## 2024-06-11 ENCOUNTER — HOSPITAL ENCOUNTER (OUTPATIENT)
Age: 74
Setting detail: OBSERVATION
Discharge: HOME | End: 2024-06-12
Attending: OTOLARYNGOLOGY | Admitting: OTOLARYNGOLOGY
Payer: MEDICARE

## 2024-06-11 DIAGNOSIS — G89.18 POST-OP PAIN: ICD-10-CM

## 2024-06-11 DIAGNOSIS — E03.9 ACQUIRED HYPOTHYROIDISM: ICD-10-CM

## 2024-06-11 DIAGNOSIS — E89.0 STATUS POST TOTAL THYROIDECTOMY: ICD-10-CM

## 2024-06-11 DIAGNOSIS — E07.9 THYROID MASS: Primary | ICD-10-CM

## 2024-06-11 PROBLEM — E04.9 SUBSTERNAL GOITER: Chronic | Status: ACTIVE | Noted: 2024-06-11

## 2024-06-11 PROBLEM — Z90.89 STATUS POST TOTAL THYROIDECTOMY: Status: ACTIVE | Noted: 2024-06-11

## 2024-06-11 PROBLEM — D44.0 NEOPLASM OF UNCERTAIN BEHAVIOR OF THYROID GLAND: Chronic | Status: ACTIVE | Noted: 2024-05-01

## 2024-06-11 PROBLEM — E04.2 MULTINODULAR NON-TOXIC GOITER: Chronic | Status: ACTIVE | Noted: 2024-05-01

## 2024-06-11 PROBLEM — Z98.890 STATUS POST TOTAL THYROIDECTOMY: Status: ACTIVE | Noted: 2024-06-11

## 2024-06-11 LAB
ABO + RH BLD: NORMAL
BLD GP AB SCN SERPL QL: NORMAL
CALCIUM SERPL-MCNC: 9.5 MG/DL (ref 8.3–10.6)

## 2024-06-11 PROCEDURE — 2580000003 HC RX 258: Performed by: ANESTHESIOLOGY

## 2024-06-11 PROCEDURE — 3700000000 HC ANESTHESIA ATTENDED CARE: Performed by: OTOLARYNGOLOGY

## 2024-06-11 PROCEDURE — 2500000003 HC RX 250 WO HCPCS: Performed by: OTOLARYNGOLOGY

## 2024-06-11 PROCEDURE — 3600000004 HC SURGERY LEVEL 4 BASE: Performed by: OTOLARYNGOLOGY

## 2024-06-11 PROCEDURE — 2580000003 HC RX 258: Performed by: OTOLARYNGOLOGY

## 2024-06-11 PROCEDURE — 88305 TISSUE EXAM BY PATHOLOGIST: CPT

## 2024-06-11 PROCEDURE — 3600000014 HC SURGERY LEVEL 4 ADDTL 15MIN: Performed by: OTOLARYNGOLOGY

## 2024-06-11 PROCEDURE — 6360000002 HC RX W HCPCS: Performed by: OTOLARYNGOLOGY

## 2024-06-11 PROCEDURE — A4217 STERILE WATER/SALINE, 500 ML: HCPCS | Performed by: OTOLARYNGOLOGY

## 2024-06-11 PROCEDURE — 7100000001 HC PACU RECOVERY - ADDTL 15 MIN: Performed by: OTOLARYNGOLOGY

## 2024-06-11 PROCEDURE — 88307 TISSUE EXAM BY PATHOLOGIST: CPT

## 2024-06-11 PROCEDURE — 60271 REMOVAL OF THYROID: CPT | Performed by: OTOLARYNGOLOGY

## 2024-06-11 PROCEDURE — 6370000000 HC RX 637 (ALT 250 FOR IP): Performed by: OTOLARYNGOLOGY

## 2024-06-11 PROCEDURE — 2580000003 HC RX 258: Performed by: NURSE ANESTHETIST, CERTIFIED REGISTERED

## 2024-06-11 PROCEDURE — 86900 BLOOD TYPING SEROLOGIC ABO: CPT

## 2024-06-11 PROCEDURE — 36415 COLL VENOUS BLD VENIPUNCTURE: CPT

## 2024-06-11 PROCEDURE — 2709999900 HC NON-CHARGEABLE SUPPLY: Performed by: OTOLARYNGOLOGY

## 2024-06-11 PROCEDURE — G0378 HOSPITAL OBSERVATION PER HR: HCPCS

## 2024-06-11 PROCEDURE — 88331 PATH CONSLTJ SURG 1 BLK 1SPC: CPT

## 2024-06-11 PROCEDURE — 2720000010 HC SURG SUPPLY STERILE: Performed by: OTOLARYNGOLOGY

## 2024-06-11 PROCEDURE — 2500000003 HC RX 250 WO HCPCS: Performed by: NURSE ANESTHETIST, CERTIFIED REGISTERED

## 2024-06-11 PROCEDURE — 6360000002 HC RX W HCPCS: Performed by: NURSE ANESTHETIST, CERTIFIED REGISTERED

## 2024-06-11 PROCEDURE — 6370000000 HC RX 637 (ALT 250 FOR IP): Performed by: ANESTHESIOLOGY

## 2024-06-11 PROCEDURE — 86850 RBC ANTIBODY SCREEN: CPT

## 2024-06-11 PROCEDURE — 3700000001 HC ADD 15 MINUTES (ANESTHESIA): Performed by: OTOLARYNGOLOGY

## 2024-06-11 PROCEDURE — 6360000002 HC RX W HCPCS: Performed by: ANESTHESIOLOGY

## 2024-06-11 PROCEDURE — 82310 ASSAY OF CALCIUM: CPT

## 2024-06-11 PROCEDURE — 7100000000 HC PACU RECOVERY - FIRST 15 MIN: Performed by: OTOLARYNGOLOGY

## 2024-06-11 PROCEDURE — 86901 BLOOD TYPING SEROLOGIC RH(D): CPT

## 2024-06-11 PROCEDURE — 94640 AIRWAY INHALATION TREATMENT: CPT

## 2024-06-11 RX ORDER — OXYCODONE HYDROCHLORIDE 5 MG/1
10 TABLET ORAL EVERY 4 HOURS PRN
Status: DISCONTINUED | OUTPATIENT
Start: 2024-06-11 | End: 2024-06-12 | Stop reason: HOSPADM

## 2024-06-11 RX ORDER — FOLIC ACID 1 MG/1
1000 TABLET ORAL DAILY
Status: DISCONTINUED | OUTPATIENT
Start: 2024-06-11 | End: 2024-06-12 | Stop reason: HOSPADM

## 2024-06-11 RX ORDER — NALOXONE HYDROCHLORIDE 0.4 MG/ML
INJECTION, SOLUTION INTRAMUSCULAR; INTRAVENOUS; SUBCUTANEOUS PRN
Status: DISCONTINUED | OUTPATIENT
Start: 2024-06-11 | End: 2024-06-11 | Stop reason: HOSPADM

## 2024-06-11 RX ORDER — SODIUM CHLORIDE 0.9 % (FLUSH) 0.9 %
5-40 SYRINGE (ML) INJECTION EVERY 12 HOURS SCHEDULED
Status: DISCONTINUED | OUTPATIENT
Start: 2024-06-11 | End: 2024-06-12 | Stop reason: HOSPADM

## 2024-06-11 RX ORDER — SODIUM CHLORIDE 0.9 % (FLUSH) 0.9 %
5-40 SYRINGE (ML) INJECTION PRN
Status: DISCONTINUED | OUTPATIENT
Start: 2024-06-11 | End: 2024-06-12 | Stop reason: HOSPADM

## 2024-06-11 RX ORDER — LABETALOL HYDROCHLORIDE 5 MG/ML
INJECTION, SOLUTION INTRAVENOUS PRN
Status: DISCONTINUED | OUTPATIENT
Start: 2024-06-11 | End: 2024-06-11 | Stop reason: SDUPTHER

## 2024-06-11 RX ORDER — ONDANSETRON 2 MG/ML
INJECTION INTRAMUSCULAR; INTRAVENOUS PRN
Status: DISCONTINUED | OUTPATIENT
Start: 2024-06-11 | End: 2024-06-11 | Stop reason: SDUPTHER

## 2024-06-11 RX ORDER — HYDROMORPHONE HYDROCHLORIDE 2 MG/ML
0.5 INJECTION, SOLUTION INTRAMUSCULAR; INTRAVENOUS; SUBCUTANEOUS EVERY 5 MIN PRN
Status: DISCONTINUED | OUTPATIENT
Start: 2024-06-11 | End: 2024-06-11 | Stop reason: HOSPADM

## 2024-06-11 RX ORDER — SODIUM CHLORIDE 0.9 % (FLUSH) 0.9 %
5-40 SYRINGE (ML) INJECTION PRN
Status: DISCONTINUED | OUTPATIENT
Start: 2024-06-11 | End: 2024-06-11 | Stop reason: HOSPADM

## 2024-06-11 RX ORDER — SODIUM CHLORIDE 9 MG/ML
INJECTION, SOLUTION INTRAVENOUS PRN
Status: DISCONTINUED | OUTPATIENT
Start: 2024-06-11 | End: 2024-06-12 | Stop reason: HOSPADM

## 2024-06-11 RX ORDER — LABETALOL 200 MG/1
200 TABLET, FILM COATED ORAL 2 TIMES DAILY
Status: DISCONTINUED | OUTPATIENT
Start: 2024-06-11 | End: 2024-06-12 | Stop reason: HOSPADM

## 2024-06-11 RX ORDER — SPIRONOLACTONE 25 MG/1
25 TABLET ORAL DAILY
Status: DISCONTINUED | OUTPATIENT
Start: 2024-06-11 | End: 2024-06-12 | Stop reason: HOSPADM

## 2024-06-11 RX ORDER — SODIUM CHLORIDE 9 MG/ML
INJECTION, SOLUTION INTRAVENOUS PRN
Status: DISCONTINUED | OUTPATIENT
Start: 2024-06-11 | End: 2024-06-11 | Stop reason: HOSPADM

## 2024-06-11 RX ORDER — HYDROMORPHONE HYDROCHLORIDE 1 MG/ML
0.25 INJECTION, SOLUTION INTRAMUSCULAR; INTRAVENOUS; SUBCUTANEOUS
Status: DISCONTINUED | OUTPATIENT
Start: 2024-06-11 | End: 2024-06-12 | Stop reason: HOSPADM

## 2024-06-11 RX ORDER — PROMETHAZINE HYDROCHLORIDE 25 MG/1
12.5 TABLET ORAL EVERY 6 HOURS PRN
Status: DISCONTINUED | OUTPATIENT
Start: 2024-06-11 | End: 2024-06-12 | Stop reason: HOSPADM

## 2024-06-11 RX ORDER — OXYCODONE HYDROCHLORIDE 5 MG/1
5 TABLET ORAL EVERY 4 HOURS PRN
Status: DISCONTINUED | OUTPATIENT
Start: 2024-06-11 | End: 2024-06-12 | Stop reason: HOSPADM

## 2024-06-11 RX ORDER — ONDANSETRON 2 MG/ML
4 INJECTION INTRAMUSCULAR; INTRAVENOUS EVERY 6 HOURS PRN
Status: DISCONTINUED | OUTPATIENT
Start: 2024-06-11 | End: 2024-06-12 | Stop reason: HOSPADM

## 2024-06-11 RX ORDER — SUCCINYLCHOLINE/SOD CL,ISO/PF 200MG/10ML
SYRINGE (ML) INTRAVENOUS PRN
Status: DISCONTINUED | OUTPATIENT
Start: 2024-06-11 | End: 2024-06-11 | Stop reason: SDUPTHER

## 2024-06-11 RX ORDER — HYDROMORPHONE HYDROCHLORIDE 2 MG/ML
INJECTION, SOLUTION INTRAMUSCULAR; INTRAVENOUS; SUBCUTANEOUS PRN
Status: DISCONTINUED | OUTPATIENT
Start: 2024-06-11 | End: 2024-06-11 | Stop reason: SDUPTHER

## 2024-06-11 RX ORDER — METHYLENE BLUE 10 MG/ML
INJECTION INTRAVENOUS
Status: COMPLETED | OUTPATIENT
Start: 2024-06-11 | End: 2024-06-11

## 2024-06-11 RX ORDER — PROPOFOL 10 MG/ML
INJECTION, EMULSION INTRAVENOUS PRN
Status: DISCONTINUED | OUTPATIENT
Start: 2024-06-11 | End: 2024-06-11 | Stop reason: SDUPTHER

## 2024-06-11 RX ORDER — DEXTROSE MONOHYDRATE AND SODIUM CHLORIDE 5; .45 G/100ML; G/100ML
INJECTION, SOLUTION INTRAVENOUS CONTINUOUS
Status: DISCONTINUED | OUTPATIENT
Start: 2024-06-11 | End: 2024-06-12 | Stop reason: HOSPADM

## 2024-06-11 RX ORDER — OXYCODONE HYDROCHLORIDE 5 MG/1
5 TABLET ORAL
Status: DISCONTINUED | OUTPATIENT
Start: 2024-06-11 | End: 2024-06-11 | Stop reason: HOSPADM

## 2024-06-11 RX ORDER — DEXMEDETOMIDINE HYDROCHLORIDE 100 UG/ML
INJECTION, SOLUTION INTRAVENOUS PRN
Status: DISCONTINUED | OUTPATIENT
Start: 2024-06-11 | End: 2024-06-11 | Stop reason: SDUPTHER

## 2024-06-11 RX ORDER — SODIUM CHLORIDE 9 MG/ML
INJECTION, SOLUTION INTRAVENOUS CONTINUOUS
Status: DISCONTINUED | OUTPATIENT
Start: 2024-06-11 | End: 2024-06-11 | Stop reason: HOSPADM

## 2024-06-11 RX ORDER — SODIUM CHLORIDE 0.9 % (FLUSH) 0.9 %
5-40 SYRINGE (ML) INJECTION EVERY 12 HOURS SCHEDULED
Status: DISCONTINUED | OUTPATIENT
Start: 2024-06-11 | End: 2024-06-11 | Stop reason: HOSPADM

## 2024-06-11 RX ORDER — LIDOCAINE HYDROCHLORIDE 20 MG/ML
INJECTION, SOLUTION EPIDURAL; INFILTRATION; INTRACAUDAL; PERINEURAL PRN
Status: DISCONTINUED | OUTPATIENT
Start: 2024-06-11 | End: 2024-06-11 | Stop reason: SDUPTHER

## 2024-06-11 RX ORDER — LIDOCAINE HYDROCHLORIDE AND EPINEPHRINE 10; 10 MG/ML; UG/ML
INJECTION, SOLUTION INFILTRATION; PERINEURAL
Status: COMPLETED | OUTPATIENT
Start: 2024-06-11 | End: 2024-06-11

## 2024-06-11 RX ORDER — LEVOTHYROXINE SODIUM 0.1 MG/1
100 TABLET ORAL DAILY
Status: DISCONTINUED | OUTPATIENT
Start: 2024-06-11 | End: 2024-06-12 | Stop reason: HOSPADM

## 2024-06-11 RX ORDER — ACETAMINOPHEN 500 MG
1000 TABLET ORAL ONCE
Status: COMPLETED | OUTPATIENT
Start: 2024-06-11 | End: 2024-06-11

## 2024-06-11 RX ORDER — HYDROMORPHONE HYDROCHLORIDE 1 MG/ML
0.5 INJECTION, SOLUTION INTRAMUSCULAR; INTRAVENOUS; SUBCUTANEOUS
Status: DISCONTINUED | OUTPATIENT
Start: 2024-06-11 | End: 2024-06-12 | Stop reason: HOSPADM

## 2024-06-11 RX ORDER — ONDANSETRON 2 MG/ML
4 INJECTION INTRAMUSCULAR; INTRAVENOUS
Status: DISCONTINUED | OUTPATIENT
Start: 2024-06-11 | End: 2024-06-11 | Stop reason: HOSPADM

## 2024-06-11 RX ORDER — GLYCOPYRROLATE 0.2 MG/ML
INJECTION INTRAMUSCULAR; INTRAVENOUS PRN
Status: DISCONTINUED | OUTPATIENT
Start: 2024-06-11 | End: 2024-06-11 | Stop reason: SDUPTHER

## 2024-06-11 RX ORDER — FENTANYL CITRATE 50 UG/ML
INJECTION, SOLUTION INTRAMUSCULAR; INTRAVENOUS PRN
Status: DISCONTINUED | OUTPATIENT
Start: 2024-06-11 | End: 2024-06-11 | Stop reason: SDUPTHER

## 2024-06-11 RX ORDER — HYDROMORPHONE HYDROCHLORIDE 2 MG/ML
0.25 INJECTION, SOLUTION INTRAMUSCULAR; INTRAVENOUS; SUBCUTANEOUS EVERY 5 MIN PRN
Status: DISCONTINUED | OUTPATIENT
Start: 2024-06-11 | End: 2024-06-11 | Stop reason: HOSPADM

## 2024-06-11 RX ORDER — DEXAMETHASONE SODIUM PHOSPHATE 4 MG/ML
INJECTION, SOLUTION INTRA-ARTICULAR; INTRALESIONAL; INTRAMUSCULAR; INTRAVENOUS; SOFT TISSUE PRN
Status: DISCONTINUED | OUTPATIENT
Start: 2024-06-11 | End: 2024-06-11 | Stop reason: SDUPTHER

## 2024-06-11 RX ORDER — ESMOLOL HYDROCHLORIDE 10 MG/ML
INJECTION INTRAVENOUS PRN
Status: DISCONTINUED | OUTPATIENT
Start: 2024-06-11 | End: 2024-06-11 | Stop reason: SDUPTHER

## 2024-06-11 RX ADMIN — SODIUM CHLORIDE: 9 INJECTION, SOLUTION INTRAVENOUS at 10:07

## 2024-06-11 RX ADMIN — HYDROMORPHONE HYDROCHLORIDE 0.25 MG: 2 INJECTION, SOLUTION INTRAMUSCULAR; INTRAVENOUS; SUBCUTANEOUS at 16:19

## 2024-06-11 RX ADMIN — LIDOCAINE HYDROCHLORIDE 60 MG: 20 INJECTION, SOLUTION EPIDURAL; INFILTRATION; INTRACAUDAL; PERINEURAL at 15:18

## 2024-06-11 RX ADMIN — CEFAZOLIN 2000 MG: 2 INJECTION, POWDER, FOR SOLUTION INTRAMUSCULAR; INTRAVENOUS at 12:39

## 2024-06-11 RX ADMIN — DEXTROSE AND SODIUM CHLORIDE: 5; 450 INJECTION, SOLUTION INTRAVENOUS at 18:25

## 2024-06-11 RX ADMIN — ACETAMINOPHEN 1000 MG: 500 TABLET ORAL at 10:02

## 2024-06-11 RX ADMIN — DEXMEDETOMIDINE HYDROCHLORIDE 4 MCG: 100 INJECTION, SOLUTION INTRAVENOUS at 15:17

## 2024-06-11 RX ADMIN — GLYCOPYRROLATE 0.2 MG: 0.2 INJECTION, SOLUTION INTRAMUSCULAR; INTRAVENOUS at 12:19

## 2024-06-11 RX ADMIN — PHENYLEPHRINE HYDROCHLORIDE 50 MCG: 10 INJECTION INTRAVENOUS at 14:56

## 2024-06-11 RX ADMIN — PHENYLEPHRINE HYDROCHLORIDE 50 MCG/MIN: 10 INJECTION INTRAVENOUS at 12:47

## 2024-06-11 RX ADMIN — FOLIC ACID 1000 MCG: 1 TABLET ORAL at 18:25

## 2024-06-11 RX ADMIN — SODIUM CHLORIDE: 9 INJECTION, SOLUTION INTRAVENOUS at 12:27

## 2024-06-11 RX ADMIN — DEXAMETHASONE SODIUM PHOSPHATE 12 MG: 4 INJECTION, SOLUTION INTRAMUSCULAR; INTRAVENOUS at 12:31

## 2024-06-11 RX ADMIN — PHENYLEPHRINE HYDROCHLORIDE 100 MCG: 10 INJECTION INTRAVENOUS at 13:12

## 2024-06-11 RX ADMIN — GLYCOPYRROLATE 0.2 MG: 0.2 INJECTION, SOLUTION INTRAMUSCULAR; INTRAVENOUS at 15:03

## 2024-06-11 RX ADMIN — PROPOFOL 120 MG: 10 INJECTION, EMULSION INTRAVENOUS at 12:21

## 2024-06-11 RX ADMIN — ONDANSETRON 4 MG: 2 INJECTION INTRAMUSCULAR; INTRAVENOUS at 14:45

## 2024-06-11 RX ADMIN — PHENYLEPHRINE HYDROCHLORIDE 100 MCG: 10 INJECTION INTRAVENOUS at 12:50

## 2024-06-11 RX ADMIN — PROPOFOL 30 MG: 10 INJECTION, EMULSION INTRAVENOUS at 12:28

## 2024-06-11 RX ADMIN — ESMOLOL HYDROCHLORIDE 20 MG: 10 INJECTION, SOLUTION INTRAVENOUS at 15:29

## 2024-06-11 RX ADMIN — HYDROMORPHONE HYDROCHLORIDE 0.5 MG: 2 INJECTION, SOLUTION INTRAMUSCULAR; INTRAVENOUS; SUBCUTANEOUS at 15:38

## 2024-06-11 RX ADMIN — SPIRONOLACTONE 25 MG: 25 TABLET ORAL at 18:25

## 2024-06-11 RX ADMIN — LABETALOL HYDROCHLORIDE 15 MG: 5 INJECTION, SOLUTION INTRAVENOUS at 15:42

## 2024-06-11 RX ADMIN — FENTANYL CITRATE 25 MCG: 50 INJECTION, SOLUTION INTRAMUSCULAR; INTRAVENOUS at 12:13

## 2024-06-11 RX ADMIN — FENTANYL CITRATE 25 MCG: 50 INJECTION, SOLUTION INTRAMUSCULAR; INTRAVENOUS at 12:21

## 2024-06-11 RX ADMIN — FENTANYL CITRATE 50 MCG: 50 INJECTION, SOLUTION INTRAMUSCULAR; INTRAVENOUS at 12:28

## 2024-06-11 RX ADMIN — LABETALOL HYDROCHLORIDE 10 MG: 5 INJECTION, SOLUTION INTRAVENOUS at 15:33

## 2024-06-11 RX ADMIN — Medication 2 PUFF: at 20:01

## 2024-06-11 RX ADMIN — LABETALOL HYDROCHLORIDE 10 MG: 5 INJECTION, SOLUTION INTRAVENOUS at 15:38

## 2024-06-11 RX ADMIN — LABETALOL HYDROCHLORIDE 200 MG: 200 TABLET, FILM COATED ORAL at 21:40

## 2024-06-11 RX ADMIN — LIDOCAINE HYDROCHLORIDE 100 MG: 20 INJECTION, SOLUTION EPIDURAL; INFILTRATION; INTRACAUDAL; PERINEURAL at 12:21

## 2024-06-11 RX ADMIN — PHENYLEPHRINE HYDROCHLORIDE 50 MCG: 10 INJECTION INTRAVENOUS at 12:46

## 2024-06-11 RX ADMIN — Medication 100 MG: at 12:21

## 2024-06-11 ASSESSMENT — PAIN DESCRIPTION - LOCATION
LOCATION: NECK
LOCATION: NECK

## 2024-06-11 ASSESSMENT — PAIN DESCRIPTION - ORIENTATION
ORIENTATION: ANTERIOR
ORIENTATION: ANTERIOR

## 2024-06-11 ASSESSMENT — PAIN SCALES - GENERAL
PAINLEVEL_OUTOF10: 3
PAINLEVEL_OUTOF10: 0
PAINLEVEL_OUTOF10: 7

## 2024-06-11 ASSESSMENT — ENCOUNTER SYMPTOMS: SHORTNESS OF BREATH: 0

## 2024-06-11 ASSESSMENT — PAIN DESCRIPTION - DESCRIPTORS: DESCRIPTORS: DISCOMFORT

## 2024-06-11 ASSESSMENT — PAIN - FUNCTIONAL ASSESSMENT: PAIN_FUNCTIONAL_ASSESSMENT: PREVENTS OR INTERFERES SOME ACTIVE ACTIVITIES AND ADLS

## 2024-06-11 NOTE — PROGRESS NOTES
Pt arrived from OR to PACU. O2 100% on 6L via simple mask. Pt awakens to voice and denies pain at this time. Surgical incision to anterior neck closed with surgical glue, CDI, soft to palpation, slightly rounded, color and temperature appropriate. Ice applied. Will cont to monitor.

## 2024-06-11 NOTE — PROGRESS NOTES
Pt VSS. O2 97% on room air. Pt denies pain at this time. Surgical incision to anterior neck, closed with surgical glue, CDI, soft to palpation, color and temp appropriate. Art line removed without incident, catheter intact. Pt seen by anesthesia. Phase I criteria met, transferring to .

## 2024-06-11 NOTE — H&P
Date of Surgery Update:  Josseline Koenig was seen, history and physical examination reviewed, and patient examined by me today. There have been no significant clinical changes since the completion of the previous history and physical.    The risk, benefits, and alternatives of the proposed procedure have been explained to the patient (or appropriate guardian) and understanding verbalized. All questions answered. Patient wishes to proceed.    Electronically signed by: Cody Durham MD,6/11/2024,11:46 AM

## 2024-06-11 NOTE — ANESTHESIA PRE PROCEDURE
50% with respiration consistent   with elevated RA pressure (8 mmHg).   Estimated pulmonary artery systolic pressure is elevated at 46 mmHg assuming   a right atrial pressure of 8 mmHg.   The right ventricle is normal in size and function.          Neuro/Psych:      (-) seizures, TIA and CVA           GI/Hepatic/Renal:        (-) GERD, liver disease and no renal disease       Endo/Other:    (+) hypothyroidism, blood dyscrasia: anticoagulation therapy, arthritis:..    (-) diabetes mellitus, hyperthyroidism                ROS comment: Multinodular Goiter with some retrosternal extension for thyroidectomy with possible sternotomy for exposure. Abdominal:         (-) obese       Vascular:   + DVT, PE (Eliqius).       Other Findings:             Anesthesia Plan      general     ASA 3     (Eliquis held)  Induction: intravenous.  arterial line  MIPS: Postoperative opioids intended and Prophylactic antiemetics administered.  Anesthetic plan and risks discussed with patient.    Use of blood products discussed with patient whom consented to blood products.    Plan discussed with CRNA.                    Florian Glass MD   6/11/2024

## 2024-06-11 NOTE — OP NOTE
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]    Post-Op Diagnosis: Same       Procedure(s):  TOTAL THYROIDECTOMY, EXCISION OF SUBSTERNAL GOITER TRANSCERVICAL APPROACH    Surgeon(s):  Cody Durham MD    Assistant:   First Assistant: Luca Bermudez    Anesthesia: General    Estimated Blood Loss (mL): less than 100     Complications: None    Specimens:   ID Type Source Tests Collected by Time Destination   A : A) THYROID DOUBLE LOOP AT ISTHMUS, LONG SUTURE INTERIOR POLE, SHORT SUTURE SUPERIOR POLE Tissue Tissue SURGICAL PATHOLOGY Cody Durham MD 6/11/2024 1358    B : B) LEFT THYROID LOBE Tissue Tissue SURGICAL PATHOLOGY Cody Durham MD 6/11/2024 1434    C : C) RULE OUT PARATHYROID Tissue Tissue SURGICAL PATHOLOGY Cody Durham MD 6/11/2024 1438        Implants:  * No implants in log *      Drains:   [REMOVED] Urinary Catheter 06/11/24 Natarajan (Removed)       Findings:  Infection Present At Time Of Surgery (PATOS) (choose all levels that have infection present):  No infection present  Other Findings:  massive bilateral multinodular goiter left > right with large right thyroid nodule with leftward deflection of the trachea and larynx.  Frozen section pathology showed a follicular lesion with no evidence of malignancy, but definite diagnosis deferred to final pathology.  Bilateral RLNs identified and preserved.  Inferior parathyroid glands identified and preserved.     Detailed Description of Procedure:   ***    Electronically signed by Cody Durham MD on 6/11/2024 at 4:05 PM

## 2024-06-11 NOTE — ANESTHESIA POSTPROCEDURE EVALUATION
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    Kelby Phase I: Kelby Score: 6    Kelby Phase II:      Anesthesia Post Evaluation    Patient location during evaluation: PACU  Patient participation: complete - patient participated  Level of consciousness: awake  Airway patency: patent  Nausea & Vomiting: no vomiting  Cardiovascular status: hemodynamically stable  Respiratory status: acceptable  Hydration status: euvolemic  Multimodal analgesia pain management approach    No notable events documented.

## 2024-06-11 NOTE — PROGRESS NOTES
Night of surgery    (S)  Patient stated that she feels good and had little pain.  She feels her voice is good.  She had a little difficulty swallowing solid food and desires soft diet for now.        (O)  Afebrile, VS stable.  Voice:  minimally hoarse.   Incision:  intact, clean, and dry.      ASSESSMENT  Stable post op.      PLAN  Change to soft diet for remainder of hospital stay.  Continue observation.

## 2024-06-11 NOTE — BRIEF OP NOTE
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 Ayesha Toscano  Anesthesia: General    Estimated Blood Loss (mL): less than 100 (one hundred)    Complications: None    Specimens:   ID Type Source Tests Collected by Time Destination   A : A) THYROID DOUBLE LOOP AT ISTHMUS, LONG SUTURE INTERIOR POLE, SHORT SUTURE SUPERIOR POLE Tissue Tissue SURGICAL PATHOLOGY Cody Durham MD 6/11/2024 1358    B : B) LEFT THYROID LOBE Tissue Tissue SURGICAL PATHOLOGY Cody Durham MD 6/11/2024 1434    C : C) RULE OUT PARATHYROID Tissue Tissue SURGICAL PATHOLOGY Cody Durham MD 6/11/2024 1438        Implants:  * No implants in log *      Drains:   Urinary Catheter 06/11/24 Natarajan (Active)       Findings:  Infection Present At Time Of Surgery (PATOS) (choose all levels that have infection present):  No infection present  Other Findings:  massive bilateral multinodular goiter left > right with large right thyroid nodule with leftward deflection of the trachea and larynx.  Frozen section pathology showed a follicular lesion with no evidence of malignancy, but definite diagnosis deferred to final pathology.  Bilateral RLNs identified and preserved.  Inferior parathyroid glands identified and preserved.       Electronically signed by Cody Durham MD on 6/11/2024 at 3:52 PM

## 2024-06-12 VITALS
WEIGHT: 137 LBS | HEART RATE: 76 BPM | SYSTOLIC BLOOD PRESSURE: 119 MMHG | DIASTOLIC BLOOD PRESSURE: 64 MMHG | BODY MASS INDEX: 27.67 KG/M2 | TEMPERATURE: 98.7 F | RESPIRATION RATE: 16 BRPM | OXYGEN SATURATION: 97 %

## 2024-06-12 LAB
ANION GAP SERPL CALCULATED.3IONS-SCNC: 11 MMOL/L (ref 3–16)
BASOPHILS # BLD: 0 K/UL (ref 0–0.2)
BASOPHILS NFR BLD: 0.2 %
BUN SERPL-MCNC: 14 MG/DL (ref 7–20)
CALCIUM SERPL-MCNC: 8.5 MG/DL (ref 8.3–10.6)
CHLORIDE SERPL-SCNC: 106 MMOL/L (ref 99–110)
CO2 SERPL-SCNC: 19 MMOL/L (ref 21–32)
CREAT SERPL-MCNC: 0.9 MG/DL (ref 0.6–1.2)
DEPRECATED RDW RBC AUTO: 17.8 % (ref 12.4–15.4)
EOSINOPHIL # BLD: 0 K/UL (ref 0–0.6)
EOSINOPHIL NFR BLD: 0 %
GFR SERPLBLD CREATININE-BSD FMLA CKD-EPI: 66 ML/MIN/{1.73_M2}
GLUCOSE SERPL-MCNC: 153 MG/DL (ref 70–99)
HCT VFR BLD AUTO: 30.2 % (ref 36–48)
HGB BLD-MCNC: 9.8 G/DL (ref 12–16)
LYMPHOCYTES # BLD: 0.4 K/UL (ref 1–5.1)
LYMPHOCYTES NFR BLD: 2.7 %
MCH RBC QN AUTO: 29.9 PG (ref 26–34)
MCHC RBC AUTO-ENTMCNC: 32.4 G/DL (ref 31–36)
MCV RBC AUTO: 92.4 FL (ref 80–100)
MONOCYTES # BLD: 1.1 K/UL (ref 0–1.3)
MONOCYTES NFR BLD: 8.5 %
NEUTROPHILS # BLD: 11.7 K/UL (ref 1.7–7.7)
NEUTROPHILS NFR BLD: 88.6 %
PLATELET # BLD AUTO: 396 K/UL (ref 135–450)
PMV BLD AUTO: 8.5 FL (ref 5–10.5)
POTASSIUM SERPL-SCNC: 4.6 MMOL/L (ref 3.5–5.1)
RBC # BLD AUTO: 3.27 M/UL (ref 4–5.2)
SODIUM SERPL-SCNC: 136 MMOL/L (ref 136–145)
WBC # BLD AUTO: 13.2 K/UL (ref 4–11)

## 2024-06-12 PROCEDURE — 94640 AIRWAY INHALATION TREATMENT: CPT

## 2024-06-12 PROCEDURE — 2580000003 HC RX 258: Performed by: OTOLARYNGOLOGY

## 2024-06-12 PROCEDURE — 6370000000 HC RX 637 (ALT 250 FOR IP): Performed by: OTOLARYNGOLOGY

## 2024-06-12 PROCEDURE — 36415 COLL VENOUS BLD VENIPUNCTURE: CPT

## 2024-06-12 PROCEDURE — G0378 HOSPITAL OBSERVATION PER HR: HCPCS

## 2024-06-12 PROCEDURE — 94761 N-INVAS EAR/PLS OXIMETRY MLT: CPT

## 2024-06-12 PROCEDURE — 80048 BASIC METABOLIC PNL TOTAL CA: CPT

## 2024-06-12 PROCEDURE — 85025 COMPLETE CBC W/AUTO DIFF WBC: CPT

## 2024-06-12 RX ORDER — LEVOTHYROXINE SODIUM 0.1 MG/1
100 TABLET ORAL DAILY
Qty: 30 TABLET | Refills: 0 | Status: SHIPPED | OUTPATIENT
Start: 2024-06-12

## 2024-06-12 RX ORDER — HYDROCODONE BITARTRATE AND ACETAMINOPHEN 7.5; 325 MG/1; MG/1
1 TABLET ORAL EVERY 6 HOURS PRN
Qty: 24 TABLET | Refills: 0 | Status: SHIPPED | OUTPATIENT
Start: 2024-06-12 | End: 2024-06-18

## 2024-06-12 RX ADMIN — Medication 2 PUFF: at 08:37

## 2024-06-12 RX ADMIN — FOLIC ACID 1000 MCG: 1 TABLET ORAL at 07:45

## 2024-06-12 RX ADMIN — DEXTROSE AND SODIUM CHLORIDE: 5; 450 INJECTION, SOLUTION INTRAVENOUS at 02:44

## 2024-06-12 RX ADMIN — LEVOTHYROXINE SODIUM 100 MCG: 0.1 TABLET ORAL at 06:26

## 2024-06-12 RX ADMIN — LABETALOL HYDROCHLORIDE 200 MG: 200 TABLET, FILM COATED ORAL at 07:45

## 2024-06-12 RX ADMIN — SPIRONOLACTONE 25 MG: 25 TABLET ORAL at 07:45

## 2024-06-12 NOTE — PROGRESS NOTES
Shift assessment complete; pt sitting up in bed eating breakfast. Tolerating well, not having trouble swallowing. States \"my throat feels a little sore\". VSS. Voiding in the bathroom. Call light and belongings within reach. The care plan and education has been reviewed and mutually agreed upon with the patient.

## 2024-06-12 NOTE — CARE COORDINATION
06/12/24 0856   IMM Letter   Observation Status Letter given to Patient/Family/Significant other/Guardian/POA/by: [place] given to pt, explained, signed and copy provided   Notice of Medicare Non-Coverage Letter date given: 06/12/24   Notice of Medicare Non-Coverage Time Given 0855

## 2024-06-12 NOTE — DISCHARGE INSTRUCTIONS
Cody Durham M.D.  [address] [place] [address], [address]  [place] [address]  [phone]    POST OPERATIVE INSTRUCTIONS  TOTAL THYROID LOBECTOMY      The following information should answer most of your questions regarding what to expect and the post operative care following your thyroid surgery.  Please read this through and keep it available through the first 2 weeks after surgery.      INCISION / TISSUE ADHESIVE / HEALING  Your incision was approximated (\"closed\") using dissolving sutures under the skin and tissue adhesive on the skin.  There are no sutures to be removed.  No dressing is needed.  Keep the incision area clean and dry.  The tissue adhesive may become soiled and \"fuzzy\" and this is normal.  Allow the tissue adhesive to separate and come off spontaneously.  Do not try to remove it for the first 14 days after surgery.  If it is still on the skin at 14 days, you may gently remove it.  Healing is 75% completed at about six weeks post op.  Thus, while the incision is essentially healed at six weeks, your incision will continue to heal, change, and mature until about 12 to 15 months after surgery.  Tightness and/or hardness under the skin is normal and will eventually soften and resolve.       HYPOCALCEMIA (low blood calcium)  This is uncommon after thyroidectomy but may occur.  Please watch for the signs of low blood calcium and call the office immediately if these occur.  Signs of hypocalcemia include: numbness or tingling in your fingers, hands, toes or feet, confusion, muscle spasms or cramps, twitching of muscles, your heart speeding up and slowing down or skipping beats.  Call the office if you experience these symptoms.  If severe, go to the nearest emergency room or call 911.      SORE THROAT / PAIN  You may experience throat or neck pain after the procedure.  The severity of this pain will vary.  The pain medication recommended will help, but do not expect to be totally pain free.  Use acetaminophen  greater than 1 cup, call [phone] to have Dr. Durham paged.        FLUIDS  It is extremely important that you drink plenty of fluids, such as non-citrus fruit juices, water, popsicles, ice cream, soda pop, & jello.  If the fluid intake is too low, fever may occur.  Adults should drink at least 16 oz of fluid in every 8 hour period.      DIET   A bland non-spicy diet should be taken for the first two days after surgery.  Liquids and soft diet may be preferred for the first 3 to 5 days.  Soft diet may be preferred for the first 3 to 5 days.  You may resume a regular diet as soon as this is tolerated.        FEVER  A low grade fever up to 101 degrees is common.  If this occurs, the fluid intake should be increased.  Acetaminophen should be used for fever according to the package directions.  If temperature stays over 101.5, despite Tylenol, the please call the doctor.  Do not exceed a total of 4000 mg of acetaminophen per 24 hours,  from any and all sources, including your prescription pain medication, to avoid possible liver damage.      SWELLING  Swelling of the neck is common and expected.  This will usually subside over 5 to 10 days after surgery.   Call the office if this seems excessive or is continually increasing.         SWALLOWING  Difficulty swallowing may occur and will usually subside over 7 to 10 days.      NAUSEA AND VOMITING  If these occur, your nausea control medication will usually help.  If uncontrolled and/or severe, you should call the office.  If markedly severe, call 911 for transport to the ER.        Take only those medications prescribed by Doctor Durham, and your usual prescribed medications, if approved by Doctor Durham.       QUESTIONS?  If you have any questions or have any problems after surgery, please do not hesitate to call our office at [phone].

## 2024-06-12 NOTE — PLAN OF CARE
Problem: Discharge Planning  Goal: Discharge to home or other facility with appropriate resources  Outcome: Progressing  Flowsheets (Taken 6/12/2024 6587)  Discharge to home or other facility with appropriate resources: Identify barriers to discharge with patient and caregiver     Problem: ABCDS Injury Assessment  Goal: Absence of physical injury  6/12/2024 0935 by Amy Armando RN  Outcome: Progressing     Problem: Safety - Adult  Goal: Free from fall injury  Outcome: Progressing     Problem: Pain  Goal: Verbalizes/displays adequate comfort level or baseline comfort level  Outcome: Progressing

## 2024-06-12 NOTE — PROGRESS NOTES
POST OP DAY # 1     (S)  The patient stated that she is doing quite well with very little pain.  She has no difficulty swallowing or breathing.  She feels her voice is normal.        (O)  Afebrile, VS stable.  Voice: Normal.    Neck: Incision intact, clean, and dry, with no evidence of dehiscence or  hematoma.  Flaps are down with no evidence of seroma or hematoma.  Postop serum calcium levels were normal.      ASSESSMENT  Stable post op.      PLAN  Discharge home.  Return to office for postop check in about 1 week.

## 2024-06-18 NOTE — DISCHARGE SUMMARY
Physician Discharge Summary     Patient ID:  Josseline Koenig  [id number]  73 y.o.  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    Consults: none      Treatments: IV hydration and analgesia: Vicodin    Discharge Exam:  See progress note.    Disposition: home    Patient Instructions:   [unfilled]  Activity: activity as tolerated, no lifting, Driving, or Strenuous exercise for 14 days, and no driving while on analgesics  Diet: regular diet  Wound Care: keep wound clean and dry    Follow-up with Dr Durham on 6/19/2024.    Signed:  Cody Durham MD  6/17/2024  11:53 PM

## 2024-06-19 ENCOUNTER — OFFICE VISIT (OUTPATIENT)
Dept: ENT CLINIC | Age: 74
End: 2024-06-19

## 2024-06-19 VITALS
WEIGHT: 141.8 LBS | SYSTOLIC BLOOD PRESSURE: 143 MMHG | BODY MASS INDEX: 28.59 KG/M2 | TEMPERATURE: 98 F | HEIGHT: 59 IN | HEART RATE: 76 BPM | DIASTOLIC BLOOD PRESSURE: 84 MMHG | OXYGEN SATURATION: 98 %

## 2024-06-19 DIAGNOSIS — E89.0 POSTOPERATIVE HYPOTHYROIDISM: ICD-10-CM

## 2024-06-19 DIAGNOSIS — E89.0 STATUS POST TOTAL THYROIDECTOMY: ICD-10-CM

## 2024-06-19 DIAGNOSIS — Z09 POSTOP CHECK: Primary | ICD-10-CM

## 2024-06-19 PROCEDURE — 99024 POSTOP FOLLOW-UP VISIT: CPT | Performed by: OTOLARYNGOLOGY

## 2024-06-19 NOTE — PROGRESS NOTES
POST-OP NOTE  No chief complaint on file.      HISTORY:     POD #8, post total thyroidectomy on 6/11/2024.  She feels her voice is normal.  She denied dysphagia or dyspnea.  She has a little operative pain.  Otherwise, doing well, with no current complaints.      EXAMINATION:        Vitals:    06/19/24 1023   BP: (!) 143/84   Pulse: 76   Temp: 98 °F (36.7 °C)   SpO2: 98%      WDWN, awake and alert, with no evidence of distress.  Voice normal.  INDIRECT LARYNGOSCOPY:  Visualization was excellent, with normally mobile vocal cords with midline approximation on phonation.   Neck: incision intact clean and dry with mild supra-incisional edema and no evidence of infection or dehiscence.        PATHOLOGY:  A.  Right thyroid, hemithyroidectomy:   -Multiple adenomatoid/hyperplastic nodules.   -Negative for malignancy.     B.  Left thyroid, hemithyroidectomy:   -Multiple adenomatoid/hyperplastic nodules.   -Negative for malignancy.     C.  Rule out parathyroid tissue:   -Benign thyroid tissue.         IMPRESSION / DIAGNOSES / ORDERS   Diagnoses and all orders for this visit:    Postop check    Status post total thyroidectomy  -     T4, Free; Future  -     TSH; Future    Postoperative hypothyroidism  -     T4, Free; Future  -     TSH; Future       Doing well postop with no evidence of perioperative complication.      RECOMMENDATIONS / PLAN   Josseline was advised to follow the post-op instructions in the after visit summary given after surgery.  Josseline was advised to continue post-op medications as prescribed.    Return in about 5 weeks (around 7/24/2024) for post op check.

## 2024-07-03 ENCOUNTER — HOSPITAL ENCOUNTER (OUTPATIENT)
Age: 74
Discharge: HOME | End: 2024-07-03
Payer: MEDICARE

## 2024-07-03 DIAGNOSIS — E89.0 STATUS POST TOTAL THYROIDECTOMY: ICD-10-CM

## 2024-07-03 DIAGNOSIS — E89.0 POSTOPERATIVE HYPOTHYROIDISM: ICD-10-CM

## 2024-07-03 LAB
T4 FREE SERPL-MCNC: 1.5 NG/DL (ref 0.9–1.8)
TSH SERPL DL<=0.005 MIU/L-ACNC: 1.88 UIU/ML (ref 0.27–4.2)

## 2024-07-03 PROCEDURE — 84439 ASSAY OF FREE THYROXINE: CPT

## 2024-07-03 PROCEDURE — 36415 COLL VENOUS BLD VENIPUNCTURE: CPT

## 2024-07-03 PROCEDURE — 84443 ASSAY THYROID STIM HORMONE: CPT

## 2024-07-04 DIAGNOSIS — E03.9 ACQUIRED HYPOTHYROIDISM: ICD-10-CM

## 2024-07-05 RX ORDER — LEVOTHYROXINE SODIUM 0.1 MG/1
100 TABLET ORAL DAILY
Qty: 30 TABLET | Refills: 0 | Status: SHIPPED | OUTPATIENT
Start: 2024-07-05

## 2024-07-05 NOTE — TELEPHONE ENCOUNTER
A 90-day prescription is not appropriate at this time.  I will refill for 30 days we will need another set of thyroid function tests in about 4 weeks.

## 2024-07-05 NOTE — TELEPHONE ENCOUNTER
A 90-day prescription is not appropriate at this time.  A 30-day prescription was written on 6/12/2024 and this should last until at least 7/12/2024.  I will refill for another 30-day supply now.  A repeat set of thyroid function tests (free T4, TSH) is needed in about 4 weeks.  Further adjustments of the dosage may be necessary based on these results.

## 2024-07-11 ENCOUNTER — OFFICE VISIT (OUTPATIENT)
Dept: ORTHOPEDIC SURGERY | Age: 74
End: 2024-07-11

## 2024-07-11 VITALS — RESPIRATION RATE: 16 BRPM | WEIGHT: 141 LBS | HEIGHT: 59 IN | BODY MASS INDEX: 28.43 KG/M2

## 2024-07-11 DIAGNOSIS — M17.0 PRIMARY OSTEOARTHRITIS OF BOTH KNEES: Primary | ICD-10-CM

## 2024-07-15 ENCOUNTER — OFFICE VISIT (OUTPATIENT)
Dept: PRIMARY CARE CLINIC | Age: 74
End: 2024-07-15

## 2024-07-15 VITALS
WEIGHT: 141.6 LBS | SYSTOLIC BLOOD PRESSURE: 134 MMHG | TEMPERATURE: 97.2 F | HEIGHT: 59 IN | DIASTOLIC BLOOD PRESSURE: 80 MMHG | BODY MASS INDEX: 28.55 KG/M2 | OXYGEN SATURATION: 95 % | HEART RATE: 63 BPM

## 2024-07-15 DIAGNOSIS — R25.2 MUSCLE CRAMPS: ICD-10-CM

## 2024-07-15 DIAGNOSIS — E03.9 ACQUIRED HYPOTHYROIDISM: ICD-10-CM

## 2024-07-15 DIAGNOSIS — I10 ESSENTIAL HYPERTENSION: Primary | ICD-10-CM

## 2024-07-15 DIAGNOSIS — E89.0 HISTORY OF THYROIDECTOMY: ICD-10-CM

## 2024-07-15 DIAGNOSIS — Z86.711 HISTORY OF PULMONARY EMBOLUS (PE): ICD-10-CM

## 2024-07-15 DIAGNOSIS — M06.00 SERONEGATIVE RHEUMATOID ARTHRITIS (HCC): ICD-10-CM

## 2024-07-15 DIAGNOSIS — R73.03 PREDIABETES: ICD-10-CM

## 2024-07-15 DIAGNOSIS — Z78.0 ASYMPTOMATIC MENOPAUSE: ICD-10-CM

## 2024-07-15 PROBLEM — E04.2 MULTINODULAR NON-TOXIC GOITER: Chronic | Status: RESOLVED | Noted: 2024-05-01 | Resolved: 2024-07-15

## 2024-07-15 PROBLEM — E04.9 SUBSTERNAL GOITER: Chronic | Status: RESOLVED | Noted: 2024-06-11 | Resolved: 2024-07-15

## 2024-07-15 RX ORDER — FOLIC ACID 1 MG/1
1000 TABLET ORAL DAILY
Qty: 90 TABLET | Refills: 3 | Status: SHIPPED | OUTPATIENT
Start: 2024-07-15

## 2024-07-15 SDOH — ECONOMIC STABILITY: FOOD INSECURITY: WITHIN THE PAST 12 MONTHS, THE FOOD YOU BOUGHT JUST DIDN'T LAST AND YOU DIDN'T HAVE MONEY TO GET MORE.: NEVER TRUE

## 2024-07-15 SDOH — ECONOMIC STABILITY: FOOD INSECURITY: WITHIN THE PAST 12 MONTHS, YOU WORRIED THAT YOUR FOOD WOULD RUN OUT BEFORE YOU GOT MONEY TO BUY MORE.: NEVER TRUE

## 2024-07-15 SDOH — ECONOMIC STABILITY: INCOME INSECURITY: HOW HARD IS IT FOR YOU TO PAY FOR THE VERY BASICS LIKE FOOD, HOUSING, MEDICAL CARE, AND HEATING?: NOT HARD AT ALL

## 2024-07-15 ASSESSMENT — PATIENT HEALTH QUESTIONNAIRE - PHQ9
SUM OF ALL RESPONSES TO PHQ QUESTIONS 1-9: 0
1. LITTLE INTEREST OR PLEASURE IN DOING THINGS: NOT AT ALL
SUM OF ALL RESPONSES TO PHQ QUESTIONS 1-9: 0
SUM OF ALL RESPONSES TO PHQ9 QUESTIONS 1 & 2: 0
2. FEELING DOWN, DEPRESSED OR HOPELESS: NOT AT ALL

## 2024-07-15 NOTE — PROGRESS NOTES
Josseline Koenig (:  1950) is a 73 y.o. female,Established patient, here for evaluation of the following chief complaint(s):  Medication Check and Blood Pressure Check      Assessment & Plan   ASSESSMENT/PLAN:   Diagnosis Orders   1. Essential hypertension controlled after nephrology increased spironolactone to two 25 mg tablets daily. Also taking labetalol 200 mg bid.  No swelling since not taking calcium channel blocker.  Continue current regimen.      Latest Ref Rng & Units 2024     5:09 AM 2024     8:51 AM 2024     3:22 PM 3/7/2024    11:48 AM 2024     9:08 AM   Labs Renal   BUN 7 - 20 mg/dL 14  16  12  17  11    Cr 0.6 - 1.2 mg/dL 0.9  0.9  0.7  0.7  0.7    K 3.5 - 5.1 mmol/L 4.6  4.1  4.1  3.7  3.6    Na 136 - 145 mmol/L 136  136  140  136  139        BP Readings from Last 3 Encounters:   07/15/24 134/80   24 (!) 142/76   24 (!) 143/84      Renal Function Panel      2. Prediabetes  controlled on diet, will monitor A1c and fructosamine level.  Lab Results   Component Value Date    LABA1C 5.1 10/31/2023    LABA1C 5.8 2023    LABA1C 5.7 2022     Lab Results   Component Value Date    GLUF 98 2013    MALBCR see below 2024    CREATININE 0.9 2024            3. Seronegative rheumatoid arthritis (HCC)  much improved under care of rheumatologist Dr. Lozano and weekly methotrexate 25 mg  SQ weekly.  Folic acid 1 mg qd. Continue under care of rheumatologist. folic acid (FOLVITE) 1 MG tablet      4. History of pulmonary embolus (PE) , unprovoked 91626:  Positive for pulmonary embolism within all the lobes except the left lower  lobe.     Equivocal right heart strain with the right ventricle a few mm larger than  the left.  However, other signs of right heart strain are not seen.  On lifetime eliquis, tolerating well without signs of bleeding or clotting. apixaban (ELIQUIS) 5 MG TABS tablet      5. Acquired hypothyroidism prior to total thyroidectomy for

## 2024-07-18 ENCOUNTER — OFFICE VISIT (OUTPATIENT)
Dept: ORTHOPEDIC SURGERY | Age: 74
End: 2024-07-18
Payer: MEDICARE

## 2024-07-18 VITALS — BODY MASS INDEX: 28.6 KG/M2 | HEIGHT: 59 IN

## 2024-07-18 DIAGNOSIS — M17.0 PRIMARY OSTEOARTHRITIS OF BOTH KNEES: Primary | ICD-10-CM

## 2024-07-18 PROCEDURE — 20610 DRAIN/INJ JOINT/BURSA W/O US: CPT | Performed by: PHYSICIAN ASSISTANT

## 2024-07-18 NOTE — PROGRESS NOTES
This dictation was done with [employer] dictation and may contain mechanical errors related to translation.  Height 1.499 m (4' 11\"), not currently breastfeeding.    This is a very pleasant 73-year-old female who has some ongoing pain with osteoarthritis.  Its progressively worsening over the years and at this point she is in the middle of a Orthovisc solution injection series.  She had her first without adverse effect in the last week.  At today's visit she has 1+ edema and crepitus during lection extension through the patellofemoral joint.    My impression is bilateral knee osteoarthritis.    We talked about short and long-term expectations at this point we injected her with the prepackaged amount of Orthovisc into the left and right intra-articular knee joint spaces and she tolerated well we talked about postinjection activities and we will see her back in 1 week

## 2024-07-21 PROBLEM — R25.2 MUSCLE CRAMPS: Status: ACTIVE | Noted: 2024-07-21

## 2024-07-21 PROBLEM — E83.52 HYPERCALCEMIA: Status: RESOLVED | Noted: 2022-10-18 | Resolved: 2024-07-21

## 2024-07-21 PROBLEM — E78.2 MIXED HYPERLIPIDEMIA: Status: RESOLVED | Noted: 2022-10-18 | Resolved: 2024-07-21

## 2024-07-21 PROBLEM — Z78.0 ASYMPTOMATIC MENOPAUSE: Status: ACTIVE | Noted: 2024-07-21

## 2024-07-24 ENCOUNTER — OFFICE VISIT (OUTPATIENT)
Dept: ENT CLINIC | Age: 74
End: 2024-07-24

## 2024-07-24 VITALS
WEIGHT: 140 LBS | OXYGEN SATURATION: 99 % | HEIGHT: 59 IN | BODY MASS INDEX: 28.22 KG/M2 | SYSTOLIC BLOOD PRESSURE: 128 MMHG | HEART RATE: 67 BPM | TEMPERATURE: 97.9 F | DIASTOLIC BLOOD PRESSURE: 81 MMHG

## 2024-07-24 DIAGNOSIS — Z09 POSTOP CHECK: Primary | ICD-10-CM

## 2024-07-24 DIAGNOSIS — E89.0 STATUS POST TOTAL THYROIDECTOMY: ICD-10-CM

## 2024-07-24 PROCEDURE — 99024 POSTOP FOLLOW-UP VISIT: CPT | Performed by: OTOLARYNGOLOGY

## 2024-07-24 NOTE — PROGRESS NOTES
POST-OP NOTE  Chief Complaint   Patient presents with    Post-Op Check       HISTORY:     POD #43, post total thyroidectomy on 6/11/2024.  Patient reported that she feels tightness in the neck and feels al little liump in the neck at the operative site.  \"My voice is fine.\"  Denied dysphagia and dyspnea.  Otherwise, doing well, with no current complaints.        EXAMINATION:        Vitals:    07/24/24 0926   BP: 128/81   Pulse: 67   Temp: 97.9 °F (36.6 °C)   SpO2: 99%      WDWN, awake and alert, with no evidence of distress.  Voice: Normal.  Neck: Flaps were down with no evidence of seroma or hematoma.  There was mild talisha-incisional edema with no tenderness and with minimal subcutaneous induration.  The incision was intact with the skin adhesive in place.  There was no evidence of infection or drainage.      IMPRESSION / DIAGNOSES / ORDERS   Josseline was seen today for post-op check.    Diagnoses and all orders for this visit:    Postop check    Status post total thyroidectomy    Doing well postop with no evidence of perioperative complication.      RECOMMENDATIONS / PLAN   Follow up with Dr Maki regarding monitoring of  thyroid function and adjustments of dosage of thyroid medication.    Return if any problems related to the thyroid surgery or, for any further ENT or sinus problems or symptoms.

## 2024-07-25 ENCOUNTER — OFFICE VISIT (OUTPATIENT)
Dept: ORTHOPEDIC SURGERY | Age: 74
End: 2024-07-25
Payer: MEDICARE

## 2024-07-25 DIAGNOSIS — M17.0 PRIMARY OSTEOARTHRITIS OF BOTH KNEES: Primary | ICD-10-CM

## 2024-07-25 PROCEDURE — 20610 DRAIN/INJ JOINT/BURSA W/O US: CPT | Performed by: PHYSICIAN ASSISTANT

## 2024-07-25 NOTE — PROGRESS NOTES
This dictation was done with [employer] dictation and may contain mechanical errors related to translation.  not currently breastfeeding.    This is a very pleasant 73-year-old female who has had some success with the first 2 injections of Orthovisc for the osteoarthritis in both of her knees.  The osteoarthritis pain is decreased she still some swelling and soreness and some crepitus during flexion extension but walks well without antalgia neurologically is intact at the feet with good symmetric motion to the hip.    Bilateral knee osteoarthritis    With her consent she was injected with the prepackaged amount of Orthovisc into the left and the right intra-articular knee joint spaces she tolerated well we talked about postinjection activities and we will see her in follow-up in 2 to 3 months.

## 2024-07-27 DIAGNOSIS — E03.9 ACQUIRED HYPOTHYROIDISM: ICD-10-CM

## 2024-07-29 RX ORDER — LEVOTHYROXINE SODIUM 0.1 MG/1
100 TABLET ORAL DAILY
Qty: 90 TABLET | Refills: 1 | OUTPATIENT
Start: 2024-07-29

## 2024-07-31 DIAGNOSIS — E03.9 ACQUIRED HYPOTHYROIDISM: ICD-10-CM

## 2024-07-31 RX ORDER — LEVOTHYROXINE SODIUM 0.1 MG/1
100 TABLET ORAL DAILY
Qty: 30 TABLET | Refills: 0 | OUTPATIENT
Start: 2024-07-31

## 2024-08-01 ENCOUNTER — HOSPITAL ENCOUNTER (OUTPATIENT)
Age: 74
Discharge: HOME | End: 2024-08-01
Attending: INTERNAL MEDICINE
Payer: MEDICARE

## 2024-08-01 ENCOUNTER — HOSPITAL ENCOUNTER (OUTPATIENT)
Dept: GENERAL RADIOLOGY | Age: 74
Discharge: HOME | End: 2024-08-01
Attending: INTERNAL MEDICINE
Payer: MEDICARE

## 2024-08-01 DIAGNOSIS — E89.0 HISTORY OF THYROIDECTOMY: ICD-10-CM

## 2024-08-01 DIAGNOSIS — Z78.0 ASYMPTOMATIC MENOPAUSE: ICD-10-CM

## 2024-08-01 DIAGNOSIS — R25.2 MUSCLE CRAMPS: ICD-10-CM

## 2024-08-01 LAB
ALBUMIN SERPL-MCNC: 4.5 G/DL (ref 3.4–5)
ANION GAP SERPL CALCULATED.3IONS-SCNC: 11 MMOL/L (ref 3–16)
BUN SERPL-MCNC: 22 MG/DL (ref 7–20)
CALCIUM SERPL-MCNC: 9.8 MG/DL (ref 8.3–10.6)
CHLORIDE SERPL-SCNC: 102 MMOL/L (ref 99–110)
CO2 SERPL-SCNC: 26 MMOL/L (ref 21–32)
CREAT SERPL-MCNC: 1 MG/DL (ref 0.6–1.2)
GFR SERPLBLD CREATININE-BSD FMLA CKD-EPI: 58 ML/MIN/{1.73_M2}
GLUCOSE SERPL-MCNC: 71 MG/DL (ref 70–99)
MAGNESIUM SERPL-MCNC: 2.1 MG/DL (ref 1.8–2.4)
PHOSPHATE SERPL-MCNC: 3.9 MG/DL (ref 2.5–4.9)
POTASSIUM SERPL-SCNC: 4.4 MMOL/L (ref 3.5–5.1)
SODIUM SERPL-SCNC: 139 MMOL/L (ref 136–145)

## 2024-08-01 PROCEDURE — 83735 ASSAY OF MAGNESIUM: CPT

## 2024-08-01 PROCEDURE — 80069 RENAL FUNCTION PANEL: CPT

## 2024-08-01 PROCEDURE — 36415 COLL VENOUS BLD VENIPUNCTURE: CPT

## 2024-08-01 PROCEDURE — 77080 DXA BONE DENSITY AXIAL: CPT

## 2024-08-02 DIAGNOSIS — M81.0 POSTMENOPAUSAL OSTEOPOROSIS: Primary | ICD-10-CM

## 2024-08-02 DIAGNOSIS — E86.0 DEHYDRATION: Primary | ICD-10-CM

## 2024-08-02 RX ORDER — ALENDRONATE SODIUM 70 MG/1
70 TABLET ORAL
Qty: 14 TABLET | Refills: 3 | Status: SHIPPED | OUTPATIENT
Start: 2024-08-02

## 2024-08-02 NOTE — RESULT ENCOUNTER NOTE
The magnesium level was normal.  The blood test shows mild dehydration so drink more water and I should repeat the renal blood tests in 1 month.  Will place order so you can go to the lab then I will also get a urine for protein at that time.  Try to get in 8 glasses of water a day.

## 2024-08-02 NOTE — RESULT ENCOUNTER NOTE
The bone density test shows osteoporosis.  I want you to be protected from bone fractures in the future.  Start taking over-the-counter Caltrate D with 600 mg of calcium and 800 units of vitamin D3.  Take 1 twice a day with lunch and dinner.  You can purchase the chewable form.    I want you to start on Fosamax weekly that will help build your bone density and reduce your future risk from fractures.  You take 1 tablet first thing in the morning on empty stomach with 8 ounces of water.  Do not eat or drink any beverage other than water and do not lie down for 30 minutes.  After that you can eat and take the rest of your medications.  The Fosamax should be taken weekly.  We should repeat your bone density in a year.

## 2024-08-17 ENCOUNTER — PATIENT MESSAGE (OUTPATIENT)
Dept: PRIMARY CARE CLINIC | Age: 74
End: 2024-08-17

## 2024-08-17 DIAGNOSIS — E03.9 ACQUIRED HYPOTHYROIDISM: ICD-10-CM

## 2024-08-19 NOTE — TELEPHONE ENCOUNTER
Medication:   Requested Prescriptions     Pending Prescriptions Disp Refills    levothyroxine (SYNTHROID) 100 MCG tablet 90 tablet 1     Sig: Take 1 tablet by mouth daily        Last Filled:      Patient Phone Number: [phone] (home)     Last appt: 7/15/2024   Next appt: 10/15/2024    Last OARRS:        No data to display

## 2024-08-20 RX ORDER — LEVOTHYROXINE SODIUM 0.1 MG/1
100 TABLET ORAL DAILY
Qty: 90 TABLET | Refills: 1 | Status: SHIPPED | OUTPATIENT
Start: 2024-08-20

## 2024-10-04 ENCOUNTER — HOSPITAL ENCOUNTER (OUTPATIENT)
Age: 74
Discharge: HOME | End: 2024-10-04
Payer: MEDICARE

## 2024-10-04 DIAGNOSIS — N28.9 ABNORMAL RENAL FUNCTION FINDING: Primary | ICD-10-CM

## 2024-10-04 DIAGNOSIS — N28.9 ABNORMAL RENAL FUNCTION FINDING: ICD-10-CM

## 2024-10-04 DIAGNOSIS — R73.03 PREDIABETES: ICD-10-CM

## 2024-10-04 DIAGNOSIS — E86.0 DEHYDRATION: ICD-10-CM

## 2024-10-04 DIAGNOSIS — R25.2 MUSCLE CRAMPS: ICD-10-CM

## 2024-10-04 LAB
ALBUMIN SERPL-MCNC: 4.3 G/DL (ref 3.4–5)
ALBUMIN/GLOB SERPL: 1.8 {RATIO} (ref 1.1–2.2)
ALP SERPL-CCNC: 50 U/L (ref 40–129)
ALT SERPL-CCNC: 12 U/L (ref 10–40)
ANION GAP SERPL CALCULATED.3IONS-SCNC: 12 MMOL/L (ref 3–16)
AST SERPL-CCNC: 19 U/L (ref 15–37)
BASOPHILS # BLD: 0 K/UL (ref 0–0.2)
BASOPHILS NFR BLD: 0.6 %
BILIRUB SERPL-MCNC: 0.7 MG/DL (ref 0–1)
BUN SERPL-MCNC: 21 MG/DL (ref 7–20)
CALCIUM SERPL-MCNC: 10.4 MG/DL (ref 8.3–10.6)
CHLORIDE SERPL-SCNC: 106 MMOL/L (ref 99–110)
CO2 SERPL-SCNC: 23 MMOL/L (ref 21–32)
CREAT SERPL-MCNC: 1.4 MG/DL (ref 0.6–1.2)
CRP SERPL-MCNC: <3 MG/L (ref 0–5.1)
DEPRECATED RDW RBC AUTO: 16.1 % (ref 12.4–15.4)
EOSINOPHIL # BLD: 0.1 K/UL (ref 0–0.6)
EOSINOPHIL NFR BLD: 1.8 %
ERYTHROCYTE [SEDIMENTATION RATE] IN BLOOD BY WESTERGREN METHOD: 18 MM/HR (ref 0–30)
GFR SERPLBLD CREATININE-BSD FMLA CKD-EPI: 39 ML/MIN/{1.73_M2}
GLUCOSE SERPL-MCNC: 80 MG/DL (ref 70–99)
HCT VFR BLD AUTO: 32.4 % (ref 36–48)
HGB BLD-MCNC: 10.9 G/DL (ref 12–16)
LYMPHOCYTES # BLD: 0.7 K/UL (ref 1–5.1)
LYMPHOCYTES NFR BLD: 12 %
MCH RBC QN AUTO: 31.5 PG (ref 26–34)
MCHC RBC AUTO-ENTMCNC: 33.7 G/DL (ref 31–36)
MCV RBC AUTO: 93.3 FL (ref 80–100)
MONOCYTES # BLD: 0.2 K/UL (ref 0–1.3)
MONOCYTES NFR BLD: 3.7 %
NEUTROPHILS # BLD: 5 K/UL (ref 1.7–7.7)
NEUTROPHILS NFR BLD: 81.9 %
PHOSPHATE SERPL-MCNC: 2.9 MG/DL (ref 2.5–4.9)
PLATELET # BLD AUTO: 479 K/UL (ref 135–450)
PMV BLD AUTO: 9 FL (ref 5–10.5)
POTASSIUM SERPL-SCNC: 4.5 MMOL/L (ref 3.5–5.1)
PROT SERPL-MCNC: 6.7 G/DL (ref 6.4–8.2)
PTH-INTACT SERPL-MCNC: 28.2 PG/ML (ref 14–72)
RBC # BLD AUTO: 3.47 M/UL (ref 4–5.2)
SODIUM SERPL-SCNC: 141 MMOL/L (ref 136–145)
WBC # BLD AUTO: 6.1 K/UL (ref 4–11)

## 2024-10-04 PROCEDURE — 82985 ASSAY OF GLYCATED PROTEIN: CPT

## 2024-10-04 PROCEDURE — 85025 COMPLETE CBC W/AUTO DIFF WBC: CPT

## 2024-10-04 PROCEDURE — 80053 COMPREHEN METABOLIC PANEL: CPT

## 2024-10-04 PROCEDURE — 83036 HEMOGLOBIN GLYCOSYLATED A1C: CPT

## 2024-10-04 PROCEDURE — 86140 C-REACTIVE PROTEIN: CPT

## 2024-10-04 PROCEDURE — 36415 COLL VENOUS BLD VENIPUNCTURE: CPT

## 2024-10-04 PROCEDURE — 83970 ASSAY OF PARATHORMONE: CPT

## 2024-10-04 PROCEDURE — 84100 ASSAY OF PHOSPHORUS: CPT

## 2024-10-04 PROCEDURE — 85652 RBC SED RATE AUTOMATED: CPT

## 2024-10-05 LAB
EST. AVERAGE GLUCOSE BLD GHB EST-MCNC: 111.2 MG/DL
HBA1C MFR BLD: 5.5 %

## 2024-10-06 LAB — FRUCTOSAMINE SERPL-SCNC: 251 UMOL/L (ref 205–285)

## 2024-10-09 ENCOUNTER — HOSPITAL ENCOUNTER (OUTPATIENT)
Age: 74
Discharge: HOME | End: 2024-10-09
Payer: MEDICARE

## 2024-10-09 LAB
BACTERIA URNS QL MICRO: NORMAL /HPF
BILIRUB UR QL STRIP.AUTO: NEGATIVE
BUN SERPL-MCNC: 19 MG/DL (ref 7–20)
CLARITY UR: CLEAR
COLOR UR: YELLOW
CREAT SERPL-MCNC: 1.2 MG/DL (ref 0.6–1.2)
EPI CELLS #/AREA URNS AUTO: 1 /HPF (ref 0–5)
GFR SERPLBLD CREATININE-BSD FMLA CKD-EPI: 47 ML/MIN/{1.73_M2}
GLUCOSE UR STRIP.AUTO-MCNC: NEGATIVE MG/DL
HGB UR QL STRIP.AUTO: NEGATIVE
HYALINE CASTS #/AREA URNS AUTO: 0 /LPF (ref 0–8)
KETONES UR STRIP.AUTO-MCNC: NEGATIVE MG/DL
LEUKOCYTE ESTERASE UR QL STRIP.AUTO: ABNORMAL
NITRITE UR QL STRIP.AUTO: NEGATIVE
PH UR STRIP.AUTO: 6 [PH] (ref 5–8)
PROT UR STRIP.AUTO-MCNC: NEGATIVE MG/DL
RBC CLUMPS #/AREA URNS AUTO: 2 /HPF (ref 0–4)
SP GR UR STRIP.AUTO: 1.01 (ref 1–1.03)
UA DIPSTICK W REFLEX MICRO PNL UR: YES
URN SPEC COLLECT METH UR: ABNORMAL
UROBILINOGEN UR STRIP-ACNC: 1 E.U./DL
WBC #/AREA URNS AUTO: 3 /HPF (ref 0–5)

## 2024-10-09 PROCEDURE — 81001 URINALYSIS AUTO W/SCOPE: CPT

## 2024-10-09 PROCEDURE — 82565 ASSAY OF CREATININE: CPT

## 2024-10-09 PROCEDURE — 84520 ASSAY OF UREA NITROGEN: CPT

## 2024-10-10 NOTE — RESULT ENCOUNTER NOTE
Give patient an appointment to come in to be further evaluated for  slightly reduced kidney function to get further testing.  This

## 2024-10-15 ENCOUNTER — OFFICE VISIT (OUTPATIENT)
Dept: PRIMARY CARE CLINIC | Age: 74
End: 2024-10-15
Payer: MEDICARE

## 2024-10-15 VITALS
SYSTOLIC BLOOD PRESSURE: 122 MMHG | DIASTOLIC BLOOD PRESSURE: 66 MMHG | OXYGEN SATURATION: 97 % | BODY MASS INDEX: 26.93 KG/M2 | HEIGHT: 60 IN | TEMPERATURE: 97.2 F | RESPIRATION RATE: 16 BRPM | WEIGHT: 137.2 LBS | HEART RATE: 68 BPM

## 2024-10-15 DIAGNOSIS — N18.31 STAGE 3A CHRONIC KIDNEY DISEASE (HCC): ICD-10-CM

## 2024-10-15 DIAGNOSIS — M81.0 POSTMENOPAUSAL OSTEOPOROSIS: ICD-10-CM

## 2024-10-15 DIAGNOSIS — M25.561 ACUTE PAIN OF RIGHT KNEE: Primary | ICD-10-CM

## 2024-10-15 DIAGNOSIS — N17.9 AKI (ACUTE KIDNEY INJURY) (HCC): ICD-10-CM

## 2024-10-15 DIAGNOSIS — S60.552S FOREIGN BODY OF LEFT HAND, SEQUELA: ICD-10-CM

## 2024-10-15 PROCEDURE — 1123F ACP DISCUSS/DSCN MKR DOCD: CPT | Performed by: INTERNAL MEDICINE

## 2024-10-15 PROCEDURE — 99214 OFFICE O/P EST MOD 30 MIN: CPT | Performed by: INTERNAL MEDICINE

## 2024-10-15 PROCEDURE — 3074F SYST BP LT 130 MM HG: CPT | Performed by: INTERNAL MEDICINE

## 2024-10-15 PROCEDURE — 3078F DIAST BP <80 MM HG: CPT | Performed by: INTERNAL MEDICINE

## 2024-10-15 RX ORDER — ALENDRONATE SODIUM 70 MG/1
70 TABLET ORAL
Qty: 12.85 TABLET | Refills: 3 | Status: SHIPPED | OUTPATIENT
Start: 2024-10-15

## 2024-10-15 RX ORDER — DAPAGLIFLOZIN 10 MG/1
10 TABLET, FILM COATED ORAL EVERY MORNING
Qty: 90 TABLET | Refills: 1 | Status: SHIPPED | OUTPATIENT
Start: 2024-10-15

## 2024-10-15 SDOH — ECONOMIC STABILITY: FOOD INSECURITY: WITHIN THE PAST 12 MONTHS, YOU WORRIED THAT YOUR FOOD WOULD RUN OUT BEFORE YOU GOT MONEY TO BUY MORE.: NEVER TRUE

## 2024-10-15 SDOH — ECONOMIC STABILITY: FOOD INSECURITY: WITHIN THE PAST 12 MONTHS, THE FOOD YOU BOUGHT JUST DIDN'T LAST AND YOU DIDN'T HAVE MONEY TO GET MORE.: NEVER TRUE

## 2024-10-15 SDOH — ECONOMIC STABILITY: INCOME INSECURITY: HOW HARD IS IT FOR YOU TO PAY FOR THE VERY BASICS LIKE FOOD, HOUSING, MEDICAL CARE, AND HEATING?: NOT HARD AT ALL

## 2024-10-15 ASSESSMENT — PATIENT HEALTH QUESTIONNAIRE - PHQ9
SUM OF ALL RESPONSES TO PHQ QUESTIONS 1-9: 0
1. LITTLE INTEREST OR PLEASURE IN DOING THINGS: NOT AT ALL
2. FEELING DOWN, DEPRESSED OR HOPELESS: NOT AT ALL
SUM OF ALL RESPONSES TO PHQ QUESTIONS 1-9: 0
SUM OF ALL RESPONSES TO PHQ QUESTIONS 1-9: 0
SUM OF ALL RESPONSES TO PHQ9 QUESTIONS 1 & 2: 0
SUM OF ALL RESPONSES TO PHQ QUESTIONS 1-9: 0

## 2024-10-15 NOTE — PROGRESS NOTES
Josseline Koenig (:  1950) is a 73 y.o. female,Established patient, here for evaluation of the following chief complaint(s):  Hypertension and Results      Assessment & Plan   ASSESSMENT/PLAN:  1. Acute pain of right knee without injury.  Over all joint pain much better with RA treatment with weekly methotrexate.  Will xray and return to ortho due to instability of knee.  Patient is seeing Dr. Crow Jenkins/ BRADFORD Cotton at [facility].  -     XR KNEE RIGHT (3 VIEWS); Future  -     CBC with Auto Differential; Future  2. Postmenopausal osteoporosis: reviewed dexa scan results from 2024 that showed osteoporosis.  Patient is to continue calcium supplement with vitamin D 600 mg twice a day and will start Fosamax and risk benefits discussed.  -     alendronate (FOSAMA renal ultrasound to rule out any obstructive uropathy.  X) 70 MG tablet; Take 1 tablet by mouth every 7 days, Disp-12.85 tablet, R-3Normal  3. KARON (acute kidney injury) (HCC) GFR is 47.  It is high enough for Fosamax therapy.  Avoid all NSAIDs check uric acid level and  -     Uric Acid; Future  -     US RENAL COMPLETE; Future  -     Renal Function Panel; Future  4. Stage 3a chronic kidney disease (HCC) , patient instructed to stop all NSAID's.  Will start farxiga.      Latest Ref Rng & Units 10/9/2024     1:52 PM 10/4/2024    10:12 AM 2024     9:20 AM 2024     5:09 AM 2024     8:51 AM   Labs Renal   BUN 7 - 20 mg/dL 19  21  22  14  16    Cr 0.6 - 1.2 mg/dL 1.2  1.4  1.0  0.9  0.9    K 3.5 - 5.1 mmol/L  4.5  4.4  4.6  4.1    Na 136 - 145 mmol/L  141  139  136  136       -     FARXIGA 10 MG tablet; Take 1 tablet by mouth every morning, Disp-90 tablet, R-1, DAWNormal  -     Renal Function Panel; Future  5. Foreign body of left hand, sequela, chronic shows up on xray.  Intermittently tender, does not appear infected. Refer to hand surgeon to have removed.  -     Lamine Armstrong MD, Hand Surgery (Hand, Wrist, Upper Extremity),

## 2024-10-17 ENCOUNTER — HOSPITAL ENCOUNTER (OUTPATIENT)
Dept: ULTRASOUND IMAGING | Age: 74
Discharge: HOME | End: 2024-10-17
Attending: INTERNAL MEDICINE
Payer: MEDICARE

## 2024-10-17 DIAGNOSIS — N17.9 AKI (ACUTE KIDNEY INJURY) (HCC): ICD-10-CM

## 2024-10-17 PROCEDURE — 76770 US EXAM ABDO BACK WALL COMP: CPT

## 2024-10-27 SDOH — HEALTH STABILITY: PHYSICAL HEALTH: ON AVERAGE, HOW MANY DAYS PER WEEK DO YOU ENGAGE IN MODERATE TO STRENUOUS EXERCISE (LIKE A BRISK WALK)?: 1 DAY

## 2024-10-27 SDOH — HEALTH STABILITY: PHYSICAL HEALTH: ON AVERAGE, HOW MANY MINUTES DO YOU ENGAGE IN EXERCISE AT THIS LEVEL?: 30 MIN

## 2024-10-28 ENCOUNTER — OFFICE VISIT (OUTPATIENT)
Dept: ORTHOPEDIC SURGERY | Age: 74
End: 2024-10-28
Payer: MEDICARE

## 2024-10-28 VITALS — HEIGHT: 59 IN | WEIGHT: 137 LBS | RESPIRATION RATE: 16 BRPM | BODY MASS INDEX: 27.62 KG/M2

## 2024-10-28 DIAGNOSIS — M79.645 BILATERAL THUMB PAIN: Primary | ICD-10-CM

## 2024-10-28 DIAGNOSIS — M79.644 BILATERAL THUMB PAIN: Primary | ICD-10-CM

## 2024-10-28 PROCEDURE — 1159F MED LIST DOCD IN RCRD: CPT | Performed by: PHYSICIAN ASSISTANT

## 2024-10-28 PROCEDURE — L3924 HFO WITHOUT JOINTS PRE OTS: HCPCS | Performed by: PHYSICIAN ASSISTANT

## 2024-10-28 PROCEDURE — 1123F ACP DISCUSS/DSCN MKR DOCD: CPT | Performed by: PHYSICIAN ASSISTANT

## 2024-10-28 PROCEDURE — 99204 OFFICE O/P NEW MOD 45 MIN: CPT | Performed by: PHYSICIAN ASSISTANT

## 2024-10-28 NOTE — PROGRESS NOTES
injury or bony fracture.      Impression:  Ms. 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    Plan:  I have had a thorough discussion with Ms. Josseline Koenig regarding the treatment options available for her initially presenting bilateral Thumb Basilar Joint osteoarthritis, which is causing her significant symptoms and difficulty.  I have outlined for Ms. Josseline Koenig the risk, benefits and consequences of the various treatment modalities, including a reasonable expectation for the long term success of each.  We have discussed the likelihood that further more aggressive treatment may be required for her current presenting condition.  Based upon our current discussion and a reasonable understating of the options available to her, Ms. Josseline Koenig has selected to proceed with a conservative plan of treatment consisting of: the use of protective splints, activity modification, and the judicious use of over-the-counter anti-inflammatory medications if allowed by her primary care physician.  An appropriately sized Neoprene Hand-based Thumb-Spica orthosis was provided.  Instructions were given regarding splint use and wear as well as suggestions for use of the other modalities were discussed.  I have clearly explained to her that the above outlined treatment plan should not be expected to 'cure' her arthritic condition, but we are rather treating the symptoms with which she presents.  She has understood that in order to achieve more durable relief of her symptoms and to prevent future worsening or further damage, that definitive surgical treatment would be required. Ms. Josseline Koenig  voiced an appropriate understanding of our discussion, the options available to her, and of the expectations of her selected  treatment.        Procedures    Sophia Cisneros CMC Controller

## 2024-10-29 ENCOUNTER — HOSPITAL ENCOUNTER (OUTPATIENT)
Age: 74
Discharge: HOME | End: 2024-10-29
Payer: MEDICARE

## 2024-10-29 ENCOUNTER — HOSPITAL ENCOUNTER (OUTPATIENT)
Dept: GENERAL RADIOLOGY | Age: 74
Discharge: HOME | End: 2024-10-29
Payer: MEDICARE

## 2024-10-29 DIAGNOSIS — N17.9 AKI (ACUTE KIDNEY INJURY) (HCC): ICD-10-CM

## 2024-10-29 DIAGNOSIS — N18.31 STAGE 3A CHRONIC KIDNEY DISEASE (HCC): ICD-10-CM

## 2024-10-29 DIAGNOSIS — M25.561 ACUTE PAIN OF RIGHT KNEE: ICD-10-CM

## 2024-10-29 LAB
ALBUMIN SERPL-MCNC: 4.5 G/DL (ref 3.4–5)
ANION GAP SERPL CALCULATED.3IONS-SCNC: 12 MMOL/L (ref 3–16)
BACTERIA URNS QL MICRO: ABNORMAL /HPF
BASOPHILS # BLD: 0 K/UL (ref 0–0.2)
BASOPHILS NFR BLD: 0.6 %
BILIRUB UR QL STRIP.AUTO: NEGATIVE
BUN SERPL-MCNC: 15 MG/DL (ref 7–20)
CALCIUM SERPL-MCNC: 10.2 MG/DL (ref 8.3–10.6)
CHLORIDE SERPL-SCNC: 106 MMOL/L (ref 99–110)
CLARITY UR: CLEAR
CO2 SERPL-SCNC: 22 MMOL/L (ref 21–32)
COLOR UR: YELLOW
CREAT SERPL-MCNC: 1.2 MG/DL (ref 0.6–1.2)
CREAT UR-MCNC: 92 MG/DL (ref 28–259)
DEPRECATED RDW RBC AUTO: 16.7 % (ref 12.4–15.4)
EOSINOPHIL # BLD: 0.2 K/UL (ref 0–0.6)
EOSINOPHIL NFR BLD: 2.4 %
EPI CELLS #/AREA URNS AUTO: 0 /HPF (ref 0–5)
GFR SERPLBLD CREATININE-BSD FMLA CKD-EPI: 47 ML/MIN/{1.73_M2}
GLUCOSE SERPL-MCNC: 72 MG/DL (ref 70–99)
GLUCOSE UR STRIP.AUTO-MCNC: 500 MG/DL
HCT VFR BLD AUTO: 31.3 % (ref 36–48)
HGB BLD-MCNC: 10.5 G/DL (ref 12–16)
HGB UR QL STRIP.AUTO: NEGATIVE
HYALINE CASTS #/AREA URNS AUTO: 0 /LPF (ref 0–8)
KETONES UR STRIP.AUTO-MCNC: NEGATIVE MG/DL
LEUKOCYTE ESTERASE UR QL STRIP.AUTO: NEGATIVE
LYMPHOCYTES # BLD: 0.8 K/UL (ref 1–5.1)
LYMPHOCYTES NFR BLD: 10.6 %
MCH RBC QN AUTO: 31.9 PG (ref 26–34)
MCHC RBC AUTO-ENTMCNC: 33.5 G/DL (ref 31–36)
MCV RBC AUTO: 95.1 FL (ref 80–100)
MICROALBUMIN UR DL<=1MG/L-MCNC: <1.2 MG/DL
MICROALBUMIN/CREAT UR: NORMAL MG/G (ref 0–30)
MONOCYTES # BLD: 0.7 K/UL (ref 0–1.3)
MONOCYTES NFR BLD: 8.3 %
NEUTROPHILS # BLD: 6.2 K/UL (ref 1.7–7.7)
NEUTROPHILS NFR BLD: 78.1 %
NITRITE UR QL STRIP.AUTO: NEGATIVE
PH UR STRIP.AUTO: 5.5 [PH] (ref 5–8)
PHOSPHATE SERPL-MCNC: 3.6 MG/DL (ref 2.5–4.9)
PLATELET # BLD AUTO: 513 K/UL (ref 135–450)
PMV BLD AUTO: 8.9 FL (ref 5–10.5)
POTASSIUM SERPL-SCNC: 4.4 MMOL/L (ref 3.5–5.1)
PROT UR STRIP.AUTO-MCNC: NEGATIVE MG/DL
PROT UR-MCNC: 6.87 MG/DL
PROT/CREAT UR-RTO: 0.1 MG/DL
RBC # BLD AUTO: 3.29 M/UL (ref 4–5.2)
RBC CLUMPS #/AREA URNS AUTO: 1 /HPF (ref 0–4)
SODIUM SERPL-SCNC: 140 MMOL/L (ref 136–145)
SP GR UR STRIP.AUTO: 1.01 (ref 1–1.03)
UA DIPSTICK W REFLEX MICRO PNL UR: ABNORMAL
URATE SERPL-MCNC: 5.1 MG/DL (ref 2.6–6)
URN SPEC COLLECT METH UR: ABNORMAL
UROBILINOGEN UR STRIP-ACNC: 0.2 E.U./DL
WBC # BLD AUTO: 7.9 K/UL (ref 4–11)
WBC #/AREA URNS AUTO: 1 /HPF (ref 0–5)

## 2024-10-29 PROCEDURE — 36415 COLL VENOUS BLD VENIPUNCTURE: CPT

## 2024-10-29 PROCEDURE — 80069 RENAL FUNCTION PANEL: CPT

## 2024-10-29 PROCEDURE — 85025 COMPLETE CBC W/AUTO DIFF WBC: CPT

## 2024-10-29 PROCEDURE — 81001 URINALYSIS AUTO W/SCOPE: CPT

## 2024-10-29 PROCEDURE — 82043 UR ALBUMIN QUANTITATIVE: CPT

## 2024-10-29 PROCEDURE — 82570 ASSAY OF URINE CREATININE: CPT

## 2024-10-29 PROCEDURE — 73562 X-RAY EXAM OF KNEE 3: CPT

## 2024-10-29 PROCEDURE — 84156 ASSAY OF PROTEIN URINE: CPT

## 2024-10-29 PROCEDURE — 84550 ASSAY OF BLOOD/URIC ACID: CPT

## 2024-11-06 DIAGNOSIS — I10 ESSENTIAL HYPERTENSION: ICD-10-CM

## 2024-11-07 NOTE — TELEPHONE ENCOUNTER
Medication:   Requested Prescriptions     Pending Prescriptions Disp Refills    spironolactone (ALDACTONE) 25 MG tablet [Pharmacy Med Name: SPIRONOLACTONE 25 MG TABLET] 90 tablet 1     Sig: TAKE 1 TABLET BY MOUTH EVERY DAY        Last Filled:      Patient Phone Number: [phone] (home)     Last appt: 10/15/2024   Next appt: 11/26/2024    Last OARRS:        No data to display

## 2024-11-08 RX ORDER — SPIRONOLACTONE 25 MG/1
25 TABLET ORAL DAILY
Qty: 90 TABLET | Refills: 1 | Status: SHIPPED | OUTPATIENT
Start: 2024-11-08

## 2024-11-10 DIAGNOSIS — U07.1 COVID-19 VIRUS INFECTION: Primary | ICD-10-CM

## 2024-11-10 RX ORDER — FLUTICASONE PROPIONATE 50 MCG
1 SPRAY, SUSPENSION (ML) NASAL DAILY
Qty: 16 G | Refills: 2 | Status: SHIPPED | OUTPATIENT
Start: 2024-11-10

## 2024-11-10 RX ORDER — METHYLPREDNISOLONE 4 MG/1
TABLET ORAL
Qty: 1 KIT | Refills: 0 | Status: SHIPPED | OUTPATIENT
Start: 2024-11-10 | End: 2024-11-16

## 2024-11-10 RX ORDER — FAMOTIDINE 20 MG/1
20 TABLET, FILM COATED ORAL 2 TIMES DAILY
Qty: 60 TABLET | Refills: 4 | Status: SHIPPED | OUTPATIENT
Start: 2024-11-10

## 2024-11-19 ENCOUNTER — HOSPITAL ENCOUNTER (OUTPATIENT)
Age: 74
Discharge: HOME | End: 2024-11-19
Payer: MEDICARE

## 2024-11-19 ENCOUNTER — OFFICE VISIT (OUTPATIENT)
Dept: PRIMARY CARE CLINIC | Age: 74
End: 2024-11-19
Payer: MEDICARE

## 2024-11-19 ENCOUNTER — HOSPITAL ENCOUNTER (OUTPATIENT)
Dept: GENERAL RADIOLOGY | Age: 74
Discharge: HOME | End: 2024-11-19
Payer: MEDICARE

## 2024-11-19 VITALS
HEART RATE: 77 BPM | BODY MASS INDEX: 26.7 KG/M2 | SYSTOLIC BLOOD PRESSURE: 136 MMHG | TEMPERATURE: 97.4 F | HEIGHT: 60 IN | RESPIRATION RATE: 16 BRPM | WEIGHT: 136 LBS | DIASTOLIC BLOOD PRESSURE: 72 MMHG | OXYGEN SATURATION: 96 %

## 2024-11-19 DIAGNOSIS — E55.9 VITAMIN D DEFICIENCY: ICD-10-CM

## 2024-11-19 DIAGNOSIS — R06.09 DOE (DYSPNEA ON EXERTION): ICD-10-CM

## 2024-11-19 DIAGNOSIS — J20.9 ACUTE BRONCHITIS AND BRONCHIOLITIS: ICD-10-CM

## 2024-11-19 DIAGNOSIS — R06.09 DOE (DYSPNEA ON EXERTION): Primary | ICD-10-CM

## 2024-11-19 DIAGNOSIS — J21.9 ACUTE BRONCHITIS AND BRONCHIOLITIS: ICD-10-CM

## 2024-11-19 LAB
25(OH)D3 SERPL-MCNC: 53.2 NG/ML
ALBUMIN SERPL-MCNC: 3.9 G/DL (ref 3.4–5)
ALBUMIN/GLOB SERPL: 1.3 {RATIO} (ref 1.1–2.2)
ALP SERPL-CCNC: 53 U/L (ref 40–129)
ALT SERPL-CCNC: 17 U/L (ref 10–40)
ANION GAP SERPL CALCULATED.3IONS-SCNC: 12 MMOL/L (ref 3–16)
AST SERPL-CCNC: 13 U/L (ref 15–37)
BASOPHILS # BLD: 0 K/UL (ref 0–0.2)
BASOPHILS NFR BLD: 0.4 %
BILIRUB SERPL-MCNC: <0.2 MG/DL (ref 0–1)
BUN SERPL-MCNC: 16 MG/DL (ref 7–20)
CALCIUM SERPL-MCNC: 9.7 MG/DL (ref 8.3–10.6)
CHLORIDE SERPL-SCNC: 105 MMOL/L (ref 99–110)
CO2 SERPL-SCNC: 24 MMOL/L (ref 21–32)
CREAT SERPL-MCNC: 1.3 MG/DL (ref 0.6–1.2)
DEPRECATED RDW RBC AUTO: 16.9 % (ref 12.4–15.4)
EOSINOPHIL # BLD: 0.3 K/UL (ref 0–0.6)
EOSINOPHIL NFR BLD: 2.3 %
GFR SERPLBLD CREATININE-BSD FMLA CKD-EPI: 43 ML/MIN/{1.73_M2}
GLUCOSE SERPL-MCNC: 95 MG/DL (ref 70–99)
HCT VFR BLD AUTO: 26.5 % (ref 36–48)
HGB BLD-MCNC: 8.9 G/DL (ref 12–16)
LYMPHOCYTES # BLD: 0.8 K/UL (ref 1–5.1)
LYMPHOCYTES NFR BLD: 6.8 %
MCH RBC QN AUTO: 31.5 PG (ref 26–34)
MCHC RBC AUTO-ENTMCNC: 33.5 G/DL (ref 31–36)
MCV RBC AUTO: 93.9 FL (ref 80–100)
MONOCYTES # BLD: 0.9 K/UL (ref 0–1.3)
MONOCYTES NFR BLD: 7.1 %
NEUTROPHILS # BLD: 10.3 K/UL (ref 1.7–7.7)
NEUTROPHILS NFR BLD: 83.4 %
NT-PROBNP SERPL-MCNC: 195 PG/ML (ref 0–449)
PLATELET # BLD AUTO: 783 K/UL (ref 135–450)
PMV BLD AUTO: 8.1 FL (ref 5–10.5)
POTASSIUM SERPL-SCNC: 4.8 MMOL/L (ref 3.5–5.1)
PROT SERPL-MCNC: 7 G/DL (ref 6.4–8.2)
RBC # BLD AUTO: 2.82 M/UL (ref 4–5.2)
SODIUM SERPL-SCNC: 141 MMOL/L (ref 136–145)
WBC # BLD AUTO: 12.3 K/UL (ref 4–11)

## 2024-11-19 PROCEDURE — 3075F SYST BP GE 130 - 139MM HG: CPT | Performed by: INTERNAL MEDICINE

## 2024-11-19 PROCEDURE — 83880 ASSAY OF NATRIURETIC PEPTIDE: CPT

## 2024-11-19 PROCEDURE — 85025 COMPLETE CBC W/AUTO DIFF WBC: CPT

## 2024-11-19 PROCEDURE — 1123F ACP DISCUSS/DSCN MKR DOCD: CPT | Performed by: INTERNAL MEDICINE

## 2024-11-19 PROCEDURE — 82306 VITAMIN D 25 HYDROXY: CPT

## 2024-11-19 PROCEDURE — 71046 X-RAY EXAM CHEST 2 VIEWS: CPT

## 2024-11-19 PROCEDURE — 80053 COMPREHEN METABOLIC PANEL: CPT

## 2024-11-19 PROCEDURE — 99214 OFFICE O/P EST MOD 30 MIN: CPT | Performed by: INTERNAL MEDICINE

## 2024-11-19 PROCEDURE — 36415 COLL VENOUS BLD VENIPUNCTURE: CPT

## 2024-11-19 PROCEDURE — 1159F MED LIST DOCD IN RCRD: CPT | Performed by: INTERNAL MEDICINE

## 2024-11-19 PROCEDURE — 3078F DIAST BP <80 MM HG: CPT | Performed by: INTERNAL MEDICINE

## 2024-11-19 PROCEDURE — 1160F RVW MEDS BY RX/DR IN RCRD: CPT | Performed by: INTERNAL MEDICINE

## 2024-11-19 RX ORDER — DOXYCYCLINE HYCLATE 100 MG
100 TABLET ORAL 2 TIMES DAILY
Qty: 20 TABLET | Refills: 0 | Status: SHIPPED | OUTPATIENT
Start: 2024-11-19 | End: 2024-11-29

## 2024-11-19 SDOH — ECONOMIC STABILITY: FOOD INSECURITY: WITHIN THE PAST 12 MONTHS, YOU WORRIED THAT YOUR FOOD WOULD RUN OUT BEFORE YOU GOT MONEY TO BUY MORE.: NEVER TRUE

## 2024-11-19 SDOH — ECONOMIC STABILITY: INCOME INSECURITY: HOW HARD IS IT FOR YOU TO PAY FOR THE VERY BASICS LIKE FOOD, HOUSING, MEDICAL CARE, AND HEATING?: NOT HARD AT ALL

## 2024-11-19 SDOH — ECONOMIC STABILITY: FOOD INSECURITY: WITHIN THE PAST 12 MONTHS, THE FOOD YOU BOUGHT JUST DIDN'T LAST AND YOU DIDN'T HAVE MONEY TO GET MORE.: NEVER TRUE

## 2024-11-19 ASSESSMENT — PATIENT HEALTH QUESTIONNAIRE - PHQ9
2. FEELING DOWN, DEPRESSED OR HOPELESS: NOT AT ALL
1. LITTLE INTEREST OR PLEASURE IN DOING THINGS: NOT AT ALL
SUM OF ALL RESPONSES TO PHQ9 QUESTIONS 1 & 2: 0
SUM OF ALL RESPONSES TO PHQ QUESTIONS 1-9: 0

## 2024-11-20 DIAGNOSIS — D64.9 NORMOCHROMIC NORMOCYTIC ANEMIA: Primary | ICD-10-CM

## 2024-11-20 NOTE — RESULT ENCOUNTER NOTE
The heart failure blood test was negative.  The vitamin D level is very good, continue supplement.  The white blood cell count is mildly elevated from taking the steroids.  The anemia has increased.  This can make you feel tired.  The cell size is normal so it does not appear to be an iron deficiency.  Will mail you 3 stool Hemoccult cards to make sure no blood loss and we should repeat your blood count in 2 weeks to make sure is returning to normal and we will check iron studies folate and B12 at the same time.    Please mail patient 3 stool Hemoccult cards

## 2024-11-20 NOTE — RESULT ENCOUNTER NOTE
No pneumonia now.  Some scaring at the base of right lung.  No fluid in the lungs and normal heart size.

## 2024-11-25 ENCOUNTER — TELEPHONE (OUTPATIENT)
Dept: PRIMARY CARE CLINIC | Age: 74
End: 2024-11-25

## 2024-11-25 ASSESSMENT — ENCOUNTER SYMPTOMS
VOMITING: 0
BACK PAIN: 0
TROUBLE SWALLOWING: 0
ORTHOPNEA: 0
CONSTIPATION: 0
SORE THROAT: 0
CHEST TIGHTNESS: 0
ABDOMINAL PAIN: 0
CHOKING: 0
SWOLLEN GLANDS: 0
RHINORRHEA: 0
PHOTOPHOBIA: 0
SHORTNESS OF BREATH: 1
COLOR CHANGE: 0
EYE PAIN: 0
RECTAL PAIN: 0
ANAL BLEEDING: 0
EYE ITCHING: 0
EYE REDNESS: 0
WHEEZING: 0
STRIDOR: 0
HEARTBURN: 0
HEMOPTYSIS: 0
VOICE CHANGE: 0
APNEA: 0
SPUTUM PRODUCTION: 0
EYE DISCHARGE: 0
DIARRHEA: 0
ABDOMINAL DISTENTION: 0
NAUSEA: 0
COUGH: 1
BLOOD IN STOOL: 0

## 2024-11-25 NOTE — TELEPHONE ENCOUNTER
PT called in regarding her recent blood test & chest xray results.     PT noted that Dr. Maki note for her Vitamin D said it was fine however there was documentation from the lab stating that it was low. PT would like to know why Dr. Maki would say that her Vit D was fine if it wasn't.     Then PT adv that on her results for the chest xray that Dr. Maki note said no pneumonia but the findings from radiology noted acute bronchitis. PT is concerned about the discrepancy between what Dr. Maki noted for the results & what is said in the radiology notes.     Please give PT a call back to discuss: [phone].    PT is aware that Dr. Maki is out of the office & that this may be reviewed by one of the other providers who are in the office covering for her.

## 2024-11-25 NOTE — PROGRESS NOTES
easily.        Pulmonary embolus December 2022, unprovoked, life time anticoagulation  unprovoked all lobes except LLL.    Psychiatric/Behavioral:  Negative for agitation, behavioral problems, confusion, decreased concentration, dysphoric mood, hallucinations, self-injury, sleep disturbance and suicidal ideas. The patient is not nervous/anxious and is not hyperactive.           Objective   Physical Exam  Constitutional:       General: She is not in acute distress.     Appearance: She is well-developed. She is not diaphoretic.   HENT:      Head: Normocephalic.      Right Ear: External ear normal.      Left Ear: External ear normal.      Mouth/Throat:      Pharynx: No oropharyngeal exudate.   Eyes:      Extraocular Movements: Extraocular movements intact.      Conjunctiva/sclera: Conjunctivae normal.      Pupils: Pupils are equal, round, and reactive to light.   Neck:      Thyroid: No thyromegaly.      Vascular: No JVD.      Trachea: No tracheal deviation.      Comments: Scar from total thyroidectomy due to multinodular goiter.   Cardiovascular:      Rate and Rhythm: Normal rate.      Heart sounds: Normal heart sounds.   Pulmonary:      Effort: Pulmonary effort is normal.      Breath sounds: Normal breath sounds.   Abdominal:      General: Bowel sounds are normal. There is no distension.      Palpations: There is no mass.      Tenderness: There is no abdominal tenderness. There is no right CVA tenderness, left CVA tenderness, guarding or rebound.      Hernia: No hernia is present.   Musculoskeletal:         General: Swelling and deformity present. No tenderness.      Cervical back: Normal range of motion and neck supple.      Right knee: No swelling, deformity, effusion, ecchymosis or lacerations. Normal range of motion. No tenderness. No medial joint line or lateral joint line tenderness.      Right lower leg: No edema.      Left lower leg: No edema.      Comments: Less knee pain and swelling on rheumatoid arthritis

## 2024-11-30 DIAGNOSIS — I10 ESSENTIAL HYPERTENSION: ICD-10-CM

## 2024-12-02 ENCOUNTER — TELEPHONE (OUTPATIENT)
Dept: PRIMARY CARE CLINIC | Age: 74
End: 2024-12-02

## 2024-12-02 DIAGNOSIS — D64.9 NORMOCHROMIC NORMOCYTIC ANEMIA: ICD-10-CM

## 2024-12-02 LAB
ALBUMIN SERPL-MCNC: 4.2 G/DL (ref 3.4–5)
ANION GAP SERPL CALCULATED.3IONS-SCNC: 12 MMOL/L (ref 3–16)
BASOPHILS # BLD: 0.1 K/UL (ref 0–0.2)
BASOPHILS NFR BLD: 1.1 %
BUN SERPL-MCNC: 16 MG/DL (ref 7–20)
CALCIUM SERPL-MCNC: 9.6 MG/DL (ref 8.3–10.6)
CHLORIDE SERPL-SCNC: 106 MMOL/L (ref 99–110)
CO2 SERPL-SCNC: 24 MMOL/L (ref 21–32)
CREAT SERPL-MCNC: 1.1 MG/DL (ref 0.6–1.2)
DEPRECATED RDW RBC AUTO: 19.2 % (ref 12.4–15.4)
EOSINOPHIL # BLD: 0.3 K/UL (ref 0–0.6)
EOSINOPHIL NFR BLD: 4.5 %
FOLATE SERPL-MCNC: >40 NG/ML (ref 4.78–24.2)
GFR SERPLBLD CREATININE-BSD FMLA CKD-EPI: 52 ML/MIN/{1.73_M2}
GLUCOSE SERPL-MCNC: 79 MG/DL (ref 70–99)
HCT VFR BLD AUTO: 29.2 % (ref 36–48)
HGB BLD-MCNC: 9.6 G/DL (ref 12–16)
IRON SATN MFR SERPL: 18 % (ref 15–50)
IRON SERPL-MCNC: 49 UG/DL (ref 37–145)
LYMPHOCYTES # BLD: 1 K/UL (ref 1–5.1)
LYMPHOCYTES NFR BLD: 18.1 %
MCH RBC QN AUTO: 31 PG (ref 26–34)
MCHC RBC AUTO-ENTMCNC: 32.9 G/DL (ref 31–36)
MCV RBC AUTO: 94.2 FL (ref 80–100)
MONOCYTES # BLD: 0.6 K/UL (ref 0–1.3)
MONOCYTES NFR BLD: 11.3 %
NEUTROPHILS # BLD: 3.6 K/UL (ref 1.7–7.7)
NEUTROPHILS NFR BLD: 65 %
PHOSPHATE SERPL-MCNC: 3.1 MG/DL (ref 2.5–4.9)
PLATELET # BLD AUTO: 563 K/UL (ref 135–450)
PMV BLD AUTO: 8.4 FL (ref 5–10.5)
POTASSIUM SERPL-SCNC: 4.2 MMOL/L (ref 3.5–5.1)
RBC # BLD AUTO: 3.1 M/UL (ref 4–5.2)
SODIUM SERPL-SCNC: 142 MMOL/L (ref 136–145)
TIBC SERPL-MCNC: 267 UG/DL (ref 260–445)
VIT B12 SERPL-MCNC: 864 PG/ML (ref 211–911)
WBC # BLD AUTO: 5.6 K/UL (ref 4–11)

## 2024-12-02 RX ORDER — LABETALOL 200 MG/1
200 TABLET, FILM COATED ORAL 2 TIMES DAILY
Qty: 180 TABLET | Refills: 1 | Status: SHIPPED | OUTPATIENT
Start: 2024-12-02

## 2024-12-02 NOTE — TELEPHONE ENCOUNTER
Medication:   Requested Prescriptions     Pending Prescriptions Disp Refills    labetalol (NORMODYNE) 200 MG tablet [Pharmacy Med Name: LABETALOL  MG TABLET] 180 tablet 1     Sig: TAKE 1 TABLET BY MOUTH 2 TIMES DAILY STOP ATENOLOL        Last Filled:      Patient Phone Number: [phone] (home)     Last appt: 11/19/2024   Next appt: 12/17/2024    Last OARRS:        No data to display

## 2024-12-02 NOTE — TELEPHONE ENCOUNTER
PT came into the office to drop off some stool sample cards.     Placed them on the counter in MA pod.

## 2024-12-03 DIAGNOSIS — Z12.11 SCREEN FOR COLON CANCER: Primary | ICD-10-CM

## 2024-12-03 LAB
HEMOCCULT STL QL: NEGATIVE

## 2024-12-03 PROCEDURE — 82270 OCCULT BLOOD FECES: CPT | Performed by: INTERNAL MEDICINE

## 2024-12-07 NOTE — RESULT ENCOUNTER NOTE
Normal vitamin B 12 level.  The Folic acid level is elevated. This is due to the Folic Acid supplement you take from your Rheumatologist that is important to take Methotrexate safely.   Do not change your folic acid dose , but discuss with Dr. Lozano to see if the dose should be modified. Kidney function is stable and improved. Continue the kidney protective Farxiga. After recovering from Covid and taking the antibiotic for bronchitis, your white blood cell count is normal.   The anemia is improving.  Iron studies were at the lower end of normal. I am concerned about the mildly elevated platelet count. Make an in office follow up in month for repeat cbc.   You are up to date with colonoscopy in 2022.     no

## 2024-12-17 ENCOUNTER — OFFICE VISIT (OUTPATIENT)
Dept: PRIMARY CARE CLINIC | Age: 74
End: 2024-12-17

## 2024-12-17 VITALS
HEART RATE: 68 BPM | WEIGHT: 139.4 LBS | RESPIRATION RATE: 16 BRPM | HEIGHT: 60 IN | SYSTOLIC BLOOD PRESSURE: 138 MMHG | OXYGEN SATURATION: 98 % | DIASTOLIC BLOOD PRESSURE: 74 MMHG | BODY MASS INDEX: 27.37 KG/M2 | TEMPERATURE: 97 F

## 2024-12-17 DIAGNOSIS — I10 ESSENTIAL HYPERTENSION: ICD-10-CM

## 2024-12-17 DIAGNOSIS — M81.0 POSTMENOPAUSAL OSTEOPOROSIS: ICD-10-CM

## 2024-12-17 DIAGNOSIS — R49.0 HOARSENESS: Primary | ICD-10-CM

## 2024-12-17 RX ORDER — ALENDRONATE SODIUM 70 MG/1
70 TABLET ORAL
Qty: 12.85 TABLET | Refills: 3 | Status: SHIPPED | OUTPATIENT
Start: 2024-12-17

## 2024-12-17 RX ORDER — SPIRONOLACTONE 25 MG/1
25 TABLET ORAL 2 TIMES DAILY
Qty: 180 TABLET | Refills: 3 | Status: SHIPPED | OUTPATIENT
Start: 2024-12-17

## 2024-12-17 SDOH — ECONOMIC STABILITY: FOOD INSECURITY: WITHIN THE PAST 12 MONTHS, THE FOOD YOU BOUGHT JUST DIDN'T LAST AND YOU DIDN'T HAVE MONEY TO GET MORE.: NEVER TRUE

## 2024-12-17 SDOH — ECONOMIC STABILITY: FOOD INSECURITY: WITHIN THE PAST 12 MONTHS, YOU WORRIED THAT YOUR FOOD WOULD RUN OUT BEFORE YOU GOT MONEY TO BUY MORE.: NEVER TRUE

## 2024-12-17 SDOH — ECONOMIC STABILITY: INCOME INSECURITY: HOW HARD IS IT FOR YOU TO PAY FOR THE VERY BASICS LIKE FOOD, HOUSING, MEDICAL CARE, AND HEATING?: NOT HARD AT ALL

## 2024-12-17 ASSESSMENT — PATIENT HEALTH QUESTIONNAIRE - PHQ9
SUM OF ALL RESPONSES TO PHQ QUESTIONS 1-9: 0
SUM OF ALL RESPONSES TO PHQ9 QUESTIONS 1 & 2: 0
SUM OF ALL RESPONSES TO PHQ QUESTIONS 1-9: 0
SUM OF ALL RESPONSES TO PHQ QUESTIONS 1-9: 0
1. LITTLE INTEREST OR PLEASURE IN DOING THINGS: NOT AT ALL
2. FEELING DOWN, DEPRESSED OR HOPELESS: NOT AT ALL
SUM OF ALL RESPONSES TO PHQ QUESTIONS 1-9: 0

## 2024-12-17 NOTE — PROGRESS NOTES
Josseline Koenig (:  1950) is a 74 y.o. female,Established patient, here for evaluation of the following chief complaint(s):  Follow-up      Assessment & Plan   ASSESSMENT/PLAN:  1. Hoarseness that occurred after COVID.  Concern for viral vocal cord paralysis.  Has not improved with antihistamine and nasal spray to control postnasal drainage.  Not related to recent thyroid surgery because her voice was fine after surgery for over a month until she developed COVID, which is when hoarseness started.  -     Cody Justice MD, Otolaryngology, [facility]  2. Essential hypertension controlled on current regimen.  Patient is taking spironolactone 25 mg twice a day for the past few months, labetalol 200 mg twice a day, also on Farxiga at that provides diuresis due to chronic kidney disease taking 10 mg daily.  Recent prescription refill was not correct because she    The directions on the spironolactone was for once a day instead of twice a day.  New corrected prescription given to the patient.  This was probably an old refill that came through.  Continue current regimen.  Up-to-date on electrolyte and renal function monitoring and tolerating Farxiga for chronic kidney disease.        Latest Ref Rng & Units 2024     1:15 PM 2024     2:11 PM 10/29/2024    10:21 AM 10/9/2024     1:52 PM 10/4/2024    10:12 AM   Labs Renal   BUN 7 - 20 mg/dL 16  16  15  19  21    Cr 0.6 - 1.2 mg/dL 1.1  1.3  1.2  1.2  1.4    K 3.5 - 5.1 mmol/L 4.2  4.8  4.4   4.5    Na 136 - 145 mmol/L 142  141  140   141       BP Readings from Last 3 Encounters:   24 138/74   24 136/72   10/29/24 113/79      -     spironolactone (ALDACTONE) 25 MG tablet; Take 1 tablet by mouth 2 times daily, Disp-180 tablet, R-3Normal  -     CBC with Auto Differential; Future  -     Renal Function Panel; Future  3. Postmenopausal osteoporosis has been on alendronate for 2 years well refill prescription and explained the patient will

## 2024-12-27 ENCOUNTER — OFFICE VISIT (OUTPATIENT)
Dept: ENT CLINIC | Age: 74
End: 2024-12-27
Payer: MEDICARE

## 2024-12-27 VITALS
TEMPERATURE: 97.1 F | BODY MASS INDEX: 28.63 KG/M2 | OXYGEN SATURATION: 98 % | SYSTOLIC BLOOD PRESSURE: 143 MMHG | WEIGHT: 142 LBS | HEART RATE: 64 BPM | HEIGHT: 59 IN | DIASTOLIC BLOOD PRESSURE: 82 MMHG

## 2024-12-27 DIAGNOSIS — R49.0 HOARSENESS OF VOICE: Primary | ICD-10-CM

## 2024-12-27 PROCEDURE — 1126F AMNT PAIN NOTED NONE PRSNT: CPT | Performed by: OTOLARYNGOLOGY

## 2024-12-27 PROCEDURE — 1159F MED LIST DOCD IN RCRD: CPT | Performed by: OTOLARYNGOLOGY

## 2024-12-27 PROCEDURE — 3079F DIAST BP 80-89 MM HG: CPT | Performed by: OTOLARYNGOLOGY

## 2024-12-27 PROCEDURE — 99213 OFFICE O/P EST LOW 20 MIN: CPT | Performed by: OTOLARYNGOLOGY

## 2024-12-27 PROCEDURE — 1123F ACP DISCUSS/DSCN MKR DOCD: CPT | Performed by: OTOLARYNGOLOGY

## 2024-12-27 PROCEDURE — 3077F SYST BP >= 140 MM HG: CPT | Performed by: OTOLARYNGOLOGY

## 2024-12-27 ASSESSMENT — ENCOUNTER SYMPTOMS
EYE PAIN: 0
VOICE CHANGE: 0
BACK PAIN: 0
APNEA: 0
PHOTOPHOBIA: 0
CHOKING: 0
RECTAL PAIN: 0
COLOR CHANGE: 0
NAUSEA: 0
SORE THROAT: 0
VOMITING: 0
ABDOMINAL PAIN: 0
CONSTIPATION: 0
CHEST TIGHTNESS: 0
EYE REDNESS: 0
EYE DISCHARGE: 0
ORTHOPNEA: 0
STRIDOR: 0
BLOOD IN STOOL: 0
SHORTNESS OF BREATH: 0
ANAL BLEEDING: 0
WHEEZING: 0
EYE ITCHING: 0
DIARRHEA: 0
RHINORRHEA: 0
BLURRED VISION: 0
COUGH: 1
TROUBLE SWALLOWING: 0
ABDOMINAL DISTENTION: 0

## 2024-12-27 NOTE — PROGRESS NOTES
Nonpalpable.      I performed this physical exam personally.        IMPRESSION / DIAGNOSES / ORDERS:   Josseline was seen today for hoarseness.    Diagnoses and all orders for this visit:    Hoarseness of voice  Comments:  I suspect this is a post-COVID problem.  There is no evidence of vocal cord or laryngeal lesion or abnormality.        RECOMMENDATIONS / PLAN:   I advised patient to maintain good hydration.  Advised to do stimulations with a hot steamy shower or a steamy pot and towel technique twice daily.  Alternatively, she use a steam mask inhaler which she probably can obtain at her local pharmacy.    Return if symptoms worsen or fail to clear up in the next 4-6 weeks, or, for any further ENT or sinus problems or symptoms.         Patient Instructions   Avoid throat clearing.  You may cough and swallow and drink fluids to clear your throat.           Cody Durham MD    [facility]  Department of Otolaryngology/Head and Neck Surgery  [facility]  [address], [address]  [place] [address]  [phone]

## 2025-01-01 DIAGNOSIS — U07.1 COVID-19 VIRUS INFECTION: ICD-10-CM

## 2025-01-02 ENCOUNTER — OFFICE VISIT (OUTPATIENT)
Dept: ORTHOPEDIC SURGERY | Age: 75
End: 2025-01-02

## 2025-01-02 VITALS — BODY MASS INDEX: 28.68 KG/M2 | HEIGHT: 59 IN

## 2025-01-02 DIAGNOSIS — M17.0 PRIMARY OSTEOARTHRITIS OF BOTH KNEES: Primary | ICD-10-CM

## 2025-01-02 RX ORDER — TRIAMCINOLONE ACETONIDE 40 MG/ML
40 INJECTION, SUSPENSION INTRA-ARTICULAR; INTRAMUSCULAR ONCE
Status: COMPLETED | OUTPATIENT
Start: 2025-01-02 | End: 2025-01-02

## 2025-01-02 RX ORDER — BUPIVACAINE HYDROCHLORIDE 2.5 MG/ML
2 INJECTION, SOLUTION INFILTRATION; PERINEURAL ONCE
Status: COMPLETED | OUTPATIENT
Start: 2025-01-02 | End: 2025-01-02

## 2025-01-02 RX ADMIN — TRIAMCINOLONE ACETONIDE 40 MG: 40 INJECTION, SUSPENSION INTRA-ARTICULAR; INTRAMUSCULAR at 08:37

## 2025-01-02 RX ADMIN — BUPIVACAINE HYDROCHLORIDE 5 MG: 2.5 INJECTION, SOLUTION INFILTRATION; PERINEURAL at 08:37

## 2025-01-02 NOTE — PROGRESS NOTES
[facility]  Office Visit    Chief Complaint: Follow-up for bilateral knee pain    HPI:  Josseline Koenig is a 74 y.o. who is here in follow-up of bilateral knee pain.  She has been treated in the past with steroid and Orthovisc injections in both knees.  She comes in today for further evaluation of both knees.  She has a history of right total hip arthroplasty and her right hip is doing well.  Both knees feel stiff.  She is on methotrexate for rheumatoid arthritis.  Injections have been helpful in the past.  She reports stiffness in both knees.  Pain is also returning.      Past Medical History:   Diagnosis Date    Bilateral pulmonary embolism (HCC)     on coumadin    Cough variant asthma 2020    HTN (hypertension) 2011    Mixed hyperlipidemia 10/18/2022    Primary osteoarthritis of left knee 2018    Thyroid disease         ROS:  Constitutional: denies fever, chills, weight loss  MSK: denies pain in other joints, muscle aches  Neurological: denies numbness, tingling, weakness    Exam:  Ht 1.499 m (4' 11\")   BMI 28.68 kg/m²      Appearance: sitting in exam room chair, appears to be in no acute distress, awake and alert  Resp: unlabored breathing on room air  Skin: warm, dry and intact with out erythema or significant increased temperature  Neuro: grossly intact both lower extremities. Intact sensation to light touch. Motor exam 4+ to 5/5 in all major motor groups.  BLE: Examination reveals that active knee range of motion is 5 to 110 degrees.  There is varus deformity, positive crepitus, positive joint line tenderness, positive antalgic gait.  Neurologically, plantar flexion and dorsiflexion is intact. 5/5 strength.     Imagin views of bilateral knees were performed and interpreted today.  These are significant for tricompartmental degenerative changes due to osteoarthritis.  She has significant joint space narrowing of the lateral compartments.  There are

## 2025-01-03 NOTE — TELEPHONE ENCOUNTER
Medication:   Requested Prescriptions     Pending Prescriptions Disp Refills    fluticasone (FLONASE) 50 MCG/ACT nasal spray [Pharmacy Med Name: FLUTICASONE PROP 50 MCG SPRAY]  2     Sig: SPRAY 1 SPRAY INTO EACH NOSTRIL EVERY DAY        Last Filled:      Patient Phone Number: [phone] (home)     Last appt: 12/17/2024   Next appt: 3/17/2025    Last OARRS:        No data to display

## 2025-01-04 DIAGNOSIS — J45.40 MODERATE PERSISTENT ASTHMA WITHOUT COMPLICATION: ICD-10-CM

## 2025-01-04 RX ORDER — FLUTICASONE PROPIONATE 50 MCG
SPRAY, SUSPENSION (ML) NASAL
Qty: 1 EACH | Refills: 5 | Status: SHIPPED | OUTPATIENT
Start: 2025-01-04

## 2025-01-06 RX ORDER — FLUTICASONE PROPIONATE AND SALMETEROL 250; 50 UG/1; UG/1
POWDER RESPIRATORY (INHALATION)
Qty: 60 EACH | Refills: 3 | Status: SHIPPED | OUTPATIENT
Start: 2025-01-06

## 2025-01-07 DIAGNOSIS — E03.9 ACQUIRED HYPOTHYROIDISM: ICD-10-CM

## 2025-01-08 ENCOUNTER — TELEPHONE (OUTPATIENT)
Dept: ORTHOPEDIC SURGERY | Age: 75
End: 2025-01-08

## 2025-01-08 RX ORDER — LEVOTHYROXINE SODIUM 100 UG/1
100 TABLET ORAL DAILY
Qty: 90 TABLET | Refills: 1 | Status: SHIPPED | OUTPATIENT
Start: 2025-01-08

## 2025-01-08 NOTE — TELEPHONE ENCOUNTER
Medication:   Requested Prescriptions     Pending Prescriptions Disp Refills    levothyroxine (SYNTHROID) 100 MCG tablet [Pharmacy Med Name: LEVOTHYROXINE 100 MCG TABLET] 90 tablet 1     Sig: TAKE 1 TABLET BY MOUTH EVERY DAY        Last Filled:      Patient Phone Number: [phone] (home)     Last appt: 12/17/2024   Next appt: 3/17/2025    Last OARRS:        No data to display

## 2025-01-08 NOTE — TELEPHONE ENCOUNTER
Maureen Moore [facility] Clinical Support  1/7/2025.  EUFLEXXA () series of 3 PER KNEE. NO PA REQUIRED. REF# [id number].  BILATERAL KNEE M17.0.  BUY AND BILL YES.  Per PHONE from INSURANCE.    I called the patient and she will call back when she is ready to schedule.

## 2025-01-31 ENCOUNTER — HOSPITAL ENCOUNTER (OUTPATIENT)
Age: 75
Discharge: HOME | End: 2025-01-31
Payer: MEDICARE

## 2025-01-31 DIAGNOSIS — I10 ESSENTIAL HYPERTENSION: ICD-10-CM

## 2025-01-31 LAB
ALBUMIN SERPL-MCNC: 4.2 G/DL (ref 3.4–5)
ANION GAP SERPL CALCULATED.3IONS-SCNC: 10 MMOL/L (ref 3–16)
BASOPHILS # BLD: 0.1 K/UL (ref 0–0.2)
BASOPHILS NFR BLD: 0.7 %
BUN SERPL-MCNC: 24 MG/DL (ref 7–20)
CALCIUM SERPL-MCNC: 10 MG/DL (ref 8.3–10.6)
CHLORIDE SERPL-SCNC: 104 MMOL/L (ref 99–110)
CO2 SERPL-SCNC: 24 MMOL/L (ref 21–32)
CREAT SERPL-MCNC: 1.4 MG/DL (ref 0.6–1.2)
DEPRECATED RDW RBC AUTO: 17.3 % (ref 12.4–15.4)
EOSINOPHIL # BLD: 0.2 K/UL (ref 0–0.6)
EOSINOPHIL NFR BLD: 3.1 %
GFR SERPLBLD CREATININE-BSD FMLA CKD-EPI: 39 ML/MIN/{1.73_M2}
GLUCOSE SERPL-MCNC: 77 MG/DL (ref 70–99)
HCT VFR BLD AUTO: 33.7 % (ref 36–48)
HGB BLD-MCNC: 11.3 G/DL (ref 12–16)
LYMPHOCYTES # BLD: 0.8 K/UL (ref 1–5.1)
LYMPHOCYTES NFR BLD: 10 %
MCH RBC QN AUTO: 31.8 PG (ref 26–34)
MCHC RBC AUTO-ENTMCNC: 33.4 G/DL (ref 31–36)
MCV RBC AUTO: 95.3 FL (ref 80–100)
MONOCYTES # BLD: 0.3 K/UL (ref 0–1.3)
MONOCYTES NFR BLD: 3.5 %
NEUTROPHILS # BLD: 6.3 K/UL (ref 1.7–7.7)
NEUTROPHILS NFR BLD: 82.7 %
PHOSPHATE SERPL-MCNC: 2.5 MG/DL (ref 2.5–4.9)
PLATELET # BLD AUTO: 561 K/UL (ref 135–450)
PMV BLD AUTO: 8.5 FL (ref 5–10.5)
POTASSIUM SERPL-SCNC: 4.9 MMOL/L (ref 3.5–5.1)
RBC # BLD AUTO: 3.54 M/UL (ref 4–5.2)
SODIUM SERPL-SCNC: 138 MMOL/L (ref 136–145)
WBC # BLD AUTO: 7.7 K/UL (ref 4–11)

## 2025-01-31 PROCEDURE — 85025 COMPLETE CBC W/AUTO DIFF WBC: CPT

## 2025-01-31 PROCEDURE — 36415 COLL VENOUS BLD VENIPUNCTURE: CPT

## 2025-01-31 PROCEDURE — 80069 RENAL FUNCTION PANEL: CPT

## 2025-02-06 DIAGNOSIS — N18.30 STAGE 3 CHRONIC KIDNEY DISEASE, UNSPECIFIED WHETHER STAGE 3A OR 3B CKD (HCC): Primary | ICD-10-CM

## 2025-02-24 ENCOUNTER — OFFICE VISIT (OUTPATIENT)
Dept: ORTHOPEDIC SURGERY | Age: 75
End: 2025-02-24
Payer: MEDICARE

## 2025-02-24 VITALS — HEIGHT: 59 IN | BODY MASS INDEX: 28.63 KG/M2 | WEIGHT: 142 LBS

## 2025-02-24 DIAGNOSIS — M17.0 PRIMARY OSTEOARTHRITIS OF BOTH KNEES: Primary | ICD-10-CM

## 2025-02-24 PROCEDURE — 1123F ACP DISCUSS/DSCN MKR DOCD: CPT | Performed by: PHYSICIAN ASSISTANT

## 2025-02-24 PROCEDURE — 20610 DRAIN/INJ JOINT/BURSA W/O US: CPT | Performed by: PHYSICIAN ASSISTANT

## 2025-02-24 PROCEDURE — 99213 OFFICE O/P EST LOW 20 MIN: CPT | Performed by: PHYSICIAN ASSISTANT

## 2025-02-24 PROCEDURE — 1159F MED LIST DOCD IN RCRD: CPT | Performed by: PHYSICIAN ASSISTANT

## 2025-02-24 RX ORDER — HYALURONATE SODIUM 10 MG/ML
20 SYRINGE (ML) INTRAARTICULAR ONCE
Status: COMPLETED | OUTPATIENT
Start: 2025-02-24 | End: 2025-02-24

## 2025-02-24 RX ADMIN — Medication 20 MG: at 09:48

## 2025-02-24 NOTE — PROGRESS NOTES
This dictation was done with [employer] dictation and may contain mechanical errors related to translation.  Height 1.499 m (4' 11\"), weight 64.4 kg (142 lb), not currently breastfeeding.    Subjective:  bilateral knee pain.   She is here for the osteoarthritis in their knees.  After examination and discussion we decided to proceed with the   1st Eufflexxa injection for the  bilateral knee(s).     Objective:   Height 1.499 m (4' 11\"), weight 64.4 kg (142 lb), not currently breastfeeding.    There is a milld joint effusion noted of the bilateral knee(s). There is moderate pain with range of motion testing. There is no significant  instability noted.    Neuro exam grossly intact both lower extremities. Intact sensation to light touch. Motor exam 4+ to 5/5 in all major motor groups.  Negative Chavarria's sign.    Skin is warm, dry and intact with out erythema or significant increased temperature around the knee joint(s).     Assessment:  Degenerative Joint Disease of the bilateral Knee(s).    Plan:  Discussed  injections including all  risks and benefits including increased pain, drug reaction, infection, bleeding, lack of improvement and  neurovascular injury.   All the questions were answered.    PROCEDURE NOTE:  PRE-PROCEDURE DIAGNOSIS: DJD knee  POST-PROCEDURE DIAGNOSIS: DJD knee    With the patient's permission, her bilaterally knee was prepped in standard sterile fashion with  Alcohol. The prefilled injection of the preferred Eufflexxa was injected into the bilaterally lateral joint space compartment without difficulty.  The patient tolerated the procedure(s) well without difficulty.  A band-aid was applied.     POST-PROCEDURE INSTRUCTIONS GIVEN TO PATIENT: The patient was advised to ice the knee for 15-20 minutes to relieve any injection site related pain. Decrease activity for the next 24 to 48 hours. May use prescription or OTC pain relievers as needed      FOLLOW-UP:   As directed or call or return to clinic if

## 2025-03-03 ENCOUNTER — OFFICE VISIT (OUTPATIENT)
Dept: ORTHOPEDIC SURGERY | Age: 75
End: 2025-03-03
Payer: MEDICARE

## 2025-03-03 VITALS — BODY MASS INDEX: 28.68 KG/M2 | HEIGHT: 59 IN

## 2025-03-03 DIAGNOSIS — M17.0 PRIMARY OSTEOARTHRITIS OF BOTH KNEES: Primary | ICD-10-CM

## 2025-03-03 PROCEDURE — 20610 DRAIN/INJ JOINT/BURSA W/O US: CPT | Performed by: ORTHOPAEDIC SURGERY

## 2025-03-03 RX ORDER — HYALURONATE SODIUM 10 MG/ML
20 SYRINGE (ML) INTRAARTICULAR ONCE
Status: COMPLETED | OUTPATIENT
Start: 2025-03-03 | End: 2025-03-03

## 2025-03-03 RX ADMIN — Medication 20 MG: at 09:48

## 2025-03-03 RX ADMIN — Medication 20 MG: at 09:47

## 2025-03-03 NOTE — PROGRESS NOTES
[facility]  Office Visit    Chief Complaint: Follow-up for bilateral knee pain    HPI:  Josseline Koenig is a 74 y.o. who is here in follow-up of bilateral knee pain.  She comes in today for the second Euflexxa injection in each knee.  She reports good relief of symptoms after the first injections last week.    Assessment:  Bilateral knee osteoarthritis    Plan:  The second Euflexxa injection was performed in each knee today as described below.  She will follow-up next week for the third injection in each knee.    PROCEDURE NOTE:  After verbal consent was obtained, bilateral knees were prepped with alcohol and anesthetized with ethyl chloride.  Each knee joint was then injected under sterile technique with the prepackaged dose of Euflexxa.  Bandages were applied.  The patient tolerated the procedure well and there were no complications.     This dictation was done with Dragon dictation and may contain mechanical errors related to translation.

## 2025-03-04 DIAGNOSIS — U07.1 COVID-19 VIRUS INFECTION: ICD-10-CM

## 2025-03-04 RX ORDER — FAMOTIDINE 20 MG/1
20 TABLET, FILM COATED ORAL 2 TIMES DAILY
Qty: 180 TABLET | Refills: 1 | Status: SHIPPED | OUTPATIENT
Start: 2025-03-04

## 2025-03-04 NOTE — TELEPHONE ENCOUNTER
Medication:   Requested Prescriptions     Pending Prescriptions Disp Refills    famotidine (PEPCID) 20 MG tablet [Pharmacy Med Name: FAMOTIDINE 20 MG TABLET] 180 tablet 1     Sig: TAKE 1 TABLET BY MOUTH TWICE A DAY        Last Filled:      Patient Phone Number: [phone] (home)     Last appt: 12/17/2024   Next appt: 3/17/2025    Last OARRS:        No data to display

## 2025-03-10 ENCOUNTER — OFFICE VISIT (OUTPATIENT)
Dept: ORTHOPEDIC SURGERY | Age: 75
End: 2025-03-10
Payer: MEDICARE

## 2025-03-10 VITALS — HEIGHT: 59 IN | BODY MASS INDEX: 28.68 KG/M2

## 2025-03-10 DIAGNOSIS — M17.0 PRIMARY OSTEOARTHRITIS OF BOTH KNEES: Primary | ICD-10-CM

## 2025-03-10 PROCEDURE — 20610 DRAIN/INJ JOINT/BURSA W/O US: CPT | Performed by: PHYSICIAN ASSISTANT

## 2025-03-10 RX ORDER — HYALURONATE SODIUM 10 MG/ML
20 SYRINGE (ML) INTRAARTICULAR ONCE
Status: COMPLETED | OUTPATIENT
Start: 2025-03-10 | End: 2025-03-10

## 2025-03-10 RX ADMIN — Medication 20 MG: at 09:46

## 2025-03-10 NOTE — PROGRESS NOTES
This dictation was done with [employer] dictation and may contain mechanical errors related to translation.  Height 1.499 m (4' 11\"), not currently breastfeeding.    Subjective:  bilateral knee pain.   She is here for the osteoarthritis in their knees.  After examination and discussion we decided to proceed with the   3rd Eufflexxa injection for the  bilateral knee(s).     Objective:   Height 1.499 m (4' 11\"), not currently breastfeeding.    There is a milld joint effusion noted of the bilateral knee(s). There is moderate pain with range of motion testing. There is no significant  instability noted.    Neuro exam grossly intact both lower extremities. Intact sensation to light touch. Motor exam 4+ to 5/5 in all major motor groups.  Negative Chavarria's sign.    Skin is warm, dry and intact with out erythema or significant increased temperature around the knee joint(s).     Assessment:  Degenerative Joint Disease of the bilateral Knee(s).    Plan:  Discussed  injections including all  risks and benefits including increased pain, drug reaction, infection, bleeding, lack of improvement and  neurovascular injury.   All the questions were answered.    PROCEDURE NOTE:  PRE-PROCEDURE DIAGNOSIS: DJD knee  POST-PROCEDURE DIAGNOSIS: DJD knee    With the patient's permission, her bilaterally knee was prepped in standard sterile fashion with  Alcohol. The prefilled injection of the preferred Eufflexxa was injected into the bilaterally lateral joint space compartment without difficulty.  The patient tolerated the procedure(s) well without difficulty.  A band-aid was applied.     POST-PROCEDURE INSTRUCTIONS GIVEN TO PATIENT: The patient was advised to ice the knee for 15-20 minutes to relieve any injection site related pain. Decrease activity for the next 24 to 48 hours. May use prescription or OTC pain relievers as needed      FOLLOW-UP:   As directed or call or return to clinic if these symptoms worsen or fail to improve as

## 2025-03-13 ENCOUNTER — HOSPITAL ENCOUNTER (OUTPATIENT)
Age: 75
Discharge: HOME | End: 2025-03-13
Payer: MEDICARE

## 2025-03-13 DIAGNOSIS — R31.9 HEMATURIA, UNSPECIFIED TYPE: ICD-10-CM

## 2025-03-13 DIAGNOSIS — I10 ESSENTIAL HYPERTENSION: ICD-10-CM

## 2025-03-13 DIAGNOSIS — N18.31 STAGE 3A CHRONIC KIDNEY DISEASE (HCC): ICD-10-CM

## 2025-03-13 LAB
25(OH)D3 SERPL-MCNC: 47.3 NG/ML
ANION GAP SERPL CALCULATED.3IONS-SCNC: 12 MMOL/L (ref 3–16)
BACTERIA URNS QL MICRO: ABNORMAL /HPF
BASOPHILS # BLD: 0.1 K/UL (ref 0–0.2)
BASOPHILS NFR BLD: 0.7 %
BILIRUB UR QL STRIP.AUTO: NEGATIVE
BUN SERPL-MCNC: 22 MG/DL (ref 7–20)
CALCIUM SERPL-MCNC: 9.9 MG/DL (ref 8.3–10.6)
CHLORIDE SERPL-SCNC: 103 MMOL/L (ref 99–110)
CLARITY UR: CLEAR
CO2 SERPL-SCNC: 23 MMOL/L (ref 21–32)
COLOR UR: YELLOW
CREAT SERPL-MCNC: 1.5 MG/DL (ref 0.6–1.2)
CREAT UR-MCNC: 83.9 MG/DL (ref 28–259)
DEPRECATED RDW RBC AUTO: 18 % (ref 12.4–15.4)
EOSINOPHIL # BLD: 0.2 K/UL (ref 0–0.6)
EOSINOPHIL NFR BLD: 2 %
EPI CELLS #/AREA URNS AUTO: 1 /HPF (ref 0–5)
GFR SERPLBLD CREATININE-BSD FMLA CKD-EPI: 36 ML/MIN/{1.73_M2}
GLUCOSE SERPL-MCNC: 69 MG/DL (ref 70–99)
GLUCOSE UR STRIP.AUTO-MCNC: 100 MG/DL
HCT VFR BLD AUTO: 36.2 % (ref 36–48)
HGB BLD-MCNC: 12 G/DL (ref 12–16)
HGB UR QL STRIP.AUTO: NEGATIVE
HYALINE CASTS #/AREA URNS AUTO: 0 /LPF (ref 0–8)
KETONES UR STRIP.AUTO-MCNC: NEGATIVE MG/DL
LEUKOCYTE ESTERASE UR QL STRIP.AUTO: ABNORMAL
LYMPHOCYTES # BLD: 0.6 K/UL (ref 1–5.1)
LYMPHOCYTES NFR BLD: 7.1 %
MCH RBC QN AUTO: 31 PG (ref 26–34)
MCHC RBC AUTO-ENTMCNC: 33.2 G/DL (ref 31–36)
MCV RBC AUTO: 93.3 FL (ref 80–100)
MICROALBUMIN UR DL<=1MG/L-MCNC: <1.2 MG/DL
MICROALBUMIN/CREAT UR: NORMAL MG/G (ref 0–30)
MONOCYTES # BLD: 0.2 K/UL (ref 0–1.3)
MONOCYTES NFR BLD: 2.4 %
NEUTROPHILS # BLD: 6.9 K/UL (ref 1.7–7.7)
NEUTROPHILS NFR BLD: 87.8 %
NITRITE UR QL STRIP.AUTO: NEGATIVE
PH UR STRIP.AUTO: 7 [PH] (ref 5–8)
PHOSPHATE SERPL-MCNC: 3.2 MG/DL (ref 2.5–4.9)
PLATELET # BLD AUTO: 618 K/UL (ref 135–450)
PMV BLD AUTO: 8.3 FL (ref 5–10.5)
POTASSIUM SERPL-SCNC: 4.5 MMOL/L (ref 3.5–5.1)
PROT UR STRIP.AUTO-MCNC: NEGATIVE MG/DL
PROT UR-MCNC: 6.02 MG/DL
PROT/CREAT UR-RTO: 0.1 MG/DL
PTH-INTACT SERPL-MCNC: 46.3 PG/ML (ref 14–72)
RBC # BLD AUTO: 3.88 M/UL (ref 4–5.2)
RBC CLUMPS #/AREA URNS AUTO: 2 /HPF (ref 0–4)
SODIUM SERPL-SCNC: 138 MMOL/L (ref 136–145)
SP GR UR STRIP.AUTO: 1.01 (ref 1–1.03)
UA DIPSTICK W REFLEX MICRO PNL UR: YES
URN SPEC COLLECT METH UR: ABNORMAL
UROBILINOGEN UR STRIP-ACNC: 0.2 E.U./DL
WBC # BLD AUTO: 7.8 K/UL (ref 4–11)
WBC #/AREA URNS AUTO: 1 /HPF (ref 0–5)

## 2025-03-13 PROCEDURE — 84100 ASSAY OF PHOSPHORUS: CPT

## 2025-03-13 PROCEDURE — 81001 URINALYSIS AUTO W/SCOPE: CPT

## 2025-03-13 PROCEDURE — 82570 ASSAY OF URINE CREATININE: CPT

## 2025-03-13 PROCEDURE — 82043 UR ALBUMIN QUANTITATIVE: CPT

## 2025-03-13 PROCEDURE — 82306 VITAMIN D 25 HYDROXY: CPT

## 2025-03-13 PROCEDURE — 80048 BASIC METABOLIC PNL TOTAL CA: CPT

## 2025-03-13 PROCEDURE — 84156 ASSAY OF PROTEIN URINE: CPT

## 2025-03-13 PROCEDURE — 85025 COMPLETE CBC W/AUTO DIFF WBC: CPT

## 2025-03-13 PROCEDURE — 36415 COLL VENOUS BLD VENIPUNCTURE: CPT

## 2025-03-13 PROCEDURE — 83970 ASSAY OF PARATHORMONE: CPT

## 2025-03-14 SDOH — ECONOMIC STABILITY: FOOD INSECURITY: WITHIN THE PAST 12 MONTHS, THE FOOD YOU BOUGHT JUST DIDN'T LAST AND YOU DIDN'T HAVE MONEY TO GET MORE.: NEVER TRUE

## 2025-03-14 SDOH — ECONOMIC STABILITY: FOOD INSECURITY: WITHIN THE PAST 12 MONTHS, YOU WORRIED THAT YOUR FOOD WOULD RUN OUT BEFORE YOU GOT MONEY TO BUY MORE.: NEVER TRUE

## 2025-03-14 SDOH — ECONOMIC STABILITY: TRANSPORTATION INSECURITY
IN THE PAST 12 MONTHS, HAS LACK OF TRANSPORTATION KEPT YOU FROM MEETINGS, WORK, OR FROM GETTING THINGS NEEDED FOR DAILY LIVING?: NO

## 2025-03-14 SDOH — ECONOMIC STABILITY: INCOME INSECURITY: IN THE LAST 12 MONTHS, WAS THERE A TIME WHEN YOU WERE NOT ABLE TO PAY THE MORTGAGE OR RENT ON TIME?: NO

## 2025-03-14 SDOH — ECONOMIC STABILITY: TRANSPORTATION INSECURITY
IN THE PAST 12 MONTHS, HAS THE LACK OF TRANSPORTATION KEPT YOU FROM MEDICAL APPOINTMENTS OR FROM GETTING MEDICATIONS?: NO

## 2025-03-14 ASSESSMENT — PATIENT HEALTH QUESTIONNAIRE - PHQ9
SUM OF ALL RESPONSES TO PHQ QUESTIONS 1-9: 0
1. LITTLE INTEREST OR PLEASURE IN DOING THINGS: NOT AT ALL
SUM OF ALL RESPONSES TO PHQ QUESTIONS 1-9: 0
2. FEELING DOWN, DEPRESSED OR HOPELESS: NOT AT ALL
2. FEELING DOWN, DEPRESSED OR HOPELESS: NOT AT ALL
SUM OF ALL RESPONSES TO PHQ9 QUESTIONS 1 & 2: 0
1. LITTLE INTEREST OR PLEASURE IN DOING THINGS: NOT AT ALL
SUM OF ALL RESPONSES TO PHQ QUESTIONS 1-9: 0
SUM OF ALL RESPONSES TO PHQ QUESTIONS 1-9: 0

## 2025-03-17 ENCOUNTER — OFFICE VISIT (OUTPATIENT)
Dept: PRIMARY CARE CLINIC | Age: 75
End: 2025-03-17

## 2025-03-17 VITALS
RESPIRATION RATE: 16 BRPM | SYSTOLIC BLOOD PRESSURE: 102 MMHG | HEART RATE: 63 BPM | OXYGEN SATURATION: 98 % | DIASTOLIC BLOOD PRESSURE: 70 MMHG | HEIGHT: 59 IN | BODY MASS INDEX: 28.95 KG/M2 | TEMPERATURE: 97.5 F | WEIGHT: 143.6 LBS

## 2025-03-17 DIAGNOSIS — D50.9 IRON DEFICIENCY ANEMIA, UNSPECIFIED IRON DEFICIENCY ANEMIA TYPE: Primary | ICD-10-CM

## 2025-03-17 DIAGNOSIS — I10 ESSENTIAL HYPERTENSION: ICD-10-CM

## 2025-03-17 DIAGNOSIS — E03.9 ACQUIRED HYPOTHYROIDISM: ICD-10-CM

## 2025-03-17 DIAGNOSIS — R25.2 MUSCLE CRAMPS: ICD-10-CM

## 2025-03-17 DIAGNOSIS — N18.31 STAGE 3A CHRONIC KIDNEY DISEASE (HCC): ICD-10-CM

## 2025-03-17 DIAGNOSIS — R73.03 PREDIABETES: ICD-10-CM

## 2025-03-17 DIAGNOSIS — J45.40 MODERATE PERSISTENT ASTHMA WITHOUT COMPLICATION: ICD-10-CM

## 2025-03-17 DIAGNOSIS — R42 VERTIGO: ICD-10-CM

## 2025-03-17 DIAGNOSIS — M06.00 SERONEGATIVE RHEUMATOID ARTHRITIS (HCC): ICD-10-CM

## 2025-03-17 RX ORDER — MONTELUKAST SODIUM 10 MG/1
10 TABLET ORAL DAILY
Qty: 30 TABLET | Refills: 3 | Status: SHIPPED | OUTPATIENT
Start: 2025-03-17

## 2025-03-17 RX ORDER — LABETALOL 200 MG/1
100 TABLET, FILM COATED ORAL 2 TIMES DAILY
Qty: 180 TABLET | Refills: 1
Start: 2025-03-17

## 2025-03-22 ENCOUNTER — RESULTS FOLLOW-UP (OUTPATIENT)
Dept: OTHER | Age: 75
End: 2025-03-22

## 2025-03-22 ASSESSMENT — ENCOUNTER SYMPTOMS
RECTAL PAIN: 0
WHEEZING: 0
COUGH: 0
EYE ITCHING: 0
APNEA: 0
ABDOMINAL PAIN: 0
PHOTOPHOBIA: 0
DIARRHEA: 0
SHORTNESS OF BREATH: 0
TROUBLE SWALLOWING: 0
CHOKING: 0
CONSTIPATION: 0
EYE DISCHARGE: 0
BLOOD IN STOOL: 0
VOMITING: 0
EYE PAIN: 0
CHEST TIGHTNESS: 0
ANAL BLEEDING: 0
BACK PAIN: 0
RHINORRHEA: 0
STRIDOR: 0
COLOR CHANGE: 0
EYE REDNESS: 0
NAUSEA: 0
ABDOMINAL DISTENTION: 0
VOICE CHANGE: 0
SORE THROAT: 0

## 2025-03-22 NOTE — PROGRESS NOTES
Josseline Koenig (:  1950) is a 74 y.o. female,Established patient, here for evaluation of the following chief complaint(s):  Dizziness (Within the last week ), Other (Muscle cramps in hands, feet and legs), Medication Check (Discuss synthroid- and bone density ), and Results      Assessment & Plan  Iron deficiency anemia, unspecified iron deficiency anemia type   Chronic, at goal (stable),  continue hematology follow-up with iron infusions as needed.    Orders:    CBC with Auto Differential; Future    Iron and TIBC; Future    Ferritin; Future    Acquired hypothyroidism   Chronic, at goal (stable), appears to be euthyroid but will need to check thyroid levels to make sure current dose of Synthroid, 100 mcg daily, is appropriate    Orders:    TSH reflex to FT4, FT3; Future    Stage 3a chronic kidney disease (HCC)    Concerned by slow progression of chronic kidney disease.  Patient is on Farxiga 10 mg daily.  Unfortunately has angioedema with ACE inhibitors so cannot take an ACE or an ARB.  Patient has upcoming appointment with nephrology who is following patient to assess.           Latest Ref Rng & Units 3/13/2025     9:23 AM 2025    11:07 AM 2024     1:15 PM 2024     2:11 PM 10/29/2024    10:21 AM   Labs Renal   BUN 7 - 20 mg/dL 22  24  16  16  15    Cr 0.6 - 1.2 mg/dL 1.5  1.4  1.1  1.3  1.2    K 3.5 - 5.1 mmol/L 4.5  4.9  4.2  4.8  4.4    Na 136 - 145 mmol/L 138  138  142  141  140            Seronegative rheumatoid arthritis (HCC)   Chronic, at goal (stable), continue current treatment plan with rheumatology methotrexate injection weekly, 50 mg /2 ml with 0.6 ml , 15 mg weekly         Vertigo    Not controlled, not severe, will evaluate hearing and refer for hearing evaluation and ent evaluation.     Orders:    Jackelyn Ireland Au.D, Audiology, [facility]    Gerardo Beard MD, Otolaryngology, [facility]    Muscle cramps    New in legs. Usually at hs. Explained

## 2025-03-26 ENCOUNTER — HOSPITAL ENCOUNTER (OUTPATIENT)
Age: 75
Discharge: HOME | End: 2025-03-26
Payer: MEDICARE

## 2025-03-26 DIAGNOSIS — D50.9 IRON DEFICIENCY ANEMIA, UNSPECIFIED IRON DEFICIENCY ANEMIA TYPE: ICD-10-CM

## 2025-03-26 DIAGNOSIS — R73.03 PREDIABETES: ICD-10-CM

## 2025-03-26 DIAGNOSIS — R25.2 MUSCLE CRAMPS: ICD-10-CM

## 2025-03-26 DIAGNOSIS — E03.9 ACQUIRED HYPOTHYROIDISM: ICD-10-CM

## 2025-03-26 LAB
ALBUMIN SERPL-MCNC: 4.4 G/DL (ref 3.4–5)
ALBUMIN/GLOB SERPL: 1.5 {RATIO} (ref 1.1–2.2)
ALP SERPL-CCNC: 61 U/L (ref 40–129)
ALT SERPL-CCNC: 17 U/L (ref 10–40)
ANION GAP SERPL CALCULATED.3IONS-SCNC: 12 MMOL/L (ref 3–16)
AST SERPL-CCNC: 26 U/L (ref 15–37)
BASOPHILS # BLD: 0.1 K/UL (ref 0–0.2)
BASOPHILS NFR BLD: 0.6 %
BILIRUB SERPL-MCNC: 0.4 MG/DL (ref 0–1)
BUN SERPL-MCNC: 21 MG/DL (ref 7–20)
CALCIUM SERPL-MCNC: 10.3 MG/DL (ref 8.3–10.6)
CHLORIDE SERPL-SCNC: 100 MMOL/L (ref 99–110)
CO2 SERPL-SCNC: 26 MMOL/L (ref 21–32)
CREAT SERPL-MCNC: 1.7 MG/DL (ref 0.6–1.2)
DEPRECATED RDW RBC AUTO: 18.2 % (ref 12.4–15.4)
EOSINOPHIL # BLD: 0.2 K/UL (ref 0–0.6)
EOSINOPHIL NFR BLD: 2.6 %
FERRITIN SERPL IA-MCNC: 839 NG/ML (ref 15–150)
GFR SERPLBLD CREATININE-BSD FMLA CKD-EPI: 31 ML/MIN/{1.73_M2}
GLUCOSE SERPL-MCNC: 88 MG/DL (ref 70–99)
HCT VFR BLD AUTO: 36.5 % (ref 36–48)
HGB BLD-MCNC: 12 G/DL (ref 12–16)
IRON SATN MFR SERPL: 31 % (ref 15–50)
IRON SERPL-MCNC: 90 UG/DL (ref 37–145)
LYMPHOCYTES # BLD: 0.7 K/UL (ref 1–5.1)
LYMPHOCYTES NFR BLD: 7.7 %
MAGNESIUM SERPL-MCNC: 2.48 MG/DL (ref 1.8–2.4)
MCH RBC QN AUTO: 30.5 PG (ref 26–34)
MCHC RBC AUTO-ENTMCNC: 32.8 G/DL (ref 31–36)
MCV RBC AUTO: 93 FL (ref 80–100)
MONOCYTES # BLD: 0.9 K/UL (ref 0–1.3)
MONOCYTES NFR BLD: 10.3 %
NEUTROPHILS # BLD: 6.9 K/UL (ref 1.7–7.7)
NEUTROPHILS NFR BLD: 78.8 %
PLATELET # BLD AUTO: 591 K/UL (ref 135–450)
PMV BLD AUTO: 8.5 FL (ref 5–10.5)
POTASSIUM SERPL-SCNC: 4.7 MMOL/L (ref 3.5–5.1)
PROT SERPL-MCNC: 7.3 G/DL (ref 6.4–8.2)
RBC # BLD AUTO: 3.92 M/UL (ref 4–5.2)
SODIUM SERPL-SCNC: 138 MMOL/L (ref 136–145)
TIBC SERPL-MCNC: 288 UG/DL (ref 260–445)
TSH SERPL DL<=0.005 MIU/L-ACNC: 0.97 UIU/ML (ref 0.27–4.2)
WBC # BLD AUTO: 8.7 K/UL (ref 4–11)

## 2025-03-26 PROCEDURE — 83735 ASSAY OF MAGNESIUM: CPT

## 2025-03-26 PROCEDURE — 80053 COMPREHEN METABOLIC PANEL: CPT

## 2025-03-26 PROCEDURE — 85025 COMPLETE CBC W/AUTO DIFF WBC: CPT

## 2025-03-26 PROCEDURE — 83036 HEMOGLOBIN GLYCOSYLATED A1C: CPT

## 2025-03-26 PROCEDURE — 82985 ASSAY OF GLYCATED PROTEIN: CPT

## 2025-03-26 PROCEDURE — 84443 ASSAY THYROID STIM HORMONE: CPT

## 2025-03-26 PROCEDURE — 82728 ASSAY OF FERRITIN: CPT

## 2025-03-26 PROCEDURE — 83550 IRON BINDING TEST: CPT

## 2025-03-26 PROCEDURE — 83540 ASSAY OF IRON: CPT

## 2025-03-26 PROCEDURE — 36415 COLL VENOUS BLD VENIPUNCTURE: CPT

## 2025-03-27 ENCOUNTER — RESULTS FOLLOW-UP (OUTPATIENT)
Dept: PRIMARY CARE CLINIC | Age: 75
End: 2025-03-27

## 2025-03-27 DIAGNOSIS — N18.31 STAGE 3A CHRONIC KIDNEY DISEASE (HCC): Primary | ICD-10-CM

## 2025-03-27 DIAGNOSIS — N17.9 AKI (ACUTE KIDNEY INJURY): ICD-10-CM

## 2025-03-27 LAB
EST. AVERAGE GLUCOSE BLD GHB EST-MCNC: 114 MG/DL
HBA1C MFR BLD: 5.6 %

## 2025-03-27 NOTE — RESULT ENCOUNTER NOTE
The A1c shows the blood sugars have been normal.  The thyroid level is normal on current supplement.  The iron levels are normal do not take any iron supplement.  The ferritin level is elevated.  This also represents inflammation associated with rheumatoid arthritis.  Inflammation is slowly improving.  You are not anemic.  Normal liver blood test.  Discussed decreased kidney function results with patient and we will get her in to see the nephrology doctor sooner, concern for a primary inflammatory renal disorder since blood pressure controlled and rheumatoid is doing better.  Concerned that the rheumatoid is affecting the kidney.  Patient will call nephrology office for earlier appointment and with decreased renal function will discontinue Fosamax

## 2025-03-28 LAB — FRUCTOSAMINE SERPL-SCNC: 238 UMOL/L (ref 205–285)

## 2025-04-08 ENCOUNTER — OFFICE VISIT (OUTPATIENT)
Dept: PRIMARY CARE CLINIC | Age: 75
End: 2025-04-08

## 2025-04-08 VITALS
HEART RATE: 74 BPM | BODY MASS INDEX: 28.87 KG/M2 | OXYGEN SATURATION: 95 % | RESPIRATION RATE: 16 BRPM | HEIGHT: 59 IN | DIASTOLIC BLOOD PRESSURE: 70 MMHG | SYSTOLIC BLOOD PRESSURE: 108 MMHG | WEIGHT: 143.2 LBS | TEMPERATURE: 97.3 F

## 2025-04-08 DIAGNOSIS — R07.81 PLEURITIC CHEST PAIN: Primary | ICD-10-CM

## 2025-04-08 DIAGNOSIS — Z86.711 HISTORY OF PULMONARY EMBOLUS (PE): ICD-10-CM

## 2025-04-08 SDOH — ECONOMIC STABILITY: FOOD INSECURITY: WITHIN THE PAST 12 MONTHS, YOU WORRIED THAT YOUR FOOD WOULD RUN OUT BEFORE YOU GOT MONEY TO BUY MORE.: NEVER TRUE

## 2025-04-08 SDOH — ECONOMIC STABILITY: FOOD INSECURITY: WITHIN THE PAST 12 MONTHS, THE FOOD YOU BOUGHT JUST DIDN'T LAST AND YOU DIDN'T HAVE MONEY TO GET MORE.: NEVER TRUE

## 2025-04-08 ASSESSMENT — PATIENT HEALTH QUESTIONNAIRE - PHQ9
SUM OF ALL RESPONSES TO PHQ QUESTIONS 1-9: 0
2. FEELING DOWN, DEPRESSED OR HOPELESS: NOT AT ALL
1. LITTLE INTEREST OR PLEASURE IN DOING THINGS: NOT AT ALL
SUM OF ALL RESPONSES TO PHQ QUESTIONS 1-9: 0

## 2025-04-10 ENCOUNTER — TELEPHONE (OUTPATIENT)
Dept: PRIMARY CARE CLINIC | Age: 75
End: 2025-04-10

## 2025-04-10 DIAGNOSIS — R07.89 OTHER CHEST PAIN: ICD-10-CM

## 2025-04-10 NOTE — TELEPHONE ENCOUNTER
Rebekah fink called from [facility] Scheduling pt needs a chest  X-ray for the lung pre fusion appt 4/21/25  [phone]

## 2025-04-12 ASSESSMENT — ENCOUNTER SYMPTOMS
EYE REDNESS: 0
SHORTNESS OF BREATH: 1
BLOOD IN STOOL: 0
EYE ITCHING: 0
COUGH: 0
RHINORRHEA: 0
EYE PAIN: 0
EYE DISCHARGE: 0
APNEA: 0
CONSTIPATION: 0
VOICE CHANGE: 0
DIARRHEA: 0
COLOR CHANGE: 0
ABDOMINAL DISTENTION: 0
VOMITING: 0
SPUTUM PRODUCTION: 0
NAUSEA: 0
CHOKING: 0
TROUBLE SWALLOWING: 0
CHEST TIGHTNESS: 0
SORE THROAT: 0
ORTHOPNEA: 0
ANAL BLEEDING: 0
WHEEZING: 0
RECTAL PAIN: 0
BACK PAIN: 1
STRIDOR: 0
ABDOMINAL PAIN: 0
PHOTOPHOBIA: 0

## 2025-04-14 ENCOUNTER — HOSPITAL ENCOUNTER (OUTPATIENT)
Age: 75
Discharge: HOME | End: 2025-04-14
Payer: MEDICARE

## 2025-04-14 ENCOUNTER — HOSPITAL ENCOUNTER (OUTPATIENT)
Dept: WOMENS IMAGING | Age: 75
Discharge: HOME | End: 2025-04-14
Payer: MEDICARE

## 2025-04-14 VITALS — HEIGHT: 60 IN | WEIGHT: 142 LBS | BODY MASS INDEX: 27.88 KG/M2

## 2025-04-14 DIAGNOSIS — N18.32 STAGE 3B CHRONIC KIDNEY DISEASE (HCC): ICD-10-CM

## 2025-04-14 DIAGNOSIS — I10 ESSENTIAL HYPERTENSION: ICD-10-CM

## 2025-04-14 DIAGNOSIS — Z12.31 VISIT FOR SCREENING MAMMOGRAM: ICD-10-CM

## 2025-04-14 DIAGNOSIS — R10.9 FLANK PAIN: ICD-10-CM

## 2025-04-14 LAB
ALBUMIN SERPL-MCNC: 4.4 G/DL (ref 3.4–5)
ALBUMIN/GLOB SERPL: 1.5 {RATIO} (ref 1.1–2.2)
ALP SERPL-CCNC: 60 U/L (ref 40–129)
ALT SERPL-CCNC: 20 U/L (ref 10–40)
ANION GAP SERPL CALCULATED.3IONS-SCNC: 11 MMOL/L (ref 3–16)
AST SERPL-CCNC: 20 U/L (ref 15–37)
BACTERIA URNS QL MICRO: ABNORMAL /HPF
BILIRUB SERPL-MCNC: <0.2 MG/DL (ref 0–1)
BILIRUB UR QL STRIP.AUTO: NEGATIVE
BUN SERPL-MCNC: 18 MG/DL (ref 7–20)
CALCIUM SERPL-MCNC: 9.9 MG/DL (ref 8.3–10.6)
CHLORIDE SERPL-SCNC: 102 MMOL/L (ref 99–110)
CLARITY UR: CLEAR
CO2 SERPL-SCNC: 25 MMOL/L (ref 21–32)
COLOR UR: YELLOW
CREAT SERPL-MCNC: 1.3 MG/DL (ref 0.6–1.2)
CREAT UR-MCNC: 85.5 MG/DL (ref 28–259)
DEPRECATED RDW RBC AUTO: 19.1 % (ref 12.4–15.4)
EPI CELLS #/AREA URNS AUTO: 0 /HPF (ref 0–5)
GFR SERPLBLD CREATININE-BSD FMLA CKD-EPI: 42 ML/MIN/{1.73_M2}
GLUCOSE SERPL-MCNC: 88 MG/DL (ref 70–99)
GLUCOSE UR STRIP.AUTO-MCNC: 100 MG/DL
HCT VFR BLD AUTO: 35.2 % (ref 36–48)
HGB BLD-MCNC: 11.7 G/DL (ref 12–16)
HGB UR QL STRIP.AUTO: NEGATIVE
HYALINE CASTS #/AREA URNS AUTO: 0 /LPF (ref 0–8)
KETONES UR STRIP.AUTO-MCNC: NEGATIVE MG/DL
LEUKOCYTE ESTERASE UR QL STRIP.AUTO: NEGATIVE
MCH RBC QN AUTO: 31.4 PG (ref 26–34)
MCHC RBC AUTO-ENTMCNC: 33.3 G/DL (ref 31–36)
MCV RBC AUTO: 94.1 FL (ref 80–100)
MICROALBUMIN UR DL<=1MG/L-MCNC: <1.2 MG/DL
MICROALBUMIN/CREAT UR: NORMAL MG/G (ref 0–30)
NITRITE UR QL STRIP.AUTO: NEGATIVE
PH UR STRIP.AUTO: 6.5 [PH] (ref 5–8)
PLATELET # BLD AUTO: 518 K/UL (ref 135–450)
PMV BLD AUTO: 8.4 FL (ref 5–10.5)
POTASSIUM SERPL-SCNC: 5.1 MMOL/L (ref 3.5–5.1)
PROT SERPL-MCNC: 7.4 G/DL (ref 6.4–8.2)
PROT UR STRIP.AUTO-MCNC: NEGATIVE MG/DL
PROT UR-MCNC: 6.83 MG/DL
PROT/CREAT UR-RTO: 0.1 MG/DL
RBC # BLD AUTO: 3.74 M/UL (ref 4–5.2)
RBC CLUMPS #/AREA URNS AUTO: 1 /HPF (ref 0–4)
SODIUM SERPL-SCNC: 138 MMOL/L (ref 136–145)
SP GR UR STRIP.AUTO: 1.01 (ref 1–1.03)
UA DIPSTICK W REFLEX MICRO PNL UR: ABNORMAL
URN SPEC COLLECT METH UR: ABNORMAL
UROBILINOGEN UR STRIP-ACNC: 1 E.U./DL
WBC # BLD AUTO: 6.5 K/UL (ref 4–11)
WBC #/AREA URNS AUTO: 1 /HPF (ref 0–5)

## 2025-04-14 PROCEDURE — 80053 COMPREHEN METABOLIC PANEL: CPT

## 2025-04-14 PROCEDURE — 87086 URINE CULTURE/COLONY COUNT: CPT

## 2025-04-14 PROCEDURE — 77063 BREAST TOMOSYNTHESIS BI: CPT

## 2025-04-14 PROCEDURE — 36415 COLL VENOUS BLD VENIPUNCTURE: CPT

## 2025-04-14 PROCEDURE — 84156 ASSAY OF PROTEIN URINE: CPT

## 2025-04-14 PROCEDURE — 82043 UR ALBUMIN QUANTITATIVE: CPT

## 2025-04-14 PROCEDURE — 81001 URINALYSIS AUTO W/SCOPE: CPT

## 2025-04-14 PROCEDURE — 82570 ASSAY OF URINE CREATININE: CPT

## 2025-04-14 PROCEDURE — 85027 COMPLETE CBC AUTOMATED: CPT

## 2025-04-15 ENCOUNTER — RESULTS FOLLOW-UP (OUTPATIENT)
Dept: NEPHROLOGY | Age: 75
End: 2025-04-15

## 2025-04-15 LAB — BACTERIA UR CULT: NORMAL

## 2025-04-15 NOTE — TELEPHONE ENCOUNTER
MA spoke with pt regarding recent lab work and results. Pt is aware and understands results. MA informed pt if she has any questions or concerns to give the office a call.     ----- Message from Dr. Hernandez Trujillo MD sent at 4/15/2025 12:15 AM EDT -----  Kidney function stable   No med changes

## 2025-04-16 ENCOUNTER — HOSPITAL ENCOUNTER (OUTPATIENT)
Dept: GENERAL RADIOLOGY | Age: 75
Discharge: HOME | End: 2025-04-16
Attending: INTERNAL MEDICINE
Payer: MEDICARE

## 2025-04-16 ENCOUNTER — HOSPITAL ENCOUNTER (OUTPATIENT)
Dept: NUCLEAR MEDICINE | Age: 75
Discharge: HOME | End: 2025-04-16
Attending: INTERNAL MEDICINE
Payer: MEDICARE

## 2025-04-16 DIAGNOSIS — Z86.711 HISTORY OF PULMONARY EMBOLUS (PE): ICD-10-CM

## 2025-04-16 DIAGNOSIS — R07.81 PLEURITIC CHEST PAIN: ICD-10-CM

## 2025-04-16 DIAGNOSIS — R07.89 OTHER CHEST PAIN: ICD-10-CM

## 2025-04-16 PROCEDURE — 71046 X-RAY EXAM CHEST 2 VIEWS: CPT

## 2025-04-16 PROCEDURE — A9540 TC99M MAA: HCPCS | Performed by: INTERNAL MEDICINE

## 2025-04-16 PROCEDURE — A9558 XE133 XENON 10MCI: HCPCS | Performed by: INTERNAL MEDICINE

## 2025-04-16 PROCEDURE — 78582 LUNG VENTILAT&PERFUS IMAGING: CPT

## 2025-04-16 PROCEDURE — 3430000000 HC RX DIAGNOSTIC RADIOPHARMACEUTICAL: Performed by: INTERNAL MEDICINE

## 2025-04-16 RX ORDER — XENON XE-133 10 MCI/1
4.14 GAS RESPIRATORY (INHALATION)
Status: COMPLETED | OUTPATIENT
Start: 2025-04-16 | End: 2025-04-16

## 2025-04-16 RX ADMIN — XENON XE-133 4.14 MILLICURIE: 10 GAS RESPIRATORY (INHALATION) at 08:18

## 2025-04-16 RX ADMIN — Medication 5.79 MILLICURIE: at 08:18

## 2025-04-17 ENCOUNTER — RESULTS FOLLOW-UP (OUTPATIENT)
Dept: PRIMARY CARE CLINIC | Age: 75
End: 2025-04-17

## 2025-04-17 DIAGNOSIS — I51.7 CARDIOMEGALY: Primary | ICD-10-CM

## 2025-04-17 DIAGNOSIS — R92.8 ABNORMAL MAMMOGRAM: Primary | ICD-10-CM

## 2025-04-18 ENCOUNTER — HOSPITAL ENCOUNTER (OUTPATIENT)
Dept: CT IMAGING | Age: 75
Discharge: HOME | End: 2025-04-18
Attending: INTERNAL MEDICINE
Payer: MEDICARE

## 2025-04-18 DIAGNOSIS — R10.9 FLANK PAIN: ICD-10-CM

## 2025-04-18 DIAGNOSIS — N18.32 STAGE 3B CHRONIC KIDNEY DISEASE (HCC): ICD-10-CM

## 2025-04-18 PROCEDURE — 74176 CT ABD & PELVIS W/O CONTRAST: CPT

## 2025-04-18 NOTE — RESULT ENCOUNTER NOTE
The chest xray showed mild enlargement of the heart and some scar or unexpanded mid left lung.  I want to get an echocardiogram, which is an ultrasound of the heart to further evaluate the mild enlargement.  [facility] to arrange. [phone].

## 2025-04-18 NOTE — RESULT ENCOUNTER NOTE
The right breast was normal.  The left breast shows an area that needs further evaluation with an ultrasound and diagnostic mammogram to see if a mass is present.  Instruct patient to call [facility] at [phone] to arrange to do the additional views.  After this is done we will give further recommendations.

## 2025-04-27 SDOH — HEALTH STABILITY: PHYSICAL HEALTH: ON AVERAGE, HOW MANY DAYS PER WEEK DO YOU ENGAGE IN MODERATE TO STRENUOUS EXERCISE (LIKE A BRISK WALK)?: 0 DAYS

## 2025-04-27 SDOH — HEALTH STABILITY: PHYSICAL HEALTH: ON AVERAGE, HOW MANY MINUTES DO YOU ENGAGE IN EXERCISE AT THIS LEVEL?: 0 MIN

## 2025-04-27 ASSESSMENT — LIFESTYLE VARIABLES
HOW MANY STANDARD DRINKS CONTAINING ALCOHOL DO YOU HAVE ON A TYPICAL DAY: PATIENT DOES NOT DRINK
HOW OFTEN DO YOU HAVE A DRINK CONTAINING ALCOHOL: NEVER
HOW OFTEN DO YOU HAVE A DRINK CONTAINING ALCOHOL: 1
HOW MANY STANDARD DRINKS CONTAINING ALCOHOL DO YOU HAVE ON A TYPICAL DAY: 0
HOW OFTEN DO YOU HAVE SIX OR MORE DRINKS ON ONE OCCASION: 1

## 2025-04-27 ASSESSMENT — PATIENT HEALTH QUESTIONNAIRE - PHQ9
SUM OF ALL RESPONSES TO PHQ QUESTIONS 1-9: 0
SUM OF ALL RESPONSES TO PHQ QUESTIONS 1-9: 0
2. FEELING DOWN, DEPRESSED OR HOPELESS: NOT AT ALL
SUM OF ALL RESPONSES TO PHQ QUESTIONS 1-9: 0
1. LITTLE INTEREST OR PLEASURE IN DOING THINGS: NOT AT ALL
SUM OF ALL RESPONSES TO PHQ QUESTIONS 1-9: 0

## 2025-04-28 ENCOUNTER — OFFICE VISIT (OUTPATIENT)
Dept: PRIMARY CARE CLINIC | Age: 75
End: 2025-04-28

## 2025-04-28 VITALS
HEIGHT: 60 IN | BODY MASS INDEX: 28.58 KG/M2 | DIASTOLIC BLOOD PRESSURE: 81 MMHG | HEART RATE: 63 BPM | WEIGHT: 145.6 LBS | TEMPERATURE: 97 F | OXYGEN SATURATION: 97 % | RESPIRATION RATE: 16 BRPM | SYSTOLIC BLOOD PRESSURE: 128 MMHG

## 2025-04-28 DIAGNOSIS — Z00.00 MEDICARE ANNUAL WELLNESS VISIT, SUBSEQUENT: Primary | ICD-10-CM

## 2025-04-28 SDOH — ECONOMIC STABILITY: FOOD INSECURITY: WITHIN THE PAST 12 MONTHS, THE FOOD YOU BOUGHT JUST DIDN'T LAST AND YOU DIDN'T HAVE MONEY TO GET MORE.: NEVER TRUE

## 2025-04-28 SDOH — ECONOMIC STABILITY: FOOD INSECURITY: WITHIN THE PAST 12 MONTHS, YOU WORRIED THAT YOUR FOOD WOULD RUN OUT BEFORE YOU GOT MONEY TO BUY MORE.: NEVER TRUE

## 2025-04-28 NOTE — PATIENT INSTRUCTIONS
Learning About Being Active as an Older Adult  Why is being active important as you get older?     Being active is one of the best things you can do for your health. And it's never too late to start. Being active--or getting active, if you aren't already--has definite benefits. It can:  Give you more energy,  Keep your mind sharp.  Improve balance to reduce your risk of falls.  Help you manage chronic illness with fewer medicines.  No matter how old you are, how fit you are, or what health problems you have, there is a form of activity that will work for you. And the more physical activity you can do, the better your overall health will be.  What kinds of activity can help you stay healthy?  Being more active will make your daily activities easier. Physical activity includes planned exercise and things you do in daily life. There are four types of activity:  Aerobic.  Doing aerobic activity makes your heart and lungs strong.  Includes walking, dancing, and gardening.  Aim for at least 2½ hours spread throughout the week.  It improves your energy and can help you sleep better.  Muscle-strengthening.  This type of activity can help maintain muscle and strengthen bones.  Includes climbing stairs, using resistance bands, and lifting or carrying heavy loads.  Aim for at least twice a week.  It can help protect the knees and other joints.  Stretching.  Stretching gives you better range of motion in joints and muscles.  Includes upper arm stretches, calf stretches, and gentle yoga.  Aim for at least twice a week, preferably after your muscles are warmed up from other activities.  It can help you function better in daily life.  Balancing.  This helps you stay coordinated and have good posture.  Includes heel-to-toe walking, festus chi, and certain types of yoga.  Aim for at least 3 days a week.  It can reduce your risk of falling.  Even if you have a hard time meeting the recommendations, it's better to be more active

## 2025-04-28 NOTE — PROGRESS NOTES
Medicare Annual Wellness Visit    Josseline Koenig is here for Medicare AWV (/)    Assessment & Plan   Medicare annual wellness visit, subsequent     Return in 2 months (on 6/28/2025).     Subjective       Patient's complete Health Risk Assessment and screening values have been reviewed and are found in Flowsheets. The following problems were reviewed today and where indicated follow up appointments were made and/or referrals ordered.    Positive Risk Factor Screenings with Interventions:              Inactivity:  On average, how many days per week do you engage in moderate to strenuous exercise (like a brisk walk)?: (Patient-Rptd) 0 days (!) Abnormal  On average, how many minutes do you engage in exercise at this level?: (Patient-Rptd) 0 min  Interventions:  Will joint the [facility] to  go 4 times a week.           Advanced Directives:  Do you have a Living Will?: (!) (Patient-Rptd) No    Intervention:  see ACP note                     Objective   Vitals:    04/28/25 0944   BP: 128/81   Pulse: 63   Resp: 16   Temp: 97 °F (36.1 °C)   TempSrc: Tympanic   SpO2: 97%   Weight: 66 kg (145 lb 9.6 oz)   Height: 1.511 m (4' 11.5\")      Body mass index is 28.92 kg/m².        Physical Exam  Constitutional:       General: She is not in acute distress.     Appearance: She is well-developed. She is not diaphoretic.   HENT:      Head: Normocephalic.      Right Ear: External ear normal.      Left Ear: External ear normal.      Mouth/Throat:      Pharynx: No oropharyngeal exudate.   Eyes:      Extraocular Movements: Extraocular movements intact.      Conjunctiva/sclera: Conjunctivae normal.      Pupils: Pupils are equal, round, and reactive to light.   Neck:      Thyroid: No thyromegaly.      Vascular: No JVD.      Trachea: No tracheal deviation.      Comments: Scar from total thyroidectomy due to multinodular goiter.   Cardiovascular:      Rate and Rhythm: Normal rate.      Pulses: Normal pulses.      Heart sounds:

## 2025-04-28 NOTE — ACP (ADVANCE CARE PLANNING)
\"Primary\")      Care Preferences:    Hospitalization:  \"If your health worsens and it becomes clear that your chance of recovery is unlikely, what would be your preference regarding hospitalization?\"  The patient would prefer hospitalization.    Ventilation:  \"If you were unable to breath on your own and your chance of recovery was unlikely, what would be your preference about the use of a ventilator (breathing machine) if it was available to you?\"  The patient would desire the use of a ventilator.    Resuscitation:  \"In the event your heart stopped as a result of an underlying serious health condition, would you want attempts made to restart your heart, or would you prefer a natural death?\"  Yes, attempt to resuscitate.    treatment goals    Conversation Outcomes / Follow-Up Plan:  ACP complete - no further action today  Reviewed DNR/DNI and patient elects Full Code (Attempt Resuscitation)    Length of Voluntary ACP Conversation in minutes:  <16 minutes (Non-Billable)    PAI ENNIS MD

## 2025-04-29 ENCOUNTER — HOSPITAL ENCOUNTER (OUTPATIENT)
Dept: ULTRASOUND IMAGING | Age: 75
Discharge: HOME | End: 2025-04-29
Payer: MEDICARE

## 2025-04-29 ENCOUNTER — HOSPITAL ENCOUNTER (OUTPATIENT)
Dept: WOMENS IMAGING | Age: 75
Discharge: HOME | End: 2025-04-29
Payer: MEDICARE

## 2025-04-29 DIAGNOSIS — R92.8 ABNORMAL MAMMOGRAM: ICD-10-CM

## 2025-04-29 PROCEDURE — G0279 TOMOSYNTHESIS, MAMMO: HCPCS

## 2025-04-30 ENCOUNTER — PROCEDURE VISIT (OUTPATIENT)
Dept: AUDIOLOGY | Age: 75
End: 2025-04-30
Payer: MEDICARE

## 2025-04-30 ENCOUNTER — OFFICE VISIT (OUTPATIENT)
Dept: ENT CLINIC | Age: 75
End: 2025-04-30
Payer: MEDICARE

## 2025-04-30 VITALS — BODY MASS INDEX: 28.2 KG/M2 | DIASTOLIC BLOOD PRESSURE: 89 MMHG | SYSTOLIC BLOOD PRESSURE: 148 MMHG | WEIGHT: 142 LBS

## 2025-04-30 DIAGNOSIS — H81.11 BENIGN PAROXYSMAL POSITIONAL VERTIGO OF RIGHT EAR: Primary | ICD-10-CM

## 2025-04-30 DIAGNOSIS — Z01.10 NORMAL HEARING TEST: ICD-10-CM

## 2025-04-30 DIAGNOSIS — R42 DIZZINESS: Primary | ICD-10-CM

## 2025-04-30 PROCEDURE — 92557 COMPREHENSIVE HEARING TEST: CPT

## 2025-04-30 PROCEDURE — 3079F DIAST BP 80-89 MM HG: CPT | Performed by: OTOLARYNGOLOGY

## 2025-04-30 PROCEDURE — 92567 TYMPANOMETRY: CPT

## 2025-04-30 PROCEDURE — 3077F SYST BP >= 140 MM HG: CPT | Performed by: OTOLARYNGOLOGY

## 2025-04-30 PROCEDURE — 1123F ACP DISCUSS/DSCN MKR DOCD: CPT | Performed by: OTOLARYNGOLOGY

## 2025-04-30 PROCEDURE — 99213 OFFICE O/P EST LOW 20 MIN: CPT | Performed by: OTOLARYNGOLOGY

## 2025-04-30 PROCEDURE — 1159F MED LIST DOCD IN RCRD: CPT | Performed by: OTOLARYNGOLOGY

## 2025-04-30 NOTE — PROGRESS NOTES
Josseline Koenig   1950, 74 y.o. female   [id number]       Referring Provider: Pedro Song MD  Referral Type: In an order in Epic    Reason for Visit: Evaluation of suspected change in hearing, tinnitus, or balance.    ADULT AUDIOLOGIC EVALUATION      Josseline Koenig is a 74 y.o. female seen today, 4/30/2025 , for an initial audiologic evaluation.  Patient was seen by Pedro Song MD following today's evaluation.    AUDIOLOGIC AND OTHER PERTINENT MEDICAL HISTORY:      Josseline Koenig reports episodic vertigo that occurs mostly when she lays down or sits up quickly, and occurs most often when she turns to the right.  Patient noted that it does not last very long.  She denied any major concerns for her hearing.  She did states she has some drainage from the right ear.    She denied otalgia, aural fullness, tinnitus, history of falls, history of noise exposure, history of head trauma, history of ear surgery, and family history of hearing loss    Date: 4/30/2025     IMPRESSIONS:      Today's results revealed Normal hearing 250-8000 Hz bilaterally. Excellent speech understanding when in quiet. Tympanometry indicates normal middle ear function. Discussed test results and implications with patient.    Follow medical recommendations of Pedro Song MD.    ASSESSMENT AND FINDINGS:     Otoscopy unremarkable.    RIGHT EAR:  Hearing Sensitivity: Normal hearing sensitivity 250-8000 Hz  Speech Recognition Threshold: 10 dB HL  Word Recognition: Excellent 100%, based on NU-6 by-difficulty list at 50 dBHL using recorded speech stimuli.    Tympanometry: Normal peak pressure and compliance, Type A tympanogram, consistent with normal middle ear function.       LEFT EAR:  Hearing Sensitivity: Normal hearing sensitivity 250-8000 Hz  Speech Recognition Threshold: 10 dB HL  Word Recognition: Excellent 100%, based on NU-6 by-difficulty list at 50 dBHL using recorded speech stimuli.    Tympanometry: Normal peak pressure and compliance,

## 2025-04-30 NOTE — PROGRESS NOTES
[facility]  DIVISION OF OTOLARYNGOLOGY- HEAD & NECK SURGERY  CONSULT      Patient Name: Josseline Koenig  Medical Record Number:  [id number]  Primary Care Physician:  Sonal Maki MD  Date of Consultation: 4/30/2025    Chief Complaint: Dizziness        HISTORY OF PRESENT ILLNESS  Josseline is a(n) 74 y.o. female who presents for evaluation of dizziness.  The patient says that for the past couple of months she has had dizziness.  It is associated with head movement.  She says that it feels like it is coming on so she sets up.  She does not she does get vertigo.  She denies head trauma.  She denies hearing changes.  She had seen my previous partner Dr. Durham for thyroid issues in the past.  She had a total thyroidectomy in June of last year.            REVIEW OF SYSTEMS  As above    PHYSICAL EXAM  GENERAL: No Acute Distress, Alert and Oriented, no Hoarseness, strong voice  EYES: EOMI, Anti-icteric  HENT:   Head: Normocephalic and atraumatic.   Face:  Symmetric, facial nerve intact, no sinus tenderness  Right Ear: Normal external ear, normal external auditory canal, intact tympanic membrane with normal mobility and aerated middle ear  Left Ear: Normal external ear, normal external auditory canal, intact tympanic membrane with normal mobility and aerated middle ear  Mouth/Oral Cavity:  normal lips, Uvula is midline, no mucosal lesions, no trismus,  Oropharynx/Larynx:  normal oropharynx,  Nose:Normal external nasal appearance.    NECK: Healed thyroidectomy incision  Neuro: Right [place]-Hallpike showed nystagmus with subjective vertigo.  A right Epley maneuver was performed.  A subsequent right Soo-Hallpike showed essentially resolution of symptoms    Audiogram today showed normal hearing            ASSESSMENT/PLAN  1. Benign paroxysmal positional vertigo of right ear  Patient's dizziness is consistent with BPPV.  She responded really well to a single Epley maneuver today in clinic.  She thinks she can do this herself

## 2025-05-21 ENCOUNTER — HOSPITAL ENCOUNTER (OUTPATIENT)
Age: 75
Discharge: HOME | End: 2025-05-23
Attending: INTERNAL MEDICINE
Payer: MEDICARE

## 2025-05-21 VITALS
DIASTOLIC BLOOD PRESSURE: 82 MMHG | BODY MASS INDEX: 27.88 KG/M2 | HEIGHT: 60 IN | WEIGHT: 142 LBS | SYSTOLIC BLOOD PRESSURE: 124 MMHG

## 2025-05-21 DIAGNOSIS — I51.7 CARDIOMEGALY: ICD-10-CM

## 2025-05-21 LAB
ECHO AO ASC DIAM: 3.3 CM
ECHO AO ASCENDING AORTA INDEX: 2.06 CM/M2
ECHO AO ROOT DIAM: 3.2 CM
ECHO AO ROOT INDEX: 2 CM/M2
ECHO AV AREA PEAK VELOCITY: 1.6 CM2
ECHO AV AREA VTI: 1.7 CM2
ECHO AV AREA/BSA PEAK VELOCITY: 1 CM2/M2
ECHO AV AREA/BSA VTI: 1.1 CM2/M2
ECHO AV MEAN GRADIENT: 5 MMHG
ECHO AV MEAN VELOCITY: 1.1 M/S
ECHO AV PEAK GRADIENT: 11 MMHG
ECHO AV PEAK VELOCITY: 1.6 M/S
ECHO AV VELOCITY RATIO: 0.75
ECHO AV VTI: 30.6 CM
ECHO BSA: 1.64 M2
ECHO EST RA PRESSURE: 3 MMHG
ECHO LA AREA 2C: 15.4 CM2
ECHO LA AREA 4C: 13.5 CM2
ECHO LA MAJOR AXIS: 3.8 CM
ECHO LA MINOR AXIS: 4.1 CM
ECHO LA VOL BP: 44 ML (ref 22–52)
ECHO LA VOL MOD A2C: 46 ML (ref 22–52)
ECHO LA VOL MOD A4C: 38 ML (ref 22–52)
ECHO LA VOL/BSA BIPLANE: 28 ML/M2 (ref 16–34)
ECHO LA VOLUME INDEX MOD A2C: 29 ML/M2 (ref 16–34)
ECHO LA VOLUME INDEX MOD A4C: 24 ML/M2 (ref 16–34)
ECHO LV E' LATERAL VELOCITY: 9.4 CM/S
ECHO LV E' SEPTAL VELOCITY: 8.22 CM/S
ECHO LV EDV A2C: 74 ML
ECHO LV EDV A4C: 71 ML
ECHO LV EDV INDEX A4C: 44 ML/M2
ECHO LV EDV NDEX A2C: 46 ML/M2
ECHO LV EF PHYSICIAN: 55 %
ECHO LV EJECTION FRACTION A2C: 57 %
ECHO LV EJECTION FRACTION A4C: 58 %
ECHO LV EJECTION FRACTION BIPLANE: 58 % (ref 55–100)
ECHO LV ESV A2C: 32 ML
ECHO LV ESV A4C: 30 ML
ECHO LV ESV INDEX A2C: 20 ML/M2
ECHO LV ESV INDEX A4C: 19 ML/M2
ECHO LV FRACTIONAL SHORTENING: 33 % (ref 28–44)
ECHO LV INTERNAL DIMENSION DIASTOLE INDEX: 2.63 CM/M2
ECHO LV INTERNAL DIMENSION DIASTOLIC: 4.2 CM (ref 3.9–5.3)
ECHO LV INTERNAL DIMENSION SYSTOLIC INDEX: 1.75 CM/M2
ECHO LV INTERNAL DIMENSION SYSTOLIC: 2.8 CM
ECHO LV IVSD: 1.2 CM (ref 0.6–0.9)
ECHO LV MASS 2D: 189.2 G (ref 67–162)
ECHO LV MASS INDEX 2D: 118.2 G/M2 (ref 43–95)
ECHO LV POSTERIOR WALL DIASTOLIC: 1.3 CM (ref 0.6–0.9)
ECHO LV RELATIVE WALL THICKNESS RATIO: 0.62
ECHO LVOT AREA: 2.3 CM2
ECHO LVOT AV VTI INDEX: 0.76
ECHO LVOT DIAM: 1.7 CM
ECHO LVOT MEAN GRADIENT: 3 MMHG
ECHO LVOT PEAK GRADIENT: 5 MMHG
ECHO LVOT PEAK VELOCITY: 1.2 M/S
ECHO LVOT STROKE VOLUME INDEX: 33 ML/M2
ECHO LVOT SV: 52.9 ML
ECHO LVOT VTI: 23.3 CM
ECHO MV A VELOCITY: 1.02 M/S
ECHO MV E VELOCITY: 0.66 M/S
ECHO MV E/A RATIO: 0.65
ECHO MV E/E' LATERAL: 7.02
ECHO MV E/E' RATIO (AVERAGED): 7.53
ECHO MV E/E' SEPTAL: 8.03
ECHO PV MAX VELOCITY: 1 M/S
ECHO PV PEAK GRADIENT: 4 MMHG
ECHO RA AREA 4C: 8.6 CM2
ECHO RA END SYSTOLIC VOLUME APICAL 4 CHAMBER INDEX BSA: 9 ML/M2
ECHO RA VOLUME: 14 ML
ECHO RV BASAL DIMENSION: 2.8 CM
ECHO RV FREE WALL PEAK S': 14 CM/S
ECHO RV LONGITUDINAL DIMENSION: 7.1 CM
ECHO RV MID DIMENSION: 1.9 CM
ECHO RV TAPSE: 2.9 CM (ref 1.7–?)

## 2025-05-21 PROCEDURE — 93306 TTE W/DOPPLER COMPLETE: CPT | Performed by: INTERNAL MEDICINE

## 2025-05-21 PROCEDURE — 93306 TTE W/DOPPLER COMPLETE: CPT

## 2025-05-23 ENCOUNTER — RESULTS FOLLOW-UP (OUTPATIENT)
Dept: PRIMARY CARE CLINIC | Age: 75
End: 2025-05-23

## 2025-05-23 NOTE — RESULT ENCOUNTER NOTE
We will discuss the diastolic dysfunction seen on the echo at your follow up appointment.  The Farxiga that you take for your kidneys is also good for your heart.

## 2025-05-28 DIAGNOSIS — I10 ESSENTIAL HYPERTENSION: ICD-10-CM

## 2025-05-29 RX ORDER — LABETALOL 200 MG/1
100 TABLET, FILM COATED ORAL 2 TIMES DAILY
Qty: 90 TABLET | Refills: 1 | Status: SHIPPED | OUTPATIENT
Start: 2025-05-29

## 2025-06-06 ENCOUNTER — TRANSCRIBE ORDERS (OUTPATIENT)
Dept: ADMINISTRATIVE | Age: 75
End: 2025-06-06

## 2025-06-06 DIAGNOSIS — I10 ESSENTIAL (PRIMARY) HYPERTENSION: Primary | ICD-10-CM

## 2025-06-23 ENCOUNTER — HOSPITAL ENCOUNTER (EMERGENCY)
Age: 75
Discharge: HOME | End: 2025-06-23
Payer: MEDICARE

## 2025-06-23 ENCOUNTER — APPOINTMENT (OUTPATIENT)
Dept: CT IMAGING | Age: 75
End: 2025-06-23
Payer: MEDICARE

## 2025-06-23 VITALS
DIASTOLIC BLOOD PRESSURE: 88 MMHG | BODY MASS INDEX: 29.15 KG/M2 | OXYGEN SATURATION: 97 % | WEIGHT: 146.8 LBS | TEMPERATURE: 98.1 F | SYSTOLIC BLOOD PRESSURE: 162 MMHG | RESPIRATION RATE: 16 BRPM | HEART RATE: 59 BPM

## 2025-06-23 DIAGNOSIS — S09.90XA CLOSED HEAD INJURY, INITIAL ENCOUNTER: Primary | ICD-10-CM

## 2025-06-23 PROCEDURE — 99284 EMERGENCY DEPT VISIT MOD MDM: CPT

## 2025-06-23 PROCEDURE — 70450 CT HEAD/BRAIN W/O DYE: CPT

## 2025-06-23 ASSESSMENT — LIFESTYLE VARIABLES
HOW OFTEN DO YOU HAVE A DRINK CONTAINING ALCOHOL: NEVER
HOW MANY STANDARD DRINKS CONTAINING ALCOHOL DO YOU HAVE ON A TYPICAL DAY: PATIENT DOES NOT DRINK

## 2025-06-23 ASSESSMENT — ENCOUNTER SYMPTOMS
BACK PAIN: 0
COLOR CHANGE: 0
VOMITING: 0
NAUSEA: 0

## 2025-06-23 ASSESSMENT — PAIN - FUNCTIONAL ASSESSMENT: PAIN_FUNCTIONAL_ASSESSMENT: 0-10

## 2025-06-23 ASSESSMENT — PAIN SCALES - GENERAL: PAINLEVEL_OUTOF10: 3

## 2025-06-23 NOTE — ED PROVIDER NOTES
[facility] EMERGENCY DEPARTMENT  EMERGENCY DEPARTMENT ENCOUNTER        Pt Name: Josseline Koenig  MRN: [id number]  Birthdate 1950  Date of evaluation: 6/23/2025  Provider: Felicia Reynolds PA-C  PCP: Sonal Maki MD  Note Started: 9:51 AM EDT 6/23/25      JS. I have evaluated this patient.        CHIEF COMPLAINT       Chief Complaint   Patient presents with    Fall     States she tripped on rocks and fell forward and hit her head yesterday, states she is on blood thinners        HISTORY OF PRESENT ILLNESS: 1 or more Elements     History From: patient  Limitations to history : None    Josseline Koenig is a 74 y.o. female who presents to the emergency department stating that yesterday she lost her balance and tripped on the rocks of her flower bed and fell forward and hit her forehead on the ground.  She rates her pain to be a 3 out of 10 on pain scale.  There was no loss of consciousness.  There is no open wounds.  She is anticoagulated on Eliquis due to history of PE.  She is here for head imaging.  She denies any other associated injury.    Nursing Notes were all reviewed and agreed with or any disagreements were addressed in the HPI.    REVIEW OF SYSTEMS :      Review of Systems   Eyes:  Negative for visual disturbance.   Gastrointestinal:  Negative for nausea and vomiting.   Musculoskeletal:  Negative for back pain, neck pain and neck stiffness.   Skin:  Negative for color change, pallor, rash and wound.   Neurological:  Positive for headaches. Negative for dizziness, tremors, seizures, syncope, facial asymmetry, speech difficulty, weakness, light-headedness and numbness.   Psychiatric/Behavioral:  Negative for confusion.    All other systems reviewed and are negative.      Positives and Pertinent negatives as per HPI.     SURGICAL HISTORY     Past Surgical History:   Procedure Laterality Date    BREAST BIOPSY  197?    BREAST LUMPECTOMY  197?    BREAST SURGERY Left     left breast

## 2025-06-24 ENCOUNTER — CARE COORDINATION (OUTPATIENT)
Dept: CARE COORDINATION | Age: 75
End: 2025-06-24

## 2025-06-24 NOTE — CARE COORDINATION
Ambulatory Care Coordination Note     2025 12:13 PM     Patient Current Location:  Home: [address] [place] [id number]     This patient was received as a referral from Ambulatory Care Manager .    ACM contacted the patient by telephone. Verified name and  with patient as identifiers. Provided introduction to self, and explanation of the ACM role.   Patient declined care management services at this time.          ACM: Virginia Genao RN     Challenges to be reviewed by the provider   Additional needs identified to be addressed with provider No                  Method of communication with provider: none.    Utilization: Initial Call - Discharge Date: 25   Discharge Facility: [facility]  Reason for ED Visit: fall  Visit Diagnosis: fall    Number of ED visits in the last 6 months: 1      Do you have any ongoing symptoms? No  Did you call your PCP prior to going to the ED? No, did not call the PCP office.     Review of Discharge Instructions:   [x] AVS discharge instructions  [x] Right Care, Right Place, Right Time document  [x] Medication changes  [x] Follow up appointments      Care Summary Note:  ACM outreach for enrollment to CC from ED list patient seen at [facility] ED for fall in her garden she hit her head on a rock and pt does take blood thinner. Pt declined CC at this time. ACM discussed BP with pt she stated that she was shocked her pressure was so high yesterday, pt reported that she has a home cuff  and will monitor her BP daily until her next appointment with Dr. Maki.  Pt educated on how to get in touch with ACM should she changed her mind on CC.      PCP/Specialist follow up:   Future Appointments         Provider Specialty Dept Phone    2025 9:00 AM Sonal Maki MD Primary Care [phone]    2025 12:45 PM Hernandez Trujillo MD Nephrology [phone]

## 2025-07-08 ENCOUNTER — OFFICE VISIT (OUTPATIENT)
Dept: PRIMARY CARE CLINIC | Age: 75
End: 2025-07-08

## 2025-07-08 VITALS
OXYGEN SATURATION: 99 % | BODY MASS INDEX: 29.31 KG/M2 | SYSTOLIC BLOOD PRESSURE: 128 MMHG | DIASTOLIC BLOOD PRESSURE: 68 MMHG | HEART RATE: 60 BPM | WEIGHT: 147.6 LBS | RESPIRATION RATE: 18 BRPM

## 2025-07-08 DIAGNOSIS — I10 ESSENTIAL HYPERTENSION: Primary | ICD-10-CM

## 2025-07-08 DIAGNOSIS — J45.40 MODERATE PERSISTENT ASTHMA WITHOUT COMPLICATION: ICD-10-CM

## 2025-07-08 DIAGNOSIS — R09.89 RIGHT CAROTID BRUIT: ICD-10-CM

## 2025-07-08 DIAGNOSIS — I51.89 DIASTOLIC DYSFUNCTION WITHOUT HEART FAILURE: ICD-10-CM

## 2025-07-08 RX ORDER — MONTELUKAST SODIUM 10 MG/1
10 TABLET ORAL DAILY
Qty: 90 TABLET | Refills: 1 | Status: SHIPPED | OUTPATIENT
Start: 2025-07-08

## 2025-07-08 NOTE — PATIENT INSTRUCTIONS
Ask for Tdap and can get Covid 19 booster.    Ask Nephrology if you can increase the Farxiga back to 10 mg with the diastolic dysfunction with hypertension.

## 2025-07-08 NOTE — PROGRESS NOTES
Josseline Koenig (:  1950) is a 74 y.o. female,Established patient, here for evaluation of the following chief complaint(s):  Follow-up (Went to [facility] ER- fall) and Fall (Head injury)      Assessment & Plan   ASSESSMENT/PLAN:  1. Essential hypertension is controlled on labetalol 100 mg bid, spironolactone 25 mg qd,       Latest Ref Rng & Units 7/10/2025     9:20 AM 2025     2:27 PM 3/26/2025     9:59 AM 3/13/2025     9:23 AM 2025    11:07 AM   Labs Renal   BUN 7 - 20 mg/dL 19  18  21  22  24    Cr 0.6 - 1.2 mg/dL 1.4  1.3  1.7  1.5  1.4    K 3.5 - 5.1 mmol/L 4.5  5.1  4.7  4.5  4.9    Na 136 - 145 mmol/L 135  138  138  138  138       BP Readings from Last 3 Encounters:   25 (!) 143/77   25 128/68   25 (!) 162/88      2. Moderate persistent asthma without complication is controlled on Advair 2 50-50, 1 puff every 12 hours and singular 10 mg daily, continue.  -     montelukast (SINGULAIR) 10 MG tablet; Take 1 tablet by mouth daily, Disp-90 tablet, R-1Normal  3. Diastolic dysfunction without heart failure is controlled with spironolactone 20 labetalol, continue.  4. Right carotid bruit close no history of TIA.  Patient does not measure the LDL.  Will get carotid artery Doppler and if plaque present will start statin.  Lab Results   Component Value Date    CHOL 147 2024    CHOL 166 2024    CHOL 176 2023     Lab Results   Component Value Date    TRIG 70 2024    TRIG 129 2024    TRIG 82 2023     Lab Results   Component Value Date    HDL 82 (H) 2024    HDL 77 (H) 2024    HDL 73 (H) 2023     Lab Results   Component Value Date    LDL 51 2024    LDL 63 2024    LDL 87 2023     Lab Results   Component Value Date    VLDL 14 2024    VLDL 26 2024    VLDL 16 2023     Lab Results   Component Value Date    CHOLHDLRATIO 2.0 2013    CHOLHDLRATIO 1.8 (L) 2012    CHOLHDLRATIO 2.3 08/10/2011      -

## 2025-07-10 ENCOUNTER — HOSPITAL ENCOUNTER (OUTPATIENT)
Age: 75
Discharge: HOME | End: 2025-07-10
Payer: MEDICARE

## 2025-07-10 DIAGNOSIS — E03.9 ACQUIRED HYPOTHYROIDISM: ICD-10-CM

## 2025-07-10 DIAGNOSIS — N18.32 STAGE 3B CHRONIC KIDNEY DISEASE (HCC): ICD-10-CM

## 2025-07-10 LAB
ANION GAP SERPL CALCULATED.3IONS-SCNC: 12 MMOL/L (ref 3–16)
BUN SERPL-MCNC: 19 MG/DL (ref 7–20)
CALCIUM SERPL-MCNC: 10.1 MG/DL (ref 8.3–10.6)
CHLORIDE SERPL-SCNC: 100 MMOL/L (ref 99–110)
CO2 SERPL-SCNC: 23 MMOL/L (ref 21–32)
CREAT SERPL-MCNC: 1.4 MG/DL (ref 0.6–1.2)
GFR SERPLBLD CREATININE-BSD FMLA CKD-EPI: 39 ML/MIN/{1.73_M2}
GLUCOSE SERPL-MCNC: 92 MG/DL (ref 70–99)
POTASSIUM SERPL-SCNC: 4.5 MMOL/L (ref 3.5–5.1)
SODIUM SERPL-SCNC: 135 MMOL/L (ref 136–145)

## 2025-07-10 PROCEDURE — 36415 COLL VENOUS BLD VENIPUNCTURE: CPT

## 2025-07-10 PROCEDURE — 80048 BASIC METABOLIC PNL TOTAL CA: CPT

## 2025-07-14 RX ORDER — LEVOTHYROXINE SODIUM 100 UG/1
100 TABLET ORAL DAILY
Qty: 90 TABLET | Refills: 1 | Status: SHIPPED | OUTPATIENT
Start: 2025-07-14

## 2025-07-19 ASSESSMENT — ENCOUNTER SYMPTOMS
VOICE CHANGE: 0
SORE THROAT: 0
TROUBLE SWALLOWING: 0
ABDOMINAL PAIN: 0
COUGH: 0
EYE DISCHARGE: 0
WHEEZING: 0
EYE PAIN: 0
ABDOMINAL DISTENTION: 0
BACK PAIN: 0
CHEST TIGHTNESS: 0
EYE REDNESS: 0
COLOR CHANGE: 0
VOMITING: 0
ANAL BLEEDING: 0
PHOTOPHOBIA: 0
BLOOD IN STOOL: 0
SHORTNESS OF BREATH: 0
RHINORRHEA: 0
CHOKING: 0
CONSTIPATION: 0
NAUSEA: 0
RECTAL PAIN: 0
STRIDOR: 0
APNEA: 0
DIARRHEA: 0
EYE ITCHING: 0

## 2025-08-11 ENCOUNTER — HOSPITAL ENCOUNTER (OUTPATIENT)
Dept: OTHER | Age: 75
Discharge: HOME | End: 2025-08-11
Payer: MEDICARE

## 2025-08-11 DIAGNOSIS — I50.32 CHRONIC HEART FAILURE WITH PRESERVED EJECTION FRACTION (HCC): ICD-10-CM

## 2025-08-11 DIAGNOSIS — I10 ESSENTIAL HYPERTENSION: ICD-10-CM

## 2025-08-11 DIAGNOSIS — N18.32 STAGE 3B CHRONIC KIDNEY DISEASE (HCC): ICD-10-CM

## 2025-08-11 LAB
ANION GAP SERPL CALCULATED.3IONS-SCNC: 11 MMOL/L (ref 3–16)
BUN SERPL-MCNC: 19 MG/DL (ref 7–20)
CALCIUM SERPL-MCNC: 9.8 MG/DL (ref 8.3–10.6)
CHLORIDE SERPL-SCNC: 104 MMOL/L (ref 99–110)
CO2 SERPL-SCNC: 24 MMOL/L (ref 21–32)
CREAT SERPL-MCNC: 1.5 MG/DL (ref 0.6–1.2)
GFR SERPLBLD CREATININE-BSD FMLA CKD-EPI: 36 ML/MIN/{1.73_M2}
GLUCOSE SERPL-MCNC: 83 MG/DL (ref 70–99)
POTASSIUM SERPL-SCNC: 4.5 MMOL/L (ref 3.5–5.1)
SODIUM SERPL-SCNC: 139 MMOL/L (ref 136–145)

## 2025-08-11 PROCEDURE — 80048 BASIC METABOLIC PNL TOTAL CA: CPT

## 2025-08-11 PROCEDURE — 36415 COLL VENOUS BLD VENIPUNCTURE: CPT

## 2025-08-12 ENCOUNTER — TELEPHONE (OUTPATIENT)
Dept: ORTHOPEDIC SURGERY | Age: 75
End: 2025-08-12

## 2025-08-24 DIAGNOSIS — U07.1 COVID-19 VIRUS INFECTION: ICD-10-CM

## 2025-08-25 ENCOUNTER — TELEPHONE (OUTPATIENT)
Dept: ORTHOPEDIC SURGERY | Age: 75
End: 2025-08-25

## 2025-08-25 DIAGNOSIS — M17.0 PRIMARY OSTEOARTHRITIS OF BOTH KNEES: Primary | ICD-10-CM

## 2025-08-26 RX ORDER — FAMOTIDINE 20 MG/1
20 TABLET, FILM COATED ORAL 2 TIMES DAILY
Qty: 180 TABLET | Refills: 1 | Status: SHIPPED | OUTPATIENT
Start: 2025-08-25

## (undated) DEVICE — SUTURE STRATAFIX SPRL SZ 2 0 L14IN ABSRB UD MH L36MM 1 2 CIR SXMP2B401

## (undated) DEVICE — CORD,CAUTERY,BIPOLAR,STERILE: Brand: MEDLINE

## (undated) DEVICE — COVER LT HNDL BLU PLAS

## (undated) DEVICE — SYRINGE MED 10ML TRNSLUC BRL PLUNG BLK MRK POLYPR CTRL

## (undated) DEVICE — SURGICAL SUCTION CONNECTING TUBE WITH MALE CONNECTOR AND SUCTION CLAMP, 2 FT. LONG (.6 M), 5 MM I.D.: Brand: CONMED

## (undated) DEVICE — GAUZE,SPONGE,4"X4",16PLY,XRAY,STRL,LF: Brand: MEDLINE

## (undated) DEVICE — DRAPE: MAGNETIC 12X16 30/CS: Brand: MEDICAL ACTION INDUSTRIES

## (undated) DEVICE — TOWEL,OR,DSP,ST,BLUE,STD,4/PK,20PK/CS: Brand: MEDLINE

## (undated) DEVICE — ENDOTRACH TUBE 8229308 NIM EMG 8MM ROHS: Brand: NIM®

## (undated) DEVICE — MERCY HEALTH WEST TURNOVER: Brand: MEDLINE INDUSTRIES, INC.

## (undated) DEVICE — SUTURE MONOCRYL + SZ 4-0 L27IN ABSRB UD L19MM PS-2 3/8 CIR MCP426H

## (undated) DEVICE — SUTURE PERMA-HAND SZ 2-0 L30IN NONABSORBABLE BLK L26MM SH K833H

## (undated) DEVICE — HYPODERMIC SAFETY NEEDLE: Brand: MAGELLAN

## (undated) DEVICE — SOLUTION IV IRRIG WATER 500ML POUR BRL ST 2F7113

## (undated) DEVICE — 3M™ IOBAN™ 2 ANTIMICROBIAL INCISE DRAPE 6650EZ: Brand: IOBAN™ 2

## (undated) DEVICE — GLOVE ORANGE PI 8 1/2   MSG9085

## (undated) DEVICE — PROTECTOR EYE PT SELF ADH NS OPT GRD LF

## (undated) DEVICE — GLOVE SURG SZ 85 L12IN FNGR ORTHO 126MIL CRM LTX FREE

## (undated) DEVICE — MAT FLR W32XL58IN

## (undated) DEVICE — SYRINGE IRRIG 60ML SFT PLIABLE BLB EZ TO GRP 1 HND USE W/

## (undated) DEVICE — 3M™ IOBAN™ 2 ANTIMICROBIAL INCISE DRAPE 6651EZ: Brand: IOBAN™ 2

## (undated) DEVICE — INTENDED FOR TISSUE SEPARATION, AND OTHER PROCEDURES THAT REQUIRE A SHARP SURGICAL BLADE TO PUNCTURE OR CUT.: Brand: BARD-PARKER ® STAINLESS STEEL BLADES

## (undated) DEVICE — SUTURE ABSORBABLE MONOFILAMENT 1-0 OS8 14 IN STRATAFIX SPRL SXPD2B202

## (undated) DEVICE — 1010 S-DRAPE TOWEL DRAPE 10/BX: Brand: STERI-DRAPE™

## (undated) DEVICE — MOUTHPIECE ENDOSCP L CTRL OPN AND SIDE PORTS DISP

## (undated) DEVICE — BLANKET WRM W40.2XL55.9IN IORT LO BODY + MISTRAL AIR

## (undated) DEVICE — C-ARM: Brand: UNBRANDED

## (undated) DEVICE — BW-412T DISP COMBO CLEANING BRUSH: Brand: SINGLE USE COMBINATION CLEANING BRUSH

## (undated) DEVICE — SOLUTION IRRIG 1000ML 0.9% SOD CHL USP POUR PLAS BTL

## (undated) DEVICE — HYPODERMIC SAFETY NEEDLE: Brand: MONOJECT

## (undated) DEVICE — 1LYRTR 16FR10ML 100%SILI SNAP: Brand: MEDLINE INDUSTRIES, INC.

## (undated) DEVICE — BLADE ES L6IN ELASTOMERIC COAT EXT DURABLE BEND UPTO 90DEG

## (undated) DEVICE — TOWEL,OR,DSP,ST,BLUE,STD,6/PK,12PK/CS: Brand: MEDLINE

## (undated) DEVICE — GLOVE SURG SZ 8 L12IN FNGR THK79MIL GRN LTX FREE

## (undated) DEVICE — SOLUTION PREP PAINT POV IOD FOR SKIN MUCOUS MEM

## (undated) DEVICE — SUTURE STRATAFIX SPRL SZ 3-0 L12IN ABSRB UD FS-1 L30X30CM SXMP2B410

## (undated) DEVICE — ADHESIVE SKIN CLOSURE WND 8.661X1.5 IN 22 CM LIQUIBAND SECUR

## (undated) DEVICE — GLOVE ORTHO 8   MSG9480

## (undated) DEVICE — PROBE 8225825X 3PK INCREMT STD PRASS TIP: Brand: NIM

## (undated) DEVICE — TOTAL BASIC: Brand: MEDLINE INDUSTRIES, INC.

## (undated) DEVICE — HEADREST POS OD9IN THICKNESS 2IN RASPBERRY FOAM RNG CUSH

## (undated) DEVICE — ELECTRODE PT RET AD L9FT HI MOIST COND ADH HYDRGEL CORDED

## (undated) DEVICE — VALVE SUCTION AIR H2O SET ORCA POD + DISP

## (undated) DEVICE — SYRINGE, LUER LOCK, 10ML: Brand: MEDLINE

## (undated) DEVICE — CORD ES L12FT BPLR FRCP

## (undated) DEVICE — GLOVE ORANGE PI 8   MSG9080

## (undated) DEVICE — BLADE ES ELASTOMERIC COAT INSUL DURABLE BEND UPTO 90DEG

## (undated) DEVICE — FORCEPS BX L240CM WRK CHN 2.8MM STD CAP W/ NDL MIC MESH

## (undated) DEVICE — 3M™ STERI-DRAPE™ INSTRUMENT POUCH 1018: Brand: STERI-DRAPE™

## (undated) DEVICE — CONTAINER,SPECIMEN,OR STERILE,4OZ: Brand: MEDLINE

## (undated) DEVICE — SOLUTION IV 1000ML 0.9% SOD CHL FOR IRRIG PLAS CONT

## (undated) DEVICE — AIR/WATER CLEANING ADAPTER FOR OLYMPUS® GI ENDOSCOPE: Brand: BULLDOG®

## (undated) DEVICE — SUTURE CHROMIC GUT SZ 2-0 L27IN ABSRB BRN L26MM SH 1/2 CIR G123H

## (undated) DEVICE — RETRACTOR SURG WIDE FLAT LO PROF LTWT PHOTONGUIDE

## (undated) DEVICE — ENDOSCOPIC KIT 6X3/16 FT COLON W/ 1.1 OZ 2 GWN W/O BRSH

## (undated) DEVICE — SPONGE,PEANUT,XRAY,ST,SM,3/8",5/CARD: Brand: MEDLINE INDUSTRIES, INC.

## (undated) DEVICE — MERCY FAIRFIELD TURNOVER KIT: Brand: MEDLINE INDUSTRIES, INC.

## (undated) DEVICE — DRAPE,T,LAPARO,TRANS,STERILE: Brand: MEDLINE

## (undated) DEVICE — 6619 2 PTNT ISO SYS INCISE AREA&LT;(&GT;&&LT;)&GT;P: Brand: STERI-DRAPE™ IOBAN™ 2

## (undated) DEVICE — YANKAUER,BULB TIP,W/O VENT,RIGID,STERILE: Brand: MEDLINE

## (undated) DEVICE — GAUZE,SPONGE,4"X4",8PLY,STRL,LF,10/TRAY: Brand: MEDLINE

## (undated) DEVICE — STAPLER SKIN H3.9MM WIRE DIA0.58MM CRWN 6.9MM 35 STPL ROT

## (undated) DEVICE — SHEARS ENDOSCP L9CM CRV HARM FOCS +

## (undated) DEVICE — SHEET,DRAPE,53X77,STERILE: Brand: MEDLINE

## (undated) DEVICE — DISPOSABLE 9450003 ELECTRO TWST PAIR 8CH

## (undated) DEVICE — ELECTRODE 8227410 PAIRED 2 CH SET ROHS

## (undated) DEVICE — APPLICATOR PREP 26ML 0.7% IOD POVACRYLEX 74% ISO ALC ST

## (undated) DEVICE — BIPOLAR SEALER 23-112-1 AQM 6.0: Brand: AQUAMANTYS ®

## (undated) DEVICE — PENROSE DRAIN 12" X 1/4: Brand: CARDINAL HEALTH

## (undated) DEVICE — KIT POS FOAM HANA TBL

## (undated) DEVICE — BASIC SINGLE BASIN 1-LF: Brand: MEDLINE INDUSTRIES, INC.

## (undated) DEVICE — MAJOR SET UP PK